# Patient Record
Sex: FEMALE | Race: BLACK OR AFRICAN AMERICAN | NOT HISPANIC OR LATINO | Employment: OTHER | URBAN - METROPOLITAN AREA
[De-identification: names, ages, dates, MRNs, and addresses within clinical notes are randomized per-mention and may not be internally consistent; named-entity substitution may affect disease eponyms.]

---

## 2017-06-08 DIAGNOSIS — Z12.31 VISIT FOR SCREENING MAMMOGRAM: Primary | ICD-10-CM

## 2017-08-10 ENCOUNTER — HOSPITAL ENCOUNTER (OUTPATIENT)
Dept: RADIOLOGY | Facility: HOSPITAL | Age: 82
Discharge: HOME | End: 2017-08-10
Attending: SPECIALIST
Payer: MEDICARE

## 2017-08-10 VITALS — WEIGHT: 190 LBS | HEIGHT: 65 IN | BODY MASS INDEX: 31.65 KG/M2

## 2017-08-10 DIAGNOSIS — Z12.31 VISIT FOR SCREENING MAMMOGRAM: ICD-10-CM

## 2017-08-10 PROCEDURE — 77067 SCR MAMMO BI INCL CAD: CPT | Mod: 26,,, | Performed by: RADIOLOGY

## 2017-08-10 PROCEDURE — 77063 BREAST TOMOSYNTHESIS BI: CPT | Mod: 26,,, | Performed by: RADIOLOGY

## 2017-08-10 PROCEDURE — 77067 SCR MAMMO BI INCL CAD: CPT | Mod: TC

## 2018-09-13 DIAGNOSIS — Z12.31 SCREENING MAMMOGRAM, ENCOUNTER FOR: Primary | ICD-10-CM

## 2018-09-20 ENCOUNTER — HOSPITAL ENCOUNTER (OUTPATIENT)
Dept: RADIOLOGY | Facility: HOSPITAL | Age: 83
Discharge: HOME | End: 2018-09-20
Attending: SPECIALIST
Payer: MEDICARE

## 2018-09-20 DIAGNOSIS — Z12.31 SCREENING MAMMOGRAM, ENCOUNTER FOR: ICD-10-CM

## 2018-09-20 PROCEDURE — 77067 SCR MAMMO BI INCL CAD: CPT | Mod: 26,,, | Performed by: RADIOLOGY

## 2018-09-20 PROCEDURE — 77063 BREAST TOMOSYNTHESIS BI: CPT | Mod: 26,,, | Performed by: RADIOLOGY

## 2018-09-20 PROCEDURE — 77067 SCR MAMMO BI INCL CAD: CPT | Mod: TC

## 2020-01-16 ENCOUNTER — HOSPITAL ENCOUNTER (OUTPATIENT)
Dept: RADIOLOGY | Facility: HOSPITAL | Age: 85
Discharge: HOME | End: 2020-01-16
Attending: SPECIALIST
Payer: MEDICARE

## 2020-01-16 DIAGNOSIS — Z12.31 SCREENING MAMMOGRAM, ENCOUNTER FOR: ICD-10-CM

## 2020-01-16 PROCEDURE — 77067 SCR MAMMO BI INCL CAD: CPT | Mod: 26,,, | Performed by: RADIOLOGY

## 2020-01-16 PROCEDURE — 77063 BREAST TOMOSYNTHESIS BI: CPT | Mod: 26,,, | Performed by: RADIOLOGY

## 2020-01-16 PROCEDURE — 77063 MAMMO DIGITAL SCREENING BILAT WITH TOMOSYNTHESIS_CAD: ICD-10-PCS | Mod: 26,,, | Performed by: RADIOLOGY

## 2020-01-16 PROCEDURE — 77067 MAMMO DIGITAL SCREENING BILAT WITH TOMOSYNTHESIS_CAD: ICD-10-PCS | Mod: 26,,, | Performed by: RADIOLOGY

## 2020-01-16 PROCEDURE — 77067 SCR MAMMO BI INCL CAD: CPT | Mod: TC

## 2020-06-17 ENCOUNTER — HOSPITAL ENCOUNTER (INPATIENT)
Facility: HOSPITAL | Age: 85
LOS: 5 days | Discharge: NURSING, REHAB OR LONG-TERM CARE | DRG: 068 | End: 2020-06-22
Attending: EMERGENCY MEDICINE | Admitting: INTERNAL MEDICINE
Payer: MEDICARE

## 2020-06-17 DIAGNOSIS — I67.9 INTRACRANIAL VASCULAR STENOSIS: ICD-10-CM

## 2020-06-17 DIAGNOSIS — E04.9 SUBSTERNAL THYROID GOITER: ICD-10-CM

## 2020-06-17 DIAGNOSIS — I63.312 THROMBOTIC STROKE INVOLVING LEFT MIDDLE CEREBRAL ARTERY: ICD-10-CM

## 2020-06-17 DIAGNOSIS — I63.9 STROKE: ICD-10-CM

## 2020-06-17 DIAGNOSIS — E78.2 MIXED HYPERLIPIDEMIA: ICD-10-CM

## 2020-06-17 DIAGNOSIS — I67.1 ANEURYSM OF MIDDLE CEREBRAL ARTERY: ICD-10-CM

## 2020-06-17 DIAGNOSIS — E04.2 MULTINODULAR GOITER (NONTOXIC): Chronic | ICD-10-CM

## 2020-06-17 DIAGNOSIS — G45.9 TIA (TRANSIENT ISCHEMIC ATTACK): Primary | ICD-10-CM

## 2020-06-17 DIAGNOSIS — N17.9 AKI (ACUTE KIDNEY INJURY): ICD-10-CM

## 2020-06-17 DIAGNOSIS — I10 UNCONTROLLED HYPERTENSION: ICD-10-CM

## 2020-06-17 PROBLEM — Z86.79 HX OF ANEURYSM: Chronic | Status: ACTIVE | Noted: 2020-06-17

## 2020-06-17 LAB
ALBUMIN SERPL BCP-MCNC: 3.8 G/DL (ref 3.5–5.2)
ALLENS TEST: ABNORMAL
ALLENS TEST: NORMAL
ALP SERPL-CCNC: 87 U/L (ref 55–135)
ALT SERPL W/O P-5'-P-CCNC: 10 U/L (ref 10–44)
ANION GAP SERPL CALC-SCNC: 5 MMOL/L (ref 8–16)
AORTIC ROOT ANNULUS: 3.36 CM
ASCENDING AORTA: 2.98 CM
AST SERPL-CCNC: 16 U/L (ref 10–40)
AV INDEX (PROSTH): 0.75
AV MEAN GRADIENT: 3 MMHG
AV PEAK GRADIENT: 6 MMHG
AV VALVE AREA: 2.73 CM2
AV VELOCITY RATIO: 0.66
BASOPHILS # BLD AUTO: 0.05 K/UL (ref 0–0.2)
BASOPHILS NFR BLD: 0.8 % (ref 0–1.9)
BILIRUB SERPL-MCNC: 0.4 MG/DL (ref 0.1–1)
BSA FOR ECHO PROCEDURE: 1.99 M2
BUN SERPL-MCNC: 12 MG/DL (ref 8–23)
CALCIUM SERPL-MCNC: 10.1 MG/DL (ref 8.7–10.5)
CHLORIDE SERPL-SCNC: 106 MMOL/L (ref 95–110)
CHOLEST SERPL-MCNC: 241 MG/DL (ref 120–199)
CHOLEST/HDLC SERPL: 4.1 {RATIO} (ref 2–5)
CK SERPL-CCNC: 99 U/L (ref 20–180)
CO2 SERPL-SCNC: 29 MMOL/L (ref 23–29)
CREAT SERPL-MCNC: 0.8 MG/DL (ref 0.5–1.4)
CREAT SERPL-MCNC: 0.9 MG/DL (ref 0.5–1.4)
CV ECHO LV RWT: 0.46 CM
DIFFERENTIAL METHOD: ABNORMAL
DOP CALC AO PEAK VEL: 1.27 M/S
DOP CALC AO VTI: 19.91 CM
DOP CALC LVOT AREA: 3.6 CM2
DOP CALC LVOT DIAMETER: 2.15 CM
DOP CALC LVOT PEAK VEL: 0.84 M/S
DOP CALC LVOT STROKE VOLUME: 54.39 CM3
DOP CALCLVOT PEAK VEL VTI: 14.99 CM
ECHO LV POSTERIOR WALL: 1.05 CM (ref 0.6–1.1)
EOSINOPHIL # BLD AUTO: 0 K/UL (ref 0–0.5)
EOSINOPHIL NFR BLD: 0.5 % (ref 0–8)
ERYTHROCYTE [DISTWIDTH] IN BLOOD BY AUTOMATED COUNT: 12.9 % (ref 11.5–14.5)
EST. GFR  (AFRICAN AMERICAN): >60 ML/MIN/1.73 M^2
EST. GFR  (NON AFRICAN AMERICAN): 59 ML/MIN/1.73 M^2
ESTIMATED AVG GLUCOSE: 120 MG/DL (ref 68–131)
FRACTIONAL SHORTENING: 25 % (ref 28–44)
GLUCOSE SERPL-MCNC: 122 MG/DL (ref 70–110)
HBA1C MFR BLD HPLC: 5.8 % (ref 4–5.6)
HCT VFR BLD AUTO: 44.4 % (ref 37–48.5)
HDLC SERPL-MCNC: 59 MG/DL (ref 40–75)
HDLC SERPL: 24.5 % (ref 20–50)
HGB BLD-MCNC: 14.1 G/DL (ref 12–16)
IMM GRANULOCYTES # BLD AUTO: 0.02 K/UL (ref 0–0.04)
IMM GRANULOCYTES NFR BLD AUTO: 0.3 % (ref 0–0.5)
INR PPP: 1 (ref 0.8–1.2)
INTERVENTRICULAR SEPTUM: 0.97 CM (ref 0.6–1.1)
IVRT: 111.32 MSEC
LA MAJOR: 4.76 CM
LA MINOR: 4.99 CM
LA WIDTH: 3.48 CM
LDLC SERPL CALC-MCNC: 152.8 MG/DL (ref 63–159)
LEFT ATRIUM SIZE: 3.68 CM
LEFT ATRIUM VOLUME INDEX: 27.4 ML/M2
LEFT ATRIUM VOLUME: 53.04 CM3
LEFT INTERNAL DIMENSION IN SYSTOLE: 3.39 CM (ref 2.1–4)
LEFT VENTRICLE DIASTOLIC VOLUME INDEX: 48.34 ML/M2
LEFT VENTRICLE DIASTOLIC VOLUME: 93.56 ML
LEFT VENTRICLE MASS INDEX: 81 G/M2
LEFT VENTRICLE SYSTOLIC VOLUME INDEX: 24.4 ML/M2
LEFT VENTRICLE SYSTOLIC VOLUME: 47.18 ML
LEFT VENTRICULAR INTERNAL DIMENSION IN DIASTOLE: 4.52 CM (ref 3.5–6)
LEFT VENTRICULAR MASS: 156.5 G
LYMPHOCYTES # BLD AUTO: 1.5 K/UL (ref 1–4.8)
LYMPHOCYTES NFR BLD: 25.6 % (ref 18–48)
MCH RBC QN AUTO: 30.1 PG (ref 27–31)
MCHC RBC AUTO-ENTMCNC: 31.8 G/DL (ref 32–36)
MCV RBC AUTO: 95 FL (ref 82–98)
MONOCYTES # BLD AUTO: 0.3 K/UL (ref 0.3–1)
MONOCYTES NFR BLD: 5 % (ref 4–15)
NEUTROPHILS # BLD AUTO: 4.1 K/UL (ref 1.8–7.7)
NEUTROPHILS NFR BLD: 67.8 % (ref 38–73)
NONHDLC SERPL-MCNC: 182 MG/DL
NRBC BLD-RTO: 0 /100 WBC
PISA TR MAX VEL: 1.88 M/S
PLATELET # BLD AUTO: 296 K/UL (ref 150–350)
PMV BLD AUTO: 9.6 FL (ref 9.2–12.9)
POC PTINR: 1.3 (ref 0.9–1.2)
POC PTWBT: 16 SEC (ref 9.7–14.3)
POCT GLUCOSE: 117 MG/DL (ref 70–110)
POCT GLUCOSE: 122 MG/DL (ref 70–110)
POTASSIUM SERPL-SCNC: 4.2 MMOL/L (ref 3.5–5.1)
PROT SERPL-MCNC: 7.6 G/DL (ref 6–8.4)
PROTHROMBIN TIME: 10.8 SEC (ref 9–12.5)
PULM VEIN S/D RATIO: 1.53
PV PEAK D VEL: 0.15 M/S
PV PEAK S VEL: 0.23 M/S
RA MAJOR: 4.28 CM
RA PRESSURE: 3 MMHG
RA WIDTH: 4.35 CM
RBC # BLD AUTO: 4.68 M/UL (ref 4–5.4)
RIGHT VENTRICULAR END-DIASTOLIC DIMENSION: 2.69 CM
SAMPLE: ABNORMAL
SAMPLE: NORMAL
SARS-COV-2 RDRP RESP QL NAA+PROBE: NEGATIVE
SITE: ABNORMAL
SITE: NORMAL
SODIUM SERPL-SCNC: 140 MMOL/L (ref 136–145)
STJ: 3.1 CM
TR MAX PG: 14 MMHG
TRICUSPID ANNULAR PLANE SYSTOLIC EXCURSION: 3.05 CM
TRIGL SERPL-MCNC: 146 MG/DL (ref 30–150)
TROPONIN I SERPL DL<=0.01 NG/ML-MCNC: 0.01 NG/ML (ref 0–0.03)
TROPONIN I SERPL DL<=0.01 NG/ML-MCNC: 0.02 NG/ML (ref 0–0.03)
TSH SERPL DL<=0.005 MIU/L-ACNC: 0.56 UIU/ML (ref 0.4–4)
TV REST PULMONARY ARTERY PRESSURE: 17 MMHG
WBC # BLD AUTO: 6.01 K/UL (ref 3.9–12.7)

## 2020-06-17 PROCEDURE — G0426 PR INPT TELEHEALTH CONSULT 50M: ICD-10-PCS | Mod: GT,,, | Performed by: PSYCHIATRY & NEUROLOGY

## 2020-06-17 PROCEDURE — 63600175 PHARM REV CODE 636 W HCPCS: Performed by: STUDENT IN AN ORGANIZED HEALTH CARE EDUCATION/TRAINING PROGRAM

## 2020-06-17 PROCEDURE — 36415 COLL VENOUS BLD VENIPUNCTURE: CPT

## 2020-06-17 PROCEDURE — 94761 N-INVAS EAR/PLS OXIMETRY MLT: CPT

## 2020-06-17 PROCEDURE — 92610 EVALUATE SWALLOWING FUNCTION: CPT

## 2020-06-17 PROCEDURE — 93010 EKG 12-LEAD: ICD-10-PCS | Mod: ,,, | Performed by: INTERNAL MEDICINE

## 2020-06-17 PROCEDURE — 99900035 HC TECH TIME PER 15 MIN (STAT)

## 2020-06-17 PROCEDURE — 84484 ASSAY OF TROPONIN QUANT: CPT

## 2020-06-17 PROCEDURE — 93005 ELECTROCARDIOGRAM TRACING: CPT

## 2020-06-17 PROCEDURE — 25000003 PHARM REV CODE 250: Performed by: STUDENT IN AN ORGANIZED HEALTH CARE EDUCATION/TRAINING PROGRAM

## 2020-06-17 PROCEDURE — 82607 VITAMIN B-12: CPT

## 2020-06-17 PROCEDURE — 25000003 PHARM REV CODE 250: Performed by: EMERGENCY MEDICINE

## 2020-06-17 PROCEDURE — 11000001 HC ACUTE MED/SURG PRIVATE ROOM

## 2020-06-17 PROCEDURE — 93010 ELECTROCARDIOGRAM REPORT: CPT | Mod: ,,, | Performed by: INTERNAL MEDICINE

## 2020-06-17 PROCEDURE — G0426 INPT/ED TELECONSULT50: HCPCS | Mod: GT,,, | Performed by: PSYCHIATRY & NEUROLOGY

## 2020-06-17 PROCEDURE — 80061 LIPID PANEL: CPT

## 2020-06-17 PROCEDURE — 92523 SPEECH SOUND LANG COMPREHEN: CPT

## 2020-06-17 PROCEDURE — 96374 THER/PROPH/DIAG INJ IV PUSH: CPT

## 2020-06-17 PROCEDURE — 82746 ASSAY OF FOLIC ACID SERUM: CPT

## 2020-06-17 PROCEDURE — 25500020 PHARM REV CODE 255: Performed by: INTERNAL MEDICINE

## 2020-06-17 PROCEDURE — 80053 COMPREHEN METABOLIC PANEL: CPT

## 2020-06-17 PROCEDURE — 84443 ASSAY THYROID STIM HORMONE: CPT

## 2020-06-17 PROCEDURE — 99291 CRITICAL CARE FIRST HOUR: CPT | Mod: 25

## 2020-06-17 PROCEDURE — G0378 HOSPITAL OBSERVATION PER HR: HCPCS

## 2020-06-17 PROCEDURE — 85025 COMPLETE CBC W/AUTO DIFF WBC: CPT

## 2020-06-17 PROCEDURE — 82550 ASSAY OF CK (CPK): CPT

## 2020-06-17 PROCEDURE — 85610 PROTHROMBIN TIME: CPT

## 2020-06-17 PROCEDURE — 83036 HEMOGLOBIN GLYCOSYLATED A1C: CPT

## 2020-06-17 PROCEDURE — 82565 ASSAY OF CREATININE: CPT

## 2020-06-17 PROCEDURE — U0002 COVID-19 LAB TEST NON-CDC: HCPCS

## 2020-06-17 PROCEDURE — 96372 THER/PROPH/DIAG INJ SC/IM: CPT | Mod: 59

## 2020-06-17 PROCEDURE — 93010 ELECTROCARDIOGRAM REPORT: CPT | Mod: 76,,, | Performed by: INTERNAL MEDICINE

## 2020-06-17 RX ORDER — LABETALOL HYDROCHLORIDE 5 MG/ML
10 INJECTION, SOLUTION INTRAVENOUS
Status: DISCONTINUED | OUTPATIENT
Start: 2020-06-17 | End: 2020-06-22 | Stop reason: HOSPADM

## 2020-06-17 RX ORDER — ENOXAPARIN SODIUM 100 MG/ML
40 INJECTION SUBCUTANEOUS EVERY 24 HOURS
Status: DISCONTINUED | OUTPATIENT
Start: 2020-06-17 | End: 2020-06-22 | Stop reason: HOSPADM

## 2020-06-17 RX ORDER — SODIUM CHLORIDE 0.9 % (FLUSH) 0.9 %
10 SYRINGE (ML) INJECTION
Status: DISCONTINUED | OUTPATIENT
Start: 2020-06-17 | End: 2020-06-22 | Stop reason: HOSPADM

## 2020-06-17 RX ORDER — NAPROXEN SODIUM 220 MG/1
81 TABLET, FILM COATED ORAL DAILY
Status: DISCONTINUED | OUTPATIENT
Start: 2020-06-18 | End: 2020-06-22 | Stop reason: HOSPADM

## 2020-06-17 RX ORDER — ASPIRIN 325 MG
325 TABLET ORAL
Status: COMPLETED | OUTPATIENT
Start: 2020-06-17 | End: 2020-06-17

## 2020-06-17 RX ORDER — CLOPIDOGREL BISULFATE 75 MG/1
75 TABLET ORAL DAILY
Status: DISCONTINUED | OUTPATIENT
Start: 2020-06-18 | End: 2020-06-22 | Stop reason: HOSPADM

## 2020-06-17 RX ORDER — ATORVASTATIN CALCIUM 40 MG/1
80 TABLET, FILM COATED ORAL DAILY
Status: DISCONTINUED | OUTPATIENT
Start: 2020-06-17 | End: 2020-06-22 | Stop reason: HOSPADM

## 2020-06-17 RX ORDER — CLOPIDOGREL BISULFATE 75 MG/1
300 TABLET ORAL
Status: COMPLETED | OUTPATIENT
Start: 2020-06-17 | End: 2020-06-17

## 2020-06-17 RX ADMIN — ENOXAPARIN SODIUM 40 MG: 100 INJECTION SUBCUTANEOUS at 02:06

## 2020-06-17 RX ADMIN — ATORVASTATIN CALCIUM 80 MG: 40 TABLET, FILM COATED ORAL at 02:06

## 2020-06-17 RX ADMIN — LABETALOL HYDROCHLORIDE 10 MG: 5 INJECTION INTRAVENOUS at 04:06

## 2020-06-17 RX ADMIN — IOHEXOL 100 ML: 350 INJECTION, SOLUTION INTRAVENOUS at 02:06

## 2020-06-17 RX ADMIN — ASPIRIN 325 MG ORAL TABLET 325 MG: 325 PILL ORAL at 11:06

## 2020-06-17 RX ADMIN — CLOPIDOGREL BISULFATE 300 MG: 75 TABLET ORAL at 11:06

## 2020-06-17 NOTE — ED PROVIDER NOTES
"History       Chief Complaint   Patient presents with    Extremity Weakness     woke up at about 0645 this morning with weakness to her right arm and leg and a right facial droop. Last felt normal at about 2000 last night       HPI:    Margarita Chávez 84 y.o.  With a  has a past medical history of Hypertension. who presents to the emergency department today with a complaint of right sided weakness. Pt went to bed last night around 8:45pm in her normal state and when she woke up and tried to get out of bed she fell. She felt weak in her right arm and leg. She could not bear weight. She denies slurred speech, sensation changes, headache. She has HTN, takes her BP meds in the evening.      ROS    Constitutional: No fever, no chills.  Eyes: No discharge. No pain.  HENT: No nasal drainage. No ear ache. No sore throat.  Cardiovascular: No chest pain, no palpitations.  Respiratory: No cough, no shortness of breath.  Gastrointestinal: No abdominal pain, no vomiting. No diarrhea.  Genitourinary: No hematuria, dysuria, urgency.  Musculoskeletal: No back pain.   Skin: No rashes, no lesions.  Neurological: See above.     Otherwise remaining ROS negative     The history is provided by the patient     ALLERGIES REVIEWED  MEDICATIONS REVIEWED  PMH/PSH/SOC/FH REVIEWED     Past Medical History:   Diagnosis Date    Hypertension        Past Surgical History:   Procedure Laterality Date    BRAIN SURGERY      HYSTERECTOMY         Family History   Problem Relation Age of Onset    Cancer Paternal Aunt        Social History     Tobacco Use    Smoking status: Never Smoker   Substance Use Topics    Alcohol use: Not on file    Drug use: Not on file       Nursing/Ancillary staff note reviewed.  VS reviewed         Physical Exam   BP (!) 166/86   Pulse 91   Temp 98.2 °F (36.8 °C) (Oral)   Resp 16   Ht 5' 5" (1.651 m)   Wt 86.2 kg (190 lb)   LMP  (LMP Unknown)   SpO2 97%   BMI 31.62 kg/m²     Physical Exam  General Appearance: " The patient is alert, has no immediate need for airway protection and no signs of toxicity.     HEENT: Head: NCAT.     Eyes: Pupils equal and round no pallor or injection. Extra ocular movements intact. No nystagmus. No drainage.       Mouth: Mucous membranes are moist. Oropharynx clear.   Neck:Neck is supple non-tender. No lymphadenopathy. No stridor.   Respiratory: There are no retractions, lungs are clear to auscultation. No wheezing, no crackles. Chest wall nontender to palpation.   Cardiovascular: Regular rate and rhythm. No murmurs, rubs or gallops.  Gastrointestinal:  Abdomen is soft and non-tender, no masses, bowel sounds normal. No guarding, no rebound.  No pulsatile mass.   Neurological: Alert and oriented x 4.   Speech: No slurred speech, no dysarthria.   CN: Slight facial droop on right. Minor.    Strength: RUE and RLE weak   Drift: +RUE drift, + RLE drift.  Gait: Unable to assess     Skin: Warm and dry, no rashes.  Musculoskeletal:Musculoskeletal: Extremities are non-tender, non-swollen. Back NTTP along the midline.       NIHSS : 3      VAN Neg      DIFFERENTIAL DIAGNOSIS: After history and physical exam a differential diagnosis was considered, but was not limited to, CVA, TIA, hypertensive encephalopathy, seizure, migraine, hyperglycemia, hypoglycemia, metabolic derangement     CHART REVIEW: Known aneurysm,        Initial management: This is a 84 y.o. female who presents with right sided weakness, weakness on exam. Stroke protocol activated immediately. Labs, imaging, neuro consult, closely monitor and reassess.                EK bpm, NSR, LAD, LVH, no STEMI, read by myself read by cardiology pending.       Labs Reviewed   CBC W/ AUTO DIFFERENTIAL - Abnormal; Notable for the following components:       Result Value    Mean Corpuscular Hemoglobin Conc 31.8 (*)     All other components within normal limits   COMPREHENSIVE METABOLIC PANEL - Abnormal; Notable for the following components:    Glucose  122 (*)     Anion Gap 5 (*)     eGFR if non  59 (*)     All other components within normal limits   LIPID PANEL - Abnormal; Notable for the following components:    Cholesterol 241 (*)     All other components within normal limits   POCT GLUCOSE - Abnormal; Notable for the following components:    POCT Glucose 122 (*)     All other components within normal limits   ISTAT PROCEDURE - Abnormal; Notable for the following components:    POC PTWBT 16.0 (*)     POC PTINR 1.3 (*)     All other components within normal limits   PROTIME-INR   TSH   CK   SARS-COV-2 RNA AMPLIFICATION, QUAL   URINALYSIS, REFLEX TO URINE CULTURE   TROPONIN I   HEMOGLOBIN A1C   VITAMIN B12   FOLATE   POCT GLUCOSE, HAND-HELD DEVICE   ISTAT CREATININE       X-Ray Chest AP Portable   Final Result      As above.         Electronically signed by: Lewis Webb   Date:    06/17/2020   Time:    12:07      CT Head Without Contrast   Final Result      No acute intracranial abnormality.      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         Electronically signed by: Quincy Randolph MD   Date:    06/17/2020   Time:    11:30      MRI Brain Without Contrast    (Results Pending)   CTA Head and Neck (xpd)    (Results Pending)           ED Course     ED Course    I spoke with neurology, Dr Christianson, re:the pts presentation. At this time pt is out of window for TPA.       Is not a candidate for TPA due to : >4.5 hr after wake up stroke.      I spoke with [facility], Dr Archuleta for admission. They accept.       ED Course as of Jun 17 1353   Wed Jun 17, 2020   1133 Dr Christianson has seen pt, not a TPA candidate. Recommends admission for further evaluation. Asprin and plavix load. Permissive HTN SBP<220.     [JA]   1220 I spoke with Dr Arhculeta who will accept pt for admission after Neg Covid swab.     [JA]   1255 Pt to be admitted   SARS-CoV-2 RNA, Amplification,  Qual: Negative [JA]      ED Course User Index  [JA] Jade Mesa MD            [facility]  MDM  Number of Diagnoses or Management Options  Stroke: new, needed workup     Amount and/or Complexity of Data Reviewed  Clinical lab tests: reviewed and ordered  Tests in the radiology section of CPT®: ordered and reviewed  Tests in the medicine section of CPT®: ordered and reviewed  Discussion of test results with the performing providers: yes  Decide to obtain previous medical records or to obtain history from someone other than the patient: yes  Discuss the patient with other providers: yes  Independent visualization of images, tracings, or specimens: yes        Margarita Chávez  presents to the ED today with right sided weakness, stroke symptoms.  The pt will be admitted for further management.              Impression        The encounter diagnosis was Stroke.              Critical care time spent with this patient (not including billable procedures) was 32 minutes due to consideration for thrombolytics and possible CNS compromise.    Critical Care    Date/Time: 6/17/2020 11:47 AM  Performed by: Jade Mesa MD  Authorized by: Jade Mesa MD   Total critical care time (exclusive of procedural time) : 32 minutes  Critical care time was exclusive of separately billable procedures and treating other patients.  Critical care was necessary to treat or prevent imminent or life-threatening deterioration of the following conditions: CNS failure or compromise.  Critical care was time spent personally by me on the following activities: development of treatment plan with patient or surrogate, discussions with consultants, discussions with primary provider, examination of patient, obtaining history from patient or surrogate, ordering and performing treatments and interventions, ordering and review of laboratory studies, ordering and review of radiographic studies, pulse oximetry, re-evaluation of patient's condition and  review of old charts.                   Jade Mesa MD  06/17/20 2704

## 2020-06-17 NOTE — ED NOTES
Admitting team spoke with son and confirmed pt does have clip from previous brain aneurysm surgery.

## 2020-06-17 NOTE — CONSULTS
[facility]  Vascular Neurology  Comprehensive Stroke Center  Tele-Consultation Note      Inpatient consult to Telemedicine-Stroke  Consult performed by: Derik Christianson MD  Consult ordered by: Omar Mesa MD          Consulting Provider: OMAR MESA  Current Providers  No providers found    Patient Location:  [facility] EMERGENCY DEPARTMENT Emergency Department  Spoke hospital nurse at bedside with patient assisting consultant.     Patient information was obtained from patient and EMS personnel.         Assessment/Plan:     STROKE DOCUMENTATION     Acute Stroke Times:   Acute Stroke Times   Last Known Normal Date: 06/16/20  Last Known Normal Time: 2030  Symptom Onset Date: 06/17/20  Symptom Onset Time: 0645  Stroke Team Called Time: 1114  Stroke Team Arrival Time: 1119  CT Interpretation Time: 1119    NIH Scale:  1a. Level of Consciousness: 0-->Alert, keenly responsive  1b. LOC Questions: 0-->Answers both questions correctly  1c. LOC Commands: 0-->Performs both tasks correctly  2. Best Gaze: 0-->Normal  3. Visual: 0-->No visual loss  4. Facial Palsy: 1-->Minor paralysis (flattened nasolabial fold, asymmetry on smiling)  5a. Motor Arm, Left: 0-->No drift, limb holds 90 (or 45) degrees for full 10 secs  5b. Motor Arm, Right: 1-->Drift, limb holds 90 (or 45) degrees, but drifts down before full 10 secs, does not hit bed or other support  6a. Motor Leg, Left: 0-->No drift, leg holds 30 degree position for full 5 secs  6b. Motor Leg, Right: 1-->Drift, leg falls by the end of the 5-sec period but does not hit bed  7. Limb Ataxia: 0-->Absent  8. Sensory: 0-->Normal, no sensory loss  9. Best Language: 0-->No aphasia, normal  10. Dysarthria: 0-->Normal  11. Extinction and Inattention (formerly Neglect): 0-->No abnormality  Total (NIH Stroke Scale): 3     Modified Mónica    Charbel Coma Scale:    ABCD2 Score:    [id number] Score:   HAS -BLED Score:   ICH Score:   Hunt & Freedman  Classification:       Diagnoses:   Thrombotic stroke involving left middle cerebral artery  Wake up stroke with mild right hemiparesis, NIHSS 3.     Antithrombotics for secondary stroke prevention:  Load aspirin 325 mg & clopidogrel 300 mg x 1 now, followed by daily aspirin 81 mg /  clopidogrel 75 mg x 30 days followed by monotherapy therafter    Statins for secondary stroke prevention and hyperlipidemia, if present:   Statins: Atorvastatin- 80 mg daily    Aggressive risk factor modification: HTN     Rehab efforts: The patient has been evaluated by a stroke team provider and the therapy needs have been fully considered based off the presenting complaints and exam findings. The following therapy evaluations are needed: PT evaluate and treat, OT evaluate and treat, SLP evaluate and treat    Diagnostics ordered/pending: CTA Head to assess vasculature , CTA Neck/Arch to assess vasculature, HgbA1C to assess blood glucose levels, Lipid Profile to assess cholesterol levels, MRI head without contrast to assess brain parenchyma, TTE to assess cardiac function/status     VTE prophylaxis: Enoxaparin 40 mg SQ every 24 hours    BP parameters: Infarct: No intervention, SBP <220            There were no vitals taken for this visit.  Alteplase Eligible?: No  Alteplase Recommendation: Alteplase not recommended due to Outside of treatment window  and Mild Non-Disabling Symptoms  Possible Interventional Revascularization Candidate? VAN-    Disposition Recommendation: admit to inpatient    Subjective:     History of Present Illness:  84F w/ wake up right hemiparesis. The aforementioned symptoms have never happened before. There are no identified triggers or modifying factors. There have been no recurrent events. There are no other associated symptoms.          Woke up with symptoms?: yes    Recent bleeding noted: no  Does the patient take any Blood Thinners? no  Medications: No relevant medications      Past Medical History:   Past  Medical History:   Diagnosis Date    Hypertension      Past Surgical History: no major surgeries within the last 2 weeks    Family History: no relevant history    Social History: no smoking, no drinking, no drugs    Allergies: Penicillins No relevant allergies    Review of Systems   Constitutional: Negative for appetite change.   HENT: Negative for congestion.    Eyes: Negative for discharge.   Respiratory: Negative for shortness of breath.    Cardiovascular: Negative for chest pain.   Gastrointestinal: Negative for abdominal pain.   Endocrine: Negative for cold intolerance.   Genitourinary: Negative for difficulty urinating.   Musculoskeletal: Negative for joint swelling.   Skin: Negative for color change.     Objective:   Vitals:  /95, , RR 12, o2 97    CT READ: Yes  No hemmorhage. No mass effect. No early infarct signs.     Physical Exam  Constitutional:       General: She is not in acute distress.  HENT:      Head: Normocephalic.      Right Ear: External ear normal.      Left Ear: External ear normal.   Eyes:      Conjunctiva/sclera: Conjunctivae normal.   Neck:      Musculoskeletal: Normal range of motion.   Pulmonary:      Effort: Pulmonary effort is normal.      Breath sounds: No stridor.   Abdominal:      Tenderness: There is no abdominal tenderness.   Skin:     General: Skin is dry.      Findings: No rash.               Recommended the emergency room physician to have a brief discussion with the patient and/or family if available regarding the risks and benefits of treatment, and to briefly document the occurrence of that discussion in his clinical encounter note.     The attending portion of this evaluation, treatment, and documentation was performed per Derik Christianson MD via audiovisual.    Billing code:  (non-intervention mild to moderate stroke, TIA, some mimics)    · This patient has a critical neurological condition/illness, with some potential for high morbidity and  mortality.  · There is a moderate probability for acute neurological change leading to clinical and possibly life-threatening deterioration requiring highest level of physician preparedness for urgent intervention.  · Care was coordinated with other physicians involved in the patient's care.  · Radiologic studies and laboratory data were reviewed and interpreted, and plan of care was re-assessed based on the results.  · Diagnosis, treatment options and prognosis may have been discussed with the patient and/or family members or caregiver.      In your opinion, this was a: Tier 2 Van Negative    Consult End Time: 11:26 AM     Derik Christianson MD  Comprehensive Stroke Center  Vascular Neurology   [facility]

## 2020-06-17 NOTE — PLAN OF CARE
VN cued into room to complete admit assessment, VIP model introduced, VN working alongside bedside treatment team.  Son Adam at bedside. Plan of care reviewed with patient and son. Patient verbalized understanding. Patient informed of fall risk and fall precautions, call light within reach, 2x bed rails. Patient notified to ask staff for assistance and pt verbalized complete understanding. Stroke education provided to pt and son. Time allowed for questions.   Son stated he will call nurses station with updated home medication list. Will continue to monitor and intervene as needed.

## 2020-06-17 NOTE — PT/OT/SLP EVAL
"Speech Language Pathology Evaluation  Bedside Swallow    Patient Name:  Margarita Chávez   MRN:  [id number]  Admitting Diagnosis: Stroke    Recommendations:                 General Recommendations:  Monitor with PO intake, ensure safety, dysarthria remediation  Diet recommendations:  Dental Soft, Thin   1. Pt to be fully awake/alert for all PO  2. HOB at 90* for intake  3. Remain upright at least 30 mins s/p intake  4. SMALL bites/sips  5. Alternate bites/sips  6. Check for pocketing/oral residue  7. Cue pt to swallow  8. Slow rate of feeding      General Precautions: Standard, fall, vision impaired(wears glasses)  Communication strategies:  Use speech strategies     History:     History of Present Illness:  Margarita Chávez is a 84 y.o. female with past medical history of HTN.. The patient presented to [facility] on 6/17/2020 with a primary complaint of right sided weakness with facial droop upon awakening.     The patient was in their usual state of health until waking up at this morning at 6:30. She stated she woke up and went to stand up out of bed and immediately slid onto the floor because she could not use her right leg. She noted it was weak and could not lift it off the ground. She stated she pulled her self up into a chair using her left arm at this time and hoped her leg would regain its strength but it did not. At this time she scooted on the ground to the bathroom and pulled her self up onto the toilet seat and shortly after called her son to come get her. She says she also noted the right side of her face was weak and she felt "forgetful." Prior to this, she was living alone and performing all ADL's. She does reports recent diagnosis of hypertension at her PCP within the past month. She denies any worsening weakness, vision changes, head ache, dysarthria, loss of sensation, urinary/bowel incontinence, fever, chills, nausea, vomiting, CP, or SOB.      Past Medical History:   Diagnosis " "Date    Hypertension        Past Surgical History:   Procedure Laterality Date    BRAIN SURGERY      HYSTERECTOMY         Social History: Patient lives alone at home, indep with ADLS, was driving, cooking and cleaning. 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    Patient goals: "Oh honey my feet are cold."    Pain/Comfort:  · Pain Rating 1: 0/10    Objective:   Pt seen in ED, she is awake and alert. Pt is verbal, follows commands.  Pt with mild dysarthric speech marked by oral motor weakness, imprecise articulation, slow rate of speech and dcr'd rate of diadochokinesis observed during speech tasks.    Oral Musculature Evaluation  · Oral Musculature: facial asymmetry present, right weakness  · Dentition: present and adequate  · Mucosal Quality: adequate, good  · Mandibular Strength and Mobility: (fair)  · Oral Labial Strength and Mobility: impaired seal  · Lingual Strength and Mobility: impaired strength  · Buccal Strength and Mobility: decreased tone  · Volitional Cough: elicited  · Volitional Swallow: timely swallow  · Voice Prior to PO Intake: clear voice    Bedside Swallow Eval:   Consistencies Assessed:  · Thin liquids water by cup/straw  · Puree pudding self fed  · Solids cracker and diced peaches     Oral Phase:   Slow oral transit time   dcr'd labial seal  Fair oral motor control  No oral spillage    Pharyngeal Phase:   · no overt clinical signs/symptoms of aspiration  · no overt clinical signs/symptoms of pharyngeal dysphagia  · multiple spontaneous swallows "   · No coughing, no voice changes and no choking present on tested consistencies.     Compensatory Strategies  · Pt can feed self  · Slow rate of eating is recommended  · Instruct on speech strategies     Treatment: monitor PO intake/ ensure safety with meal, monitor speech production.     Assessment:     Margarita Chávez is a 84 y.o. female admitted with CVA who presents with an SLP diagnosis of mild dysarthria and mild oral dysphagia (very minimal). No outward signs of airway threat or penetration noted.      Goals:   Multidisciplinary Problems     SLP Goals        Problem: SLP Goal    Goal Priority Disciplines Outcome   SLP Goal     SLP Ongoing, Progressing   Description: Short Term Goals:  1. Pt will participate in swallow eval to determine safest PO diet.  2 Pt will tolerate dental soft diet and thin liquids with no s/s of dysphagia.  3. Pt will participate in speech eval to determine deficits.   4. Pt will complete OMEx w/ min cues to increase buccal, lingual, labial ROM/strength  5. Pt will converse with staff and unknown listeners with 90% speech clarity.                    Plan:     · Patient to be seen:  3 x/week   · Plan of Care expires:  07/16/20  · Plan of Care reviewed with:  patient   · SLP Follow-Up:  Yes       Discharge recommendations:  (TBD pending PT/OT evals)   Barriers to Discharge:  none    Time Tracking:     SLP Treatment Date:   06/17/20  Speech Start Time:  1300  Speech Stop Time:  1324     Speech Total Time (min):  24 min    Billable Minutes: Eval 12  and Eval Swallow and Oral Function 12    ELBA Arreaga, CCC-SLP  06/17/2020

## 2020-06-17 NOTE — ASSESSMENT & PLAN NOTE
Wake up stroke with mild right hemiparesis, NIHSS 3.     Antithrombotics for secondary stroke prevention:  Load aspirin 325 mg & clopidogrel 300 mg x 1 now, followed by daily aspirin 81 mg /  clopidogrel 75 mg x 30 days followed by monotherapy therafter    Statins for secondary stroke prevention and hyperlipidemia, if present:   Statins: Atorvastatin- 80 mg daily    Aggressive risk factor modification: HTN     Rehab efforts: The patient has been evaluated by a stroke team provider and the therapy needs have been fully considered based off the presenting complaints and exam findings. The following therapy evaluations are needed: PT evaluate and treat, OT evaluate and treat, SLP evaluate and treat    Diagnostics ordered/pending: CTA Head to assess vasculature , CTA Neck/Arch to assess vasculature, HgbA1C to assess blood glucose levels, Lipid Profile to assess cholesterol levels, MRI head without contrast to assess brain parenchyma, TTE to assess cardiac function/status     VTE prophylaxis: Enoxaparin 40 mg SQ every 24 hours    BP parameters: Infarct: No intervention, SBP <220

## 2020-06-17 NOTE — HPI
85 y/o woman with PMH HTN, arthritis, R-MCA aneurysm s/p craniotomy and pipeline (details unclear @ [facility] 2010), who was admitted with presumed L-MCA stroke.  Patient woke up yesterday with R sided weakness.  Did not receive tPA as she was outside the window.  Admitted for workup.  CTA with relatively stable R-MCA aneurysm and diffuse intracranial atherosclerosis.  Unable to obtain MRI due to prior aneurysm hardware.

## 2020-06-17 NOTE — PLAN OF CARE
Problem: SLP Goal  Goal: SLP Goal  Description: Short Term Goals:  1. Pt will participate in swallow eval to determine safest PO diet.  2 Pt will tolerate dental soft diet and thin liquids with no s/s of dysphagia.  3. Pt will participate in speech eval to determine deficits.   4. Pt will complete OMEx w/ min cues to increase buccal, lingual, labial ROM/strength  5. Pt will converse with staff and unknown listeners with 90% speech clarity.   Outcome: Ongoing, Progressing   ST eval completed, pt may initiate dental soft diet and thin liquids for now.

## 2020-06-17 NOTE — H&P
"[facility] Medicine H&P Note     Admitting Team: [facility] Hospitalist Team A  Attending Physician: Lorraine Archuleta MD  Resident: Diana  Intern: Kathe    Date of Admit: 6/17/2020    Chief Complaint     Right sided weakness for 6+ hours    Subjective:      History of Present Illness:  Margarita Chávez is a 84 y.o. female with past medical history of HTN.. The patient presented to [facility] on 6/17/2020 with a primary complaint of right sided weakness with facial droop upon awakening.    The patient was in their usual state of health until waking up at this morning at 6:30. She stated she woke up and went to stand up out of bed and immediately slid onto the floor because she could not use her right leg. She noted it was weak and could not lift it off the ground. She stated she pulled her self up into a chair using her left arm at this time and hoped her leg would regain its strength but it did not. At this time she scooted on the ground to the bathroom and pulled her self up onto the toilet seat and shortly after called her son to come get her. She says she also noted the right side of her face was weak and she felt "forgetful." Prior to this, she was living alone and performing all ADL's. She does reports recent diagnosis of hypertension at her PCP within the past month. She denies any worsening weakness, vision changes, head ache, dysarthria, loss of sensation, urinary/bowel incontinence, fever, chills, nausea, vomiting, CP, or SOB.    Past Medical History:  Past Medical History:   Diagnosis Date    Hypertension      Past Surgical History:  Past Surgical History:   Procedure Laterality Date    BRAIN SURGERY      HYSTERECTOMY       Allergies:  Review of patient's allergies indicates:   Allergen Reactions    Penicillins Nausea And Vomiting     Home Medications:  Prior to Admission medications    Medication Sig Start Date End Date Taking? Authorizing Provider   hydrochlorothiazide " (HYDRODIURIL) 12.5 MG Tab Take 12.5 mg by mouth once daily.    Historical Provider, MD   naproxen (NAPROSYN) 500 MG tablet Take 1 tablet (500 mg total) by mouth 2 (two) times daily with meals. 14   Enmanuel Abarca Jr., MD   tramadol (ULTRAM) 50 mg tablet Take 50 mg by mouth every 6 (six) hours as needed for Pain.    Historical Provider, MD     Family History:  Family History   Problem Relation Age of Onset    Cancer Paternal Aunt      Social History:  Social History     Tobacco Use    Smoking status: Never Smoker   Substance Use Topics    Alcohol use: Not on file    Drug use: Not on file     Review of Systems:  Pertinent items are noted in HPI. All other systems are reviewed and are negative.    Health Maintaince :   Primary Care Physician: Syeda Grace    Immunizations:   TDap UTD    Flu unknown   Pna UTD    Cancer Screening:  MMG: unknown  Colonoscopy: UTD     Objective:   Last 24 Hour Vital Signs:  BP  Min: 195/95  Max: 195/95  Pulse  Av  Min: 98  Max: 98  Resp  Av  Min: 16  Max: 16  SpO2  Av %  Min: 97 %  Max: 97 %  There is no height or weight on file to calculate BMI.  No intake/output data recorded.    Physical Examination:  Examination  General: Patient sitting comfortably in NAD  HEENT: normocephalic, atraumatic, PERRL, EOMI  Neck: No thyromegaly or masses   Cardiac: RRR, no murmurs appreciated, no extra heart sounds  Pulmonary/Chest: CTAB, symmetric chest rise, no wheezing or crackles  GI: Soft, non tender, non distended, normoactive bowel sounds  Extremities: no edema, clubbing, or cyanosis  Skin: dry, warm, intact. No bruising or rashes.  Neuro: Alert and oriented. Moves all extremities and carries on conversation. CN- II: PERRL; III/IV/VI: EOMI without evidence of nystagmus; V: no deficits appreciated to light touch bilateral face; VII: right sided facial asymmetry, eyebrow raise symmetric, smile asymmetric; IX/X: palate midline and raises symmetrically; XI: shoulder shrug  5/5 bilaterally; XII: tongue is midline without asymmetry. 4/5 strength of right side upper and lower extremities compared to left side.     Laboratory:  CBC:   Lab Results   Component Value Date    WBC 6.01 06/17/2020    HGB 14.1 06/17/2020    HCT 44.4 06/17/2020     06/17/2020    MCV 95 06/17/2020    RDW 12.9 06/17/2020     BMP:   Lab Results   Component Value Date     06/17/2020    K 4.2 06/17/2020     06/17/2020    CO2 29 06/17/2020    BUN 12 06/17/2020    CREATININE 0.9 06/17/2020     (H) 06/17/2020    CALCIUM 10.1 06/17/2020     LFTs:   Lab Results   Component Value Date    PROT 7.6 06/17/2020    ALBUMIN 3.8 06/17/2020    BILITOT 0.4 06/17/2020    AST 16 06/17/2020    ALKPHOS 87 06/17/2020    ALT 10 06/17/2020     Coags:   Lab Results   Component Value Date    INR 1.0 06/17/2020     FLP:   Lab Results   Component Value Date    CHOL 241 (H) 06/17/2020    HDL 59 06/17/2020    LDLCALC 152.8 06/17/2020    TRIG 146 06/17/2020    CHOLHDL 24.5 06/17/2020     DM:   Lab Results   Component Value Date    LDLCALC 152.8 06/17/2020    CREATININE 0.9 06/17/2020     Thyroid:   Lab Results   Component Value Date    TSH 0.564 06/17/2020     Cardiac:   Lab Results   Component Value Date    TROPONINI <0.006 05/26/2014     Trended Lab Data:  Recent Labs   Lab 06/17/20  1120   WBC 6.01   HGB 14.1   HCT 44.4      MCV 95   RDW 12.9      K 4.2      CO2 29   BUN 12   CREATININE 0.9   *   PROT 7.6   ALBUMIN 3.8   BILITOT 0.4   AST 16   ALKPHOS 87   ALT 10     Microbiology Data:  COVID = negative    Other Results:  EKG (my interpretation): NSR at a rate of 95. No interval changes, no enlargements, and no STEMI noted.    Radiology:  CT Head (6/17/2020)  The brain is significant for periventricular hypoattenuation consistent with microvascular ischemic change.  There are remote bilateral lacunar type basal ganglial infarcts.  There is a remote left cerebellar hemisphere infarct.  There  is a 1.9 cm partially calcified aneurysm of the right MCA better characterized on CTA performed 04/06/2010.  This aneurysm measured 1.4 cm on previous CTA.  There is no intra or extra-axial mass or hemorrhage.  The visualized extracranial structures are significant for postoperative change within the right frontotemporal calvarium.    CXR (6/17/2020)  No large airspace opacity or lobar consolidation.  No pleural effusion or pneumothorax.  Cardiomediastinal silhouette appears similar allowing for variation in technique.  Slight elevation of the left hemidiaphragm with probable gas in the stomach.  Similar rightward deviation of the trachea.  Age related DJD.     Assessment:     Margarita Chávez is a 84 y.o. female with:  Patient Active Problem List    Diagnosis Date Noted    Thrombotic stroke involving left middle cerebral artery 06/17/2020        Plan:     Thrombotic CVA of Left MCA  - Patient woke morning of admission with right sided hemiparesis and facial droop  - NIHSS = 3 I tPA not given due to patient outside of treatment window  - Vascular Neurology consulted: , plavix 300 followed by 75 mg for 30 days, atorvastatin 80 mg  - Ordered CTA head/neck and MRI brain without contrast  - Consulted SLP/OT/PT    Right MCA Aneurysm  - Patient with known right MCA aneurysm  - CT head (6/17): Large partially calcified aneurysm extending off the right MCA which has increased in size compared to previous CT dated 04/06/2010.  - Follow up with neurosurgery as outpatient    Hypertension  - BP on admission = 195/95  - Home medications: HCTZ 12.5  - Holding medications for permissive hypertension give CVA    Health Care Maintenance  - TSH = 0.564 I ordered A1c  - Lipid panel: cholesterol = 241 I TG = 146 I HDL = 59 I LDL = 152  - Influenza, Tdap, and pneumococcal vaccines up to date    Code Status: Full code  DVT Prophylaxis: lovenox  Diet: NPO for speech evaluation  Disposition:  Pending stroke workup; possible  discharge tomorrow    Susie Archuleta MD  [facility] Internal Medicine Resident, -I    [facility] Medicine Hospitalist Pager numbers:   [facility] Hospitalist Medicine Team A (Fuad/Kathe): [phone]  [facility] Hospitalist Medicine Team B (Nayana/Alysa):  [phone]

## 2020-06-17 NOTE — ED NOTES
Pt reports previous brain surgery for aneurysm. She is unsure if any clips or metal were placed. Admitting team informed pt needs to be cleared by radiology prior to testing.

## 2020-06-18 PROBLEM — I67.9 INTRACRANIAL VASCULAR STENOSIS: Status: ACTIVE | Noted: 2020-06-18

## 2020-06-18 PROBLEM — E78.2 MIXED HYPERLIPIDEMIA: Status: ACTIVE | Noted: 2020-06-18

## 2020-06-18 PROBLEM — E04.9 SUBSTERNAL THYROID GOITER: Status: ACTIVE | Noted: 2020-06-18

## 2020-06-18 PROBLEM — G45.9 TIA (TRANSIENT ISCHEMIC ATTACK): Status: ACTIVE | Noted: 2020-06-17

## 2020-06-18 LAB
ANION GAP SERPL CALC-SCNC: 8 MMOL/L (ref 8–16)
BACTERIA #/AREA URNS HPF: ABNORMAL /HPF
BASOPHILS # BLD AUTO: 0.05 K/UL (ref 0–0.2)
BASOPHILS NFR BLD: 0.9 % (ref 0–1.9)
BILIRUB UR QL STRIP: NEGATIVE
BUN SERPL-MCNC: 8 MG/DL (ref 8–23)
CALCIUM SERPL-MCNC: 10.3 MG/DL (ref 8.7–10.5)
CHLORIDE SERPL-SCNC: 105 MMOL/L (ref 95–110)
CLARITY UR: ABNORMAL
CO2 SERPL-SCNC: 27 MMOL/L (ref 23–29)
COLOR UR: YELLOW
CREAT SERPL-MCNC: 0.8 MG/DL (ref 0.5–1.4)
DIFFERENTIAL METHOD: ABNORMAL
EOSINOPHIL # BLD AUTO: 0.1 K/UL (ref 0–0.5)
EOSINOPHIL NFR BLD: 1.9 % (ref 0–8)
ERYTHROCYTE [DISTWIDTH] IN BLOOD BY AUTOMATED COUNT: 13 % (ref 11.5–14.5)
EST. GFR  (AFRICAN AMERICAN): >60 ML/MIN/1.73 M^2
EST. GFR  (NON AFRICAN AMERICAN): >60 ML/MIN/1.73 M^2
FOLATE SERPL-MCNC: 15.1 NG/ML (ref 4–24)
GLUCOSE SERPL-MCNC: 93 MG/DL (ref 70–110)
GLUCOSE UR QL STRIP: NEGATIVE
HCT VFR BLD AUTO: 44.5 % (ref 37–48.5)
HGB BLD-MCNC: 13.8 G/DL (ref 12–16)
HGB UR QL STRIP: ABNORMAL
HYALINE CASTS #/AREA URNS LPF: 0 /LPF
IMM GRANULOCYTES # BLD AUTO: 0.01 K/UL (ref 0–0.04)
IMM GRANULOCYTES NFR BLD AUTO: 0.2 % (ref 0–0.5)
KETONES UR QL STRIP: NEGATIVE
LEUKOCYTE ESTERASE UR QL STRIP: ABNORMAL
LYMPHOCYTES # BLD AUTO: 2 K/UL (ref 1–4.8)
LYMPHOCYTES NFR BLD: 34.1 % (ref 18–48)
MAGNESIUM SERPL-MCNC: 2 MG/DL (ref 1.6–2.6)
MCH RBC QN AUTO: 29.2 PG (ref 27–31)
MCHC RBC AUTO-ENTMCNC: 31 G/DL (ref 32–36)
MCV RBC AUTO: 94 FL (ref 82–98)
MICROSCOPIC COMMENT: ABNORMAL
MONOCYTES # BLD AUTO: 0.5 K/UL (ref 0.3–1)
MONOCYTES NFR BLD: 8.6 % (ref 4–15)
NEUTROPHILS # BLD AUTO: 3.1 K/UL (ref 1.8–7.7)
NEUTROPHILS NFR BLD: 54.3 % (ref 38–73)
NITRITE UR QL STRIP: POSITIVE
NRBC BLD-RTO: 0 /100 WBC
PH UR STRIP: 7 [PH] (ref 5–8)
PHOSPHATE SERPL-MCNC: 3.4 MG/DL (ref 2.7–4.5)
PLATELET # BLD AUTO: 283 K/UL (ref 150–350)
PMV BLD AUTO: 9.6 FL (ref 9.2–12.9)
POCT GLUCOSE: 103 MG/DL (ref 70–110)
POTASSIUM SERPL-SCNC: 4.3 MMOL/L (ref 3.5–5.1)
PROT UR QL STRIP: NEGATIVE
RBC # BLD AUTO: 4.72 M/UL (ref 4–5.4)
RBC #/AREA URNS HPF: 10 /HPF (ref 0–4)
SODIUM SERPL-SCNC: 140 MMOL/L (ref 136–145)
SP GR UR STRIP: 1.01 (ref 1–1.03)
SQUAMOUS #/AREA URNS HPF: 4 /HPF
TROPONIN I SERPL DL<=0.01 NG/ML-MCNC: 0.01 NG/ML (ref 0–0.03)
URN SPEC COLLECT METH UR: ABNORMAL
UROBILINOGEN UR STRIP-ACNC: NEGATIVE EU/DL
VIT B12 SERPL-MCNC: 436 PG/ML (ref 210–950)
WBC # BLD AUTO: 5.72 K/UL (ref 3.9–12.7)
WBC #/AREA URNS HPF: 100 /HPF (ref 0–5)
WBC CLUMPS URNS QL MICRO: ABNORMAL

## 2020-06-18 PROCEDURE — 36415 COLL VENOUS BLD VENIPUNCTURE: CPT

## 2020-06-18 PROCEDURE — 87086 URINE CULTURE/COLONY COUNT: CPT

## 2020-06-18 PROCEDURE — 97530 THERAPEUTIC ACTIVITIES: CPT

## 2020-06-18 PROCEDURE — 97161 PT EVAL LOW COMPLEX 20 MIN: CPT

## 2020-06-18 PROCEDURE — 87186 SC STD MICRODIL/AGAR DIL: CPT

## 2020-06-18 PROCEDURE — G0425 PR INPT TELEHEALTH CONSULT 30M: ICD-10-PCS | Mod: GT,,, | Performed by: PSYCHIATRY & NEUROLOGY

## 2020-06-18 PROCEDURE — 92523 SPEECH SOUND LANG COMPREHEN: CPT

## 2020-06-18 PROCEDURE — 83735 ASSAY OF MAGNESIUM: CPT

## 2020-06-18 PROCEDURE — 93010 EKG 12-LEAD: ICD-10-PCS | Mod: ,,, | Performed by: INTERNAL MEDICINE

## 2020-06-18 PROCEDURE — 11000001 HC ACUTE MED/SURG PRIVATE ROOM

## 2020-06-18 PROCEDURE — 63600175 PHARM REV CODE 636 W HCPCS: Performed by: STUDENT IN AN ORGANIZED HEALTH CARE EDUCATION/TRAINING PROGRAM

## 2020-06-18 PROCEDURE — 80048 BASIC METABOLIC PNL TOTAL CA: CPT

## 2020-06-18 PROCEDURE — 94761 N-INVAS EAR/PLS OXIMETRY MLT: CPT

## 2020-06-18 PROCEDURE — 93010 ELECTROCARDIOGRAM REPORT: CPT | Mod: ,,, | Performed by: INTERNAL MEDICINE

## 2020-06-18 PROCEDURE — 84484 ASSAY OF TROPONIN QUANT: CPT

## 2020-06-18 PROCEDURE — 87077 CULTURE AEROBIC IDENTIFY: CPT

## 2020-06-18 PROCEDURE — 87088 URINE BACTERIA CULTURE: CPT

## 2020-06-18 PROCEDURE — 92526 ORAL FUNCTION THERAPY: CPT

## 2020-06-18 PROCEDURE — 97110 THERAPEUTIC EXERCISES: CPT

## 2020-06-18 PROCEDURE — 85025 COMPLETE CBC W/AUTO DIFF WBC: CPT

## 2020-06-18 PROCEDURE — 81000 URINALYSIS NONAUTO W/SCOPE: CPT

## 2020-06-18 PROCEDURE — 97165 OT EVAL LOW COMPLEX 30 MIN: CPT

## 2020-06-18 PROCEDURE — 93005 ELECTROCARDIOGRAM TRACING: CPT

## 2020-06-18 PROCEDURE — 97802 MEDICAL NUTRITION INDIV IN: CPT

## 2020-06-18 PROCEDURE — G0425 INPT/ED TELECONSULT30: HCPCS | Mod: GT,,, | Performed by: PSYCHIATRY & NEUROLOGY

## 2020-06-18 PROCEDURE — 84100 ASSAY OF PHOSPHORUS: CPT

## 2020-06-18 PROCEDURE — 25000003 PHARM REV CODE 250: Performed by: STUDENT IN AN ORGANIZED HEALTH CARE EDUCATION/TRAINING PROGRAM

## 2020-06-18 RX ORDER — HYDROCHLOROTHIAZIDE 25 MG/1
25 TABLET ORAL DAILY
Status: DISCONTINUED | OUTPATIENT
Start: 2020-06-18 | End: 2020-06-22 | Stop reason: HOSPADM

## 2020-06-18 RX ADMIN — HYDROCHLOROTHIAZIDE 25 MG: 25 TABLET ORAL at 10:06

## 2020-06-18 RX ADMIN — ENOXAPARIN SODIUM 40 MG: 100 INJECTION SUBCUTANEOUS at 05:06

## 2020-06-18 RX ADMIN — CLOPIDOGREL BISULFATE 75 MG: 75 TABLET ORAL at 09:06

## 2020-06-18 RX ADMIN — ATORVASTATIN CALCIUM 80 MG: 40 TABLET, FILM COATED ORAL at 09:06

## 2020-06-18 RX ADMIN — ASPIRIN 81 MG 81 MG: 81 TABLET ORAL at 09:06

## 2020-06-18 NOTE — PT/OT/SLP EVAL
"Speech Language Pathology Evaluation  Cognitive Communication    Patient Name:  Margarita Chávez   MRN:  [id number]   [id number]    Admitting Diagnosis: Thrombotic stroke involving left middle cerebral artery    Recommendations:     Recommendations:                General Recommendations:  Cognitive-linguistic therapy  Diet recommendations:  Regular, Thin   Aspiration Precautions: Standard aspiration precautions   General Precautions: Standard, fall, vision impaired(wears glasses)  Communication strategies:  none    History:     Past Medical History:   Diagnosis Date    Hypertension        Past Surgical History:   Procedure Laterality Date    BRAIN SURGERY      HYSTERECTOMY         Social History: Patient lives alone at home, indep with ADLS, was driving, cooking and cleaning.     Prior Intubation HX:  n/a     Modified Barium Swallow: n/a     Chest X-Rays:  No pleural effusion or pneumothorax     CT: Large partially calcified aneurysm extending off the right MCA which has increased in size compared to previous CT dated 04/06/2010     Prior diet: unrestricted diet       Subjective     Patient found awake and feeding self breakfast.   She states, "I think they said I have to see a few more doctors and thin I can go home."    Objective:       COGNITIVE STATUS:  Behavioral Observations: alert and appropriate-  Memory:  · Immediate: wfl for 5 word and number lists; impaired for phone numebrs  · Short Term: impaired; recalled 0/3 unrelated words after a 1 minute filled delay; impaired with funcitonal information    · Long Term: wfl  Orientation: wfl  Attention: mildly impaired sustained attn  Problem Solving: wfl for hypothetical situations  Insight Awareness: wfl  Sequencing:   · Functional Events: wfl  · Mental Manipulation: impaired with 4 word lists  Pragmatics: wfl  Simple Money/Time Management: impaired    LANGUAGE:   Receptive Language:  Simple y/n Questions: wfl  Complex y/n Questions: wfl  Identification: wfl  1 " Step Directions: wfl  2 Step Directions: wfl  Complex Directions: impaired; required multiple repetitions    Expressive Language:   Naming:   · Divergent: impaired; patient named 6 animals despite cues in 1 minute and 4 fruits in 40 seconds given min cues.   · Convergent: wfl  · Confrontational: wfl  Automatic Speech: wfl  Sentence Completion: wfl  Responsive Speech: wfl  Repetition: wfl    Motor Speech: Pt with mild dysarthric speech marked by oral motor weakness, imprecise articulation, slow rate of speech and dcr'd rate of diadochokinesis observed during speech tasks.     Voice: Wfl    Reading: tba     Writing: tba    Treatment: Patient seen for an ongoing swallowing assessment. She denies difficulties with meal trays. She completed entire breakfast tray without difficulties. Patient assessed with bites of ankur cracker and sips of water via straw. She presents with adequate oral clearance and no overt s/s of aspiration with all trials. SLP provided patient education on SLP recommendations for diet upgrade, SLP role, s/s and risks of aspiration, safe swallow precautions, and POC. Patient verbalized understanding of all discussed.     Assessment:   Margarita Chávez is a 84 y.o. female with an SLP diagnosis of mild Cognitive-Linguistic Impairment and mild Dysarthria. ST will continue to follow 3x a week.     Goals:   Multidisciplinary Problems     SLP Goals        Problem: SLP Goal    Goal Priority Disciplines Outcome   SLP Goal     SLP Ongoing, Progressing   Description: Short Term Goals:  1. Pt will participate in swallow eval to determine safest PO diet.  2 Pt will tolerate dental soft diet and thin liquids with no s/s of dysphagia. -met 6/18  3. Pt will participate in speech eval to determine deficits. -met 6/18  4. Pt will complete OMEx w/ min cues to increase buccal, lingual, labial ROM/strength  5. Pt will converse with staff and unknown listeners with 90% speech clarity. -met 6/18  6. Pt will name 8 items  in a concrete category in 60 seconds given min cues.  7. Pt will answer functional money/time management questions with 85% acc min cues.   8. Pt will recall functional information after a 2+ minute delay with 90% acc min cues.   9. Pt will follow multi-unit directions with 90% acc given 1 repetition.   10. Pt will participate in ongoing assessment of reading and writing to determine further ST needs.                    Plan:   · Patient to be seen:  3 x/week   · Plan of Care expires:  07/16/20  · Plan of Care reviewed with:  patient   · SLP Follow-Up:  Yes       Discharge recommendations:  Discharge Facility/Level of Care Needs: rehabilitation facility   Barriers to Discharge:  None    Time Tracking:   SLP Treatment Date:   06/18/20  Speech Start Time:  0811  Speech Stop Time:  0830     Speech Total Time (min):  19 min    Billable Minutes: Eval 10  and Treatment Swallowing Dysfunction 9    ELBA Harris, CCC-SLP  06/18/2020

## 2020-06-18 NOTE — PLAN OF CARE
Referral sent to [facility] as this is pt's first choice.  Per Savita in admissions, facility has beds available.  Will have physician review and request insurance auth once medically accepted.  Aware pt is ready for d/c tomorrow.    Brianne Lo RN    [phone]

## 2020-06-18 NOTE — ASSESSMENT & PLAN NOTE
Contributing Nutrition Diagnosis  Inadequate oral intake    Related to (etiology):   TIA    Signs and Symptoms (as evidenced by):   Decreased appetite, slowly increasing. Pt eating 25-50% of meals.      Interventions:  Collaboration with other providers  [employer]     Nutrition Diagnosis Status:   New

## 2020-06-18 NOTE — PLAN OF CARE
1900 reported no pain.   No change in neuro check however pt reported that her right hand doesn't feel normal we touched; it's not numb or tingly but has an abnormal sensation when touched.     No accu ck.     Tele: SR, HR  80,  No alarms.     Bed in lowest position, wheels locked, non skid socks, ID band worn, personal items and call bell with in reach, bed alarm set.

## 2020-06-18 NOTE — PLAN OF CARE
Plan of care reviewed with patient. Patient voiced understanding. NSR on monitor. No acute distress noted. Side rails x2, bed in lowest position, call bell within reach, pt advised to call for assistance. Maintain bed alarm for patient safety.

## 2020-06-18 NOTE — PLAN OF CARE
Problem: SLP Goal  Goal: SLP Goal  Description: Short Term Goals:  1. Pt will participate in swallow eval to determine safest PO diet.  2 Pt will tolerate dental soft diet and thin liquids with no s/s of dysphagia. -met 6/18  3. Pt will participate in speech eval to determine deficits. -met 6/18  4. Pt will complete OMEx w/ min cues to increase buccal, lingual, labial ROM/strength  5. Pt will converse with staff and unknown listeners with 90% speech clarity. -met 6/18  6. Pt will name 8 items in a concrete category in 60 seconds given min cues.  7. Pt will answer functional money/time management questions with 85% acc min cues.   8. Pt will recall functional information after a 2+ minute delay with 90% acc min cues.   9. Pt will follow multi-unit directions with 90% acc given 1 repetition.   10. Pt will participate in ongoing assessment of reading and writing to determine further ST needs.   Outcome: Ongoing, Progressing   Gwlvpd-Dcbvjylx-Newgwfxmu Evaluation completed. Patient presents with mild cognitive-linguistic impairments. Patient with increased tolerance of regular solids. Recommend upgrade to regular diet and thin liquids. ST will continue to follow.   RANDA Moralez., CCC-SLP  06/18/2020

## 2020-06-18 NOTE — PT/OT/SLP EVAL
Physical Therapy Evaluation and Treatment    Patient Name:  Margarita Chávez   MRN:  [id number]    Recommendations:     Discharge Recommendations:  rehabilitation facility   Discharge Equipment Recommendations: (defer to next level of care)   Barriers to discharge: Decreased caregiver support and requires significantly increased assist for all mobility    Assessment:     Margarita Chávez is a 84 y.o. female admitted with a medical diagnosis of TIA (transient ischemic attack).  She presents with the following impairments/functional limitations:  weakness, impaired endurance, impaired self care skills, impaired functional mobilty, gait instability, impaired balance, visual deficits, decreased coordination, decreased upper extremity function, decreased lower extremity function, decreased ROM, impaired coordination, impaired fine motor. Pt presenting with R UE/LE weakness with ataxia and requires significant assist for all functional mobility. Pt was living alone and functioning at mod I level with use of SPC at baseline. Pt is cognitively intact, highly motivated, and would greatly benefit from IPR upon d/c. Pt is able to tolerate 3 hours of therapy/day and is motivated return to her mod I functional level.    Rehab Prognosis: Good; patient would benefit from acute skilled PT services to address these deficits and reach maximum level of function.    Recent Surgery: * No surgery found *      Plan:     During this hospitalization, patient to be seen 6 x/week to address the identified rehab impairments via gait training, therapeutic activities, therapeutic exercises, neuromuscular re-education and progress toward the following goals:    · Plan of Care Expires:  07/18/20    Subjective     Chief Complaint: weakness in RUE  Patient/Family Comments/goals: pt is very pleasant and motivated to participate in therapy and is open to going to IPR for intensive therapy  Pain/Comfort:  · Pain Rating 1: 0/10  · Pain Rating  Post-Intervention 1: 0/10    Patients cultural, spiritual, Judaism conflicts given the current situation: no    Living Environment:  Pt lives alone in a SSH with no Edgar and walk in tub.  Prior to admission, patients level of function was mod I with use of her SPC for all mobility and ADLs, drives, and completes her own cooking/cleaning.  Equipment used at home: cane, straight.  DME owned (not currently used): none.  Upon discharge, patient will have assistance from unknown level of assist available upon d/c as pt lives alone.    Objective:     Communicated with nurse Tiffany prior to session.  Patient found supine with bed alarm  upon PT entry to room.    General Precautions: Standard, fall   Orthopedic Precautions:N/A   Braces: N/A     Exams:  · Pt is alert and oriented but requires repetition of commands to process therapist's request  · Cognitive Exam:  Patient is oriented to Person, Place, Time and Situation  · Fine Motor Coordination:    · -       Impaired  Right hand thumb/finger opposition skills impaired, Right hand, manipulation of objects imapired and RLE heel shin impaired  · Gross Motor Coordination:  R sided ataxia - RLE ataxia with ambulation  · Postural Exam:  Patient presented with the following abnormalities:    · -       Rounded shoulders  · -       Forward head  · Sensation:    · -       Intact  · Skin Integrity/Edema:      · -       Skin integrity: Visible skin intact  · RLE ROM: WFL except pt with ankle inversion during DF  · RLE Strength: Deficits: ankle DF ~4-/5 with inversion, knee ext WFLs, knee flex ~3-/5, hip flexion 4-/5  · LLE ROM: WFL  · LLE Strength: WFL except hip flexion 4/5    Functional Mobility:  · Bed Mobility:     · Scooting: moderate assist to scoot forward while sitting EOB and min A to scoot up in bed in supine (assist for placement of RLE into bridge position)  · Supine to Sit: moderate assistance  · Sit to Supine: maximal assistance  · Transfers:     · Sit to Stand:   moderate assist x 2 for 1st stand, minimal/moderate assist x 2 for 2nd stand with rolling walker and assist for placement and maintenance of RUE on RW  · Gait: 4-5 side steps left with mod A x 2 - assist with maintaining R hand on RW and assist with weight shifting and pt leaning heavily to the right in standing. Pt with R knee buckling during LLE steps. Pt unable to clear feet during steps, dragging feet.    Therapeutic Activities and Exercises:  Pt sat EOB with intermittent R lateral leaning requiring min A to correct then pt improves midline positioning following stands. Cues for forward gaze and to place RUE on bed 2/2 R hand sliding forward off EOB.  Completed 2 stands as reported above and took side steps towards HOB.  Returned to supine and completed rolling R/L to change brief and complete hygiene.  Educated extensively on importance of R sided mobility and educated in UE/LE exercises.    AM-PAC 6 CLICK MOBILITY  Total Score:11     Patient left HOB elevated with all lines intact, call button in reach, bed alarm on and nurse notified.    GOALS:   Multidisciplinary Problems     Physical Therapy Goals        Problem: Physical Therapy Goal    Goal Priority Disciplines Outcome Goal Variances Interventions   Physical Therapy Goal     PT, PT/OT Ongoing, Not Progressing     Description: Goals to be met by: 20     Patient will increase functional independence with mobility by performin. Supine <> sit with MInimal Assistance  2. Sit to stand transfer with Minimal Assistance  3. Bed to chair transfer with Minimal Assistance using appropriate AD  4. Gait  x 10 feet with Moderate Assistance using appropriate AD  5. Lower extremity exercise program x 10 reps per handout, with supervision                     History:     Past Medical History:   Diagnosis Date    Hypertension        Past Surgical History:   Procedure Laterality Date    BRAIN SURGERY      HYSTERECTOMY         Time Tracking:     PT Received On:  06/18/20  PT Start Time: 0835     PT Stop Time: 0917  PT Total Time (min): 42 min co-tx with OT    Billable Minutes: Evaluation 10 and Therapeutic Activity 16      Jen Lopez, PT  06/18/2020

## 2020-06-18 NOTE — PLAN OF CARE
Recommendation:   1. Continue current low Na diet with texture/consistency modifications per SLP.   2. Addition of Boost Plus BID if pt has consistent intake of 50% or less of meals.      Goals:   Pt intake >/= 75% EEN/EPN by RD follow up     Nutrition Goal Status: new  Communication of RD Recs: other (comment)(POC)      Problem: Adult Inpatient Plan of Care  Goal: Plan of Care Review  Outcome: Ongoing, Progressing

## 2020-06-18 NOTE — PLAN OF CARE
Problem: Physical Therapy Goal  Goal: Physical Therapy Goal  Description: Goals to be met by: 20     Patient will increase functional independence with mobility by performin. Supine <> sit with MInimal Assistance  2. Sit to stand transfer with Minimal Assistance  3. Bed to chair transfer with Minimal Assistance using appropriate AD  4. Gait  x 10 feet with Moderate Assistance using appropriate AD  5. Lower extremity exercise program x 10 reps per handout, with supervision    Outcome: Ongoing, Not Progressing     PT evaluation completed, note to follow. Pt presenting with R UE/LE weakness with ataxia and requires significant assist for all functional mobility. Pt was living alone and functioning at mod I level with use of SPC at baseline. Pt is cognitively intact, highly motivated, and would greatly benefit from IPR upon d/c. Pt is able to tolerate 3 hours of therapy/day and is motivated return to her mod I functional level.

## 2020-06-18 NOTE — CONSULTS
"  [facility]  Adult Nutrition  Consult Note    SUMMARY     Recommendations    Recommendation:   1. Continue current low Na diet with texture/consistency modifications per SLP.   2. Addition of Boost Plus BID if pt has consistent intake of 50% or less of meals.     Goals:   Pt intake >/= 75% EEN/EPN by RD follow up    Nutrition Goal Status: new  Communication of RD Recs: other (comment)(POC)    Reason for Assessment    Reason For Assessment: consult(assess dietary needs)  Diagnosis: cardiac disease(TIA)  Relevant Medical History: HTN  Interdisciplinary Rounds: did not attend  General Information Comments: Pt with family member in the room. On dysphagia soft low Na diet. Pt reports increasing appetite, eate 1/3 of breakfast and eating lunch at time of interview. Already had consumed 50% of plate. Pt denies N/V/D/C. Pt open to trying Boost Plus if consistently consuming 50% of meals or less.  Pt shows no indicators of malnutrition at this time NFPE not warranted.   Nutrition Discharge Planning: d/c on low na diet with texture/consistency modifications per SLP    Nutrition Risk Screen    Nutrition Risk Screen: no indicators present    Nutrition/Diet History    Food Preferences: no cultural or spiritual preferences identified  Spiritual, Cultural Beliefs, Advent Practices, Values that Affect Care: no  Food Allergies: NKFA  Factors Affecting Nutritional Intake: None identified at this time    Anthropometrics    Temp: 98.9 °F (37.2 °C)  Height Method: Stated  Height: 5' 5" (165.1 cm)  Height (inches): 65 in  Weight Method: Bed Scale  Weight: 77.5 kg (170 lb 13.7 oz)  Weight (lb): 170.86 lb  Ideal Body Weight (IBW), Female: 125 lb  % Ideal Body Weight, Female (lb): 136.69 %  BMI (Calculated): 28.4  BMI Grade: 25 - 29.9 - overweight       Lab/Procedures/Meds    Pertinent Labs Reviewed: reviewed  Pertinent Labs Comments: Chol 241, A1C 5.8  Pertinent Medications Reviewed: reviewed  Pertinent Medications " Comments: aspirin, statin, clopidogrel, enoxaparin    Physical Findings/Assessment    Abdirashid Score: 20    Estimated/Assessed Needs    Weight Used For Calorie Calculations: 77.5 kg (170 lb 13.7 oz)  Energy Calorie Requirements (kcal): 1744-4252  Energy Need Method: Kcal/kg(20-25 kcal/kg)  Protein Requirements: 78(1.0 gm/kg)  Weight Used For Protein Calculations: 77.5 kg (170 lb 13.7 oz)     Estimated Fluid Requirement Method: RDA Method(or PER MD)  RDA Method (mL): 1550         Nutrition Prescription Ordered    Current Diet Order: Dysphagia Soft Low Na Thin Liquids    Evaluation of Received Nutrient/Fluid Intake    I/O: -345  Energy Calories Required: not meeting needs  Protein Required: not meeting needs  Fluid Required: meeting needs  Comments: LBM 6/17  Tolerance: tolerating  % Intake of Estimated Energy Needs: 25 - 50 %  % Meal Intake: 25 - 50 %    Nutrition Risk    Level of Risk/Frequency of Follow-up: (2 x/week)     Assessment and Plan    * TIA (transient ischemic attack)  Contributing Nutrition Diagnosis  Inadequate oral intake    Related to (etiology):   TIA    Signs and Symptoms (as evidenced by):   Decreased appetite, slowly increasing. Pt eating 25-50% of meals.      Interventions:  Collaboration with other providers  [employer]     Nutrition Diagnosis Status:   New           Monitor and Evaluation    Food and Nutrient Intake: energy intake, food and beverage intake  Food and Nutrient Adminstration: diet order  Knowledge/Beliefs/Attitudes: food and nutrition knowledge/skill  Physical Activity and Function: nutrition-related ADLs and IADLs  Anthropometric Measurements: weight, body mass index, weight change  Biochemical Data, Medical Tests and Procedures: electrolyte and renal panel, gastrointestinal profile, glucose/endocrine profile, inflammatory profile, lipid profile  Nutrition-Focused Physical Findings: overall appearance     Malnutrition Assessment     NFPE not warranted at this time, pt with  no signs of malnutrition     Nutrition Follow-Up    RD Follow-up?: Yes

## 2020-06-18 NOTE — PLAN OF CARE
Visit with pt and Adam at bedside.  Pt lives at home alone, independent with ADL's, uses no DME.  Pt's son lives in [place].  We discussed stroke deficits, pt is aware of inpatient rehab recommendations by therapy to return to independent living and self care. Also discussed importance of timely transition of rehab to assist in restoring function.  Discussed contracted facilities nearby, pt and son have chosen [facility] as first choice and will review list in [facility] as well as [place] for second option.  Referral has been sent.    Discharge planning brochure provided. White board updated with CM name & contact info.  Pt encouraged to call with any questions or needs. CM will continue to follow patient throughout the transitions of care, and assist with any discharge needs.          06/18/20 1222   Discharge Assessment   Assessment Type Discharge Planning Assessment   Confirmed/corrected address and phone number on facesheet? Yes   Assessment information obtained from? Patient   Expected Length of Stay (days) 2   Communicated expected length of stay with patient/caregiver yes   Prior to hospitilization cognitive status: Alert/Oriented   Prior to hospitalization functional status: Independent   Current cognitive status: Alert/Oriented   Current Functional Status: Needs Assistance   Lives With alone   Able to Return to Prior Arrangements   (TBD)   Patient's perception of discharge disposition rehab facility   Readmission Within the Last 30 Days no previous admission in last 30 days   Patient currently being followed by outpatient case management? No   Patient currently receives any other outside agency services? No   Equipment Currently Used at Home none   Do you have any problems affording any of your prescribed medications? No   Is the patient taking medications as prescribed? yes   Does the patient have transportation home? Yes   Transportation Anticipated family or friend will provide    Does the patient receive services at the [facility]? No   Discharge Plan A Rehab   Patient/Family in Agreement with Plan yes       Brianne Lo RN    [phone]

## 2020-06-18 NOTE — CONSULTS
[facility]  Vascular Neurology  Comprehensive Stroke Center  Consult Note    Consults  Assessment/Plan:     Patient is a 84 y.o. 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  Suspect either artery-artery embolism in setting of extensive intracranial atherosclerosis, or lacunar stroke.  Aneurysm relatively stable from imaging 10 years ago, and given her age, treatment is not indicated.    - Repeat CTH today to see if infarct now apparent.  - Continue DAPT for 30d, then ASA 81 mg daily.  - Continue atorvastatin 40 for intracranial athero.  - Encouraged patient to stay well hydrated with H20 given multiple intracranial stenosis.  - PT/OT.  - Follow-up arranged in [facility] at [facility].      STROKE DOCUMENTATION     Acute Stroke Times   Last Known Normal Date: 06/16/20  Last Known Normal Time: 2030  Symptom Onset Date: 06/17/20  Symptom Onset Time: 0654  Stroke Team Called Time: 1114  Stroke Team Arrival Time: 1119  CT Interpretation Time: 1119    NIH Scale:  1a. Level of Consciousness: 0-->Alert, keenly responsive  1b. LOC Questions: 0-->Answers both questions correctly  1c. LOC Commands: 0-->Performs both tasks correctly  2. Best Gaze: 0-->Normal  3. Visual: 0-->No visual loss  4. Facial Palsy: 2-->Partial paralysis (total or near-total paralysis of lower face)  5a. Motor Arm, Left: 0-->No drift, limb holds 90 (or 45) degrees for full 10 secs  5b. Motor Arm, Right: 2-->Some effort against gravity, limb cannot get to or maintain (if cued) 90 (or 45) degrees, drifts down to bed, but has some effort against gravity  6a. Motor Leg, Left: 0-->No drift, leg holds 30 degree position for full 5 secs  6b. Motor Leg, Right: 1-->Drift, leg falls by the end of the 5-sec period but does not hit bed  7. Limb Ataxia: 0-->Absent  8. Sensory: 0-->Normal, no sensory loss  9. Best Language: 0-->No aphasia, normal  10. Dysarthria: 0-->Normal  11. Extinction and  Inattention (formerly Neglect): 0-->No abnormality  Total (NIH Stroke Scale): 5      Thrombolysis Candidate? No, Out of window     Interventional Revascularization Candidate?   Is the patient eligible for mechanical endovascular reperfusion (MODESTA)?  No; No large vessel occlusion      Hemorrhagic change of an Ischemic Stroke: Does this patient have an ischemic stroke with hemorrhagic changes? No     Subjective:     History of Present Illness:  85 y/o woman with PMH HTN, arthritis, R-MCA aneurysm s/p craniotomy and pipeline (details unclear @ [facility] 2010), who was admitted with presumed L-MCA stroke.  Patient woke up yesterday with R sided weakness.  Did not receive tPA as she was outside the window.  Admitted for workup.  CTA with relatively stable R-MCA aneurysm and diffuse intracranial atherosclerosis.  Unable to obtain MRI due to prior aneurysm hardware.      Woke up with symptoms?: yes    Recent bleeding noted: no  Does the patient take any Blood Thinners? no  Medications: No relevant medications      Past Medical History:   Past Medical History:   Diagnosis Date    Hypertension      Past Surgical History: no major surgeries within the last 2 weeks    Family History: no relevant history    Social History: no smoking, no drinking, no drugs    Allergies: Penicillins No relevant allergies    Review of Systems   Constitutional: Negative for appetite change.   HENT: Negative for congestion.    Eyes: Negative for discharge.   Respiratory: Negative for shortness of breath.    Cardiovascular: Negative for chest pain.   Gastrointestinal: Negative for abdominal pain.   Endocrine: Negative for cold intolerance.   Genitourinary: Negative for difficulty urinating.   Musculoskeletal: Negative for joint swelling.   Skin: Negative for color change.     Objective:     Vitals:    06/18/20 0735 06/18/20 0810 06/18/20 1145 06/18/20 1300   BP:  (!) 183/81     BP Location:  Left arm     Patient Position:  Sitting     Pulse:  91 84   "  Resp:  18     Temp:  98.9 °F (37.2 °C)     TempSrc:  Oral     SpO2: 96% 96%     Weight:    77.5 kg (170 lb 13.7 oz)   Height:    5' 5" (1.651 m)       Neuro Exam:  MS: A&XO3, speech fluent, follows commands, no neglect  CN: PERRL, EOMI, sensation intact, R facial droop, no dysarthria  Motor: R arm antigravity but quickly falls to bed  R leg drift  Sens: intact to LT  Coord: no ataxia on finger to nose      CTA Head/Neck   CT head: No hemorrhage or major vascular distribution infarction.  Further evaluation with MRI at the discretion of the clinical service.     CTA: No definite large vessel occlusion.  Severe stenosis of both branches of the right M2 as seen on series 5, image 412 and 416.  Multifocal intracranial arterial stenoses including multifocal posterior circulation moderate to severe stenoses and moderate right M1 MCA stenosis.     Enlarging right MCA aneurysm in comparison the prior exam of 2010.     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            Khushboo Allred MD  Comprehensive Stroke Center  Department of Vascular Neurology   [facility]   "

## 2020-06-18 NOTE — PT/OT/SLP EVAL
Occupational Therapy   Evaluation/Treatment     Name: Margarita Chávez  MRN: [id number]  Admitting Diagnosis:  TIA (transient ischemic attack)      Recommendations:     Discharge Recommendations: rehabilitation facility  Discharge Equipment Recommendations:  (per rehab)  Barriers to discharge:  Decreased caregiver support    Assessment:     Margarita Chávez is a 84 y.o. female with a medical diagnosis of TIA (transient ischemic attack).  She presents with R hemiplegia . Performance deficits affecting function: weakness, gait instability, decreased upper extremity function, impaired balance, impaired endurance, decreased lower extremity function, decreased ROM, impaired self care skills, impaired fine motor, abnormal tone, decreased coordination, impaired functional mobilty.      Despite patient's co-morbidities, including HTN, aneurysm, patient was living in community setting functioning at independent level for ADLs, and mobility prior to this event.  There is an expectation of returning to prior level of function to maintain independence thus avoiding readmission.  Patient's clinical condition meets full Inpatient Rehab (IPR) criteria; including the ability to actively participate in 3 hours of therapy.  A lower level of care(SNF) cannot provide the interdisciplinary treatment approach needed.     Pt significantly motivated to participate in therapy services in order to return to independent PLOF. Demonstrates R hemiplegia at this time. Requires increased assist for ADLs and functional mobility. Excellent rehab candidate.     Rehab Prognosis: Good; patient would benefit from acute skilled OT services to address these deficits and reach maximum level of function.       Plan:     Patient to be seen 5 x/week to address the above listed problems via self-care/home management, therapeutic activities, therapeutic exercises  · Plan of Care Expires: 07/18/20  · Plan of Care Reviewed with: patient    Subjective     Chief  "Complaint: R UE/LE weakness; pt states, " I could move it yesterday. It done gone got stiff on me."   Patient/Family Comments/goals: return to independent PLOF     Occupational Profile:  Living Environment: alone, SSH, no steps, WIS, and walk in tub with built in tub  Previous level of function: independent ADLs; using SPC for ambulation   Equipment Used at Home:  cane, straight  Assistance upon Discharge: limited     Pain/Comfort:  · Pain Rating 1: 0/10    Patients cultural, spiritual, Yarsanism conflicts given the current situation:      Objective:     Communicated with: sophia prior to session.     General Precautions: Standard, fall   Orthopedic Precautions:N/A   Braces: N/A     Occupational Performance:    Bed Mobility:    · Patient completed Rolling/Turning to Left with  minimum assistance and moderate assistance  · Patient completed Rolling/Turning to Right with contact guard assistance and minimum assistance  · Patient completed Scooting/Bridging with minimum assistance and moderate assistance  · Patient completed Supine to Sit with moderate assistance  · Patient completed Sit to Supine with max assistance    Functional Mobility/Transfers:  · Patient completed Sit <> Stand Transfer with minimum assistance, moderate assistance and of 2 persons  with  rolling walker   · Functional Mobility: Mod A of 2 lateral steps to HOB with RW    Activities of Daily Living:  · Toileting: maximal assistance and total assistance soiled brief    Cognitive/Visual Perceptual:  Cognitive/Psychosocial Skills:     -       Oriented to: Person, Place, Time and Situation   -       Follows Commands/attention:Follows multistep  commands  -       Communication: clear/fluent; R facial droop   -       Memory: No Deficits noted  -       Safety awareness/insight to disability: impaired   -       Mood/Affect/Coping skills/emotional control: Appropriate to situation    Physical Exam:  Balance:    -       SBA/CGA sitting balance with slight R " lateral lean, howvever, pt able to correct self; Max/mod A standing   Postural examination/scapula alignment:    -       Rounded shoulders  Skin integrity: Visible skin intact  Sensation:    -       Intact  Dominant hand:    -       right  Upper Extremity Range of Motion:  LUE limited at end shoulder range but WFL for pt's needs and WFL distally; AAROM WFL RUE   Upper Extremity Strength:  LUE 4/5 within range; RUE + shoulder shrug; 2+ to 3/5 at shoulder in gravity eliminated position- able to hold at 90 degrees; unable to hold at 90 degrees seated EOB and limited active flexion noted- distally 3+/5 at elbow   Strength:  WFL L hand; fair - R hand   Fine Motor Coordination:  WFL L hand; impaired R hand 2/2 weakness   Gross motor coordination:  Impaired LLE      AMPAC 6 Click ADL:  AMPAC Total Score: 14    Treatment & Education:  Pt performed bed mobility as above  Slight R lateral lean, however, pt able to self correct to midline with verbal cues  Focus made for attention to RUE 2/2 increased weakness   Stood x 2 trials from EOB with RW; mod A of 2 first trial and Min A of 2 second trial; required assist to maintain R  on RW handle throughout transitions   Lateral steps to HOB Mod A; poor placement and control of LLE but able to advance; increased R lateral leaning during stance and decreased WB through RLE for step with LLE due to weakness; focused on upright stance with tactile and verbal cues for trunk and head   Sat EOB for rest break and performed shoulder shrugs, bicep curls, wrist flex/ext, and fisting   Returned to supine max A   Rolling performed (to L mod A, to R min A) for changing of brief; total A - soiled in urine   Performed supine UE/LE therex- shoulder shrugs, AAROM shoulder flex/ext, bicep curls, and fisting   Education:    Patient left supine with all lines intact, call button in reach, bed alarm on and nsg notified    GOALS:   Multidisciplinary Problems     Occupational Therapy Goals         Problem: Occupational Therapy Goal    Goal Priority Disciplines Outcome Interventions   Occupational Therapy Goal     OT, PT/OT Ongoing, Progressing    Description: Goals to be met by: 7/18/20     Patient will increase functional independence with ADLs by performing:    LE Dressing with Moderate Assistance.  Grooming while seated with Supervision.  Toileting from bedside commode with Moderate Assistance for hygiene and clothing management.   Supine to sit with Contact Guard Assistance.  Step transfer with Minimal Assistance  Toilet transfer to bedside commode with Minimal Assistance.  Increased functional strength RUE to 3+/5 for self care skills and functional mobility.  Upper extremity exercise program x10 reps per handout, with independence.                     History:     Past Medical History:   Diagnosis Date    Hypertension        Past Surgical History:   Procedure Laterality Date    BRAIN SURGERY      HYSTERECTOMY         Time Tracking:     OT Date of Treatment: 06/18/20  OT Start Time: 0835  OT Stop Time: 0917  OT Total Time (min): 42 min    Billable Minutes:Evaluation 10  Therapeutic Exercise 16 co treatment with PT       Grecia Bright OT  6/18/2020

## 2020-06-18 NOTE — NURSING
Stroke Neurovascular Consult done with Dr. Allred     Via Telemedicine cart.  See physician consult note.  Patient educated on stroke s/s, FAST, Risk factors including HTN, HLD, Atherosclerosis, diet, exercise, and new medications to be dc'd on;  Plavix, Aspirin, & Lipitor. Stroke Education Packet Guide individualized for her care and given to patient to take home.

## 2020-06-18 NOTE — PROGRESS NOTES
"[facility] Medicine Progress Note    Primary Team: [facility] Hospitalist Team A  Attending Physician: Lorraine Archuleta MD  Resident: Diana  Intern: Kathe    Subjective:      Mrs. Chávez reports sleeping well overnight. She was eating her breakfast without issue upon entering the room. She states that her right arms continues to feel weaker than her left but she is still able to lift it. She denies any dysarthria, aphasia, trouble swallowing, lower extremity weakness, loss in sensation, nausea, vomiting, fever, chills, CP or SOB.     Objective:     Last 24 Hour Vital Signs:  BP  Min: 130/84  Max: 222/102  Temp  Av.6 °F (36.4 °C)  Min: 96.8 °F (36 °C)  Max: 98.2 °F (36.8 °C)  Pulse  Av.2  Min: 62  Max: 98  Resp  Av.1  Min: 16  Max: 23  SpO2  Av.4 %  Min: 93 %  Max: 99 %  Height  Av' 5" (165.1 cm)  Min: 5' 5" (165.1 cm)  Max: 5' 5" (165.1 cm)  Weight  Av.7 kg (177 lb 14.3 oz)  Min: 76.6 kg (168 lb 14 oz)  Max: 86.2 kg (190 lb)  No intake/output data recorded.    Physical Examination:  Examination  General: Patient sitting comfortably in NAD  HEENT: normocephalic, atraumatic  Cardiac: RRR, no murmurs appreciated, no extra heart sounds  Pulmonary/Chest: CTAB, symmetric chest rise, no wheezing or crackles  GI: Soft, non tender, non distended, normoactive bowel sounds  Extremities: no edema, clubbing, or cyanosis  Skin: dry, warm, intact. No bruising or rashes.  Neuro: Alert and oriented, moving all extremities; right sided facial droop with asymmetry on smile. Right upper extremity weakness 4/5 compared to left sided 5/5. Lower extremity 5/5 strength BL.    Laboratory:  Recent Labs   Lab 20  1120 20  1341 20  1907 20  0153   WBC 6.01  --   --  5.72   HGB 14.1  --   --  13.8   HCT 44.4  --   --  44.5     --   --  283   MCV 95  --   --  94     --   --   --    K 4.2  --   --   --      --   --   --    CO2 29  --   --   --    BUN 12  --   --   --  "   CREATININE 0.9  --   --   --    *  --   --   --    CALCIUM 10.1  --   --   --    PROT 7.6  --   --   --    ALBUMIN 3.8  --   --   --    AST 16  --   --   --    ALT 10  --   --   --    ALKPHOS 87  --   --   --    TROPONINI  --  0.018 0.014 0.006     Invalid input(s): POCTGLU  Recent Labs   Lab 06/17/20  1341   HGBA1C 5.8*     Microbiology Data:  COVID = negative    Radiology:  CT Head (6/17/2020)  The brain is significant for periventricular hypoattenuation consistent with microvascular ischemic change.  There are remote bilateral lacunar type basal ganglial infarcts.  There is a remote left cerebellar hemisphere infarct.  There is a 1.9 cm partially calcified aneurysm of the right MCA better characterized on CTA performed 04/06/2010.  This aneurysm measured 1.4 cm on previous CTA.  There is no intra or extra-axial mass or hemorrhage.  The visualized extracranial structures are significant for postoperative change within the right frontotemporal calvarium.     CXR (6/17/2020)  No large airspace opacity or lobar consolidation.  No pleural effusion or pneumothorax.  Cardiomediastinal silhouette appears similar allowing for variation in technique.  Slight elevation of the left hemidiaphragm with probable gas in the stomach.  Similar rightward deviation of the trachea.  Age related DJD.     CTA Head and Neck (6/18/2020)  CT head: No hemorrhage or major vascular distribution infarction.  Further evaluation with MRI at the discretion of the clinical service.     CTA: No definite large vessel occlusion.  Severe stenosis of both branches of the right M2 as seen on series 5, image 412 and 416.  Multifocal intracranial arterial stenoses including multifocal posterior circulation moderate to severe stenoses and moderate right M1 MCA stenosis.     Enlarging right MCA aneurysm in comparison the prior exam of 2010.     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    Current Medications:  Scheduled:    aspirin  81 mg Oral Daily    atorvastatin  80 mg Oral Daily    clopidogreL  75 mg Oral Daily    enoxaparin  40 mg Subcutaneous Q24H        PRN:  labetalol, sodium chloride 0.9%    Antibiotics and Day Number of Therapy:  None    Assessment:     Margarita Chávez is a 84 y.o.female with  Patient Active Problem List    Diagnosis Date Noted    Thrombotic stroke involving left middle cerebral artery 06/17/2020    Stroke 06/17/2020    Uncontrolled hypertension 06/17/2020    Aneurysm of middle cerebral artery 06/17/2020        Plan:     High Risk TIA  - Patient woke morning of admission with right sided hemiparesis and facial droop  - NIHSS = 3 I tPA not given due to patient outside of treatment window  - Vascular Neurology consulted: , plavix 300 followed by 75 mg for 30 days, atorvastatin 80 mg  - CTA head/neck (6/17/2020):  No definite large vessel occlusion.  Severe stenosis of both branches of the right M2 as seen on series 5, image 412 and 416.  Multifocal intracranial arterial stenoses including multifocal posterior circulation moderate to severe stenoses and moderate right M1 MCA stenosis.  - Unable to obtain MRI brain due to aneurysm clip placed  - Consulted SLP/OT/PT: dental soft diet  - Consulted vascular neurology     Right MCA Aneurysm  - Patient with known right MCA aneurysm  - CT head (6/17): Large partially calcified aneurysm extending off the right MCA which has increased in size compared to previous CT dated 04/06/2010.  - Follow up with neurosurgery as outpatient    Thyroid Goiter  - CTA head and Neck (6/18/2020): Massive thyroid goiter with substernal extension which deviates the airway to the right.  - Ordered ultrasound of thyroid    Hypertension  - BP on admission = 195/95  - Home medications: HCTZ 12.5  - Holding medications for permissive hypertension give CVA    HLD  - Lipid panel: cholesterol = 241 I TG = 146 I HDL = 59 I LDL = 152  - Starting on atorvastatin 80  - Will continue home  medication     Health Care Maintenance  - TSH = 0.564 I  A1c = 5.8  - Lipid panel: cholesterol = 241 I TG = 146 I HDL = 59 I LDL = 152  - Influenza, Tdap, and pneumococcal vaccines up to date     Code Status: Full code  DVT Prophylaxis: lovenox  Diet: Level 6 diet  Disposition:  Pending stroke workup and neurology consult    Susie Archuleta MD  [facility] Internal Medicine Resident, -I    [facility] Medicine Hospitalist Pager numbers:   [facility] Hospitalist Medicine Team A (Fuad/Kathe): [phone]  [facility] Hospitalist Medicine Team B (Nayana/Alysa):  [phone]

## 2020-06-18 NOTE — PLAN OF CARE
Problem: Occupational Therapy Goal  Goal: Occupational Therapy Goal  Description: Goals to be met by: 7/18/20     Patient will increase functional independence with ADLs by performing:    LE Dressing with Moderate Assistance.  Grooming while seated with Supervision.  Toileting from bedside commode with Moderate Assistance for hygiene and clothing management.   Supine to sit with Contact Guard Assistance.  Step transfer with Minimal Assistance  Toilet transfer to bedside commode with Minimal Assistance.  Increased functional strength RUE to 3+/5 for self care skills and functional mobility.  Upper extremity exercise program x10 reps per handout, with independence.    Outcome: Ongoing, Progressing       Despite patient's co-morbidities, including HTN, aneurysm, patient was living in community setting functioning at independent level for ADLs, and mobility prior to this event.  There is an expectation of returning to prior level of function to maintain independence thus avoiding readmission.  Patient's clinical condition meets full Inpatient Rehab (IPR) criteria; including the ability to actively participate in 3 hours of therapy.  A lower level of care(SNF) cannot provide the interdisciplinary treatment approach needed.     Pt significantly motivated to participate in therapy services in order to return to independent PLOF. Demonstrates R hemiplegia at this time. Requires increased assist for ADLs and functional mobility. Excellent rehab candidate.

## 2020-06-19 PROBLEM — E04.2 MULTINODULAR GOITER (NONTOXIC): Chronic | Status: ACTIVE | Noted: 2020-06-18

## 2020-06-19 LAB
BASOPHILS # BLD AUTO: 0.04 K/UL (ref 0–0.2)
BASOPHILS NFR BLD: 0.7 % (ref 0–1.9)
DIFFERENTIAL METHOD: ABNORMAL
EOSINOPHIL # BLD AUTO: 0.2 K/UL (ref 0–0.5)
EOSINOPHIL NFR BLD: 3.1 % (ref 0–8)
ERYTHROCYTE [DISTWIDTH] IN BLOOD BY AUTOMATED COUNT: 13.2 % (ref 11.5–14.5)
HCT VFR BLD AUTO: 43.2 % (ref 37–48.5)
HGB BLD-MCNC: 13.7 G/DL (ref 12–16)
IMM GRANULOCYTES # BLD AUTO: 0.01 K/UL (ref 0–0.04)
IMM GRANULOCYTES NFR BLD AUTO: 0.2 % (ref 0–0.5)
LYMPHOCYTES # BLD AUTO: 2 K/UL (ref 1–4.8)
LYMPHOCYTES NFR BLD: 35.1 % (ref 18–48)
MCH RBC QN AUTO: 29.5 PG (ref 27–31)
MCHC RBC AUTO-ENTMCNC: 31.7 G/DL (ref 32–36)
MCV RBC AUTO: 93 FL (ref 82–98)
MONOCYTES # BLD AUTO: 0.6 K/UL (ref 0.3–1)
MONOCYTES NFR BLD: 10.9 % (ref 4–15)
NEUTROPHILS # BLD AUTO: 2.9 K/UL (ref 1.8–7.7)
NEUTROPHILS NFR BLD: 50 % (ref 38–73)
NRBC BLD-RTO: 0 /100 WBC
PLATELET # BLD AUTO: 299 K/UL (ref 150–350)
PMV BLD AUTO: 9.8 FL (ref 9.2–12.9)
POCT GLUCOSE: 91 MG/DL (ref 70–110)
RBC # BLD AUTO: 4.65 M/UL (ref 4–5.4)
WBC # BLD AUTO: 5.79 K/UL (ref 3.9–12.7)

## 2020-06-19 PROCEDURE — 85025 COMPLETE CBC W/AUTO DIFF WBC: CPT

## 2020-06-19 PROCEDURE — 92526 ORAL FUNCTION THERAPY: CPT

## 2020-06-19 PROCEDURE — 94761 N-INVAS EAR/PLS OXIMETRY MLT: CPT

## 2020-06-19 PROCEDURE — 97530 THERAPEUTIC ACTIVITIES: CPT

## 2020-06-19 PROCEDURE — 97535 SELF CARE MNGMENT TRAINING: CPT | Mod: CO

## 2020-06-19 PROCEDURE — 11000001 HC ACUTE MED/SURG PRIVATE ROOM

## 2020-06-19 PROCEDURE — 63600175 PHARM REV CODE 636 W HCPCS: Performed by: STUDENT IN AN ORGANIZED HEALTH CARE EDUCATION/TRAINING PROGRAM

## 2020-06-19 PROCEDURE — 25000003 PHARM REV CODE 250: Performed by: STUDENT IN AN ORGANIZED HEALTH CARE EDUCATION/TRAINING PROGRAM

## 2020-06-19 PROCEDURE — 36415 COLL VENOUS BLD VENIPUNCTURE: CPT

## 2020-06-19 PROCEDURE — 92507 TX SP LANG VOICE COMM INDIV: CPT

## 2020-06-19 RX ORDER — CLOPIDOGREL BISULFATE 75 MG/1
75 TABLET ORAL DAILY
Qty: 30 TABLET | Refills: 11 | Status: SHIPPED | OUTPATIENT
Start: 2020-06-20 | End: 2020-06-22

## 2020-06-19 RX ORDER — HYDROCHLOROTHIAZIDE 12.5 MG/1
25 TABLET ORAL DAILY
Qty: 30 TABLET | Refills: 5 | Status: SHIPPED | OUTPATIENT
Start: 2020-06-19 | End: 2020-07-19

## 2020-06-19 RX ORDER — AMLODIPINE BESYLATE 10 MG/1
10 TABLET ORAL DAILY
Qty: 30 TABLET | Refills: 11 | Status: SHIPPED | OUTPATIENT
Start: 2020-06-20 | End: 2021-06-20

## 2020-06-19 RX ORDER — AMLODIPINE BESYLATE 5 MG/1
10 TABLET ORAL DAILY
Status: DISCONTINUED | OUTPATIENT
Start: 2020-06-19 | End: 2020-06-22 | Stop reason: HOSPADM

## 2020-06-19 RX ORDER — NAPROXEN SODIUM 220 MG/1
81 TABLET, FILM COATED ORAL DAILY
Qty: 30 TABLET | Refills: 5 | Status: SHIPPED | OUTPATIENT
Start: 2020-06-20 | End: 2022-12-15 | Stop reason: SDUPTHER

## 2020-06-19 RX ORDER — ACETAMINOPHEN 325 MG/1
650 TABLET ORAL EVERY 6 HOURS PRN
Status: DISCONTINUED | OUTPATIENT
Start: 2020-06-19 | End: 2020-06-22 | Stop reason: HOSPADM

## 2020-06-19 RX ORDER — ATORVASTATIN CALCIUM 80 MG/1
80 TABLET, FILM COATED ORAL DAILY
Qty: 90 TABLET | Refills: 5 | Status: SHIPPED | OUTPATIENT
Start: 2020-06-20 | End: 2021-06-20

## 2020-06-19 RX ADMIN — HYDROCHLOROTHIAZIDE 25 MG: 25 TABLET ORAL at 09:06

## 2020-06-19 RX ADMIN — ASPIRIN 81 MG 81 MG: 81 TABLET ORAL at 09:06

## 2020-06-19 RX ADMIN — CLOPIDOGREL BISULFATE 75 MG: 75 TABLET ORAL at 09:06

## 2020-06-19 RX ADMIN — ENOXAPARIN SODIUM 40 MG: 100 INJECTION SUBCUTANEOUS at 05:06

## 2020-06-19 RX ADMIN — ATORVASTATIN CALCIUM 80 MG: 40 TABLET, FILM COATED ORAL at 09:06

## 2020-06-19 RX ADMIN — AMLODIPINE BESYLATE 10 MG: 5 TABLET ORAL at 09:06

## 2020-06-19 NOTE — PLAN OF CARE
POC reviewed with the pt, verbalized understanding.  High BP noted, closely monitored.  AAOx3.  No complaint of pain or any other distress noted throughout night.  PIV remained intact.  On continuous telemetry monitor, SR.  No ectopy noted.  Neuro check done.  Purewick changed during shift.  Encouraged to turn frequently.  Safety maintained, free of falls throughout shift.  Instructed to call for any assistance.  Will continue to monitor.

## 2020-06-19 NOTE — PLAN OF CARE
Contacted Twan at [facility] who states rehab request is under review at [facility] (in process).  Plan for d/c today pending auth.    Brianne Lo RN    [phone]

## 2020-06-19 NOTE — PLAN OF CARE
Plan of care reviewed with patient and son. Pt AAO and able to tell staff what she needs. Pt with noted facial droop to left side of face. Evident right sided weakness. Continues to be able to lift right arm but does drift. Worked with PT /OT/ST on transfers and strength training. Took med whole with 0 aspiration problems noted. Pt set to DC to inpat rehab when bed available. Denies pain when asked. Pt with 0 respiratory distress at this time. Verbalizes to staff understanding. NSR on monitor with 0 red alarms noted.  No acute distress observed at this time. Side rails x2, bed in low position, call bell within reach. Bed alarm in place for patient safety. Will relay to oncoming nurse to monitor for changes in condition.

## 2020-06-19 NOTE — PLAN OF CARE
Problem: SLP Goal  Goal: SLP Goal  Description: Short Term Goals:  1. Pt will participate in swallow eval to determine safest PO diet.  2 Pt will tolerate dental soft diet and thin liquids with no s/s of dysphagia. -met 6/18  3. Pt will participate in speech eval to determine deficits. -met 6/18  4. Pt will complete OMEx w/ min cues to increase buccal, lingual, labial ROM/strength  5. Pt will converse with staff and unknown listeners with 90% speech clarity. -met 6/18  6. Pt will name 8 items in a concrete category in 60 seconds given min cues.  7. Pt will answer functional money/time management questions with 85% acc min cues.   8. Pt will recall functional information after a 2+ minute delay with 90% acc min cues.   9. Pt will follow multi-unit directions with 90% acc given 1 repetition.   10. Pt will participate in ongoing assessment of reading and writing to determine further ST needs.   Outcome: Ongoing, Progressing   Pt making good progress towards ST goals.

## 2020-06-19 NOTE — PLAN OF CARE
"Problem: Occupational Therapy Goal  Goal: Occupational Therapy Goal  Description: Goals to be met by: 7/18/20     Patient will increase functional independence with ADLs by performing:    LE Dressing with Moderate Assistance.  Grooming while seated with Supervision.  Toileting from bedside commode with Moderate Assistance for hygiene and clothing management.   Supine to sit with Contact Guard Assistance.  Step transfer with Minimal Assistance  Toilet transfer to bedside commode with Minimal Assistance.  Increased functional strength RUE to 3+/5 for self care skills and functional mobility.  Upper extremity exercise program x10 reps per handout, with independence.    Outcome: Ongoing, Progressing    Despite patient's co-morbidities, patient was living in community setting functioning at MODIFIED INDEPENDENT level for ADLs, and mobility prior to this event.  There is an expectation of returning to prior level of function to maintain independence thus avoiding readmission.  Patient's clinical condition meets full Inpatient Rehab (IPR) criteria; including the ability to actively participate in 3 hours of therapy.      Patient able to complete BSC SPT /c Mod-Max of 2 persons. Patient is highly motivated to participate in therapy. Patient /c increased c/o "heaviness" and "aching" in RUE -- limited ROM and ability to grasp/squeeze /c R hand.   "

## 2020-06-19 NOTE — PLAN OF CARE
CM contacted by Mary at [facility] requesting OT/PT notes from today for determination of inpt rehab.  Therapy notified with request.      Acceptance to [facility] today pending insurance auth once therapy notes reviewed.    Brianne Lo RN    [phone]

## 2020-06-19 NOTE — PT/OT/SLP PROGRESS
"Physical Therapy Treatment    Patient Name:  Margarita Chávez   MRN:  [id number]    Recommendations:     Discharge Recommendations:  rehabilitation facility   Discharge Equipment Recommendations: (defer to next level of care)   Barriers to discharge: Decreased caregiver support and requires significant assist for all functional mobility    Assessment:     Margarita Chávez is a 84 y.o. female admitted with a medical diagnosis of TIA (transient ischemic attack).  She presents with the following impairments/functional limitations:  weakness, impaired endurance, impaired self care skills, impaired functional mobilty, gait instability, impaired balance, decreased coordination, decreased upper extremity function, decreased lower extremity function, decreased safety awareness, abnormal tone, decreased ROM, impaired coordination, impaired fine motor. Pt is very motivated to participate in therapy and improve her independence. Pt required max A x 1 to stand from BSC and mod A x 2 to stand from bed, requiring mod/max A x 2 for SPT, and max A x 2 for side steps 2/2 R hemiparesis leading to heavy R leaning in standing requiring blocking at R knee and assist with weight shifting.  Patient's clinical condition meets full Inpatient Rehab (IPR) criteria; including the ability to actively participate in 3 hours of therapy.      Rehab Prognosis: Good; patient would benefit from acute skilled PT services to address these deficits and reach maximum level of function.    Recent Surgery: * No surgery found *      Plan:     During this hospitalization, patient to be seen 6 x/week to address the identified rehab impairments via gait training, therapeutic activities, therapeutic exercises, neuromuscular re-education and progress toward the following goals:    · Plan of Care Expires:  07/18/20    Subjective     Chief Complaint: weakness in her RUE  Patient/Family Comments/goals: "I can't move it (R UE) like I did " "yesterday"  Pain/Comfort:  · Pain Rating 1: 0/10  · Pain Rating Post-Intervention 1: 0/10      Objective:     Communicated with nurse Shin prior to session.  Patient found sitting on BSC with OT present with peripheral IV, telemetry upon PT entry to room.     General Precautions: Standard, fall   Orthopedic Precautions:N/A   Braces: N/A     Functional Mobility:  · Bed Mobility:     · Sit to Supine: moderate assistance to assist slightly at trunk and with raising RLE  · Transfers:     · Sit to Stand:  max A x 1 from BSC and mod A x 2 from bed with hand-held assist  · Toilet Transfer: moderate assistance, maximal assistance and of 2  ·  persons with  hand-held assist  using  Stand Pivot  · Gait: 2-3 side steps R/L and SPT from BSC>bed with max A x 2 2/2 pt with R leaning, R knee flexion, only able to slide feet, and requires significant assist for weight shifting.       AM-PAC 6 CLICK MOBILITY  Turning over in bed (including adjusting bedclothes, sheets and blankets)?: 3  Sitting down on and standing up from a chair with arms (e.g., wheelchair, bedside commode, etc.): 2  Moving from lying on back to sitting on the side of the bed?: 2  Moving to and from a bed to a chair (including a wheelchair)?: 2  Need to walk in hospital room?: 1  Climbing 3-5 steps with a railing?: 1  Basic Mobility Total Score: 11       Therapeutic Activities and Exercises:  Pt sitting on BSC with OT when PT entered.  Pt required max A x 1 to assume and maintain standing from PT - blocking R knee. OT completed hygiene following BM.  SPT to the right back to bed, requiring PT to move RLE during pivot.  Sat with SBA/CGA with mild R lateral lean which pt improves when given VCs.  Completed 2 stands and side stepping as reported above - significant R lateral lean and RLE flexion but able to improve for ~3-5 sec bouts with cues prior to returning to lateral flexion.  Sat on bed and completed oral hygiene.  Instructed in APs, LAQs, and hip flexion - " cues for full available ROM and pacing for control of movement. Pt with most difficulty noted with R knee flexion.  Returend to supine and pt able to scoot self laterally to position in midline.  RUE propped on pillow and re-educated pt on UE/LE exercises.    Patient left HOB elevated with all lines intact, call button in reach, bed alarm on and nurse present..    GOALS:   Multidisciplinary Problems     Physical Therapy Goals        Problem: Physical Therapy Goal    Goal Priority Disciplines Outcome Goal Variances Interventions   Physical Therapy Goal     PT, PT/OT Ongoing, Progressing     Description: Goals to be met by: 20     Patient will increase functional independence with mobility by performin. Supine <> sit with MInimal Assistance  2. Sit to stand transfer with Minimal Assistance  3. Bed to chair transfer with Minimal Assistance using appropriate AD  4. Gait  x 10 feet with Moderate Assistance using appropriate AD  5. Lower extremity exercise program x 10 reps per handout, with supervision                     Time Tracking:     PT Received On: 20  PT Start Time: 913     PT Stop Time: 941  PT Total Time (min): 28 min overlap with OT    Billable Minutes: Therapeutic Activity 10    Treatment Type: Treatment  PT/PTA: PT     PTA Visit Number: 0     Jen Lopez, PT  2020

## 2020-06-19 NOTE — PLAN OF CARE
No word from [facility] regarding auth per Savita at [facility].  [facility] is aware and will be contacted if pt is approved for admission in event pt approved for admit tomorrow.    Brianne Lo RN    [phone]

## 2020-06-19 NOTE — PT/OT/SLP PROGRESS
"Speech Language Pathology Treatment    Patient Name:  Margarita Chávez   MRN:  [id number]  Admitting Diagnosis: TIA (transient ischemic attack)    Recommendations:                Recommendations:            General Recommendations:  Cognitive-linguistic therapy  Diet recommendations:  Regular, Thin   Aspiration Precautions: Standard aspiration precautions   General Precautions: Standard, fall, vision impaired(wears glasses)  Communication strategies:  none    Subjective     Pt seen this date for cognitive-linguistic deficits and monitor with meal.   Patient goals: "Oh I just need to get this arm moving."    Pain/Comfort:  · Pain Rating 1: 0/10    Objective:     Has the patient been evaluated by SLP for swallowing?   Yes  Keep patient NPO? No   Current Respiratory Status: room air      Swallowing: Pt consumed 100% of PO with no difficulty. Pt took whole meds with water. Pt reports good appetite and no issues with consuming liquids.     Communication: Pt completed OMEx with min cues. Pt stated improved mouth movement when talking. Pt completed recall of autobiographical info with min cues. Pt recalled morning events with min cues.   Pt completed word deduction tasks with 75% acc.   Pt completed divergent naming tasks up to 5 w/ mod cues. Pt aware that she is "not thinking as quickly"  Pt did need assist with sequencing of tasks and organization.Pt stated one of her favorite activities is playing cards and cooking.   Dysarthric speech is improving. Dcr'd speech clarity noted in complex conversational and unknown contexts. She tends to speak fast.     Assessment:     Margarita Chávez is a 84 y.o. female with an SLP diagnosis of mild Cognitive-Linguistic Impairment and mild Dysarthria. ST will continue to follow 3x a week.        Goals:   Multidisciplinary Problems     SLP Goals        Problem: SLP Goal    Goal Priority Disciplines Outcome   SLP Goal     SLP Ongoing, Progressing   Description: Short Term Goals:  1. Pt will " participate in swallow eval to determine safest PO diet.  2 Pt will tolerate dental soft diet and thin liquids with no s/s of dysphagia. -met 6/18  3. Pt will participate in speech eval to determine deficits. -met 6/18  4. Pt will complete OMEx w/ min cues to increase buccal, lingual, labial ROM/strength  5. Pt will converse with staff and unknown listeners with 90% speech clarity. -met 6/18  6. Pt will name 8 items in a concrete category in 60 seconds given min cues.  7. Pt will answer functional money/time management questions with 85% acc min cues.   8. Pt will recall functional information after a 2+ minute delay with 90% acc min cues.   9. Pt will follow multi-unit directions with 90% acc given 1 repetition.   10. Pt will participate in ongoing assessment of reading and writing to determine further ST needs.                    Plan:     · Patient to be seen:  3 x/week   · Plan of Care expires:  07/16/20  · Plan of Care reviewed with:  patient   · SLP Follow-Up:  Yes       Discharge recommendations:  rehabilitation facility       Time Tracking:     SLP Treatment Date:   06/19/20  Speech Start Time:  0950  Speech Stop Time:  1010     Speech Total Time (min):  20 min      Billable Minutes: Speech Therapy Individual 10 and Treatment Swallowing Dysfunction 10        ELBA Arreaga, CCC-SLP  06/19/2020

## 2020-06-19 NOTE — PT/OT/SLP PROGRESS
"Occupational Therapy   Treatment    Name: Margarita Chávez  MRN: [id number]  Admitting Diagnosis:  TIA (transient ischemic attack)       Recommendations:     Discharge Recommendations: rehabilitation facility  Discharge Equipment Recommendations:  (Defer to IPR)  Barriers to discharge:  Decreased caregiver support    Assessment:   Despite patient's co-morbidities, patient was living in community setting functioning at MODIFIED INDEPENDENT level for ADLs, and mobility prior to this event.  There is an expectation of returning to prior level of function to maintain independence thus avoiding readmission.  Patient's clinical condition meets full Inpatient Rehab (IPR) criteria; including the ability to actively participate in 3 hours of therapy.      Patient able to complete BSC SPT /c Mod-Max of 2 persons. Patient is highly motivated to participate in therapy. Patient /c increased c/o "heaviness" and "aching" in RUE -- limited ROM and ability to grasp/squeeze /c R hand.     Margarita Chávez is a 84 y.o. female with a medical diagnosis of TIA (transient ischemic attack). Performance deficits affecting function are weakness, impaired endurance, impaired self care skills, impaired functional mobilty, gait instability, impaired balance, decreased coordination, decreased upper extremity function, decreased lower extremity function, abnormal tone, decreased ROM, impaired coordination, impaired fine motor, edema.     Rehab Prognosis:  Good; patient would benefit from acute skilled OT services to address these deficits and reach maximum level of function.       Plan:     Patient to be seen 5 x/week to address the above listed problems via self-care/home management, therapeutic activities, therapeutic exercises  · Plan of Care Expires: 07/18/20  · Plan of Care Reviewed with: patient    Subjective     Pain/Comfort:  · Pain Rating 1: 0/10(reported "heaviness and ache" in RUE)    Objective:     Communicated with: Aleida villarreal " to session.  Patient found on BSC with telemetry, peripheral IV upon OT entry to room.    General Precautions: Standard, fall   Orthopedic Precautions:N/A   Braces: N/A     Bed Mobility:    · Patient completed Scooting/Bridging with contact guard assistance and VCs  · Patient completed Sit to Supine with minimum assistance, 2 persons and (A) for RLE     Functional Mobility/Transfers:  · MaxA to stand from BSC  · Mod-MaxA of 2 persons SPT BSC > EOB  · ModA of 2 persons to stand from EOB    Activities of Daily Living:  · Grooming: ModA to complete hair grooming on R side; Carly for oral care    · Toileting: total assistance for hygiene after BM    AMPAC 6 Click ADL: 14    Treatment & Education:  Patient on BSC on arrival. MaxA to stand from BSC, but unable to maintain stand to allow therapist to perform toileting hygiene. PT/OT together able to stand patient /c MaxA of 1 and 2nd person performing hygiene after BM. SPT back to EOB as above. Patient /c improved sit > stand from EOB /c mod-maxHHA of 2 persons. Demonstrating increased R lateral lean in standing /c mod-maxA to WS to midline/neutral. MaxA of 2 persons to SS -- see PT note for details. Completed oral care from EOB /c forearms resting on tabletop. Educated on using R hand as gross (A) for functional tasks. Increased time and effort for all tasks. Patient returned to supine position and RUE elevated /c pillow. Educated on ROM (AAROM and SROM) to RUE.     Patient left HOB elevated with all lines intact, call button in reach, bed alarm on and nsg notifiedEducation:      GOALS:   Multidisciplinary Problems     Occupational Therapy Goals        Problem: Occupational Therapy Goal    Goal Priority Disciplines Outcome Interventions   Occupational Therapy Goal     OT, PT/OT Ongoing, Progressing    Description: Goals to be met by: 7/18/20     Patient will increase functional independence with ADLs by performing:    LE Dressing with Moderate Assistance.  Grooming while  seated with Supervision.  Toileting from bedside commode with Moderate Assistance for hygiene and clothing management.   Supine to sit with Contact Guard Assistance.  Step transfer with Minimal Assistance  Toilet transfer to bedside commode with Minimal Assistance.  Increased functional strength RUE to 3+/5 for self care skills and functional mobility.  Upper extremity exercise program x10 reps per handout, with independence.                     Time Tracking:     OT Date of Treatment: 06/19/20  OT Start Time: 0855  OT Stop Time: 0942  OT Total Time (min): 47 mins; partial co-tx /c PT    Billable Minutes:Self Care/Home Management 32    MARIALUISA Fritz  6/19/2020

## 2020-06-19 NOTE — PLAN OF CARE
Problem: Physical Therapy Goal  Goal: Physical Therapy Goal  Description: Goals to be met by: 20     Patient will increase functional independence with mobility by performin. Supine <> sit with MInimal Assistance  2. Sit to stand transfer with Minimal Assistance  3. Bed to chair transfer with Minimal Assistance using appropriate AD  4. Gait  x 10 feet with Moderate Assistance using appropriate AD  5. Lower extremity exercise program x 10 reps per handout, with supervision    Outcome: Ongoing, Progressing     Pt is very motivated to participate in therapy and improve her independence. Pt required max A x 1 to stand from BSC and mod A x 2 to stand from bed, requiring mod/max A x 2 for SPT, and max A x 2 for side steps 2/2 R hemiparesis leading to heavy R leaning in standing requiring blocking at R knee and assist with weight shifting.  Patient's clinical condition meets full Inpatient Rehab (IPR) criteria; including the ability to actively participate in 3 hours of therapy.

## 2020-06-19 NOTE — PROGRESS NOTES
"[facility] Hospital Medicine Progress Note    Primary Team: [facility] Hospitalist Team A  Attending Physician: Lorraine Archuleta MD  Resident: Diana  Intern: Kathe    Subjective:      Evaluated by PT/OT/SLP and Neurovascular on yesterday.   Advanced diet to regular, continuing recommended medications, BP control. Pending inpatient rehab placement.   This morning, reports continued decreased movement of RUE. Otherwise, she denies fever, chills, difficulty swallowing, n/v, CP and SOB.      Objective:     Last 24 Hour Vital Signs:  BP  Min: 176/81  Max: 213/93  Temp  Av.6 °F (36.4 °C)  Min: 96.7 °F (35.9 °C)  Max: 98.9 °F (37.2 °C)  Pulse  Av.1  Min: 64  Max: 91  Resp  Av.5  Min: 16  Max: 20  SpO2  Av.4 %  Min: 95 %  Max: 99 %  Height  Av' 5" (165.1 cm)  Min: 5' 5" (165.1 cm)  Max: 5' 5" (165.1 cm)  Weight  Av.5 kg (170 lb 13.7 oz)  Min: 77.5 kg (170 lb 13.7 oz)  Max: 77.5 kg (170 lb 13.7 oz)  I/O last 3 completed shifts:  In: 540 [P.O.:540]  Out: 745 [Urine:745]    Physical Examination:  Examination  General: Elderly AAF, NAD, sitting upright in bed, eating breakfast, requested assistance with opening packets  HEENT: normocephalic, atraumatic, mild asymmetric smile, neck is supple  Cardiac: RRR, No MGR, no pedal edema  Respt: Lungs CTAB, No WRR, nonlabored breathing  GI: Soft, non tender, non distended, normoactive bowel sounds  Neuro: Alert and oriented,4/5 strength in LUE and b/l LE, unable to raise RUE from bed without assistance, Asymmetry on smile, decreased sensation in RUE,  MSK: no cyanosis  Skin: Warm, intact. No bruising or rashes noted      Laboratory:  Recent Labs   Lab 20  1120 20  1341 20  1907 20  0153 20  0623 20  0413   WBC 6.01  --   --  5.72  --  5.79   HGB 14.1  --   --  13.8  --  13.7   HCT 44.4  --   --  44.5  --  43.2     --   --  283  --  299   MCV 95  --   --  94  --  93     --   --   --  140  --    K 4.2  --   --   --  4.3  " --      --   --   --  105  --    CO2 29  --   --   --  27  --    BUN 12  --   --   --  8  --    CREATININE 0.9  --   --   --  0.8  --    *  --   --   --  93  --    CALCIUM 10.1  --   --   --  10.3  --    PROT 7.6  --   --   --   --   --    ALBUMIN 3.8  --   --   --   --   --    PHOS  --   --   --   --  3.4  --    MG  --   --   --   --  2.0  --    AST 16  --   --   --   --   --    ALT 10  --   --   --   --   --    ALKPHOS 87  --   --   --   --   --    TROPONINI  --  0.018 0.014 0.006  --   --      Invalid input(s): POCTGLU  Recent Labs   Lab 06/17/20  1341   HGBA1C 5.8*     Microbiology Data:  COVID = negative    Radiology:  CT Head (6/17/2020)  The brain is significant for periventricular hypoattenuation consistent with microvascular ischemic change.  There are remote bilateral lacunar type basal ganglial infarcts.  There is a remote left cerebellar hemisphere infarct.  There is a 1.9 cm partially calcified aneurysm of the right MCA better characterized on CTA performed 04/06/2010.  This aneurysm measured 1.4 cm on previous CTA.  There is no intra or extra-axial mass or hemorrhage.  The visualized extracranial structures are significant for postoperative change within the right frontotemporal calvarium.     CXR (6/17/2020)  No large airspace opacity or lobar consolidation.  No pleural effusion or pneumothorax.  Cardiomediastinal silhouette appears similar allowing for variation in technique.  Slight elevation of the left hemidiaphragm with probable gas in the stomach.  Similar rightward deviation of the trachea.  Age related DJD.     CTA Head and Neck (6/18/2020)  FINDINGS:  Intracranial Compartment:     Ventricles and sulci are normal in size for age without evidence of hydrocephalus. No extra-axial blood or fluid collections.     Periventricular hypoattenuation consistent with chronic microvascular ischemic changes.  Remote bilateral lacunar infarcts in the basal ganglia.  Remote left cerebellar  infarct.  No major vascular distribution infarction or intracranial hemorrhage.  No parenchymal mass.     Skull/Extracranial Contents (limited evaluation): Surgical change of the right frontal and temporal bones stable.  Mastoid air cells and paranasal sinuses are essentially clear.     Non-Vascular Structures of the Neck/Thoracic Inlet (limited evaluation): Massive thyroid goiter with substernal extension which deviates the airway to the right.  Posterior dependent densities in the lungs.  No focal consolidation or mass.     Aorta: Tortuosity of the aorta.  There is minimal aortic atherosclerosis.  Origin of the common carotid arteries appears unremarkable.  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     Extracranial carotid circulation: No hemodynamically significant stenosis, aneurysmal dilatation, or dissection.  Slight tortuosity of the internal carotid arteries is noted.     Extracranial vertebral circulation: No hemodynamically significant stenosis, aneurysmal dilatation, or dissection.  Congenitally small sized left vertebral artery.     Intracranial Arteries:     Anterior circulation: Large, slightly lobulated and partially calcified aneurysm of the right MCA bifurcation is again seen measuring 1.7 x 1.3 x 1.6 cm (AP TV CC).  The more distal right MCA arises from the aneurysm with stenosis of the post-aneurysmal segment of the M1 MCA.  Additional severe stenosis of the M2 MCA affecting both M2 branches as seen on series 5, image 412 and image 416.  Multifocal mild stenoses of the left M1 MCA without occlusion.  Additional more distal stenosis with probable high-grade stenosis of about the M2/M3 bifurcation on the left.     Posterior circulation: Multifocal narrowing of the vertebrobasilar system with severe stenosis of the right P1 PCA prior to the posterior communicating artery which appears to provide significant supply.  Stenosis of the right posterior communicating artery at its junction with  the right PCA, mild-to-moderate.  Left PCA arises predominantly from the left posterior communicating artery.  There is moderate stenosis of the left posterior communicating artery and moderate stenosis of the more distal left PCA.  Irregularity of the basilar artery with areas of up to 50% narrowing related to atherosclerosis.     Additional aneurysms are identified in the bilateral supraclinoid ICA measuring 9 x 3 mm on the right and a more fusiform aneurysm on the left measuring approximately 5 x 4 mm.     Venous structures (limited evaluation): Normal.     Impression:     CT head: No hemorrhage or major vascular distribution infarction.  Further evaluation with MRI at the discretion of the clinical service.     CTA: No definite large vessel occlusion.  Severe stenosis of both branches of the right M2 as seen on series 5, image 412 and 416.  Multifocal intracranial arterial stenoses including multifocal posterior circulation moderate to severe stenoses and moderate right M1 MCA stenosis.     Enlarging right MCA aneurysm in comparison the prior exam of 2010.     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        US SOFT TISSUE HEAD NECK THYROID  (6/18/2020)     CLINICAL HISTORY:  Incidental finding of massive thyroid goiter on CTA;     TECHNIQUE:  Ultrasound of the thyroid.     COMPARISON:  CTA head neck dated 06/17/2020     FINDINGS:  Right lobe measures 5.7 x 1.8 x 2.0 cm.  Measured nodules as follows: Midpole measuring 1.9 x 1.4 x 1.8 cm and lower pole measuring 1.2 x 1.8 x 1.5 cm.  Both nodules demonstrate more spongiform appearance on dedicated cine clip.     Left lobe measures 8.9 x 3.2 x 5.0 cm and diffusely heterogeneous.  Dominant, predominantly solid appearing nodule measuring 2.9 x 2.2 x 3.7 cm with apparent scattered peripheral echogenic foci.     The isthmus measures 0.43 cm.     Note that degree of substernal thyroid extension is better evaluated on comparison CTA.     No  evidence for cervical adenopathy.     Impression:     Enlarged, multinodular thyroid, greater on the left.  Note that degree of substernal extension is better evaluated on comparison CTA.     Dominant left lobe nodule which meets sonographic criteria for FNA guided sampling if not already performed.     This report was flagged in Epic as abnormal.       Current Medications:  Scheduled:   amLODIPine  10 mg Oral Daily    aspirin  81 mg Oral Daily    atorvastatin  80 mg Oral Daily    clopidogreL  75 mg Oral Daily    enoxaparin  40 mg Subcutaneous Q24H    hydroCHLOROthiazide  25 mg Oral Daily        PRN:  labetalol, sodium chloride 0.9%    Antibiotics and Day Number of Therapy:  None    Assessment:     Margarita Chávez is a 84 y.o.female with  Patient Active Problem List    Diagnosis Date Noted    Mixed hyperlipidemia 06/18/2020    Substernal thyroid goiter 06/18/2020    Intracranial vascular stenosis 06/18/2020    TIA (transient ischemic attack) 06/17/2020    Stroke 06/17/2020    Uncontrolled hypertension 06/17/2020    Aneurysm of middle cerebral artery 06/17/2020        Plan:     High Risk TIA  - Patient woke morning of admission with right sided hemiparesis and facial droop  - NIHSS = 3 I tPA not given due to patient outside of treatment window  - Vascular Neurology consulted: , plavix 300 followed by 75 mg for 30 days, atorvastatin 80 mg  - CTA head/neck (6/17/2020):  No definite large vessel occlusion.  Severe stenosis of both branches of the right M2 as seen on series 5, image 412 and 416.  Multifocal intracranial arterial stenoses including multifocal posterior circulation moderate to severe stenoses and moderate right M1 MCA stenosis.  - Unable to obtain MRI brain due to intracranial aneurysm clip in place  - Has been evaluated by Vascular Neurology, SLP/OT/PT  - Continue DAPT, high dose statin, pending inpatient rehab       Chronic Right MCA Aneurysm s/p clip with multiple areas of  nonocclusive intracerebral stenoses and remote infarctions  - Patient with known right MCA aneurysm s/p clip >10yrs ago  - CT head (6/17): Large partially calcified aneurysm extending off the right MCA which has increased in size compared to previous CT dated 04/06/2010.  - DAPT, Will f/u with Vascular Neurology and Neurosurgery after discharge    Uncontrolled Hypertension  - BP on admission = 195/95  - Initially permissive HTN upon admission, required one dose of prn labetalol for BP >220/110  - Now resumed home HCTZ, increased from 12.5mg to to 25mg, added amlodipine this morning  - Will continue to monitor, Goal BP <130/80      Multinodular Thyroid Goiter, incidentaloma  - CTA head and Neck (6/17/2020): Demonstrated massive thyroid goiter with substernal extension which deviates the airway to the right, incidental finding  - Normal TSH  - Thyroid ultrasound of thyroid from 6/18 showed large multinodular thyroid, L>R  - Will refer to Endocrinology for further eval after discharge    HLD  - Lipid panel: cholesterol = 241 I TG = 146 I HDL = 59 I LDL = 152  - Starting on atorvastatin 80  - Will continue home medication     Health Care Maintenance  - TSH = 0.564 I  A1c = 5.8  - Lipid panel: cholesterol = 241 I TG = 146 I HDL = 59 I LDL = 152  - Influenza, Tdap, and pneumococcal vaccines up to date     Code Status: Full code  DVT Prophylaxis: lovenox  Diet: Regular/thin liquids    Disposition:  Pending inpatient rehab placement, f/u with PCP, Vascular Neurology, Neurosurgery, and Endocrinology as an outpatient      Kala Ortiz MD, PhD  [facility] Med-Peds Resident, [facility] Internal Medicine/[facility] Team A      [facility] Medicine Hospitalist Pager numbers:   [facility] Hospitalist Medicine Team A (Fuad/Kathe): [phone]  [facility] Hospitalist Medicine Team B (Nayana/Alysa):  [phone]

## 2020-06-19 NOTE — PLAN OF CARE
PHN aware of therapy notes in [facility], will review and notify CM and [facility] with decision.    Brianne Lo RN    [phone]

## 2020-06-19 NOTE — PLAN OF CARE
06/19/20 0922   Post-Acute Status   Post-Acute Authorization Placement   Post-Acute Placement Status Pending Payor Medical Review   Discharge Delays None known at this time   Discharge Plan   Discharge Plan A Rehab

## 2020-06-19 NOTE — PLAN OF CARE
PHN requesting contact number for primary team to discuss case before giving decision on inpt rehab.  Paged primary team, awaiting return call.  Secure chat also initiated to expedite.      1340  Return call from Dr Rdz, contact number provided to N for discussion.  Will await decision.  Pt and son at bedside have been informed.    Brianne Lo RN    [phone]

## 2020-06-20 LAB
BACTERIA UR CULT: ABNORMAL
POCT GLUCOSE: 102 MG/DL (ref 70–110)
POCT GLUCOSE: 108 MG/DL (ref 70–110)

## 2020-06-20 PROCEDURE — 94761 N-INVAS EAR/PLS OXIMETRY MLT: CPT

## 2020-06-20 PROCEDURE — 63600175 PHARM REV CODE 636 W HCPCS: Performed by: STUDENT IN AN ORGANIZED HEALTH CARE EDUCATION/TRAINING PROGRAM

## 2020-06-20 PROCEDURE — 25000003 PHARM REV CODE 250: Performed by: STUDENT IN AN ORGANIZED HEALTH CARE EDUCATION/TRAINING PROGRAM

## 2020-06-20 PROCEDURE — 11000001 HC ACUTE MED/SURG PRIVATE ROOM

## 2020-06-20 RX ORDER — LISINOPRIL 20 MG/1
20 TABLET ORAL DAILY
Status: DISCONTINUED | OUTPATIENT
Start: 2020-06-20 | End: 2020-06-20

## 2020-06-20 RX ORDER — LISINOPRIL 5 MG/1
10 TABLET ORAL DAILY
Status: DISCONTINUED | OUTPATIENT
Start: 2020-06-20 | End: 2020-06-20

## 2020-06-20 RX ADMIN — ENOXAPARIN SODIUM 40 MG: 100 INJECTION SUBCUTANEOUS at 08:06

## 2020-06-20 RX ADMIN — ATORVASTATIN CALCIUM 80 MG: 40 TABLET, FILM COATED ORAL at 09:06

## 2020-06-20 RX ADMIN — HYDROCHLOROTHIAZIDE 25 MG: 25 TABLET ORAL at 09:06

## 2020-06-20 RX ADMIN — CLOPIDOGREL BISULFATE 75 MG: 75 TABLET ORAL at 09:06

## 2020-06-20 RX ADMIN — ACETAMINOPHEN 650 MG: 325 TABLET ORAL at 11:06

## 2020-06-20 RX ADMIN — ASPIRIN 81 MG 81 MG: 81 TABLET ORAL at 09:06

## 2020-06-20 RX ADMIN — AMLODIPINE BESYLATE 10 MG: 5 TABLET ORAL at 09:06

## 2020-06-20 RX ADMIN — ACETAMINOPHEN 650 MG: 325 TABLET ORAL at 04:06

## 2020-06-20 NOTE — PROGRESS NOTES
[facility] Internal Medicine Resident HO-II Progress Note    Subjective:      Afebrile overnight, talking on the phone this morning without issues. Tolerating her diet without any issues. Able to get up and move around safely. Feels like her R hand strength is slowly improving, but feels more comfortable with  strength this morning. Encouraged her to continue to work with therapy and that we're still awaiting insurance for rehab vs SNF vs outpatient; she's aware and is very encouraged.    Denied any fevers, chills, nausea, vomiting, chest pain, SOB, abdominal pain, dysuria, constipation, diarrhea.     Objective:     Last 24 Hour Vital Signs:  BP  Min: 140/76  Max: 216/98  Temp  Av.6 °F (37 °C)  Min: 98 °F (36.7 °C)  Max: 99.2 °F (37.3 °C)  Pulse  Av.1  Min: 83  Max: 96  Resp  Av.7  Min: 17  Max: 20  SpO2  Av.4 %  Min: 94 %  Max: 98 %  I/O last 3 completed shifts:  In: 420 [P.O.:420]  Out: 1100 [Urine:1100]    Physical Examination:  Gen: AOx3, NAD, sitting upright talking to her son without issues  HEENT: NCAT, PERRL, EOMI, MMM, JVP ~5cm, no thyromegaly, lymphadenopathy appreciated; mild asymmetric smile, neck supple  CV: RRR, S1/S2 appreciated, no murmur gallop or rubs  Resp: CTA B/L, no increased WOB, no crackles, rhonchi appreciated  Abdomen: Soft, NT, ND, +BS  Ext: 2+ distal pulses, no edema appreciated  Skin: Warm, dry, no rashes appreciated  Psych: Good mood, Affect  Neuro: AAOx4, follows commands without issue. Asymmetric smile, decreased sensation to touch on RUE compared to LUE; Strength is slowly improving on the R arm, able to resist gravity for a few seconds (improved from day prior), Strength 3/5 in RUE, 5/5 in LUE     Laboratory:  Laboratory Data Reviewed: Yes  Pertinent Findings:  Trended Lab Data:  Recent Labs   Lab 20  1120 20  0153 20  0623 20  0413   WBC 6.01 5.72  --  5.79   HGB 14.1 13.8  --  13.7   HCT 44.4 44.5  --  43.2    283  --  299   MCV 95  94  --  93   RDW 12.9 13.0  --  13.2     --  140  --    K 4.2  --  4.3  --      --  105  --    CO2 29  --  27  --    BUN 12  --  8  --    *  --  93  --    CALCIUM 10.1  --  10.3  --    PROT 7.6  --   --   --    ALBUMIN 3.8  --   --   --    BILITOT 0.4  --   --   --    AST 16  --   --   --    ALKPHOS 87  --   --   --    ALT 10  --   --   --        Microbiology Data Reviewed: Yes  Pertinent Findings:  Microbiology Results (last 7 days)     Procedure Component Value Units Date/Time    Urine culture [[id number]]  (Abnormal) Collected: 06/18/20 1206    Order Status: Completed Specimen: Urine Updated: 06/19/20 1800     Urine Culture, Routine GRAM NEGATIVE JOHAN  >100,000 cfu/ml  Identification and susceptibility pending  No other significant isolate      Narrative:      Preferred Collection Type->Urine, Clean Catch  Specimen Source->Urine          Other Results:  Radiology:  CT Head (6/17/2020)  The brain is significant for periventricular hypoattenuation consistent with microvascular ischemic change.  There are remote bilateral lacunar type basal ganglial infarcts.  There is a remote left cerebellar hemisphere infarct.  There is a 1.9 cm partially calcified aneurysm of the right MCA better characterized on CTA performed 04/06/2010.  This aneurysm measured 1.4 cm on previous CTA.  There is no intra or extra-axial mass or hemorrhage.  The visualized extracranial structures are significant for postoperative change within the right frontotemporal calvarium.     CXR (6/17/2020)  No large airspace opacity or lobar consolidation.  No pleural effusion or pneumothorax.  Cardiomediastinal silhouette appears similar allowing for variation in technique.  Slight elevation of the left hemidiaphragm with probable gas in the stomach.  Similar rightward deviation of the trachea.  Age related DJD.      CTA Head and Neck (6/18/2020)  FINDINGS:  Intracranial Compartment:     Ventricles and sulci are normal in size for age  without evidence of hydrocephalus. No extra-axial blood or fluid collections.     Periventricular hypoattenuation consistent with chronic microvascular ischemic changes.  Remote bilateral lacunar infarcts in the basal ganglia.  Remote left cerebellar infarct.  No major vascular distribution infarction or intracranial hemorrhage.  No parenchymal mass.     Skull/Extracranial Contents (limited evaluation): Surgical change of the right frontal and temporal bones stable.  Mastoid air cells and paranasal sinuses are essentially clear.     Non-Vascular Structures of the Neck/Thoracic Inlet (limited evaluation): Massive thyroid goiter with substernal extension which deviates the airway to the right.  Posterior dependent densities in the lungs.  No focal consolidation or mass.     Aorta: Tortuosity of the aorta.  There is minimal aortic atherosclerosis.  Origin of the common carotid arteries appears unremarkable.  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     Extracranial carotid circulation: No hemodynamically significant stenosis, aneurysmal dilatation, or dissection.  Slight tortuosity of the internal carotid arteries is noted.     Extracranial vertebral circulation: No hemodynamically significant stenosis, aneurysmal dilatation, or dissection.  Congenitally small sized left vertebral artery.     Intracranial Arteries:     Anterior circulation: Large, slightly lobulated and partially calcified aneurysm of the right MCA bifurcation is again seen measuring 1.7 x 1.3 x 1.6 cm (AP TV CC).  The more distal right MCA arises from the aneurysm with stenosis of the post-aneurysmal segment of the M1 MCA.  Additional severe stenosis of the M2 MCA affecting both M2 branches as seen on series 5, image 412 and image 416.  Multifocal mild stenoses of the left M1 MCA without occlusion.  Additional more distal stenosis with probable high-grade stenosis of about the M2/M3 bifurcation on the left.     Posterior circulation:  Multifocal narrowing of the vertebrobasilar system with severe stenosis of the right P1 PCA prior to the posterior communicating artery which appears to provide significant supply.  Stenosis of the right posterior communicating artery at its junction with the right PCA, mild-to-moderate.  Left PCA arises predominantly from the left posterior communicating artery.  There is moderate stenosis of the left posterior communicating artery and moderate stenosis of the more distal left PCA.  Irregularity of the basilar artery with areas of up to 50% narrowing related to atherosclerosis.     Additional aneurysms are identified in the bilateral supraclinoid ICA measuring 9 x 3 mm on the right and a more fusiform aneurysm on the left measuring approximately 5 x 4 mm.     Venous structures (limited evaluation): Normal.     Impression:     CT head: No hemorrhage or major vascular distribution infarction.  Further evaluation with MRI at the discretion of the clinical service.     CTA: No definite large vessel occlusion.  Severe stenosis of both branches of the right M2 as seen on series 5, image 412 and 416.  Multifocal intracranial arterial stenoses including multifocal posterior circulation moderate to severe stenoses and moderate right M1 MCA stenosis.     Enlarging right MCA aneurysm in comparison the prior exam of 2010.     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           US SOFT TISSUE HEAD NECK THYROID  (6/18/2020)     CLINICAL HISTORY:  Incidental finding of massive thyroid goiter on CTA;     TECHNIQUE:  Ultrasound of the thyroid.     COMPARISON:  CTA head neck dated 06/17/2020     FINDINGS:  Right lobe measures 5.7 x 1.8 x 2.0 cm.  Measured nodules as follows: Midpole measuring 1.9 x 1.4 x 1.8 cm and lower pole measuring 1.2 x 1.8 x 1.5 cm.  Both nodules demonstrate more spongiform appearance on dedicated cine clip.     Left lobe measures 8.9 x 3.2 x 5.0 cm and diffusely heterogeneous.   Dominant, predominantly solid appearing nodule measuring 2.9 x 2.2 x 3.7 cm with apparent scattered peripheral echogenic foci.     The isthmus measures 0.43 cm.     Note that degree of substernal thyroid extension is better evaluated on comparison CTA.     No evidence for cervical adenopathy.     Impression:     Enlarged, multinodular thyroid, greater on the left.  Note that degree of substernal extension is better evaluated on comparison CTA.     Dominant left lobe nodule which meets sonographic criteria for FNA guided sampling if not already performed.     This report was flagged in Epic as abnormal.    Current Medications:     Infusions:       Scheduled:   amLODIPine  10 mg Oral Daily    aspirin  81 mg Oral Daily    atorvastatin  80 mg Oral Daily    clopidogreL  75 mg Oral Daily    enoxaparin  40 mg Subcutaneous Q24H    hydroCHLOROthiazide  25 mg Oral Daily    lisinopriL  10 mg Oral Daily        PRN:  acetaminophen, labetalol, sodium chloride 0.9%      Assessment:     Margarita Chávez is a 84 y.o.female with  Patient Active Problem List    Diagnosis Date Noted    Substernal thyroid goiter     Mixed hyperlipidemia 06/18/2020    Multinodular goiter (nontoxic) 06/18/2020    Intracranial vascular stenosis 06/18/2020    TIA (transient ischemic attack) 06/17/2020    Stroke 06/17/2020    Uncontrolled hypertension 06/17/2020    Aneurysm of middle cerebral artery 06/17/2020        Plan:     High Risk TIA  - Patient woke morning of admission with right sided hemiparesis and facial droop  - NIHSS = 3 I tPA not given due to patient outside of treatment window  - Vascular Neurology consulted: , plavix 300 followed by 75 mg for 30 days, atorvastatin 80 mg  - CTA head/neck (6/17/2020):  No definite large vessel occlusion.  Severe stenosis of both branches of the right M2 as seen on series 5, image 412 and 416.  Multifocal intracranial arterial stenoses including multifocal posterior circulation moderate  to severe stenoses and moderate right M1 MCA stenosis.  - Unable to obtain MRI brain due to intracranial aneurysm clip in place  - Has been evaluated by Vascular Neurology, SLP/OT/PT  - Continue DAPT, high dose statin, pending placement     Chronic Right MCA Aneurysm s/p clip with multiple areas of nonocclusive intracerebral stenoses and remote infarctions  - Patient with known right MCA aneurysm s/p clip >10yrs ago  - CT head (6/17): Large partially calcified aneurysm extending off the right MCA which has increased in size compared to previous CT dated 04/06/2010.  - DAPT, Will f/u with Vascular Neurology and Neurosurgery after discharge     Uncontrolled Hypertension  - BP still elevated  - Continue Amlodipine 10mg daily, HCTZ 25mg daily, will add lisinopril 20mg daily and titrate as able  - Goal BP <130/80     Multinodular Thyroid Goiter, incidentaloma  - CTA head and Neck (6/17/2020): Demonstrated massive thyroid goiter with substernal extension which deviates the airway to the right, incidental finding  - Normal TSH  - Thyroid ultrasound of thyroid from 6/18 showed large multinodular thyroid, L>R  - Will refer to Endocrinology for further eval after discharge     HLD  - Lipid panel: cholesterol = 241 I TG = 146 I HDL = 59 I LDL = 152  - Continue atorvastatin 80mg daily     Health Care Maintenance  - TSH = 0.564 I  A1c = 5.8  - Lipid panel: cholesterol = 241 I TG = 146 I HDL = 59 I LDL = 152  - Influenza, Tdap, and pneumococcal vaccines up to date     Code Status: Full code  DVT Prophylaxis: lovenox  Diet: Regular/thin liquids  Disposition: Medically stable for discharge, pending placement    Derick Gutierrez MD  [facility] -II  [facility] Internal Medicine Service Team A

## 2020-06-20 NOTE — PLAN OF CARE
Permission attained to enter room via virtual system.  Virtual rounds completed as documented.  Today's lab values, notes, and vital signs up to now have been reviewed.  Son at bedside   pt has handout stroke patient education guide [facility] stroke center.   Pt up in chair   reviewed booklet with both pt and son   pt and son very receptive  they both have been reviewing booklet by statement prior to this virtual visit .  No questions at this time after review

## 2020-06-20 NOTE — PLAN OF CARE
POC reviewed, patient verbalize understanding. Patient denies pain/discomfort.  Neuro checks every 4 hours. NSR on tele monitor.Fall precaution maintained: bed on lowest position, call light within reach, bed alarm set, side rail up x 2, non skid sock on. Will continue to monitor.

## 2020-06-21 PROCEDURE — 63600175 PHARM REV CODE 636 W HCPCS: Performed by: STUDENT IN AN ORGANIZED HEALTH CARE EDUCATION/TRAINING PROGRAM

## 2020-06-21 PROCEDURE — 11000001 HC ACUTE MED/SURG PRIVATE ROOM

## 2020-06-21 PROCEDURE — 25000003 PHARM REV CODE 250: Performed by: STUDENT IN AN ORGANIZED HEALTH CARE EDUCATION/TRAINING PROGRAM

## 2020-06-21 PROCEDURE — 97112 NEUROMUSCULAR REEDUCATION: CPT

## 2020-06-21 PROCEDURE — 94761 N-INVAS EAR/PLS OXIMETRY MLT: CPT

## 2020-06-21 PROCEDURE — 97530 THERAPEUTIC ACTIVITIES: CPT

## 2020-06-21 RX ORDER — TALC
9 POWDER (GRAM) TOPICAL NIGHTLY PRN
Status: DISCONTINUED | OUTPATIENT
Start: 2020-06-21 | End: 2020-06-22 | Stop reason: HOSPADM

## 2020-06-21 RX ORDER — LISINOPRIL 20 MG/1
20 TABLET ORAL DAILY
Status: DISCONTINUED | OUTPATIENT
Start: 2020-06-21 | End: 2020-06-22 | Stop reason: HOSPADM

## 2020-06-21 RX ADMIN — HYDROCHLOROTHIAZIDE 25 MG: 25 TABLET ORAL at 08:06

## 2020-06-21 RX ADMIN — CLOPIDOGREL BISULFATE 75 MG: 75 TABLET ORAL at 08:06

## 2020-06-21 RX ADMIN — ENOXAPARIN SODIUM 40 MG: 100 INJECTION SUBCUTANEOUS at 06:06

## 2020-06-21 RX ADMIN — ACETAMINOPHEN 650 MG: 325 TABLET ORAL at 09:06

## 2020-06-21 RX ADMIN — AMLODIPINE BESYLATE 10 MG: 5 TABLET ORAL at 08:06

## 2020-06-21 RX ADMIN — ATORVASTATIN CALCIUM 80 MG: 40 TABLET, FILM COATED ORAL at 08:06

## 2020-06-21 RX ADMIN — Medication 9 MG: at 09:06

## 2020-06-21 RX ADMIN — LISINOPRIL 20 MG: 20 TABLET ORAL at 10:06

## 2020-06-21 RX ADMIN — ASPIRIN 81 MG 81 MG: 81 TABLET ORAL at 08:06

## 2020-06-21 NOTE — PLAN OF CARE
VIRTUAL NURSE:  Cued into patient's room.  Permission received per patient to turn camera to view patient.  Introduced as VN for night shift that will be working with floor nurse and nursing assistant.  Educated patient on VN's role in patient care. Plan of care reviewed with patient. Education per flowsheet.  Opportunity given for questions and questions answered.  States feeling stiff and tired; no other complaints.  Instructed to call for assistance.  Will cont to monitor.    Labs, notes, and orders reviewed.

## 2020-06-21 NOTE — PLAN OF CARE
Problem: Physical Therapy Goal  Goal: Physical Therapy Goal  Description: Goals to be met by: 20     Patient will increase functional independence with mobility by performin. Supine <> sit with MInimal Assistance  2. Sit to stand transfer with Minimal Assistance  3. Bed to chair transfer with Minimal Assistance using appropriate AD  4. Gait  x 10 feet with Moderate Assistance using appropriate AD  5. Lower extremity exercise program x 10 reps per handout, with supervision    Outcome: Ongoing, Progressing   Pt  required MODAx  1 with supine to sit, scooting anteriorly  at EOB with MAX/MODAx  1, sit<>stand transfer from bed requiring MAX/MODAx 2 ( using a rw 1st trial and no AD supported by therapist on either side of pt 2nd trial) and bed to chair stand pivot with MODAx 1 and MIN assist of another.Pt leans excessively to  R in standing needing assist for supporting R UE and blocking  R knee to promote extension. Pt continues to meet IPR criteria  that is needed upon discharge in order to return to prior functional mobility level.

## 2020-06-21 NOTE — PROGRESS NOTES
[facility] Medicine Progress Note    Primary Team: [facility] Hospitalist Team A  Attending Physician: Lorraine Archuleta MD  Resident: Diana  Intern: Kathe    Subjective:      Mrs. Chávez reports that she is frustrated with the decreased mobility of her right arm given than prior to this hospitalization she was living alone performing all ADL's. She states that she is ready for IPR and is motivated to work hard to get strength and functionality back in her arm. She denies any nausea, vomiting, fever, chills, abdominal pain, dysuria, or increased urination.      Objective:     Last 24 Hour Vital Signs:  BP  Min: 138/95  Max: 169/80  Temp  Av.6 °F (37 °C)  Min: 96.6 °F (35.9 °C)  Max: 100.2 °F (37.9 °C)  Pulse  Av.3  Min: 66  Max: 99  Resp  Av  Min: 17  Max: 20  SpO2  Av.3 %  Min: 94 %  Max: 97 %  Weight  Av.6 kg (166 lb 10.7 oz)  Min: 75.6 kg (166 lb 10.7 oz)  Max: 75.6 kg (166 lb 10.7 oz)  I/O last 3 completed shifts:  In: 320 [P.O.:320]  Out: 400 [Urine:400]    Physical Examination:  Examination  General: Patient sitting comfortably in NAD  HEENT: normocephalic, atraumatic  Cardiac: RRR, no murmurs appreciated, no extra heart sounds  Pulmonary/Chest: CTAB, symmetric chest rise, no wheezing or crackles  GI: Soft, non tender, non distended, normoactive bowel sounds  Extremities: no edema, clubbing, or cyanosis  Skin: dry, warm, intact. No bruising or rashes.  Neuro: Alert and oriented, moving all extremities; right sided facial droop with asymmetry on smile. Right upper extremity weakness 4/5 compared to left sided 5/5. Lower extremity 5/5 strength BL.    Laboratory:  Recent Labs   Lab 20  1120 20  1341 20  1907 20  0153 20  0623 20  0413   WBC 6.01  --   --  5.72  --  5.79   HGB 14.1  --   --  13.8  --  13.7   HCT 44.4  --   --  44.5  --  43.2     --   --  283  --  299   MCV 95  --   --  94  --  93     --   --   --  140  --    K 4.2  --   --    --  4.3  --      --   --   --  105  --    CO2 29  --   --   --  27  --    BUN 12  --   --   --  8  --    CREATININE 0.9  --   --   --  0.8  --    *  --   --   --  93  --    CALCIUM 10.1  --   --   --  10.3  --    PROT 7.6  --   --   --   --   --    ALBUMIN 3.8  --   --   --   --   --    PHOS  --   --   --   --  3.4  --    MG  --   --   --   --  2.0  --    AST 16  --   --   --   --   --    ALT 10  --   --   --   --   --    ALKPHOS 87  --   --   --   --   --    TROPONINI  --  0.018 0.014 0.006  --   --      Invalid input(s): POCTGLU  Recent Labs   Lab 06/17/20  1341   HGBA1C 5.8*     Microbiology Data:  COVID = negative    Radiology:  CT Head (6/17/2020)  The brain is significant for periventricular hypoattenuation consistent with microvascular ischemic change.  There are remote bilateral lacunar type basal ganglial infarcts.  There is a remote left cerebellar hemisphere infarct.  There is a 1.9 cm partially calcified aneurysm of the right MCA better characterized on CTA performed 04/06/2010.  This aneurysm measured 1.4 cm on previous CTA.  There is no intra or extra-axial mass or hemorrhage.  The visualized extracranial structures are significant for postoperative change within the right frontotemporal calvarium.     CXR (6/17/2020)  No large airspace opacity or lobar consolidation.  No pleural effusion or pneumothorax.  Cardiomediastinal silhouette appears similar allowing for variation in technique.  Slight elevation of the left hemidiaphragm with probable gas in the stomach.  Similar rightward deviation of the trachea.  Age related DJD.     CTA Head and Neck (6/18/2020)  CT head: No hemorrhage or major vascular distribution infarction.  Further evaluation with MRI at the discretion of the clinical service.     CTA: No definite large vessel occlusion.  Severe stenosis of both branches of the right M2 as seen on series 5, image 412 and 416.  Multifocal intracranial arterial stenoses including  multifocal posterior circulation moderate to severe stenoses and moderate right M1 MCA stenosis.     Enlarging right MCA aneurysm in comparison the prior exam of 2010.     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    Current Medications:  Scheduled:   amLODIPine  10 mg Oral Daily    aspirin  81 mg Oral Daily    atorvastatin  80 mg Oral Daily    clopidogreL  75 mg Oral Daily    enoxaparin  40 mg Subcutaneous Q24H    hydroCHLOROthiazide  25 mg Oral Daily        PRN:  acetaminophen, labetalol, sodium chloride 0.9%    Antibiotics and Day Number of Therapy:  None    Assessment:     Margarita Chávez is a 84 y.o.female with  Patient Active Problem List    Diagnosis Date Noted    Substernal thyroid goiter     Mixed hyperlipidemia 06/18/2020    Multinodular goiter (nontoxic) 06/18/2020    Intracranial vascular stenosis 06/18/2020    TIA (transient ischemic attack) 06/17/2020    Stroke 06/17/2020    Uncontrolled hypertension 06/17/2020    Aneurysm of middle cerebral artery 06/17/2020        Plan:     High Risk TIA  - Patient woke morning of admission with right sided hemiparesis and facial droop  - NIHSS = 3 I tPA not given due to patient outside of treatment window  - Vascular Neurology consulted: , plavix 300 followed by 75 mg for 30 days, atorvastatin 80 mg  - CTA head/neck (6/17/2020):  No definite large vessel occlusion.  Severe stenosis of both branches of the right M2 as seen on series 5, image 412 and 416.  Multifocal intracranial arterial stenoses including multifocal posterior circulation moderate to severe stenoses and moderate right M1 MCA stenosis.  - Unable to obtain MRI brain due to aneurysm clip placed  - Consulted SLP/OT/PT: dental soft diet  - Pending inpatient rehabilitation; continue DAPT and high dose statin     Chronic Right MCA Aneurysm s/p clip with areas of stenoses and infarctions  - Patient with known right MCA aneurysm  - CT head (6/17): Large partially  calcified aneurysm extending off the right MCA which has increased in size compared to previous CT dated 04/06/2010.  - Follow up with neurosurgery as outpatient    Multinodular Thyroid Goiter  - TSH = 0.564  - CTA head and Neck (6/18/2020): Massive thyroid goiter with substernal extension which deviates the airway to the right.  - Thyroid ultrasound of thyroid from 6/18 showed large multinodular thyroid, L>R  - Will refer to Endocrinology for further eval after discharge    Uncontrolled Hypertension  - BP on admission = 195/95  - Home medications: HCTZ 12.5  - Continue amlodipine 10 daily, HCTZ 25 mg daily  - Plan for addition of lisinopril 20 mg if BP remains above goal (BP < 130/80)    HLD  - Lipid panel: cholesterol = 241 I TG = 146 I HDL = 59 I LDL = 152  - Continue on atorvastatin 80    Asymptomatic E. Coli Urinary Tract Infection  - Urine culture = pan sensitive E. Coli  - Patient denies any dysuria, frequency, or urgency  - Afebrile without elevation in WBC    Health Care Maintenance  - TSH = 0.564 I  A1c = 5.8  - Lipid panel: cholesterol = 241 I TG = 146 I HDL = 59 I LDL = 152  - Influenza, Tdap, and pneumococcal vaccines up to date     Code Status: Full code  DVT Prophylaxis: lovenox  Diet: Regular diet with thin liquids  Disposition:  Medically stable for discharge; pending IPR    Susie Archuleta MD  [facility] Internal Medicine Resident, [facility] Medicine Hospitalist Pager numbers:   [facility] Hospitalist Medicine Team A (Fuad/Kathe): [phone]  [facility] Hospitalist Medicine Team B (Nayana/Alysa):  [phone]

## 2020-06-21 NOTE — PLAN OF CARE
Plan of care reviewed with patient and son. Pt AAO x4 . Will call staff for assistance. Continues to have evident right sided weakness, but is able to move upper and lower extremities. Pt up in recliner most of the day with 0 problems. Purewick in place at this time. Tolerates med with 0 problems. 0 swallowing issues noted with meals. Denies pain when asked. Verbalizes to staff understanding. NSR on monitor with 0 red alarms noted.  No acute distress observed at this time. Side rails x2, bed in low position, call bell within reach. Bed alarm in place for patient safety. Will relay to oncoming nurse to monitor for changes in condition.

## 2020-06-21 NOTE — PLAN OF CARE
Plan of care reviewed with patient and son. Pt AAO x4. Pt continues to put on call light for assistance. Pt up in her recliner most of the day. Evident right sided weakness mostly in her hand. Pt able to move hand in the morning, but becomes weaker as the day goes. Continues to be able to turn and pivot to get on the BSC. 2 large bowel movements today.  No problem with eating and swallowing meds, Tylenol given for right arm pain. PRN med was effective. Verbalizes to staff understanding. NSR on monitor with 0 red alarms noted.  No acute distress observed at this time. Side rails x2, bed in low position, call bell within reach. Bed alarm in place for patient safety. Will relay to oncoming nurse to monitor for changes in condition.

## 2020-06-21 NOTE — PLAN OF CARE
Care plan reviewed with patient, AAOx4, no respiratory distress noted, on room air. NSR per cardiac monitor, no red alarm noted. Denies any pain of discomfort, education provided on medication effect and side effect, voices understanding, bed in low position, bed alarm on, call light within her reach, educated on the importance of calling as needed, voices understanding, stable at this time.

## 2020-06-21 NOTE — PT/OT/SLP PROGRESS
Physical Therapy Treatment    Patient Name:  Margarita Chávez   MRN:  [id number]    Recommendations:     Discharge Recommendations:  rehabilitation facility   Discharge Equipment Recommendations: (TBD)   Barriers to discharge: Decreased caregiver support and dcreased functional mobility level requiring extensive assist    Assessment:     Margarita Chávez is a 84 y.o. female admitted with a medical diagnosis of TIA (transient ischemic attack).  She presents with the following impairments/functional limitations:  weakness, impaired endurance, impaired sensation, impaired self care skills, impaired functional mobilty, gait instability, impaired balance, decreased coordination, decreased upper extremity function, decreased lower extremity function, pain, decreased ROM, impaired coordination, impaired fine motor, decreased safety awareness, abnormal tone Pt  required MODAx  1 with supine to sit, scooting anteriorly  at EOB with MAX/MODAx  1, sit<>stand transfer from bed requiring MAX/MODAx 2 ( using a rw 1st trial and no AD supported by therapist on either side of pt 2nd trial) and bed to chair stand pivot with MODAx 1 and MIN assist of another.Pt leans excessively to  R in standing needing assist for supporting R UE and blocking  R knee to promote extension. Pt continues to meet IPR criteria  that is needed upon discharge in order to return to prior functional mobility level..    Rehab Prognosis: Good; patient would benefit from acute skilled PT services to address these deficits and reach maximum level of function.    Recent Surgery: * No surgery found *      Plan:     During this hospitalization, patient to be seen 6 x/week to address the identified rehab impairments via gait training, therapeutic activities, therapeutic exercises, neuromuscular re-education and progress toward the following goals:    · Plan of Care Expires:  07/18/20    Subjective     Chief Complaint: R elbow and L shoulder pain which decreased at end  of TX  Patient/Family Comments/goals: to be independent  Pain/Comfort:  · Pain Rating 1: 5/10  · Location - Side 1: Right  · Location - Orientation 1: generalized  · Location 1: elbow  · Pain Addressed 1: Reposition, Distraction, Cessation of Activity, Nurse notified  · Pain Rating Post-Intervention 1: 2/10  · Pain Rating 2: 3/10  · Location - Side 2: Left  · Location - Orientation 2: generalized  · Location 2: shoulder  · Pain Addressed 2: Reposition, Distraction, Nurse notified  · Pain Rating Post-Intervention 2: 2/10      Objective:     Communicated with Aleida ( RN) prior to session.  Patient found HOB elevated with PureWick, telemetry, bed alarm upon PT entry to room.     General Precautions: Standard, fall(Standard)   Orthopedic Precautions:N/A   Braces:       Functional Mobility:  · Bed Mobility:     · Rolling Right: minimum assistance using bed rail  · Supine to sit: MODAx  1 for LE and trunk assist and HOB elevated  · Transfers:     · Sit to Stand:  moderate assistance, maximal assistance and of 2 persons with rolling walker and also without AD supported by a therapist on either side of pt  · Bed to Chair: minimum assistance, moderate assistance and of 2 persons with  hand-held assist  using  Stand Pivot  · Balance: Static sit: initially MOD/MINAx  1 but progressed to CG/SBA      AM-PAC 6 CLICK MOBILITY  Turning over in bed (including adjusting bedclothes, sheets and blankets)?: 3  Sitting down on and standing up from a chair with arms (e.g., wheelchair, bedside commode, etc.): 2  Moving from lying on back to sitting on the side of the bed?: 2  Moving to and from a bed to a chair (including a wheelchair)?: 2  Need to walk in hospital room?: 1  Climbing 3-5 steps with a railing?: 1  Basic Mobility Total Score: 11       Therapeutic Activities and Exercises:   Pt performed rolling to R and supine to sit as stated above requiring  MAX/MODAx 1 for scooting anteriorly at EOB. Pt initially required MOD/MINAx  1  for static sit balance with excessive leaning to R and posteriorly. Worked on R forearm wt bearing  X 6-8 reps with cues and facilitation for pt to re-position from midline  to leaning on R forearm needing MOD/MINAx 1. Also performed ant wt shift reaches with L UE while R UE/ R LE wt bearing, trunk flex/ ext  x 8-10 reps to activate abdominals holding ext position posterior  to COG for 10 sec holds and facilitation at  R lat trunk for R trunk elongation and upright position. Pt tolerated 30 minutes of EOB sitting time with improved mid line and upright position at end of session requirining CG/SBA for static sit balance. Pt transferred sit<>stand to a rw with MODAx 2 but unable to  RW/ WB through RW using R hand/UE. Pt sat at EOB and again transferred sit<>stand with therapist on either side of pt requiring assist for R UE support, blocking of R knee and facilitation of upright posture  due to leaning to R. Pt rested and performed bed to chair stand pivot requiring MODAx  1 and MIN assist of another.    Patient left up in chair with call button in reach, chair alarm on and RN notifiedGOALS:   Multidisciplinary Problems     Physical Therapy Goals        Problem: Physical Therapy Goal    Goal Priority Disciplines Outcome Goal Variances Interventions   Physical Therapy Goal     PT, PT/OT Ongoing, Progressing     Description: Goals to be met by: 20     Patient will increase functional independence with mobility by performin. Supine <> sit with MInimal Assistance  2. Sit to stand transfer with Minimal Assistance  3. Bed to chair transfer with Minimal Assistance using appropriate AD  4. Gait  x 10 feet with Moderate Assistance using appropriate AD  5. Lower extremity exercise program x 10 reps per handout, with supervision                     Time Tracking:     PT Received On: 20  PT Start Time: 855(Co-tx with OT)     PT Stop Time: 938  PT Total Time (min): 43 min     Billable Minutes: Therapeutic  Activity 20    Treatment Type: Treatment  PT/PTA: PT     PTA Visit Number: 0     Jyoti Zaragoza, PT  06/21/2020

## 2020-06-21 NOTE — PT/OT/SLP PROGRESS
Occupational Therapy   Treatment    Name: Margarita Chávez  MRN: [id number]  Admitting Diagnosis:  TIA (transient ischemic attack)       Recommendations:     Discharge Recommendations: rehabilitation facility  Discharge Equipment Recommendations:  (TBD at next level of care)  Barriers to discharge:  Decreased caregiver support, Inaccessible home environment    Assessment:     Margarita Chávez is a 84 y.o. female with a medical diagnosis of TIA (transient ischemic attack).  She presents with the following performance deficits affecting function: weakness, impaired endurance, impaired self care skills, impaired functional mobilty, gait instability, impaired balance, impaired cognition, decreased coordination, decreased upper extremity function, decreased lower extremity function, decreased safety awareness, pain, abnormal tone, decreased ROM, impaired coordination, impaired fine motor.     Rehab Prognosis:  Good; patient would benefit from acute skilled OT services to address these deficits and reach maximum level of function.       Plan:     Patient to be seen 5 x/week to address the above listed problems via self-care/home management, therapeutic activities, therapeutic exercises, neuromuscular re-education  · Plan of Care Expires: 07/21/20  · Plan of Care Reviewed with: patient    Subjective     Pain/Comfort:  · Pain Rating 1: 5/10  · Location - Side 1: Right  · Location 1: elbow  · Pain Addressed 1: Reposition, Distraction  · Pain Rating Post-Intervention 1: 3/10  · Pain Rating 2: 3/10  · Location - Side 2: Left  · Location 2: shoulder  · Pain Addressed 2: Reposition, Distraction  · Pain Rating Post-Intervention 2: 2/10    Objective:     Communicated with: nurse prior to session.  Patient found HOB elevated with bed alarm, telemetry upon OT entry to room.    General Precautions: Standard, fall   Orthopedic Precautions:N/A   Braces: N/A     Occupational Performance:     Bed Mobility:    · Patient completed  Scooting/Bridging with maximal assistance  · Patient completed Supine to Sit with moderate assistance     Functional Mobility/Transfers:  · Patient completed Sit <> Stand Transfer with maximal assistance and of 2 persons  with  rolling walker and (max of 1 person and min of 2nd) - pt needed cues and A for sequencing and holding onto walker with R hand   · Patient completed Bed <> Chair Transfer using Step Transfer technique with moderate assistance and of 2 persons with no assistive device  · Functional Mobility: Pt easily distracted and with decreased attention to R - required cues to attend to task and with sequencing     Activities of Daily Living:  · Grooming: contact guard assistance set up for task and CGA for balance while seated EOB      [facility] 6 Click ADL: 13    Treatment & Education:  · Neuromuscular Re-education:  · Pt worked on increasing active/purposeful movement of R UE:  · Began with WB on R forearm while seated EOB (x10) - pt needed A to position self on R elbow and to push back to sitting - min triceps activation to push to sit when assisted with hand placement on bed during process of lifting trunk to upright  · AROM performed for set of 10 reps:  Shoulder shrugs (began within moderate ROM and decreased to min ROM with repetition due to fatigue); shoulder flex/ext (extension began WFL with decrease to mod ROM; flexion began within min range - trace range); elbow flex/ext (Ext - WFL ROM with muscle tapping on triceps;  throughout; Flex (only min-trace ROM - did not tolerate muscle tapping on biceps due to pain)  · SROM performed for elbow flex/ext - educated to grasp R wrist instead of fingers; pt only able to perform SROM minimally on R shoulder flexion due limited shoulder flexion on L (up to ~90 degrees only)  · Pt worked on sitting balance:   · Pt noted to sit EOB for 30' with SBA - min A  · Pt worked on R sided awareness with cues to assist with sitting upright - R hand placed on bedrail and pt  able to hold onto it for only very brief intervals at a time  · Manual cues along shoulders and trunks, along with verbal cues, provided to assist with midline sitting and upright posture  · Pt completed 10 reps of ant/post weight shifts while seated EOB - reached forward to touch toes and leaned back far enough to activate abdominal muscles to hold position (for a count of 10) - pt varied CGA to SBA during posterior leans, but able to transition with touching toes to sitting upright without physical assistance.     Patient left up in chair with call button in reach and chair alarm onEducation:      GOALS:   Multidisciplinary Problems     Occupational Therapy Goals        Problem: Occupational Therapy Goal    Goal Priority Disciplines Outcome Interventions   Occupational Therapy Goal     OT, PT/OT Ongoing, Progressing    Description: Goals to be met by: 7/18/20     Patient will increase functional independence with ADLs by performing:    LE Dressing with Moderate Assistance.  Grooming while seated with Supervision.  Toileting from bedside commode with Moderate Assistance for hygiene and clothing management.   Supine to sit with Contact Guard Assistance.  Step transfer with Minimal Assistance  Toilet transfer to bedside commode with Minimal Assistance.  Increased functional strength RUE to 3+/5 for self care skills and functional mobility.  Upper extremity exercise program x10 reps per handout, with independence.                     Time Tracking:     OT Date of Treatment: 06/21/20  OT Start Time: 0855  OT Stop Time: 0938  OT Total Time (min): 43 min    Billable Minutes:Neuromuscular Re-education 23 (cotx with PT)    Marina Ramires, OT  6/21/2020

## 2020-06-22 VITALS
BODY MASS INDEX: 27.77 KG/M2 | SYSTOLIC BLOOD PRESSURE: 128 MMHG | TEMPERATURE: 98 F | HEIGHT: 65 IN | WEIGHT: 166.69 LBS | HEART RATE: 85 BPM | DIASTOLIC BLOOD PRESSURE: 61 MMHG | OXYGEN SATURATION: 93 % | RESPIRATION RATE: 20 BRPM

## 2020-06-22 LAB
ALBUMIN SERPL BCP-MCNC: 3.5 G/DL (ref 3.5–5.2)
ALP SERPL-CCNC: 73 U/L (ref 55–135)
ALT SERPL W/O P-5'-P-CCNC: 16 U/L (ref 10–44)
ANION GAP SERPL CALC-SCNC: 10 MMOL/L (ref 8–16)
AST SERPL-CCNC: 19 U/L (ref 10–40)
BASOPHILS # BLD AUTO: 0.03 K/UL (ref 0–0.2)
BASOPHILS NFR BLD: 0.4 % (ref 0–1.9)
BILIRUB SERPL-MCNC: 0.7 MG/DL (ref 0.1–1)
BUN SERPL-MCNC: 34 MG/DL (ref 8–23)
CALCIUM SERPL-MCNC: 10.2 MG/DL (ref 8.7–10.5)
CHLORIDE SERPL-SCNC: 99 MMOL/L (ref 95–110)
CO2 SERPL-SCNC: 28 MMOL/L (ref 23–29)
CREAT SERPL-MCNC: 1.4 MG/DL (ref 0.5–1.4)
DIFFERENTIAL METHOD: NORMAL
EOSINOPHIL # BLD AUTO: 0.2 K/UL (ref 0–0.5)
EOSINOPHIL NFR BLD: 3.2 % (ref 0–8)
ERYTHROCYTE [DISTWIDTH] IN BLOOD BY AUTOMATED COUNT: 12.9 % (ref 11.5–14.5)
EST. GFR  (AFRICAN AMERICAN): 40 ML/MIN/1.73 M^2
EST. GFR  (NON AFRICAN AMERICAN): 35 ML/MIN/1.73 M^2
GLUCOSE SERPL-MCNC: 92 MG/DL (ref 70–110)
HCT VFR BLD AUTO: 41.3 % (ref 37–48.5)
HGB BLD-MCNC: 13.3 G/DL (ref 12–16)
IMM GRANULOCYTES # BLD AUTO: 0.02 K/UL (ref 0–0.04)
IMM GRANULOCYTES NFR BLD AUTO: 0.3 % (ref 0–0.5)
LYMPHOCYTES # BLD AUTO: 2.1 K/UL (ref 1–4.8)
LYMPHOCYTES NFR BLD: 30 % (ref 18–48)
MAGNESIUM SERPL-MCNC: 2.1 MG/DL (ref 1.6–2.6)
MCH RBC QN AUTO: 30 PG (ref 27–31)
MCHC RBC AUTO-ENTMCNC: 32.2 G/DL (ref 32–36)
MCV RBC AUTO: 93 FL (ref 82–98)
MONOCYTES # BLD AUTO: 0.7 K/UL (ref 0.3–1)
MONOCYTES NFR BLD: 9.8 % (ref 4–15)
NEUTROPHILS # BLD AUTO: 4 K/UL (ref 1.8–7.7)
NEUTROPHILS NFR BLD: 56.3 % (ref 38–73)
NRBC BLD-RTO: 0 /100 WBC
PHOSPHATE SERPL-MCNC: 4.8 MG/DL (ref 2.7–4.5)
PLATELET # BLD AUTO: 288 K/UL (ref 150–350)
PMV BLD AUTO: 10.2 FL (ref 9.2–12.9)
POTASSIUM SERPL-SCNC: 4 MMOL/L (ref 3.5–5.1)
PROT SERPL-MCNC: 7.2 G/DL (ref 6–8.4)
RBC # BLD AUTO: 4.44 M/UL (ref 4–5.4)
SODIUM SERPL-SCNC: 137 MMOL/L (ref 136–145)
WBC # BLD AUTO: 7.13 K/UL (ref 3.9–12.7)

## 2020-06-22 PROCEDURE — 36415 COLL VENOUS BLD VENIPUNCTURE: CPT

## 2020-06-22 PROCEDURE — 83735 ASSAY OF MAGNESIUM: CPT

## 2020-06-22 PROCEDURE — 80053 COMPREHEN METABOLIC PANEL: CPT

## 2020-06-22 PROCEDURE — 25000003 PHARM REV CODE 250: Performed by: STUDENT IN AN ORGANIZED HEALTH CARE EDUCATION/TRAINING PROGRAM

## 2020-06-22 PROCEDURE — 84100 ASSAY OF PHOSPHORUS: CPT

## 2020-06-22 PROCEDURE — 63600175 PHARM REV CODE 636 W HCPCS: Performed by: STUDENT IN AN ORGANIZED HEALTH CARE EDUCATION/TRAINING PROGRAM

## 2020-06-22 PROCEDURE — 94761 N-INVAS EAR/PLS OXIMETRY MLT: CPT

## 2020-06-22 PROCEDURE — 85025 COMPLETE CBC W/AUTO DIFF WBC: CPT

## 2020-06-22 RX ORDER — CLOPIDOGREL BISULFATE 75 MG/1
75 TABLET ORAL DAILY
Qty: 30 TABLET | Refills: 0
Start: 2020-06-22 | End: 2020-08-19

## 2020-06-22 RX ORDER — LISINOPRIL 20 MG/1
20 TABLET ORAL DAILY
Qty: 90 TABLET | Refills: 3 | Status: ON HOLD
Start: 2020-06-23 | End: 2022-12-16 | Stop reason: HOSPADM

## 2020-06-22 RX ADMIN — HYDROCHLOROTHIAZIDE 25 MG: 25 TABLET ORAL at 09:06

## 2020-06-22 RX ADMIN — ATORVASTATIN CALCIUM 80 MG: 40 TABLET, FILM COATED ORAL at 09:06

## 2020-06-22 RX ADMIN — LISINOPRIL 20 MG: 20 TABLET ORAL at 09:06

## 2020-06-22 RX ADMIN — ASPIRIN 81 MG 81 MG: 81 TABLET ORAL at 09:06

## 2020-06-22 RX ADMIN — AMLODIPINE BESYLATE 10 MG: 5 TABLET ORAL at 09:06

## 2020-06-22 RX ADMIN — SODIUM CHLORIDE, SODIUM LACTATE, POTASSIUM CHLORIDE, AND CALCIUM CHLORIDE 1000 ML: .6; .31; .03; .02 INJECTION, SOLUTION INTRAVENOUS at 11:06

## 2020-06-22 RX ADMIN — CLOPIDOGREL BISULFATE 75 MG: 75 TABLET ORAL at 09:06

## 2020-06-22 NOTE — PLAN OF CARE
Pt approved for transfer to [facility] today, nurse informed to call report to [phone].  Transport arranged for pickup at 1PM today per rehab request.  Pt and son notified.       06/22/20 1151   Final Note   Assessment Type Final Discharge Note   Anticipated Discharge Disposition Rehab  ([facility])   Hospital Follow Up  Appt(s) scheduled? No   Discharge plans and expectations educations in teach back method with documentation complete? Yes   Right Care Referral Info   Post Acute Recommendation IRF   Post-Acute Status   Post-Acute Authorization Placement   Post-Acute Placement Status Set-up Complete   Discharge Delays None known at this time       Brianne Lo, BK    [phone]

## 2020-06-22 NOTE — PLAN OF CARE
[facility]    FACILITY TRANSFER ORDERS      Patient Name: Margarita Chávez  YOB: 1935    PCP: Syeda Grace MD   PCP Address: [address] [place] [address] [address] / [place]  PCP Phone Number: [phone]  PCP Fax: [phone]    Encounter Date: 06/22/2020    Admit to: [facility]    Vital Signs:  Routine    Diagnoses:   Active Hospital Problems    Diagnosis  POA    *TIA (transient ischemic attack) [G45.9]  Yes    Substernal thyroid goiter [E04.9]  Yes    Mixed hyperlipidemia [E78.2]  Yes    Multinodular goiter (nontoxic) [E04.2]  Yes     Chronic    Intracranial vascular stenosis [I67.9]  Yes    Stroke [I63.9]  Yes    Uncontrolled hypertension [I10]  Yes    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     Allergies:  Review of patient's allergies indicates:   Allergen Reactions    Penicillins Nausea And Vomiting     Diet: regular diet    Activities: Activity as tolerated    Nursing: As per facility     Labs: CMP weekly to monitor creatinine with routing to PCP    CONSULTS:    Physical Therapy to evaluate and treat.  and Occupational Therapy to evaluate and treat.    MISCELLANEOUS CARE:  None    WOUND CARE ORDERS  None    Medications: Review discharge medications with patient and family and provide education.      Current Discharge Medication List      START taking these medications    Details   amLODIPine (NORVASC) 10 MG tablet Take 1 tablet (10 mg total) by mouth once daily.  Qty: 30 tablet, Refills: 11    Comments: .      aspirin 81 MG Chew Chew and swallow 1 tablet (81 mg total) by mouth once daily.  Qty: 30 tablet, Refills: 5      atorvastatin (LIPITOR) 80 MG tablet Take 1 tablet (80 mg total) by mouth once daily.  Qty: 90 tablet, Refills: 5      clopidogreL (PLAVIX) 75 mg tablet Take 1 tablet (75 mg total) by mouth once daily.  Qty: 30 tablet, Refills: 11      lisinopriL  (PRINIVIL,ZESTRIL) 20 MG tablet Take 1 tablet (20 mg total) by mouth once daily.  Qty: 90 tablet, Refills: 3    Comments: .         CONTINUE these medications which have CHANGED    Details   hydroCHLOROthiazide (HYDRODIURIL) 12.5 MG Tab Take 2 tablets (25 mg total) by mouth once daily.  Qty: 30 tablet, Refills: 5    Comments: .         STOP taking these medications       naproxen (NAPROSYN) 500 MG tablet Comments:   Reason for Stopping:         tramadol (ULTRAM) 50 mg tablet Comments:   Reason for Stopping:              _________________________________  Susie Archuleta MD  06/22/2020

## 2020-06-22 NOTE — PROGRESS NOTES
[facility] Medicine Progress Note    Primary Team: [facility] Hospitalist Team A  Attending Physician: Lorraine Archuleta MD  Resident: Diana  Intern: Kathe    Subjective:      Mrs. Chávez reports no issues overnight. Assisted her with preparing her breakfast as she said it is difficult to get her food ready due to her right arm being weak. Otherwise, she reports no further weakness, loss in sensation, nausea, vomiting, fever, chills, SOB, or CP.     Objective:     Last 24 Hour Vital Signs:  BP  Min: 117/79  Max: 162/81  Temp  Av.7 °F (36.5 °C)  Min: 96.9 °F (36.1 °C)  Max: 98.6 °F (37 °C)  Pulse  Av.7  Min: 57  Max: 87  Resp  Av  Min: 16  Max: 18  SpO2  Av.9 %  Min: 93 %  Max: 100 %  I/O last 3 completed shifts:  In: 460 [P.O.:460]  Out: 300 [Urine:300]    Physical Examination:  Examination  General: Patient sitting comfortably in NAD eating breakfast  HEENT: normocephalic, atraumatic  Cardiac: RRR, no murmurs appreciated, no extra heart sounds  Pulmonary/Chest: CTAB, symmetric chest rise, no wheezing or crackles  GI: Soft, non tender, non distended, normoactive bowel sounds  Extremities: no edema, clubbing, or cyanosis  Skin: dry, warm, intact. No bruising or rashes.  Neuro: Alert and oriented, moving all extremities; right sided facial droop with asymmetry on smile. Right upper extremity weakness 4/5 compared to left sided 5/5. Lower extremity 5/5 strength BL.    Laboratory:  Recent Labs   Lab 20  1120 20  1341 20  1907 20  0153 20  0623 20  0413 20  0410   WBC 6.01  --   --  5.72  --  5.79 7.13   HGB 14.1  --   --  13.8  --  13.7 13.3   HCT 44.4  --   --  44.5  --  43.2 41.3     --   --  283  --  299 288   MCV 95  --   --  94  --  93 93     --   --   --  140  --  137   K 4.2  --   --   --  4.3  --  4.0     --   --   --  105  --  99   CO2 29  --   --   --  27  --  28   BUN 12  --   --   --  8  --  34*   CREATININE 0.9  --   --   --   0.8  --  1.4   *  --   --   --  93  --  92   CALCIUM 10.1  --   --   --  10.3  --  10.2   PROT 7.6  --   --   --   --   --  7.2   ALBUMIN 3.8  --   --   --   --   --  3.5   PHOS  --   --   --   --  3.4  --  4.8*   MG  --   --   --   --  2.0  --  2.1   AST 16  --   --   --   --   --  19   ALT 10  --   --   --   --   --  16   ALKPHOS 87  --   --   --   --   --  73   TROPONINI  --  0.018 0.014 0.006  --   --   --      Invalid input(s): POCTGLU  Recent Labs   Lab 06/17/20  1341   HGBA1C 5.8*     Microbiology Data:  COVID = negative    Radiology:  CT Head (6/17/2020)  The brain is significant for periventricular hypoattenuation consistent with microvascular ischemic change.  There are remote bilateral lacunar type basal ganglial infarcts.  There is a remote left cerebellar hemisphere infarct.  There is a 1.9 cm partially calcified aneurysm of the right MCA better characterized on CTA performed 04/06/2010.  This aneurysm measured 1.4 cm on previous CTA.  There is no intra or extra-axial mass or hemorrhage.  The visualized extracranial structures are significant for postoperative change within the right frontotemporal calvarium.     CXR (6/17/2020)  No large airspace opacity or lobar consolidation.  No pleural effusion or pneumothorax.  Cardiomediastinal silhouette appears similar allowing for variation in technique.  Slight elevation of the left hemidiaphragm with probable gas in the stomach.  Similar rightward deviation of the trachea.  Age related DJD.     CTA Head and Neck (6/18/2020)  CT head: No hemorrhage or major vascular distribution infarction.  Further evaluation with MRI at the discretion of the clinical service.     CTA: No definite large vessel occlusion.  Severe stenosis of both branches of the right M2 as seen on series 5, image 412 and 416.  Multifocal intracranial arterial stenoses including multifocal posterior circulation moderate to severe stenoses and moderate right M1 MCA  stenosis.     Enlarging right MCA aneurysm in comparison the prior exam of 2010.     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    Current Medications:  Scheduled:   amLODIPine  10 mg Oral Daily    aspirin  81 mg Oral Daily    atorvastatin  80 mg Oral Daily    clopidogreL  75 mg Oral Daily    enoxaparin  40 mg Subcutaneous Q24H    hydroCHLOROthiazide  25 mg Oral Daily    lisinopriL  20 mg Oral Daily        PRN:  acetaminophen, labetalol, melatonin, sodium chloride 0.9%    Antibiotics and Day Number of Therapy:  None    Assessment:     Margarita Chávez is a 84 y.o.female with  Patient Active Problem List    Diagnosis Date Noted    Substernal thyroid goiter     Mixed hyperlipidemia 06/18/2020    Multinodular goiter (nontoxic) 06/18/2020    Intracranial vascular stenosis 06/18/2020    TIA (transient ischemic attack) 06/17/2020    Stroke 06/17/2020    Uncontrolled hypertension 06/17/2020    Aneurysm of middle cerebral artery 06/17/2020        Plan:     High Risk TIA  - Patient woke morning of admission with right sided hemiparesis and facial droop  - NIHSS = 3 I tPA not given due to patient outside of treatment window  - Vascular Neurology consulted: , plavix 300 followed by 75 mg for 30 days, atorvastatin 80 mg  - CTA head/neck (6/17/2020):  No definite large vessel occlusion.  Severe stenosis of both branches of the right M2 as seen on series 5, image 412 and 416.  Multifocal intracranial arterial stenoses including multifocal posterior circulation moderate to severe stenoses and moderate right M1 MCA stenosis.  - Unable to obtain MRI brain due to aneurysm clip placed  - Consulted SLP/OT/PT: dental soft diet  - Pending inpatient rehabilitation; continue DAPT and high dose statin    SANTOSH  - Cr = 1.4 (baseline ~ 0.8)   - Encourage PO intake  - Obtaining urine studies then will start IVF    Chronic Right MCA Aneurysm s/p clip with areas of stenoses and infarctions  - Patient  with known right MCA aneurysm  - CT head (6/17): Large partially calcified aneurysm extending off the right MCA which has increased in size compared to previous CT dated 04/06/2010.  - Follow up with neurosurgery as outpatient    Multinodular Thyroid Goiter  - TSH = 0.564  - CTA head and Neck (6/18/2020): Massive thyroid goiter with substernal extension which deviates the airway to the right.  - Thyroid ultrasound of thyroid from 6/18 showed large multinodular thyroid, L>R  - Will refer to Endocrinology for further eval after discharge    Uncontrolled Hypertension  - BP on admission = 195/95  - Home medications: HCTZ 12.5  - Continue amlodipine 10 daily, HCTZ 25 mg daily, lisinopril 20 mg daily  - Goal BP of < 130/80    HLD  - Lipid panel: cholesterol = 241 I TG = 146 I HDL = 59 I LDL = 152  - Continue on atorvastatin 80 mg daily    Asymptomatic E. Coli Urinary Tract Infection  - Urine culture = pan sensitive E. Coli  - Patient denies any dysuria, frequency, or urgency  - Afebrile without elevation in WBC    Health Care Maintenance  - TSH = 0.564 I  A1c = 5.8  - Lipid panel: cholesterol = 241 I TG = 146 I HDL = 59 I LDL = 152  - Influenza, Tdap, and pneumococcal vaccines up to date     Code Status: Full code  DVT Prophylaxis: lovenox  Diet: Regular diet with thin liquids  Disposition:  Medically stable for discharge; pending IPR    Susie Archuleta MD  U Internal Medicine Resident, [facility] Medicine Hospitalist Pager numbers:   [facility] Hospitalist Medicine Team A (Fuad/Kathe): [phone]  [facility] Hospitalist Medicine Team B (Nayana/Alysa):  [phone]

## 2020-06-22 NOTE — PLAN OF CARE
POC reviewed, patient verbalize understanding. Patient complained of shoulder pain administered tylenol prn as charted. Administered melatonin prn for insomnia as charted. NSR on tele monitor. Neuro check every 4 hours. Fall precaution maintained: bed on lowest position, call light within reach, bed alarm set, side rail up x 2, non skid sock on. Will continue to monitor.

## 2020-06-22 NOTE — DISCHARGE SUMMARY
[facility] Hospital Medicine Discharge Summary    Primary Team: [facility] Hospitalist Team A  Attending Physician: Kathe  Resident: Diana  Intern: Kathe    Date of Admit: 6/17/2020  Date of Discharge: 6/22/2020    Discharge to: [facility]  Condition: Stable    Discharge Diagnoses     Patient Active Problem List   Diagnosis    TIA (transient ischemic attack)    Stroke    Uncontrolled hypertension    Aneurysm of middle cerebral artery    Mixed hyperlipidemia    Multinodular goiter (nontoxic)    Intracranial vascular stenosis    Substernal thyroid goiter       Consultants and Procedures     Consultants:  Vascular Neurology  Nutrition    Procedures:   None    Imaging:  CT Head (6/17/2020)  The brain is significant for periventricular hypoattenuation consistent with microvascular ischemic change.  There are remote bilateral lacunar type basal ganglial infarcts.  There is a remote left cerebellar hemisphere infarct.  There is a 1.9 cm partially calcified aneurysm of the right MCA better characterized on CTA performed 04/06/2010.  This aneurysm measured 1.4 cm on previous CTA.  There is no intra or extra-axial mass or hemorrhage.  The visualized extracranial structures are significant for postoperative change within the right frontotemporal calvarium.     CXR (6/17/2020)  No large airspace opacity or lobar consolidation.  No pleural effusion or pneumothorax.  Cardiomediastinal silhouette appears similar allowing for variation in technique.  Slight elevation of the left hemidiaphragm with probable gas in the stomach.  Similar rightward deviation of the trachea.  Age related DJD.      CTA Head and Neck (6/18/2020)  CT head: No hemorrhage or major vascular distribution infarction.  Further evaluation with MRI at the discretion of the clinical service.     CTA: No definite large vessel occlusion.  Severe stenosis of both branches of the right M2 as seen on series 5, image 412 and 416.  Multifocal intracranial arterial  "stenoses including multifocal posterior circulation moderate to severe stenoses and moderate right M1 MCA stenosis.     Enlarging right MCA aneurysm in comparison the prior exam of 2010.     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    Brief History of Present Illness      Margarita Chávez is a 84 y.o. female with past medical history of HTN.. The patient presented to [facility] on 6/17/2020 with a primary complaint of right sided weakness with facial droop upon awakening.     The patient was in their usual state of health until waking up at this morning at 6:30. She stated she woke up and went to stand up out of bed and immediately slid onto the floor because she could not use her right leg. She noted it was weak and could not lift it off the ground. She stated she pulled her self up into a chair using her left arm at this time and hoped her leg would regain its strength but it did not. At this time she scooted on the ground to the bathroom and pulled her self up onto the toilet seat and shortly after called her son to come get her. She says she also noted the right side of her face was weak and she felt "forgetful." Prior to this, she was living alone and performing all ADL's. She does reports recent diagnosis of hypertension at her PCP within the past month. She denies any worsening weakness, vision changes, head ache, dysarthria, loss of sensation, urinary/bowel incontinence, fever, chills, nausea, vomiting, CP, or SOB.    For the full HPI please refer to the History & Physical from this admission.    Hospital Course By Problem with Pertinent Findings     High Risk TIA  - Patient woke morning of admission with right sided hemiparesis and facial droop  - NIHSS = 3 I tPA not given due to patient outside of treatment window  - Vascular Neurology consulted: , plavix 300 followed by 75 mg for 30 days, atorvastatin 80 mg  - CTA head/neck (6/17/2020):  No definite large " vessel occlusion.  Severe stenosis of both branches of the right M2 as seen on series 5, image 412 and 416.  Multifocal intracranial arterial stenoses including multifocal posterior circulation moderate to severe stenoses and moderate right M1 MCA stenosis.  - Unable to obtain MRI brain due to aneurysm clip placed  - Consulted SLP/OT/PT: dental soft diet  - Discharge with plavix for 30 days, aspirin, and high dose statin     SANTOSH  - Cr = 1.4 (baseline ~ 0.8)   - Encourage PO intake  - Likely secondary to recent start of ACEi with poor PO intake  - Received 1L IVF and recommend continued monitoring at [facility]     Chronic Right MCA Aneurysm s/p clip with areas of stenoses and infarctions  - Patient with known right MCA aneurysm  - CT head (6/17): Large partially calcified aneurysm extending off the right MCA which has increased in size compared to previous CT dated 04/06/2010.  - Neurosurgery referral sent at discharge     Multinodular Thyroid Goiter  - TSH = 0.564  - CTA head and Neck (6/18/2020): Massive thyroid goiter with substernal extension which deviates the airway to the right.  - Thyroid ultrasound of thyroid from 6/18 showed large multinodular thyroid, L>R  - Endocrinology referral sent at discharge     Hypertension  - BP on admission = 195/95  - Home medications: HCTZ 12.5  - Discharge with amlodipine 10 daily, HCTZ 25 mg daily, lisinopril 20 mg daily  - Goal BP of < 130/80     HLD  - Lipid panel: cholesterol = 241 I TG = 146 I HDL = 59 I LDL = 152  - Discharge on atorvastatin 80 mg daily     Asymptomatic E. Coli Urinary Tract Infection  - Urine culture = pan sensitive E. Coli  - Patient denies any dysuria, frequency, or urgency  - Afebrile without elevation in WBC     Health Care Maintenance  - TSH = 0.564 I  A1c = 5.8  - Lipid panel: cholesterol = 241 I TG = 146 I HDL = 59 I LDL = 152  - Influenza, Tdap, and pneumococcal vaccines up to date     Discharge Medications      Margarita Chávez   Home Medication  Instructions JOANIE:[id number]    Printed on:06/22/20 4385   Medication Information                      amLODIPine (NORVASC) 10 MG tablet  Take 1 tablet (10 mg total) by mouth once daily.             aspirin 81 MG Chew  Chew and swallow 1 tablet (81 mg total) by mouth once daily.             atorvastatin (LIPITOR) 80 MG tablet  Take 1 tablet (80 mg total) by mouth once daily.             clopidogreL (PLAVIX) 75 mg tablet  Take 1 tablet (75 mg total) by mouth once daily.             hydroCHLOROthiazide (HYDRODIURIL) 12.5 MG Tab  Take 2 tablets (25 mg total) by mouth once daily.               Discharge Information:     Diet:  Regular Diet    Physical Activity:  As tolerated             Instructions:  1. Take all medications as prescribed  2. Keep all follow-up appointments  3. Return to the hospital or call your primary care physicians if any worsening symptoms such as fever, chest pain, shortness of breath, return of symptoms, or any other concerns.    Follow-Up Appointments:  PCP  Neurology  Neurosurgery  Endocrine    Susie Archuleta MD  [facility] Internal Medicine Resident, [facility]

## 2020-06-22 NOTE — PLAN OF CARE
CM notified by Savita at [facility] has been approved by N to begin inpatient rehab today.   Savita will contact CM with transport and report time, will notify nursing once established.    Primary team to place Facility Transfer Orders in Epic, bed will be assigned once orders received and reviewed. Team paged for orders.    Family made aware of process for transfer to facility, anticipate transfer early afternoon pending orders and review.      Brianne Lo RN    [phone]

## 2020-06-22 NOTE — PT/OT/SLP PROGRESS
Occupational Therapy  Visit Attempt    Patient Name:  Margarita Chávez   MRN:  [id number]    Patient not seen today secondary to c/o pain in RUE (antecubital area). On assessment, patient /c increased erythema, edema, heat and tender to touch just superior to previous PIV site. Nursing notified and present to assess. Will notify MD team of same. Will follow-up.    MARIALUISA Fritz  6/22/2020

## 2020-06-22 NOTE — PLAN OF CARE
VIRTUAL NURSE:  Cued into patient's room.  Patient not responding to introduction and permission inquiry.  Patient resting comfortably in bed with eyes closed; respirations even and unlabored.  No distress noted.    Labs, notes, and orders reviewed.

## 2020-06-22 NOTE — NURSING
PT reported noting redness to pt's right arm with C/O pain with movement. Assessed and noted redness with minimal swelling to right arm.  Contacted Dr. Archuleta's team to notify. Order to apply warm compress to site.

## 2020-06-22 NOTE — PLAN OF CARE
06/22/20 1006   Post-Acute Status   Post-Acute Authorization Placement  ([facility])   Post-Acute Placement Status   (Pending Orders in Epic)

## 2020-06-22 NOTE — NURSING
Report given to BK Lassiter at [facility] [phone].  Communicated right arm redness with edema with order for warm compress and to collect U/A per MD order. Unable to collect this morning.

## 2020-06-23 NOTE — PHYSICIAN QUERY
"PT Name: Margarita Chávez  MR #: [id number]  Physician Query Form -  Neurological Condition Clarification      CDS: Ara Mcclure RN, CCDS  Contact information: [email or web address]    This form is a permanent document in the medical record.     Query Date: June 23, 2020    By submitting this query, we are merely seeking further clarification of documentation. Please utilize your independent clinical judgment when addressing the question(s) below.    The Medical record contains the following:   Indicators  Supporting Clinical Findings Location in Medical Record   x "Occlusion", "Stenosis" documented CTA Head and Neck (6/18/2020)   CT head: No hemorrhage or major vascular distribution infarction. Further evaluation with MRI at the discretion of the clinical service.     CTA: No definite large vessel &occlusion. Severe stenosis of both branches of the right M2 as seen on series 5, image 412 and 416. Multifocal intracranial arterial stenoses including multifocal posterior circulation moderate to severe stenoses and moderate right M1 MCA stenosis.    DC summary 6/22   x "TIA" or  "Stroke" documented Patient is a 84 y.o. 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  Suspect either artery-artery embolism in setting of extensive intracranial atherosclerosis, or lacunar stroke.    CTA: No definite large vessel occlusion      High Risk TIA  - Patient woke morning of admission with right sided hemiparesis and facial droop  - NIHSS = 3 I tPA not given due to patient outside of treatment window    Discharge Diagnoses:  Patient Active Problem list:  TIA (tranient ischemic attack)  Stroke  Intracranial vascular stenosis   Vasc Neuro consult 6/18            Dc summary 6/22   x Radiology findings: Intracranial Arteries:     Anterior circulation: Large, slightly lobulated and partially calcified aneurysm of the right MCA bifurcation is again seen measuring 1.7 x 1.3 x 1.6 cm (AP TV " CC).  The more distal right MCA arises from the aneurysm with stenosis of the post-aneurysmal segment of the M1 MCA.  Additional severe stenosis of the M2 MCA affecting both M2 branches as seen on series 5, image 412 and image 416.  Multifocal mild stenoses of the left M1 MCA without occlusion.  Additional more distal stenosis with probable high-grade stenosis of about the M2/M3 bifurcation on the left.     Posterior circulation: Multifocal narrowing of the vertebrobasilar system with severe stenosis of the right P1 PCA prior to the posterior communicating artery which appears to provide significant supply.  Stenosis of the right posterior communicating artery at its junction with the right PCA, mild-to-moderate.  Left PCA arises predominantly from the left posterior communicating artery.  There is moderate stenosis of the left posterior communicating artery and moderate stenosis of the more distal left PCA.  Irregularity of the basilar artery with areas of up to 50% narrowing related to atherosclerosis.      Impression:  Large right MCA aneurysm.  No acute intracranial abnormality. CTA head and neck 6/17                                          CT head 6/18    Medication:     x Treatment: - Vascular Neurology consulted: , plavix 300 followed by 75 mg for 30 days, atorvastatin 80 mg DC summary 6/22    Other:          Provider, please clarify the suspected or known diagnosis associated with above clinical findings.    Provider Use Only        [  ] Suspected embolic L MCA CVA      [  ] Suspected Lacunar CVA      [ X ] Suspected TIA due to cerebral artery stenosis, please specify artery, if possible        [  ] Left PCA        [  ] Left Basilar        [ X ] Right MCA        [  ] Right PCA        [  ] Other artery: _______________        [  ] Unspecified     [  ] Other diagnosis (please explain): _______________________________________                                                                                                              [  ] Clinically Undetermined       Please document in your progress notes daily for the duration of treatment, until resolved, and include in your discharge summary.

## 2020-06-29 ENCOUNTER — HOSPITAL ENCOUNTER (OUTPATIENT)
Dept: RADIOLOGY | Facility: HOSPITAL | Age: 85
Discharge: HOME | End: 2020-06-29
Attending: PHYSICAL MEDICINE & REHABILITATION
Payer: MEDICARE

## 2020-06-29 PROCEDURE — 73560 XR KNEE 1 OR 2 VIEW BILATERAL: ICD-10-PCS | Mod: 26,RT,, | Performed by: RADIOLOGY

## 2020-06-29 PROCEDURE — 73560 X-RAY EXAM OF KNEE 1 OR 2: CPT | Mod: 26,RT,, | Performed by: RADIOLOGY

## 2020-06-29 PROCEDURE — 73560 X-RAY EXAM OF KNEE 1 OR 2: CPT | Mod: 26,59,LT, | Performed by: RADIOLOGY

## 2020-07-01 ENCOUNTER — HOSPITAL ENCOUNTER (INPATIENT)
Facility: HOSPITAL | Age: 85
LOS: 16 days | Discharge: NURSING, REHAB OR LONG-TERM CARE | DRG: 067 | End: 2020-07-17
Attending: EMERGENCY MEDICINE | Admitting: INTERNAL MEDICINE
Payer: MEDICARE

## 2020-07-01 DIAGNOSIS — Z74.09 IMPAIRED MOBILITY AND ADLS: ICD-10-CM

## 2020-07-01 DIAGNOSIS — R94.31 ST ELEVATION ON ECG: ICD-10-CM

## 2020-07-01 DIAGNOSIS — I67.1 ANEURYSM OF MIDDLE CEREBRAL ARTERY: ICD-10-CM

## 2020-07-01 DIAGNOSIS — G45.9 TIA (TRANSIENT ISCHEMIC ATTACK): ICD-10-CM

## 2020-07-01 DIAGNOSIS — I21.3 ST ELEVATION MYOCARDIAL INFARCTION (STEMI), UNSPECIFIED ARTERY: Primary | ICD-10-CM

## 2020-07-01 DIAGNOSIS — Z78.9 IMPAIRED MOBILITY AND ADLS: ICD-10-CM

## 2020-07-01 DIAGNOSIS — I63.9 STROKE: ICD-10-CM

## 2020-07-01 DIAGNOSIS — R53.1 ACUTE LEFT-SIDED WEAKNESS: ICD-10-CM

## 2020-07-01 DIAGNOSIS — I67.9 INTRACRANIAL VASCULAR STENOSIS: ICD-10-CM

## 2020-07-01 DIAGNOSIS — I21.4 NSTEMI (NON-ST ELEVATED MYOCARDIAL INFARCTION): ICD-10-CM

## 2020-07-01 DIAGNOSIS — R53.1 WEAKNESS: ICD-10-CM

## 2020-07-01 DIAGNOSIS — I21.02 ST ELEVATION MYOCARDIAL INFARCTION INVOLVING LEFT ANTERIOR DESCENDING (LAD) CORONARY ARTERY: ICD-10-CM

## 2020-07-01 PROBLEM — I24.9 ACS (ACUTE CORONARY SYNDROME): Status: ACTIVE | Noted: 2020-07-01

## 2020-07-01 LAB
ALBUMIN SERPL BCP-MCNC: 3.6 G/DL (ref 3.5–5.2)
ALP SERPL-CCNC: 67 U/L (ref 55–135)
ALT SERPL W/O P-5'-P-CCNC: 28 U/L (ref 10–44)
ANION GAP SERPL CALC-SCNC: 11 MMOL/L (ref 8–16)
APTT BLDCRRT: 28 SEC (ref 21–32)
APTT BLDCRRT: 28.4 SEC (ref 21–32)
ASCENDING AORTA: 2.99 CM
AST SERPL-CCNC: 27 U/L (ref 10–40)
AV INDEX (PROSTH): 0.68
AV MEAN GRADIENT: 5 MMHG
AV PEAK GRADIENT: 10 MMHG
AV VALVE AREA: 2.46 CM2
AV VELOCITY RATIO: 0.63
BACTERIA #/AREA URNS AUTO: NORMAL /HPF
BASOPHILS # BLD AUTO: 0.02 K/UL (ref 0–0.2)
BASOPHILS NFR BLD: 0.3 % (ref 0–1.9)
BILIRUB SERPL-MCNC: 0.5 MG/DL (ref 0.1–1)
BILIRUB UR QL STRIP: NEGATIVE
BSA FOR ECHO PROCEDURE: 1.86 M2
BUN SERPL-MCNC: 49 MG/DL (ref 6–30)
BUN SERPL-MCNC: 50 MG/DL (ref 8–23)
CALCIUM SERPL-MCNC: 10.2 MG/DL (ref 8.7–10.5)
CHLORIDE SERPL-SCNC: 99 MMOL/L (ref 95–110)
CHLORIDE SERPL-SCNC: 99 MMOL/L (ref 95–110)
CLARITY UR REFRACT.AUTO: CLEAR
CO2 SERPL-SCNC: 23 MMOL/L (ref 23–29)
COLOR UR AUTO: ABNORMAL
CREAT SERPL-MCNC: 1.9 MG/DL (ref 0.5–1.4)
CREAT SERPL-MCNC: 2 MG/DL (ref 0.5–1.4)
CREAT UR-MCNC: 39 MG/DL (ref 15–325)
CV ECHO LV RWT: 0.51 CM
DIFFERENTIAL METHOD: ABNORMAL
DOP CALC AO PEAK VEL: 1.57 M/S
DOP CALC AO VTI: 24.68 CM
DOP CALC LVOT AREA: 3.6 CM2
DOP CALC LVOT DIAMETER: 2.14 CM
DOP CALC LVOT PEAK VEL: 0.99 M/S
DOP CALC LVOT STROKE VOLUME: 60.68 CM3
DOP CALCLVOT PEAK VEL VTI: 16.88 CM
E WAVE DECELERATION TIME: 162.73 MSEC
E/A RATIO: 0.62
E/E' RATIO: 5.16 M/S
ECHO LV POSTERIOR WALL: 1.09 CM (ref 0.6–1.1)
EOSINOPHIL # BLD AUTO: 0.6 K/UL (ref 0–0.5)
EOSINOPHIL NFR BLD: 10.5 % (ref 0–8)
ERYTHROCYTE [DISTWIDTH] IN BLOOD BY AUTOMATED COUNT: 12.6 % (ref 11.5–14.5)
EST. GFR  (AFRICAN AMERICAN): 26 ML/MIN/1.73 M^2
EST. GFR  (NON AFRICAN AMERICAN): 22.6 ML/MIN/1.73 M^2
FRACTIONAL SHORTENING: 35 % (ref 28–44)
GLUCOSE SERPL-MCNC: 102 MG/DL (ref 70–110)
GLUCOSE SERPL-MCNC: 106 MG/DL (ref 70–110)
GLUCOSE UR QL STRIP: NEGATIVE
HCT VFR BLD AUTO: 42.6 % (ref 37–48.5)
HCT VFR BLD CALC: 42 %PCV (ref 36–54)
HGB BLD-MCNC: 13.4 G/DL (ref 12–16)
HGB UR QL STRIP: ABNORMAL
IMM GRANULOCYTES # BLD AUTO: 0.02 K/UL (ref 0–0.04)
IMM GRANULOCYTES NFR BLD AUTO: 0.3 % (ref 0–0.5)
INR PPP: 1 (ref 0.8–1.2)
INTERVENTRICULAR SEPTUM: 1.06 CM (ref 0.6–1.1)
KETONES UR QL STRIP: NEGATIVE
LA MAJOR: 4.3 CM
LA MINOR: 4.12 CM
LA WIDTH: 3.28 CM
LACTATE SERPL-SCNC: 1.1 MMOL/L (ref 0.5–2.2)
LEFT ATRIUM SIZE: 3.46 CM
LEFT ATRIUM VOLUME INDEX: 22.2 ML/M2
LEFT ATRIUM VOLUME: 40.59 CM3
LEFT INTERNAL DIMENSION IN SYSTOLE: 2.78 CM (ref 2.1–4)
LEFT VENTRICLE DIASTOLIC VOLUME INDEX: 44.73 ML/M2
LEFT VENTRICLE DIASTOLIC VOLUME: 81.96 ML
LEFT VENTRICLE MASS INDEX: 85 G/M2
LEFT VENTRICLE SYSTOLIC VOLUME INDEX: 15.9 ML/M2
LEFT VENTRICLE SYSTOLIC VOLUME: 29.12 ML
LEFT VENTRICULAR INTERNAL DIMENSION IN DIASTOLE: 4.28 CM (ref 3.5–6)
LEFT VENTRICULAR MASS: 156.55 G
LEUKOCYTE ESTERASE UR QL STRIP: ABNORMAL
LV LATERAL E/E' RATIO: 4.45 M/S
LV SEPTAL E/E' RATIO: 6.13 M/S
LYMPHOCYTES # BLD AUTO: 1.2 K/UL (ref 1–4.8)
LYMPHOCYTES NFR BLD: 20.7 % (ref 18–48)
MAGNESIUM SERPL-MCNC: 2.2 MG/DL (ref 1.6–2.6)
MCH RBC QN AUTO: 30.6 PG (ref 27–31)
MCHC RBC AUTO-ENTMCNC: 31.5 G/DL (ref 32–36)
MCV RBC AUTO: 97 FL (ref 82–98)
MICROSCOPIC COMMENT: NORMAL
MONOCYTES # BLD AUTO: 0.5 K/UL (ref 0.3–1)
MONOCYTES NFR BLD: 8 % (ref 4–15)
MV PEAK A VEL: 0.79 M/S
MV PEAK E VEL: 0.49 M/S
MV STENOSIS PRESSURE HALF TIME: 47.19 MS
MV VALVE AREA P 1/2 METHOD: 4.66 CM2
NEUTROPHILS # BLD AUTO: 3.6 K/UL (ref 1.8–7.7)
NEUTROPHILS NFR BLD: 60.2 % (ref 38–73)
NITRITE UR QL STRIP: NEGATIVE
NRBC BLD-RTO: 0 /100 WBC
PH UR STRIP: 5 [PH] (ref 5–8)
PISA TR MAX VEL: 2.66 M/S
PLATELET # BLD AUTO: 310 K/UL (ref 150–350)
PMV BLD AUTO: 10 FL (ref 9.2–12.9)
POC IONIZED CALCIUM: 1.23 MMOL/L (ref 1.06–1.42)
POC TCO2 (MEASURED): 26 MMOL/L (ref 23–29)
POCT GLUCOSE: 102 MG/DL (ref 70–110)
POTASSIUM BLD-SCNC: 4.8 MMOL/L (ref 3.5–5.1)
POTASSIUM SERPL-SCNC: 5 MMOL/L (ref 3.5–5.1)
PROT SERPL-MCNC: 7.5 G/DL (ref 6–8.4)
PROT UR QL STRIP: NEGATIVE
PROTHROMBIN TIME: 10.5 SEC (ref 9–12.5)
PULM VEIN S/D RATIO: 1.76
PV PEAK D VEL: 0.34 M/S
PV PEAK S VEL: 0.6 M/S
RA MAJOR: 4.41 CM
RA PRESSURE: 3 MMHG
RA WIDTH: 2.08 CM
RBC # BLD AUTO: 4.38 M/UL (ref 4–5.4)
RBC #/AREA URNS AUTO: 1 /HPF (ref 0–4)
RIGHT VENTRICULAR END-DIASTOLIC DIMENSION: 1.91 CM
SAMPLE: ABNORMAL
SARS-COV-2 RDRP RESP QL NAA+PROBE: NEGATIVE
SINUS: 3.17 CM
SODIUM BLD-SCNC: 132 MMOL/L (ref 136–145)
SODIUM SERPL-SCNC: 133 MMOL/L (ref 136–145)
SODIUM UR-SCNC: 47 MMOL/L (ref 20–250)
SP GR UR STRIP: 1.01 (ref 1–1.03)
SQUAMOUS #/AREA URNS AUTO: 1 /HPF
STJ: 2.15 CM
T4 FREE SERPL-MCNC: 1.15 NG/DL (ref 0.71–1.51)
TDI LATERAL: 0.11 M/S
TDI SEPTAL: 0.08 M/S
TDI: 0.1 M/S
TR MAX PG: 28 MMHG
TRICUSPID ANNULAR PLANE SYSTOLIC EXCURSION: 1.37 CM
TROPONIN I SERPL DL<=0.01 NG/ML-MCNC: 0.01 NG/ML (ref 0–0.03)
TROPONIN I SERPL DL<=0.01 NG/ML-MCNC: <0.006 NG/ML (ref 0–0.03)
TSH SERPL DL<=0.005 MIU/L-ACNC: 0.34 UIU/ML (ref 0.4–4)
TV REST PULMONARY ARTERY PRESSURE: 31 MMHG
URN SPEC COLLECT METH UR: ABNORMAL
WBC # BLD AUTO: 5.98 K/UL (ref 3.9–12.7)
WBC #/AREA URNS AUTO: 3 /HPF (ref 0–5)

## 2020-07-01 PROCEDURE — 84443 ASSAY THYROID STIM HORMONE: CPT

## 2020-07-01 PROCEDURE — 93005 ELECTROCARDIOGRAM TRACING: CPT

## 2020-07-01 PROCEDURE — 82570 ASSAY OF URINE CREATININE: CPT

## 2020-07-01 PROCEDURE — 85730 THROMBOPLASTIN TIME PARTIAL: CPT | Mod: 91

## 2020-07-01 PROCEDURE — 84439 ASSAY OF FREE THYROXINE: CPT

## 2020-07-01 PROCEDURE — 20000000 HC ICU ROOM

## 2020-07-01 PROCEDURE — 25000003 PHARM REV CODE 250: Performed by: STUDENT IN AN ORGANIZED HEALTH CARE EDUCATION/TRAINING PROGRAM

## 2020-07-01 PROCEDURE — 82962 GLUCOSE BLOOD TEST: CPT

## 2020-07-01 PROCEDURE — 80053 COMPREHEN METABOLIC PANEL: CPT

## 2020-07-01 PROCEDURE — 99223 1ST HOSP IP/OBS HIGH 75: CPT | Mod: GC,,, | Performed by: PSYCHIATRY & NEUROLOGY

## 2020-07-01 PROCEDURE — 99222 1ST HOSP IP/OBS MODERATE 55: CPT | Mod: AI,GC,, | Performed by: INTERNAL MEDICINE

## 2020-07-01 PROCEDURE — 93010 EKG 12-LEAD: ICD-10-PCS | Mod: 77,,, | Performed by: INTERNAL MEDICINE

## 2020-07-01 PROCEDURE — U0002 COVID-19 LAB TEST NON-CDC: HCPCS

## 2020-07-01 PROCEDURE — 84484 ASSAY OF TROPONIN QUANT: CPT | Mod: 91

## 2020-07-01 PROCEDURE — 99239 HOSP IP/OBS DSCHRG MGMT >30: CPT | Mod: ,,, | Performed by: PHYSICAL MEDICINE & REHABILITATION

## 2020-07-01 PROCEDURE — 99292 PR CRITICAL CARE, ADDL 30 MIN: ICD-10-PCS | Mod: ,,, | Performed by: EMERGENCY MEDICINE

## 2020-07-01 PROCEDURE — 85730 THROMBOPLASTIN TIME PARTIAL: CPT

## 2020-07-01 PROCEDURE — 99292 CRITICAL CARE ADDL 30 MIN: CPT | Mod: ,,, | Performed by: EMERGENCY MEDICINE

## 2020-07-01 PROCEDURE — 93010 ELECTROCARDIOGRAM REPORT: CPT | Mod: 77,,, | Performed by: INTERNAL MEDICINE

## 2020-07-01 PROCEDURE — 99223 PR INITIAL HOSPITAL CARE,LEVL III: ICD-10-PCS | Mod: GC,,, | Performed by: PSYCHIATRY & NEUROLOGY

## 2020-07-01 PROCEDURE — 99291 PR CRITICAL CARE, E/M 30-74 MINUTES: ICD-10-PCS | Mod: ,,, | Performed by: EMERGENCY MEDICINE

## 2020-07-01 PROCEDURE — 99222 PR INITIAL HOSPITAL CARE,LEVL II: ICD-10-PCS | Mod: AI,GC,, | Performed by: INTERNAL MEDICINE

## 2020-07-01 PROCEDURE — 94761 N-INVAS EAR/PLS OXIMETRY MLT: CPT

## 2020-07-01 PROCEDURE — 25000003 PHARM REV CODE 250: Performed by: INTERNAL MEDICINE

## 2020-07-01 PROCEDURE — 99239 PR HOSPITAL DISCHARGE DAY,>30 MIN: ICD-10-PCS | Mod: ,,, | Performed by: PHYSICAL MEDICINE & REHABILITATION

## 2020-07-01 PROCEDURE — 81001 URINALYSIS AUTO W/SCOPE: CPT

## 2020-07-01 PROCEDURE — 85610 PROTHROMBIN TIME: CPT

## 2020-07-01 PROCEDURE — 99291 CRITICAL CARE FIRST HOUR: CPT | Mod: ,,, | Performed by: EMERGENCY MEDICINE

## 2020-07-01 PROCEDURE — 84484 ASSAY OF TROPONIN QUANT: CPT

## 2020-07-01 PROCEDURE — 83605 ASSAY OF LACTIC ACID: CPT

## 2020-07-01 PROCEDURE — 84300 ASSAY OF URINE SODIUM: CPT

## 2020-07-01 PROCEDURE — 83735 ASSAY OF MAGNESIUM: CPT

## 2020-07-01 PROCEDURE — 99291 CRITICAL CARE FIRST HOUR: CPT | Mod: 25

## 2020-07-01 PROCEDURE — 80047 BASIC METABLC PNL IONIZED CA: CPT

## 2020-07-01 PROCEDURE — 85025 COMPLETE CBC W/AUTO DIFF WBC: CPT

## 2020-07-01 RX ORDER — ASPIRIN 81 MG/1
81 TABLET ORAL DAILY
Status: DISCONTINUED | OUTPATIENT
Start: 2020-07-02 | End: 2020-07-17 | Stop reason: HOSPADM

## 2020-07-01 RX ORDER — SODIUM CHLORIDE 9 MG/ML
3 INJECTION, SOLUTION INTRAVENOUS CONTINUOUS
Status: CANCELLED | OUTPATIENT
Start: 2020-07-01 | End: 2020-07-01

## 2020-07-01 RX ORDER — CLOPIDOGREL BISULFATE 75 MG/1
75 TABLET ORAL DAILY
Status: DISCONTINUED | OUTPATIENT
Start: 2020-07-02 | End: 2020-07-17 | Stop reason: HOSPADM

## 2020-07-01 RX ORDER — HEPARIN SODIUM,PORCINE/D5W 25000/250
12 INTRAVENOUS SOLUTION INTRAVENOUS CONTINUOUS
Status: DISCONTINUED | OUTPATIENT
Start: 2020-07-01 | End: 2020-07-01

## 2020-07-01 RX ORDER — ONDANSETRON 8 MG/1
8 TABLET, ORALLY DISINTEGRATING ORAL EVERY 8 HOURS PRN
Status: DISCONTINUED | OUTPATIENT
Start: 2020-07-01 | End: 2020-07-17 | Stop reason: HOSPADM

## 2020-07-01 RX ORDER — ASPIRIN 325 MG
TABLET ORAL
Status: DISPENSED
Start: 2020-07-01 | End: 2020-07-02

## 2020-07-01 RX ORDER — ACETAMINOPHEN 325 MG/1
650 TABLET ORAL EVERY 4 HOURS PRN
Status: DISCONTINUED | OUTPATIENT
Start: 2020-07-01 | End: 2020-07-17 | Stop reason: HOSPADM

## 2020-07-01 RX ORDER — ONDANSETRON 2 MG/ML
4 INJECTION INTRAMUSCULAR; INTRAVENOUS EVERY 8 HOURS PRN
Status: DISCONTINUED | OUTPATIENT
Start: 2020-07-01 | End: 2020-07-17 | Stop reason: HOSPADM

## 2020-07-01 RX ORDER — TALC
6 POWDER (GRAM) TOPICAL NIGHTLY PRN
Status: DISCONTINUED | OUTPATIENT
Start: 2020-07-01 | End: 2020-07-17 | Stop reason: HOSPADM

## 2020-07-01 RX ORDER — HEPARIN SODIUM 5000 [USP'U]/ML
5000 INJECTION, SOLUTION INTRAVENOUS; SUBCUTANEOUS EVERY 8 HOURS
Status: DISCONTINUED | OUTPATIENT
Start: 2020-07-01 | End: 2020-07-17 | Stop reason: HOSPADM

## 2020-07-01 RX ORDER — DIPHENHYDRAMINE HCL 50 MG
50 CAPSULE ORAL ONCE
Status: CANCELLED | OUTPATIENT
Start: 2020-07-01 | End: 2020-07-01

## 2020-07-01 RX ORDER — SODIUM CHLORIDE 0.9 % (FLUSH) 0.9 %
10 SYRINGE (ML) INJECTION
Status: DISCONTINUED | OUTPATIENT
Start: 2020-07-01 | End: 2020-07-17 | Stop reason: HOSPADM

## 2020-07-01 RX ORDER — ATORVASTATIN CALCIUM 20 MG/1
80 TABLET, FILM COATED ORAL NIGHTLY
Status: DISCONTINUED | OUTPATIENT
Start: 2020-07-01 | End: 2020-07-17 | Stop reason: HOSPADM

## 2020-07-01 RX ORDER — ASPIRIN 325 MG
325 TABLET ORAL DAILY
Status: DISCONTINUED | OUTPATIENT
Start: 2020-07-01 | End: 2020-07-01

## 2020-07-01 RX ORDER — CLOPIDOGREL 300 MG/1
600 TABLET, FILM COATED ORAL ONCE
Status: COMPLETED | OUTPATIENT
Start: 2020-07-01 | End: 2020-07-01

## 2020-07-01 RX ADMIN — Medication 325 MG: at 01:07

## 2020-07-01 RX ADMIN — CLOPIDOGREL BISULFATE 600 MG: 300 TABLET, FILM COATED ORAL at 01:07

## 2020-07-01 RX ADMIN — ATORVASTATIN CALCIUM 80 MG: 20 TABLET, FILM COATED ORAL at 08:07

## 2020-07-01 NOTE — SUBJECTIVE & OBJECTIVE
Past Medical History:   Diagnosis Date    Hypertension        Past Surgical History:   Procedure Laterality Date    BRAIN SURGERY      HYSTERECTOMY         Review of patient's allergies indicates:   Allergen Reactions    Penicillins Nausea And Vomiting       Current Facility-Administered Medications on File Prior to Encounter   Medication    [DISCONTINUED] ibuprofen tablet     Current Outpatient Medications on File Prior to Encounter   Medication Sig    amLODIPine (NORVASC) 10 MG tablet Take 1 tablet (10 mg total) by mouth once daily.    aspirin 81 MG Chew Chew and swallow 1 tablet (81 mg total) by mouth once daily.    atorvastatin (LIPITOR) 80 MG tablet Take 1 tablet (80 mg total) by mouth once daily.    clopidogreL (PLAVIX) 75 mg tablet Take 1 tablet (75 mg total) by mouth once daily.    hydroCHLOROthiazide (HYDRODIURIL) 12.5 MG Tab Take 2 tablets (25 mg total) by mouth once daily.    lisinopriL (PRINIVIL,ZESTRIL) 20 MG tablet Take 1 tablet (20 mg total) by mouth once daily.     Family History     Problem Relation (Age of Onset)    Cancer Paternal Aunt        Tobacco Use    Smoking status: Never Smoker   Substance and Sexual Activity    Alcohol use: Never     Frequency: Never    Drug use: Never    Sexual activity: Not on file     Review of Systems   Constitution: Negative for chills, diaphoresis, fever, weight gain and weight loss.   HENT: Negative for congestion and sore throat.    Eyes: Negative for visual disturbance.   Cardiovascular: Negative for chest pain, dyspnea on exertion, leg swelling, orthopnea, palpitations, paroxysmal nocturnal dyspnea and syncope.        L arm pain   Respiratory: Negative for cough, shortness of breath and wheezing.    Skin: Negative for rash.   Musculoskeletal: Negative for joint pain and myalgias.   Gastrointestinal: Negative for abdominal pain, constipation, diarrhea, melena, nausea and vomiting.   Genitourinary: Negative for dysuria, frequency and hematuria.    Neurological: Negative for dizziness, headaches, light-headedness and numbness.   Psychiatric/Behavioral: Positive for altered mental status.     Objective:     Vital Signs (Most Recent):  Temp: 98 °F (36.7 °C) (07/01/20 1217)  Pulse: 66 (07/01/20 1518)  Resp: 15 (07/01/20 1518)  BP: (!) 126/59 (07/01/20 1510)  SpO2: 100 % (07/01/20 1518) Vital Signs (24h Range):  Temp:  [98 °F (36.7 °C)] 98 °F (36.7 °C)  Pulse:  [54-76] 66  Resp:  [13-20] 15  SpO2:  [94 %-100 %] 100 %  BP: (110-155)/(54-70) 126/59     Weight: 75.8 kg (167 lb)  Body mass index is 27.79 kg/m².    SpO2: 100 %  O2 Device (Oxygen Therapy): room air    No intake or output data in the 24 hours ending 07/01/20 1615    Lines/Drains/Airways     Peripheral Intravenous Line                 Peripheral IV - Single Lumen 07/01/20 1334 18 G Right Antecubital less than 1 day                Physical Exam   Constitutional: She is oriented to person, place, and time. She appears well-developed and well-nourished. No distress.   HENT:   Head: Normocephalic and atraumatic.   Mouth/Throat: Oropharynx is clear and moist. No oropharyngeal exudate.   Eyes: Pupils are equal, round, and reactive to light. Conjunctivae are normal. No scleral icterus.   Neck: Neck supple. No JVD present. No tracheal deviation present.   Cardiovascular: Normal rate, regular rhythm, normal heart sounds and intact distal pulses.   No murmur heard.  Pulmonary/Chest: Effort normal and breath sounds normal. No respiratory distress. She has no wheezes. She has no rales.   Abdominal: Soft. Bowel sounds are normal. She exhibits no distension. There is no abdominal tenderness. There is no rebound.   Musculoskeletal:         General: No edema.   Neurological: She is alert and oriented to person, place, and time. She exhibits normal muscle tone.   Skin: Skin is warm and dry. No rash noted. She is not diaphoretic.   Psychiatric: She has a normal mood and affect. Her behavior is normal.       Significant  Labs:   CMP   Recent Labs   Lab 07/01/20  1334   *   K 5.0   CL 99   CO2 23      BUN 50*   CREATININE 2.0*   CALCIUM 10.2   PROT 7.5   ALBUMIN 3.6   BILITOT 0.5   ALKPHOS 67   AST 27   ALT 28   ANIONGAP 11   ESTGFRAFRICA 26.0*   EGFRNONAA 22.6*   , CBC   Recent Labs   Lab 07/01/20  1334 07/01/20  1338   WBC 5.98  --    HGB 13.4  --    HCT 42.6 42     --    , INR   Recent Labs   Lab 07/01/20  1334   INR 1.0    and Troponin   Recent Labs   Lab 07/01/20  1334   TROPONINI <0.006       Significant Imaging: Reviewed.

## 2020-07-01 NOTE — PLAN OF CARE
CMICU DAILY GOALS       A: Awake    RASS: Goal - RASS Goal: 0-->alert and calm  Actual - RASS (Ibarra Agitation-Sedation Scale): 0-->alert and calm   Restraint necessity:    B: Breath   SBT: Not intubated   C: Coordinate A & B, analgesics/sedatives   Pain: managed    SAT: Not intubated  D: Delirium   CAM-ICU: Overall CAM-ICU: Negative  E: Early Mobility   MOVE Screen: Pass   Activity: Activity Management: activity adjusted per tolerance, activity clustered for rest period  FAS: Feeding/Nutrition   Diet order: Diet/Nutrition Received: NPO,   Fluid restriction:    T: Thrombus   DVT prophylaxis:    H: HOB Elevation   Head of Bed (HOB): HOB at 30-45 degrees  U: Ulcer Prophylaxis   GI: yes  G: Glucose control   managed    S: Skin   Bundle compliance: yes   Bathing/Skin Care: bath, partial, incontinence care, linen changed Date: 7/1/2020  B: Bowel Function   no issues   I: Indwelling Catheters   Pol[place] necessity:  no   CVC necessity: no   IPAD offered: Not appropriate  D: De-escalation Antibx   No  Plan for the day   EKG Q6. Treating with aspirin and plavix.   Family/Goals of care/Code Status   Code Status: DNR     No acute events throughout day, VS and assessment per flow sheet, patient progressing towards goals as tolerated, plan of care reviewed with Margarita Chávez and family, all concerns addressed, will continue to monitor.

## 2020-07-01 NOTE — SUBJECTIVE & OBJECTIVE
Past Medical History:   Diagnosis Date    Hypertension        Past Surgical History:   Procedure Laterality Date    BRAIN SURGERY      HYSTERECTOMY         Review of patient's allergies indicates:   Allergen Reactions    Penicillins Nausea And Vomiting       Current Facility-Administered Medications on File Prior to Encounter   Medication    [DISCONTINUED] ibuprofen tablet     Current Outpatient Medications on File Prior to Encounter   Medication Sig    amLODIPine (NORVASC) 10 MG tablet Take 1 tablet (10 mg total) by mouth once daily.    aspirin 81 MG Chew Chew and swallow 1 tablet (81 mg total) by mouth once daily.    atorvastatin (LIPITOR) 80 MG tablet Take 1 tablet (80 mg total) by mouth once daily.    clopidogreL (PLAVIX) 75 mg tablet Take 1 tablet (75 mg total) by mouth once daily.    hydroCHLOROthiazide (HYDRODIURIL) 12.5 MG Tab Take 2 tablets (25 mg total) by mouth once daily.    lisinopriL (PRINIVIL,ZESTRIL) 20 MG tablet Take 1 tablet (20 mg total) by mouth once daily.     Family History     Problem Relation (Age of Onset)    Cancer Paternal Aunt        Tobacco Use    Smoking status: Never Smoker   Substance and Sexual Activity    Alcohol use: Never     Frequency: Never    Drug use: Never    Sexual activity: Not on file     Review of Systems   Constitution: Negative for chills, decreased appetite and diaphoresis.   HENT: Negative for congestion and ear discharge.    Eyes: Negative for blurred vision and discharge.   Cardiovascular: Negative for chest pain, dyspnea on exertion, irregular heartbeat, leg swelling and paroxysmal nocturnal dyspnea.   Respiratory: Negative for cough, hemoptysis and shortness of breath.    Gastrointestinal: Negative for abdominal pain.     Objective:     Vital Signs (Most Recent):  Temp: 98 °F (36.7 °C) (07/01/20 1217)  Pulse: 72 (07/01/20 1350)  Resp: 14 (07/01/20 1350)  BP: (!) 155/70 (07/01/20 1350)  SpO2: (!) 94 % (07/01/20 1350) Vital Signs (24h Range):  Temp:   [98 °F (36.7 °C)] 98 °F (36.7 °C)  Pulse:  [58-76] 72  Resp:  [13-18] 14  SpO2:  [94 %-98 %] 94 %  BP: (110-155)/(54-70) 155/70        There is no height or weight on file to calculate BMI.    SpO2: (!) 94 %  O2 Device (Oxygen Therapy): room air    No intake or output data in the 24 hours ending 07/01/20 1359    Lines/Drains/Airways     Peripheral Intravenous Line                 Peripheral IV - Single Lumen 07/01/20 1334 18 G Right Antecubital less than 1 day                Physical Exam   Constitutional: She is oriented to person, place, and time. She appears well-developed and well-nourished. No distress.   Eyes: Pupils are equal, round, and reactive to light. Conjunctivae are normal.   Neck: No tracheal deviation present. No thyromegaly present.   Cardiovascular: Normal rate, regular rhythm, normal heart sounds and intact distal pulses. Exam reveals no gallop and no friction rub.   No murmur heard.  Pulses:       Radial pulses are 2+ on the right side and 2+ on the left side.        Femoral pulses are 2+ on the right side and 2+ on the left side.  Pulmonary/Chest: Effort normal and breath sounds normal. No respiratory distress. She has no wheezes. She has no rales.   Abdominal: Soft. Bowel sounds are normal. She exhibits no distension. There is no abdominal tenderness.   Musculoskeletal:         General: No deformity or edema.   Neurological: She is alert and oriented to person, place, and time. No cranial nerve deficit. Coordination normal.   Skin: Skin is warm and dry. She is not diaphoretic.   Psychiatric: She has a normal mood and affect. Her behavior is normal.       Significant Labs: BMP: No results for input(s): GLU, NA, K, CL, CO2, BUN, CREATININE, CALCIUM, MG in the last 48 hours.    Significant Imaging: Echocardiogram:   Transthoracic echo (TTE) complete (Cupid Only):   Results for orders placed or performed during the hospital encounter of 06/17/20   Echo Color Flow Doppler? Yes; Bubble Contrast? Yes    Result Value Ref Range    Ascending aorta 2.98 cm    STJ 3.10 cm    AV mean gradient 3 mmHg    Ao peak jacob 1.27 m/s    Ao VTI 19.91 cm    IVRT 111.32 msec    IVS 0.97 0.6 - 1.1 cm    LA size 3.68 cm    Left Atrium Major Axis 4.76 cm    Left Atrium Minor Axis 4.99 cm    LVIDD 4.52 3.5 - 6.0 cm    LVIDS 3.39 2.1 - 4.0 cm    LVOT diameter 2.15 cm    LVOT peak VTI 14.99 cm    PW 1.05 0.6 - 1.1 cm    PV Peak D Jacob 0.15 m/s    PV Peak S Jacob 0.23 m/s    RA Major Axis 4.28 cm    RA Width 4.35 cm    RVDD 2.69 cm    TAPSE 3.05 cm    TR Max Jacob 1.88 m/s    LA WIDTH 3.48 cm    Ao root annulus 3.36 cm    LV Diastolic Volume 93.56 mL    LV Systolic Volume 47.18 mL    LVOT peak jacob 0.84 m/s    FS 25 %    LA volume 53.04 cm3    LV mass 156.50 g    Left Ventricle Relative Wall Thickness 0.46 cm    AV valve area 2.73 cm2    AV Velocity Ratio 0.66     AV index (prosthetic) 0.75     Pulm vein S/D ratio 1.53     LVOT area 3.6 cm2    LVOT stroke volume 54.39 cm3    AV peak gradient 6 mmHg    LV Systolic Volume Index 24.4 mL/m2    LV Diastolic Volume Index 48.34 mL/m2    LA Volume Index 27.4 mL/m2    LV Mass Index 81 g/m2    Triscuspid Valve Regurgitation Peak Gradient 14 mmHg    BSA 1.99 m2    Right Atrial Pressure (from IVC) 3 mmHg    TV rest pulmonary artery pressure 17 mmHg    Narrative    · Concentric left ventricular hypertrophy.  · Left ventricular systolic function. The estimated ejection fraction is   55%.  · Normal LV diastolic function.  · Normal right ventricular systolic function.  · Mild aortic regurgitation.  · Normal central venous pressure (3 mmHg).  · The estimated PA systolic pressure is 17 mmHg.  · There is no evidence of intracardiac shunting.

## 2020-07-01 NOTE — ED NOTES
Patient reports left arm pain and a left sided facial droop was noted when trying to attempt an IV.  Dr. Peters notified

## 2020-07-01 NOTE — CARE UPDATE
Met with patient and son at bedside to discuss code status. Patient is oriented x 3. Per son, patient seemed alert and oriented to make decisions about code status. When asked patient what she would like if her heart were to stop, she expressed clearly that she would not like to be intubated or resuscitated. Son agreed that this was in her best interest. DNR order placed.     Sal Rosales MD  Medical Resident  [facility]  Pager: [phone]

## 2020-07-01 NOTE — ED TRIAGE NOTES
"Patient from [facility]. Patient's son states that he and the patient were having a conversation when all of a sudden the patient began having slurred speech and she told her son "she felt funny" but could not explain what that meant. Patient nodding off on ems stretcher. No stroke code per Dr. Olivas. History of TIA 2 weeks ago.   "

## 2020-07-01 NOTE — CONSULTS
[facility]  Cardiology  Consult Note    Patient Name: Margarita Chávez  MRN: [id number]  Admission Date: 7/1/2020  Hospital Length of Stay: 0 days  Code Status: Prior   Attending Provider: Patsy Peters MD   Consulting Provider: Lai Churchill MD  Primary Care Physician: Syeda Grace MD  Principal Problem:<principal problem not specified>    Patient information was obtained from patient and ER records.     Inpatient consult to Cardiology  Consult performed by: Lai Churchill MD  Consult ordered by: Patsy Peters MD        Subjective:     Chief Complaint:  stemi     HPI:   83 yo F with PMH of TIA, cerebral aneurysm s/p clip (slight increase), coming in with some confusion, slurred speech and now complaining of arm pain or R arm. Bedside TTE EF 50%, no effusion, Inf wall hypokinesis,    Past Medical History:   Diagnosis Date    Hypertension        Past Surgical History:   Procedure Laterality Date    BRAIN SURGERY      HYSTERECTOMY         Review of patient's allergies indicates:   Allergen Reactions    Penicillins Nausea And Vomiting       Current Facility-Administered Medications on File Prior to Encounter   Medication    [DISCONTINUED] ibuprofen tablet     Current Outpatient Medications on File Prior to Encounter   Medication Sig    amLODIPine (NORVASC) 10 MG tablet Take 1 tablet (10 mg total) by mouth once daily.    aspirin 81 MG Chew Chew and swallow 1 tablet (81 mg total) by mouth once daily.    atorvastatin (LIPITOR) 80 MG tablet Take 1 tablet (80 mg total) by mouth once daily.    clopidogreL (PLAVIX) 75 mg tablet Take 1 tablet (75 mg total) by mouth once daily.    hydroCHLOROthiazide (HYDRODIURIL) 12.5 MG Tab Take 2 tablets (25 mg total) by mouth once daily.    lisinopriL (PRINIVIL,ZESTRIL) 20 MG tablet Take 1 tablet (20 mg total) by mouth once daily.     Family History     Problem Relation (Age of Onset)    Cancer Paternal Aunt        Tobacco Use     Smoking status: Never Smoker   Substance and Sexual Activity    Alcohol use: Never     Frequency: Never    Drug use: Never    Sexual activity: Not on file     Review of Systems   Constitution: Negative for chills, decreased appetite and diaphoresis.   HENT: Negative for congestion and ear discharge.    Eyes: Negative for blurred vision and discharge.   Cardiovascular: Negative for chest pain, dyspnea on exertion, irregular heartbeat, leg swelling and paroxysmal nocturnal dyspnea.   Respiratory: Negative for cough, hemoptysis and shortness of breath.    Gastrointestinal: Negative for abdominal pain.     Objective:     Vital Signs (Most Recent):  Temp: 98 °F (36.7 °C) (07/01/20 1217)  Pulse: 72 (07/01/20 1350)  Resp: 14 (07/01/20 1350)  BP: (!) 155/70 (07/01/20 1350)  SpO2: (!) 94 % (07/01/20 1350) Vital Signs (24h Range):  Temp:  [98 °F (36.7 °C)] 98 °F (36.7 °C)  Pulse:  [58-76] 72  Resp:  [13-18] 14  SpO2:  [94 %-98 %] 94 %  BP: (110-155)/(54-70) 155/70        There is no height or weight on file to calculate BMI.    SpO2: (!) 94 %  O2 Device (Oxygen Therapy): room air    No intake or output data in the 24 hours ending 07/01/20 1359    Lines/Drains/Airways     Peripheral Intravenous Line                 Peripheral IV - Single Lumen 07/01/20 1334 18 G Right Antecubital less than 1 day                Physical Exam   Constitutional: She is oriented to person, place, and time. She appears well-developed and well-nourished. No distress.   Eyes: Pupils are equal, round, and reactive to light. Conjunctivae are normal.   Neck: No tracheal deviation present. No thyromegaly present.   Cardiovascular: Normal rate, regular rhythm, normal heart sounds and intact distal pulses. Exam reveals no gallop and no friction rub.   No murmur heard.  Pulses:       Radial pulses are 2+ on the right side and 2+ on the left side.        Femoral pulses are 2+ on the right side and 2+ on the left side.  Pulmonary/Chest: Effort normal and  breath sounds normal. No respiratory distress. She has no wheezes. She has no rales.   Abdominal: Soft. Bowel sounds are normal. She exhibits no distension. There is no abdominal tenderness.   Musculoskeletal:         General: No deformity or edema.   Neurological: She is alert and oriented to person, place, and time. No cranial nerve deficit. Coordination normal.   Skin: Skin is warm and dry. She is not diaphoretic.   Psychiatric: She has a normal mood and affect. Her behavior is normal.       Significant Labs: BMP: No results for input(s): GLU, NA, K, CL, CO2, BUN, CREATININE, CALCIUM, MG in the last 48 hours.    Significant Imaging: Echocardiogram:   Transthoracic echo (TTE) complete (Cupid Only):   Results for orders placed or performed during the hospital encounter of 06/17/20   Echo Color Flow Doppler? Yes; Bubble Contrast? Yes   Result Value Ref Range    Ascending aorta 2.98 cm    STJ 3.10 cm    AV mean gradient 3 mmHg    Ao peak jacob 1.27 m/s    Ao VTI 19.91 cm    IVRT 111.32 msec    IVS 0.97 0.6 - 1.1 cm    LA size 3.68 cm    Left Atrium Major Axis 4.76 cm    Left Atrium Minor Axis 4.99 cm    LVIDD 4.52 3.5 - 6.0 cm    LVIDS 3.39 2.1 - 4.0 cm    LVOT diameter 2.15 cm    LVOT peak VTI 14.99 cm    PW 1.05 0.6 - 1.1 cm    PV Peak D Jacob 0.15 m/s    PV Peak S Jacob 0.23 m/s    RA Major Axis 4.28 cm    RA Width 4.35 cm    RVDD 2.69 cm    TAPSE 3.05 cm    TR Max Jacob 1.88 m/s    LA WIDTH 3.48 cm    Ao root annulus 3.36 cm    LV Diastolic Volume 93.56 mL    LV Systolic Volume 47.18 mL    LVOT peak jacob 0.84 m/s    FS 25 %    LA volume 53.04 cm3    LV mass 156.50 g    Left Ventricle Relative Wall Thickness 0.46 cm    AV valve area 2.73 cm2    AV Velocity Ratio 0.66     AV index (prosthetic) 0.75     Pulm vein S/D ratio 1.53     LVOT area 3.6 cm2    LVOT stroke volume 54.39 cm3    AV peak gradient 6 mmHg    LV Systolic Volume Index 24.4 mL/m2    LV Diastolic Volume Index 48.34 mL/m2    LA Volume Index 27.4 mL/m2    LV  Mass Index 81 g/m2    Triscuspid Valve Regurgitation Peak Gradient 14 mmHg    BSA 1.99 m2    Right Atrial Pressure (from IVC) 3 mmHg    TV rest pulmonary artery pressure 17 mmHg    Narrative    · Concentric left ventricular hypertrophy.  · Left ventricular systolic function. The estimated ejection fraction is   55%.  · Normal LV diastolic function.  · Normal right ventricular systolic function.  · Mild aortic regurgitation.  · Normal central venous pressure (3 mmHg).  · The estimated PA systolic pressure is 17 mmHg.  · There is no evidence of intracardiac shunting.        Assessment and Plan:     STEMI (ST elevation myocardial infarction)  - ST elevation I and aVL left arm pain  - R RADIAL LHC +/- PCI  - Update -  - Repeat EKG with resolution of ST elevation in 1 and aVL   - Continue DAPT after Load, Consult Neurology to comment on aneurysm and TIA/CVA  - Trend EKG, Troponin, follow up CT head  - Admit to CCU        VTE Risk Mitigation (From admission, onward)    None          Thank you for your consult. I will follow-up with patient. Please contact us if you have any additional questions.    Lai Churchill MD  Cardiology   [facility]

## 2020-07-01 NOTE — HPI
Ms. Chávez is a 84 yr old female with medical history of HTN; HLD, intracranial aneurysms (right MCA bifurcation - measuring 1.7 x 1.3 x 1.6 cm; reported of mesh repair 10 yr ago & bilateral supraclinoid ICA measuring 9 x 3 mm on the right and a more fusiform aneurysm on the left measuring approximately 5 x 4 mm); recent CVA - right sided weakness (TIA On DAPT, statins - for intracranial atheor etiology - multifocal including stenosis of the post-aneurysmal segment of the right M1 MCA. Additional severe stenosis of the M2 MCA affecting both M2 branches; Multifocal mild stenoses of the left M1 MCA without occlusion and more distal stenosis with probable high-grade stenosis of about the M2/M3 bifurcation on the left + Multifocal narrowing of the vertebrobasilar system) presenting to the ER with acute onset fatigue, dysarthria; reduced level of alertness or wakefulness. Presentation within the spectrum of ACS. Given intracranial aneurysm - consult reasons for clearance for ACS intervention from stroke standpoint. Also rule out stroke DDx for presentation.     CT head: Negative for acute stroke. Extensive microvascular ishcemic changes. Remote infarcts in left BG, Left cerebellum.     Patient back to baseline - with residual subtle RUE drift. Recuded concentration, however no aphasia, no facial droop. Negative concerns for any LVO. Less concerns for acute stroke etiology. However, intracranial aneurysm needs IR evaluation in near future. Shall have F/u set-up on op basis. And optimal medical management for initiation of anticoagulation from cardiac, ACS standpoint. No specific interventions, investigations from stroke standpoint. Continue atorvastatin. Continue close neuro-monitoring.

## 2020-07-01 NOTE — ASSESSMENT & PLAN NOTE
Recently suffered TIA with R-sided residual weakness  CTH negative for acute abnormalities, extensive chronic microvascular changes + small remote infarcts, stable calcified R MCA bifurcation aneurysm    Plan:  - Continue ASA, plavix, statin  - Vascular Neuro consulted, recs appreciated

## 2020-07-01 NOTE — HPI
83 yo F with recent TIA (R sided residual weakness), cerebral aneurysm s/p clip w/ recent subacute increase in size, HTN, and HLD that presents with confusion, slurred speech, and facial droop.    Patient recently suffered TIA stroke. She was discharged to [facility] and progressing well. On morning of 7/1, son visited patient. During visit, patient showed some intermittent confusion that was different from day prior. He also noticed some slurred speech and facial droop. Patient was transferred to [facility] for evaluation of possible recurrent TIA.    While in the ED, patient developed L arm pain. Denies SOB or CP. EKG showed ST elevation in I and aVL. Code STEMI called. Bedside TTE EF 50%, no effusion, inferior wall hypokinesis. Interventional Cards evaluated patient, recommended repeat EKG, which showed resolution of ST elevations. Recommended CCU admission for closer monitoring.

## 2020-07-01 NOTE — SUBJECTIVE & OBJECTIVE
Past Medical History:   Diagnosis Date    Hypertension      Past Surgical History:   Procedure Laterality Date    BRAIN SURGERY      HYSTERECTOMY       Family History   Problem Relation Age of Onset    Cancer Paternal Aunt      Social History     Tobacco Use    Smoking status: Never Smoker   Substance Use Topics    Alcohol use: Never     Frequency: Never    Drug use: Never     Review of patient's allergies indicates:   Allergen Reactions    Penicillins Nausea And Vomiting       Medications: I have reviewed the current medication administration record.    Medications Prior to Admission   Medication Sig Dispense Refill Last Dose    amLODIPine (NORVASC) 10 MG tablet Take 1 tablet (10 mg total) by mouth once daily. 30 tablet 11 7/1/2020    aspirin 81 MG Chew Chew and swallow 1 tablet (81 mg total) by mouth once daily. 30 tablet 5 7/1/2020    atorvastatin (LIPITOR) 80 MG tablet Take 1 tablet (80 mg total) by mouth once daily. 90 tablet 5 7/1/2020    clopidogreL (PLAVIX) 75 mg tablet Take 1 tablet (75 mg total) by mouth once daily. 30 tablet 0 7/1/2020    hydroCHLOROthiazide (HYDRODIURIL) 12.5 MG Tab Take 2 tablets (25 mg total) by mouth once daily. 30 tablet 5 7/1/2020    lisinopriL (PRINIVIL,ZESTRIL) 20 MG tablet Take 1 tablet (20 mg total) by mouth once daily. 90 tablet 3 7/1/2020       Review of Systems   Unable to perform ROS: Acuity of condition   Neurological: Negative for facial asymmetry and headaches.   Psychiatric/Behavioral: Positive for decreased concentration. Negative for agitation and behavioral problems.     Objective:     Vital Signs (Most Recent):  Temp: 97.9 °F (36.6 °C) (07/01/20 1600)  Pulse: 66 (07/01/20 1600)  Resp: 16 (07/01/20 1600)  BP: (!) 123/57 (07/01/20 1600)  SpO2: 98 % (07/01/20 1600)    Vital Signs Range (Last 24H):  Temp:  [97.9 °F (36.6 °C)-98 °F (36.7 °C)]   Pulse:  [54-76]   Resp:  [13-20]   BP: (110-155)/(54-70)   SpO2:  [94 %-100 %]     Physical  Exam    Neurological Exam:   LOC: alerty  Attention Span: poor   Language: No aphasia  Articulation: dysarthria  Orientation: Person, Place   Visual Fields: Full  EOM (CN III, IV, VI): Full/intact  Pupils (CN II, III): PERRL  Facial Sensation (CN V): Normal  Facial Movement (CN VII): Symmetric facial expression    Motor: Arm left  Normal 4+/5  Leg left  Normal 4+/5  Arm right  Paresis: 4/5  Leg right Paresis: 4+/5  Cebellar: No evidence of appendicular or axial ataxia  Sensation: Intact to light touch  Tone: Normal tone throughout    Laboratory:  CMP:   Recent Labs   Lab 20  1334   CALCIUM 10.2   ALBUMIN 3.6   PROT 7.5   *   K 5.0   CO2 23   CL 99   BUN 50*   CREATININE 2.0*   ALKPHOS 67   ALT 28   AST 27   BILITOT 0.5     CBC:   Recent Labs   Lab 20  1334 20  1338   WBC 5.98  --    RBC 4.38  --    HGB 13.4  --    HCT 42.6 42     --    MCV 97  --    MCH 30.6  --    MCHC 31.5*  --      Lipid Panel: No results for input(s): CHOL, LDLCALC, HDL, TRIG in the last 168 hours.  Coagulation:   Recent Labs   Lab 20  1334 20  1630   INR 1.0  --    APTT 28.0 28.4     Hgb A1C: No results for input(s): HGBA1C in the last 168 hours.  TSH:   Recent Labs   Lab 20  1334   TSH 0.341*       Diagnostic Results:    No acute intracranial abnormality.  No significant detrimental changes from recent exams.     Extensive chronic microvascular ischemic changes and small remote infarcts.     Large peripherally calcified right MCA bifurcation aneurysm stable from prior.    Brain imagin/17        Cardiac Evaluation:   N/A

## 2020-07-01 NOTE — ASSESSMENT & PLAN NOTE
84 yr old female with medical history of HTN; HLD, intracranial aneurysms (right MCA bifurcation - measuring 1.7 x 1.3 x 1.6 cm; reported of mesh repair 10 yr ago & bilateral supraclinoid ICA measuring 9 x 3 mm on the right and a more fusiform aneurysm on the left measuring approximately 5 x 4 mm); recent CVA - right sided weakness (On DAPT, statins - for intracranial atheor etiology - multifocal including stenosis of the post-aneurysmal segment of the right M1 MCA. Additional severe stenosis of the M2 MCA affecting both M2 branches; Multifocal mild stenoses of the left M1 MCA without occlusion and more distal stenosis with probable high-grade stenosis of about the M2/M3 bifurcation on the left + Multifocal narrowing of the vertebrobasilar system) presenting to the ER with acute onset fatigue, dysarthria; reduced level of alertness or wakefulness. Presentation within the spectrum of ACS. Given intracranial aneurysm - consult reasons for clearance for ACS intervention from stroke standpoint. Also rule out stroke DDx for presentation.     CT head: Negative for acute stroke. Extensive microvascular ishcemic changes. Remote infarcts in left BG, Left cerebellum. MRI contraindicated for unclear details on compatibility of intracranial mesh.     Patient back to baseline - with residual subtle RUE drift. Recuded concentration, however no aphasia, no facial droop. Negative concerns for any LVO signs.      Plans:  - Less concerns for acute stroke etiology. However, intracranial aneurysm needs IR evaluation in near future. Shall have F/u set-up on op basis.   - Currently on optimal medical management for initiation of anticoagulation from cardiac, ACS standpoint in addition to DAPT and high potency statins for secondary stroke prevention. Reviewed the benefits > risks for anticoagulation with family.     - No specific interventions, investigations from stroke standpoint.    - Continue close neuro-monitoring. If any neuro  change - raising concerns for acute ishcemic event - shall consider repeat CT head and vessel imaging as needed

## 2020-07-01 NOTE — H&P
[facility]  Cardiology  History and Physical     Patient Name: Margarita Chávez  MRN: [id number]  Admission Date: 7/1/2020  Code Status: DNR   Attending Provider: Derik Cunningham III, MD   Primary Care Physician: Syeda Grace MD  Principal Problem:NSTEMI (non-ST elevated myocardial infarction)    Patient information was obtained from patient, relative(s) and ER records.     Subjective:     Chief Complaint:  Stroke-like symptoms     HPI:  83 yo F with recent TIA (R sided residual weakness), cerebral aneurysm s/p clip w/ recent subacute increase in size, HTN, and HLD that presents with confusion, slurred speech, and facial droop.    Patient recently suffered TIA stroke. She was discharged to [facility] and progressing well. On morning of 7/1, son visited patient. During visit, patient showed some intermittent confusion that was different from day prior. He also noticed some slurred speech and facial droop. Patient was transferred to [facility] for evaluation of possible recurrent TIA.    While in the ED, patient developed L arm pain. Denies SOB or CP. EKG showed ST elevation in I and aVL. Code STEMI called. Bedside TTE EF 50%, no effusion, inferior wall hypokinesis. Interventional Cards evaluated patient, recommended repeat EKG, which showed resolution of ST elevations. Recommended CCU admission for closer monitoring.     Past Medical History:   Diagnosis Date    Hypertension        Past Surgical History:   Procedure Laterality Date    BRAIN SURGERY      HYSTERECTOMY         Review of patient's allergies indicates:   Allergen Reactions    Penicillins Nausea And Vomiting       Current Facility-Administered Medications on File Prior to Encounter   Medication    [DISCONTINUED] ibuprofen tablet     Current Outpatient Medications on File Prior to Encounter   Medication Sig    amLODIPine (NORVASC) 10 MG tablet Take 1 tablet (10 mg total) by mouth once daily.    aspirin 81 MG Chew Chew and swallow 1 tablet  (81 mg total) by mouth once daily.    atorvastatin (LIPITOR) 80 MG tablet Take 1 tablet (80 mg total) by mouth once daily.    clopidogreL (PLAVIX) 75 mg tablet Take 1 tablet (75 mg total) by mouth once daily.    hydroCHLOROthiazide (HYDRODIURIL) 12.5 MG Tab Take 2 tablets (25 mg total) by mouth once daily.    lisinopriL (PRINIVIL,ZESTRIL) 20 MG tablet Take 1 tablet (20 mg total) by mouth once daily.     Family History     Problem Relation (Age of Onset)    Cancer Paternal Aunt        Tobacco Use    Smoking status: Never Smoker   Substance and Sexual Activity    Alcohol use: Never     Frequency: Never    Drug use: Never    Sexual activity: Not on file     Review of Systems   Constitution: Negative for chills, diaphoresis, fever, weight gain and weight loss.   HENT: Negative for congestion and sore throat.    Eyes: Negative for visual disturbance.   Cardiovascular: Negative for chest pain, dyspnea on exertion, leg swelling, orthopnea, palpitations, paroxysmal nocturnal dyspnea and syncope.        L arm pain   Respiratory: Negative for cough, shortness of breath and wheezing.    Skin: Negative for rash.   Musculoskeletal: Negative for joint pain and myalgias.   Gastrointestinal: Negative for abdominal pain, constipation, diarrhea, melena, nausea and vomiting.   Genitourinary: Negative for dysuria, frequency and hematuria.   Neurological: Negative for dizziness, headaches, light-headedness and numbness.   Psychiatric/Behavioral: Positive for altered mental status.     Objective:     Vital Signs (Most Recent):  Temp: 98 °F (36.7 °C) (07/01/20 1217)  Pulse: 66 (07/01/20 1518)  Resp: 15 (07/01/20 1518)  BP: (!) 126/59 (07/01/20 1510)  SpO2: 100 % (07/01/20 1518) Vital Signs (24h Range):  Temp:  [98 °F (36.7 °C)] 98 °F (36.7 °C)  Pulse:  [54-76] 66  Resp:  [13-20] 15  SpO2:  [94 %-100 %] 100 %  BP: (110-155)/(54-70) 126/59     Weight: 75.8 kg (167 lb)  Body mass index is 27.79 kg/m².    SpO2: 100 %  O2 Device  (Oxygen Therapy): room air    No intake or output data in the 24 hours ending 07/01/20 1615    Lines/Drains/Airways     Peripheral Intravenous Line                 Peripheral IV - Single Lumen 07/01/20 1334 18 G Right Antecubital less than 1 day                Physical Exam   Constitutional: She is oriented to person, place, and time. She appears well-developed and well-nourished. No distress.   HENT:   Head: Normocephalic and atraumatic.   Mouth/Throat: Oropharynx is clear and moist. No oropharyngeal exudate.   Eyes: Pupils are equal, round, and reactive to light. Conjunctivae are normal. No scleral icterus.   Neck: Neck supple. No JVD present. No tracheal deviation present.   Cardiovascular: Normal rate, regular rhythm, normal heart sounds and intact distal pulses.   No murmur heard.  Pulmonary/Chest: Effort normal and breath sounds normal. No respiratory distress. She has no wheezes. She has no rales.   Abdominal: Soft. Bowel sounds are normal. She exhibits no distension. There is no abdominal tenderness. There is no rebound.   Musculoskeletal:         General: No edema.   Neurological: She is alert and oriented to person, place, and time. She exhibits normal muscle tone.   Skin: Skin is warm and dry. No rash noted. She is not diaphoretic.   Psychiatric: She has a normal mood and affect. Her behavior is normal.       Significant Labs:   CMP   Recent Labs   Lab 07/01/20  1334   *   K 5.0   CL 99   CO2 23      BUN 50*   CREATININE 2.0*   CALCIUM 10.2   PROT 7.5   ALBUMIN 3.6   BILITOT 0.5   ALKPHOS 67   AST 27   ALT 28   ANIONGAP 11   ESTGFRAFRICA 26.0*   EGFRNONAA 22.6*   , CBC   Recent Labs   Lab 07/01/20  1334 07/01/20  1338   WBC 5.98  --    HGB 13.4  --    HCT 42.6 42     --    , INR   Recent Labs   Lab 07/01/20  1334   INR 1.0    and Troponin   Recent Labs   Lab 07/01/20  1334   TROPONINI <0.006       Significant Imaging: Reviewed.    Assessment and Plan:     * NSTEMI (non-ST elevated  myocardial infarction)  83 yo F with no significant past cardiac history that presents with slurred speech, confusion, and facial droop. Patient had L arm pain, denies chest pain/SOB. EKG showed ST elevations in I and aVL that resolved on repeat EKG. Trop wnl. Patient with recent TIA c/b R-sided weakness. Per Vascular Neurology, patient's intracranial abnormalities are not contraindications for anticoagulation/ACS protocol. Given negative troponin and resolution of ST abnormalities on repeat EKG, suspect type II NSTEMI 2/2 demand ischemia. Symptoms more consistent with TIA/stroke.    Plan:  - Admit to CCU for closer monitoring  - Neuro checks q4hr  - Serial EKGs and trend troponin q6hr  - Continue ASA, plavix  - Continue statin  - Hold on heparin gtt given negative troponin  - Consult Vascular Neuro, recs appreciated  - Telemetry  - NPO for potential procedure tomorrow    SANTOSH (acute kidney injury)  Baseline sCr 0.8, sCr 2 on admission  No indications for HD at this time    Plan:  - Trend Cr  - Strict I&Os  - Avoid nephrotoxic agents  - Renally adjust medications    Aneurysm of middle cerebral artery  TIA (transient ischemic attack)  Recently suffered TIA with R-sided residual weakness  CTH negative for acute abnormalities, extensive chronic microvascular changes + small remote infarcts, stable calcified R MCA bifurcation aneurysm    Plan:  - Continue ASA, plavix, statin  - Vascular Neuro consulted, recs appreciated    Mixed hyperlipidemia  Continue home statin    Uncontrolled hypertension  Hold home BP meds while normotensive      VTE Risk Mitigation (From admission, onward)         Ordered     heparin (porcine) injection 5,000 Units  Every 8 hours      07/01/20 1702     IP VTE HIGH RISK PATIENT  Once      07/01/20 1454                Sal Rosales MD  Cardiology   [facility]

## 2020-07-01 NOTE — ED PROVIDER NOTES
"Encounter Date: 7/1/2020       History     Chief Complaint   Patient presents with    Fatigue     skilled staff reports patient having slurred speech since 10am      84F with PMH remote brain aneurysm s/p mesh, recent CVA, HTN, presenting with weakness and aphasia 2 hr PTA. Patient had embolic stroke 2 weeks ago, with right-sided deficits.  At 10:00 a.m. while son was talking to patient he reports that she described sudden weakness with aphagia, and he noted left-sided facial droop.  He reports that lasted several minutes then resolved.  At this time patient also reported "strange feeling "in left arm.  Patient has not had any prior left-sided deficits.          Review of patient's allergies indicates:   Allergen Reactions    Penicillins Nausea And Vomiting     Past Medical History:   Diagnosis Date    Aneurysm of middle cerebral artery     Hypertension     TIA (transient ischemic attack)      Past Surgical History:   Procedure Laterality Date    BRAIN SURGERY      HYSTERECTOMY       Family History   Problem Relation Age of Onset    Cancer Paternal Aunt      Social History     Tobacco Use    Smoking status: Never Smoker   Substance Use Topics    Alcohol use: Never     Frequency: Never    Drug use: Never     Review of Systems   Unable to perform ROS: Acuity of condition   Constitutional: Negative for chills and fever.   Eyes: Negative for visual disturbance.   Respiratory: Negative for shortness of breath.    Cardiovascular: Negative for chest pain.   Neurological: Positive for weakness. Negative for headaches.        "strange feeling" in left arm       Physical Exam     Initial Vitals [07/01/20 1217]   BP Pulse Resp Temp SpO2   110/60 60 18 98 °F (36.7 °C) 98 %      MAP       --         Physical Exam    Nursing note and vitals reviewed.  Constitutional: She appears well-developed and well-nourished. She is not diaphoretic. No distress.   Appears globally fatigued   HENT:   Head: Normocephalic and " atraumatic.   Nose: Nose normal.   Eyes: EOM are normal. Pupils are equal, round, and reactive to light. No scleral icterus.   Neck: Normal range of motion. Neck supple.   Cardiovascular: Normal rate, regular rhythm and intact distal pulses.   Pulmonary/Chest: Breath sounds normal. No stridor. No respiratory distress. She has no wheezes. She has no rhonchi. She has no rales. She exhibits no tenderness.   Abdominal: Soft. Bowel sounds are normal. She exhibits no distension. There is no abdominal tenderness. There is no rebound.   Musculoskeletal: Normal range of motion. Edema (1+ bilateral lower extremity) present. No tenderness.   Neurological: She is alert and oriented to person, place, and time. She has normal strength. No cranial nerve deficit or sensory deficit.   No facial droop, no aphasia   Skin: Skin is warm and dry. Capillary refill takes less than 2 seconds. No rash noted. No pallor.   Psychiatric: She has a normal mood and affect. Her behavior is normal. Judgment and thought content normal.         ED Course   Critical Care    Date/Time: 7/1/2020 2:00 PM  Performed by: Patsy Peters MD  Authorized by: Patsy Peters MD   Direct patient critical care time: 20 minutes  Additional history critical care time: 15 minutes  Ordering / reviewing critical care time: 20 minutes  Documentation critical care time: 10 minutes  Consulting other physicians critical care time: 15 minutes  Consult with family critical care time: 10 minutes  Total critical care time (exclusive of procedural time) : 90 minutes  Critical care time was exclusive of separately billable procedures and treating other patients and teaching time.  Critical care was necessary to treat or prevent imminent or life-threatening deterioration of the following conditions: CNS failure or compromise and cardiac failure.  Critical care was time spent personally by me on the following activities: development of treatment plan with patient or  surrogate, discussions with consultants, interpretation of cardiac output measurements, evaluation of patient's response to treatment, obtaining history from patient or surrogate, ordering and review of laboratory studies, examination of patient, ordering and performing treatments and interventions, ordering and review of radiographic studies, re-evaluation of patient's condition, pulse oximetry and review of old charts.        Labs Reviewed   CBC W/ AUTO DIFFERENTIAL - Abnormal; Notable for the following components:       Result Value    Mean Corpuscular Hemoglobin Conc 31.5 (*)     Eos # 0.6 (*)     Eosinophil% 10.5 (*)     All other components within normal limits   TSH - Abnormal; Notable for the following components:    TSH 0.341 (*)     All other components within normal limits    Narrative:     ADD-ON APTT #[id number] PER GEENA RANDOLPH MD 15:12  07/01/2020    ISTAT PROCEDURE - Abnormal; Notable for the following components:    POC BUN 49 (*)     POC Creatinine 1.9 (*)     POC Sodium 132 (*)     All other components within normal limits   PROTIME-INR   TROPONIN I    Narrative:     ADD-ON APTT #[id number] PER GEENA RANDOLPH MD 15:12  07/01/2020    SARS-COV-2 RNA AMPLIFICATION, QUAL   LACTIC ACID, PLASMA   TROPONIN I   T4, FREE    Narrative:     ADD-ON APTT #[id number] PER GEENA RANDOLPH MD 15:12  07/01/2020    POCT GLUCOSE, HAND-HELD DEVICE   POCT GLUCOSE   ISTAT CHEM8     EKG Readings: (Independently Interpreted)   13:25: ST elevations in 1 and aVL, with T-wave inversions leads 3, AVF  13:29:  Same EKG pattern of ischemia/STEMI  13:44:  ST elevations have resolved, slight T-wave inversions still present in lead 3.        ECG Results          EKG 12-lead (In process)  Result time 07/01/20 17:18:25    In process by Interface, Lab In [facility] (07/01/20 17:18:25)                 Narrative:    Test Reason :     Vent. Rate : 068 BPM     Atrial Rate : 068 BPM     P-R Int : 188 ms          QRS Dur : 100 ms       QT Int : 378 ms       P-R-T Axes : 056 -24 026 degrees     QTc Int : 401 ms    Age and gender specific analysis  Sinus rhythm with sinus arrhythmia  Minimal voltage criteria for LVH, may be normal variant  Anterior infarct ,age undetermined  Abnormal ECG  When compared with ECG of 01-JUL-2020 14:13,  No significant change was found    Referred By: ZARIA   SELF           Confirmed By:                              EKG 12-lead (Final result)  Result time 07/01/20 18:25:19    Final result by Interface, Lab In [facility] (07/01/20 18:25:19)                 Narrative:    Test Reason : I21.4,    Vent. Rate : 076 BPM     Atrial Rate : 076 BPM     P-R Int : 188 ms          QRS Dur : 100 ms      QT Int : 356 ms       P-R-T Axes : 040 -34 015 degrees     QTc Int : 400 ms    Normal sinus rhythm  Left axis deviation  Moderate voltage criteria for LVH, may be normal variant  Abnormal R wave progression in the precordial leads  Abnormal ECG  When compared with ECG of 01-JUL-2020 13:29,  T wave inversion less evident in Inferior leads  ST elevation in lateral limb limbs has resolved    Confirmed by Derik Cunningham MD ([id number]) on 7/1/2020 6:25:10 PM    Referred By: ZARIA   SELF           Confirmed By:Derik Cunningham MD                             EKG 12-lead (Final result)  Result time 07/01/20 18:24:35    Final result by Interface, Lab In [facility] (07/01/20 18:24:35)                 Narrative:    Test Reason :     Vent. Rate : 064 BPM     Atrial Rate : 064 BPM     P-R Int : 184 ms          QRS Dur : 094 ms      QT Int : 394 ms       P-R-T Axes : 037 257 -17 degrees     QTc Int : 406 ms    Normal sinus rhythm  Lateral injury pattern       ACUTE MI       Abnormal ECG  When compared with ECG of 01-JUL-2020 13:25,  No significant change was found  Confirmed by Derik Cunningham MD ([id number]) on 7/1/2020 6:24:23 PM    Referred By: ZARIA   SELF           Confirmed By:Derik Cunningham MD                             ECG 12 lead (Final result)   Result time 07/01/20 18:24:03    Final result by Interface, Lab In [facility] (07/01/20 18:24:03)                 Narrative:    Test Reason : I63.9,    Vent. Rate : 068 BPM     Atrial Rate : 068 BPM     P-R Int : 188 ms          QRS Dur : 092 ms      QT Int : 384 ms       P-R-T Axes : 053 -71 -17 degrees     QTc Int : 408 ms    Sinus rhythm with sinus arrhythmia  Left axis deviation  Lateral injury pattern       ACUTE MI       Abnormal ECG  When compared with ECG of 18-JUN-2020 04:11,  ST now depressed in Inferior leads  Inverted T waves have replaced nonspecific T wave abnormality in Inferior  leads    Confirmed by Derik Cunningham MD ([id number]) on 7/1/2020 6:23:57 PM    Referred By: [email or web address]   SELF           Confirmed By:Derik Cunningham MD                            Imaging Results          CT Head Without Contrast (Final result)  Result time 07/01/20 14:32:55    Final result by José Manuel Vaz MD (07/01/20 14:32:55)                 Impression:      No acute intracranial abnormality.  No significant detrimental changes from recent exams.    Extensive chronic microvascular ischemic changes and small remote infarcts.    Large peripherally calcified right MCA bifurcation aneurysm stable from prior.      Electronically signed by: José Manuel Vaz MD  Date:    07/01/2020  Time:    14:32             Narrative:    EXAMINATION:  CT HEAD WITHOUT CONTRAST    CLINICAL HISTORY:  Altered mental status;    TECHNIQUE:  Low dose axial images were obtained through the head.  Coronal and sagittal reformations were also performed. Contrast was not administered.    COMPARISON:  06/18/2020    FINDINGS:  There is no midline shift, hydrocephalus or mass effect.  No evidence of acute intracranial hemorrhage or acute major vascular territory infarct.  There are extensive chronic microvascular ischemic changes with remote infarcts in the left basal ganglia and left cerebellum.  There is extensive atherosclerosis.  There is a large right MCA  bifurcation aneurysm with peripheral calcification.  This is stable when compared to most recent CTs of 06/18/2020 and 06/17/2020.  No abnormal extra-axial fluid collections.  Bones show no acute abnormalities.  There are postoperative changes of the right frontal bone.  There is a lobular opacity in the sphenoid sinus possibly a mucosal retention cyst.  Mastoid air cells are clear.                               X-Ray Chest AP Portable (Final result)  Result time 07/01/20 13:58:57    Final result by Alfred Marie MD (07/01/20 13:58:57)                 Impression:      Superior mediastinal mass lesion with tracheal deviation to the right is again observed, and evidently relates to significant enlargement of both lobes of the thyroid, as documented on a recent ultrasound exam.  Appearance of the chest is otherwise unremarkable, with no significant detrimental interval change since 06/17/2020 appreciated.      Electronically signed by: Alfred Marie MD  Date:    07/01/2020  Time:    13:58             Narrative:    EXAMINATION:  XR CHEST AP PORTABLE    COMPARISON:  Comparison is made to 06/17/2020 and 05/26/2014.    FINDINGS:  Allowing for magnification of the cardiomediastinal silhouette related to projection, the heart is not significantly enlarged, and the pulmonary vascularity is normal.  Abnormal widening of the superior mediastinum with deviation of the trachea to the right is observed, but can be seen on chest radiographs dating back to 05/26/2014, and relates to abnormal thyroid enlargement which has been documented on prior ultrasound examinations, including a recent study of 06/18/2020.  Lung zones remain clear, and they are free of significant airspace consolidation or volume loss.  No pleural fluid.  No pneumothorax.                                 Medical Decision Making:   History:   I obtained history from: someone other than patient.       <> Summary of History: Son at bedside (see HPI)  Old Medical Records:  "I decided to obtain old medical records.  Old Records Summarized: records from clinic visits and records from previous admission(s).       <> Summary of Records: Prior admission two weeks ago for TIA with R facial droop and hemiparesis, resolved:    CTA: No definite large vessel occlusion.  Severe stenosis of both branches of the right M2 as seen on series 5, image 412 and 416.  Multifocal intracranial arterial stenoses including multifocal posterior circulation moderate to severe stenoses and moderate right M1 MCA stenosis.   Enlarging right MCA aneurysm in comparison the prior exam of 2010.    "High Risk TIA  - Patient woke morning of admission with right sided hemiparesis and facial droop  - NIHSS = 3 I tPA not given due to patient outside of treatment window  - Vascular Neurology consulted: , plavix 300 followed by 75 mg for 30 days, atorvastatin 80 mg  - CTA head/neck (6/17/2020):  No definite large vessel occlusion.  Severe stenosis of both branches of the right M2 as seen on series 5, image 412 and 416.  Multifocal intracranial arterial stenoses including multifocal posterior circulation moderate to severe stenoses and moderate right M1 MCA stenosis.  - Unable to obtain MRI brain due to aneurysm clip placed  - Consulted SLP/OT/PT: dental soft diet  - Discharge with plavix for 30 days, aspirin, and high dose statin    Chronic Right MCA Aneurysm s/p clip with areas of stenoses and infarctions  - Patient with known right MCA aneurysm  - CT head (6/17): Large partially calcified aneurysm extending off the right MCA which has increased in size compared to previous CT dated 04/06/2010.  - Neurosurgery referral sent at discharge"    Initial Assessment:   Patient has no neurologic deficits appreciated on exam, however EKG showing ST elevations concerning for STEMI  Differential Diagnosis:   CVA, electrolyte or metabolic abnormality, ACS, I have considered but do not suspect AoD  Independently Interpreted " "Test(s):   I have ordered and independently interpreted X-rays - see summary below.       <> Summary of X-Ray Reading(s): CT head negative for bleed.    Chest x-ray unchanged from prior.  I have ordered and independently interpreted EKG Reading(s) - see prior notes  Clinical Tests:   Lab Tests: Ordered and Reviewed  Radiological Study: Ordered and Reviewed  Medical Tests: Ordered and Reviewed  ED Management:  1330:  EKG with elevations and reciprocal depressions concerning for STEMI, however patient denying chest pain or shortness of breath.  Left arm "strange feeling" may be ACS equivalent.  Called Cardiology and plan code STEMI.    1345:  Cardiologist at bedside, he plans to discuss with his attending and will d/w Neurology regarding aneurysm mesh which may contraindicate cath intervention, however I am concerned by EKG changes and Code STEMI activated. Labs pending.      Vascular Neurology evaluated patient and reviewed imaging, they feel that she needs neurologic evaluation in the future however potential ACS current priority.  Repeat EKG showing resolution of ST elevations, cardiology reviewed and will defer catheterization at this time, planned admission to CCU.  I have discussed findings and plan with patient's son and answered questions.    Other:   I have discussed this case with another health care provider.       <> Summary of the Discussion: See d/w Cards above. Also discussed with [facility] Neurology who notes no contraindication to ACS interventions, and will follow along.                                  Clinical Impression:       ICD-10-CM ICD-9-CM   1. ST elevation myocardial infarction (STEMI), unspecified artery  I21.3 410.90   2. Stroke  I63.9 434.91   3. Weakness  R53.1 780.79   4. ST elevation myocardial infarction involving left anterior descending (LAD) coronary artery  I21.02 410.10   5. Acute left-sided weakness  R53.1 728.87         ED Disposition Condition    Admit             Condition: " Serious                 Patsy Peters MD  07/03/20 1140

## 2020-07-01 NOTE — NURSING
Pt arrived to [facility] room 6088 from ED on cardiac monitor. Son at bedside with patient. No gtts infusing. Pt transferred to bed and cardiac leads applied. Pt AAOx4, no defeficts noted. EKG performed. Cards team called and at bedside. WCTM    Patient has glasses on, wedding ring, and wedding band. The rest of patient's personal belongings placed in patient closet.

## 2020-07-01 NOTE — ASSESSMENT & PLAN NOTE
85 yo F with no significant past cardiac history that presents with slurred speech, confusion, and facial droop. Patient had L arm pain, denies chest pain/SOB. EKG showed ST elevations in I and aVL that resolved on repeat EKG. Trop wnl. Patient with recent TIA c/b R-sided weakness. Per Vascular Neurology, patient's intracranial abnormalities are not contraindications for anticoagulation/ACS protocol. Given negative troponin and resolution of ST abnormalities on repeat EKG, suspect type II NSTEMI 2/2 demand ischemia. Symptoms more consistent with TIA/stroke.    Plan:  - Admit to CCU for closer monitoring  - Neuro checks q4hr  - Continue ASA, plavix  - Continue statin  - Hold on heparin gtt given negative troponin  - Consult Vascular Neuro, recs appreciated  - Telemetry  - NPO for potential procedure tomorrow

## 2020-07-01 NOTE — ED NOTES
[facility]  Patient's son took belongings

## 2020-07-01 NOTE — ASSESSMENT & PLAN NOTE
Baseline sCr 0.8, sCr 2 on admission  No indications for HD at this time    Plan:  - Trend Cr  - Strict I&Os  - Avoid nephrotoxic agents  - Renally adjust medications

## 2020-07-01 NOTE — CONSULTS
[facility]  Vascular Neurology  Comprehensive Stroke Center  Consult Note    Inpatient consult to Vascular (Stroke) Neurology  Consult performed by: Olivia Hodge MD  Consult ordered by: Sal Rosales MD        Assessment/Plan:     Patient is a 84 y.o. year old female with:    * NSTEMI (non-ST elevated myocardial infarction)  - as per cardiology     SANTOSH (acute kidney injury)  - as per primary    Mixed hyperlipidemia  - continue high potency statins   - atorva 80 mg     Aneurysm of middle cerebral artery  84 yr old female with medical history of HTN; HLD, intracranial aneurysms (right MCA bifurcation - measuring 1.7 x 1.3 x 1.6 cm; reported of mesh repair 10 yr ago & bilateral supraclinoid ICA measuring 9 x 3 mm on the right and a more fusiform aneurysm on the left measuring approximately 5 x 4 mm); recent CVA - right sided weakness (On DAPT, statins - for intracranial atheor etiology - multifocal including stenosis of the post-aneurysmal segment of the right M1 MCA. Additional severe stenosis of the M2 MCA affecting both M2 branches; Multifocal mild stenoses of the left M1 MCA without occlusion and more distal stenosis with probable high-grade stenosis of about the M2/M3 bifurcation on the left + Multifocal narrowing of the vertebrobasilar system) presenting to the ER with acute onset fatigue, dysarthria; reduced level of alertness or wakefulness. Presentation within the spectrum of ACS. Given intracranial aneurysm - consult reasons for clearance for ACS intervention from stroke standpoint. Also rule out stroke DDx for presentation.     CT head: Negative for acute stroke. Extensive microvascular ishcemic changes. Remote infarcts in left BG, Left cerebellum. MRI contraindicated for unclear details on compatibility of intracranial mesh.     Patient back to baseline - with residual subtle RUE drift. Recuded concentration, however no aphasia, no facial droop. Negative concerns for any LVO  signs.      Plans:  - Less concerns for acute stroke etiology. However, intracranial aneurysm needs IR evaluation in near future. Shall have F/u set-up on op basis.   - Currently on optimal medical management for initiation of anticoagulation from cardiac, ACS standpoint in addition to DAPT and high potency statins for secondary stroke prevention. Reviewed the benefits > risks for anticoagulation with family.     - No specific interventions, investigations from stroke standpoint.    - Continue close neuro-monitoring. If any neuro change - raising concerns for acute ishcemic event - shall consider repeat CT head and vessel imaging as needed       Uncontrolled hypertension  - stroke risk factor  - intracranial athero risk factor    - long term goals < 130/80 mmHg        STROKE DOCUMENTATION     Acute Stroke Times   Last Known Normal Date: 07/01/20  Last Known Normal Time: 0700  Symptom Onset Date: 07/01/20  Symptom Onset Time: 0700  Stroke Team Called Date: 07/01/20  Stroke Team Called Time: 1500  Stroke Team Arrival Date: 07/01/20  Stroke Team Arrival Time: 1515  Decision to Treat Time for Alteplase: (Alternative diagnosis )  Decision to Treat Time for IR: (Alternative diagnosis / F/u IR clinic)    NIH Scale:  Interval: initial 15 minute assessment from arrival  1a. Level of Consciousness: 0-->Alert, keenly responsive  1b. LOC Questions: 0-->Answers both questions correctly  1c. LOC Commands: 0-->Performs both tasks correctly  2. Best Gaze: 0-->Normal  3. Visual: 0-->No visual loss  4. Facial Palsy: 0-->Normal symmetrical movements  5a. Motor Arm, Left: 0-->No drift, limb holds 90 (or 45) degrees for full 10 secs  5b. Motor Arm, Right: 1-->Drift, limb holds 90 (or 45) degrees, but drifts down before full 10 secs, does not hit bed or other support  6a. Motor Leg, Left: 0-->No drift, leg holds 30 degree position for full 5 secs  6b. Motor Leg, Right: 0-->No drift, leg holds 30 degree position for full 5 secs  7. Limb  Ataxia: 0-->Absent  8. Sensory: 0-->Normal, no sensory loss  9. Best Language: 0-->No aphasia, normal  10. Dysarthria: 1-->Mild-to-moderate dysarthria, patient slurs at least some words and, at worst, can be understood with some difficulty  11. Extinction and Inattention (formerly Neglect): 0-->No abnormality  Total (NIH Stroke Scale): 2    Modified Cowlitz Score: 2  [place] Coma Scale:    ABCD2 Score:    [id number] Score:   HAS -BLED Score:   ICH Score:   Hunt & Freedman Classification:       Thrombolysis Candidate? No, Strong suspicion for stroke mimic or alternative diagnosis     Delays to Thrombolysis?  No    Interventional Revascularization Candidate?   Is the patient eligible for mechanical endovascular reperfusion (MODESTA)?  Alternative etiology       Hemorrhagic change of an Ischemic Stroke: Does this patient have an ischemic stroke with hemorrhagic changes? No     Subjective:     History of Present Illness:  Ms. Chávez is a 84 yr old female with medical history of HTN; HLD, intracranial aneurysms (right MCA bifurcation - measuring 1.7 x 1.3 x 1.6 cm; reported of mesh repair 10 yr ago & bilateral supraclinoid ICA measuring 9 x 3 mm on the right and a more fusiform aneurysm on the left measuring approximately 5 x 4 mm); recent CVA - right sided weakness (TIA On DAPT, statins - for intracranial atheor etiology - multifocal including stenosis of the post-aneurysmal segment of the right M1 MCA. Additional severe stenosis of the M2 MCA affecting both M2 branches; Multifocal mild stenoses of the left M1 MCA without occlusion and more distal stenosis with probable high-grade stenosis of about the M2/M3 bifurcation on the left + Multifocal narrowing of the vertebrobasilar system) presenting to the ER with acute onset fatigue, dysarthria; reduced level of alertness or wakefulness. Presentation within the spectrum of ACS. Given intracranial aneurysm - consult reasons for clearance for ACS intervention from stroke  standpoint. Also rule out stroke DDx for presentation.     CT head: Negative for acute stroke. Extensive microvascular ishcemic changes. Remote infarcts in left BG, Left cerebellum.     Patient back to baseline - with residual subtle RUE drift. Recuded concentration, however no aphasia, no facial droop. Negative concerns for any LVO. Less concerns for acute stroke etiology. However, intracranial aneurysm needs IR evaluation in near future. Shall have F/u set-up on op basis. And optimal medical management for initiation of anticoagulation from cardiac, ACS standpoint. No specific interventions, investigations from stroke standpoint. Continue atorvastatin. Continue close neuro-monitoring.         Past Medical History:   Diagnosis Date    Hypertension      Past Surgical History:   Procedure Laterality Date    BRAIN SURGERY      HYSTERECTOMY       Family History   Problem Relation Age of Onset    Cancer Paternal Aunt      Social History     Tobacco Use    Smoking status: Never Smoker   Substance Use Topics    Alcohol use: Never     Frequency: Never    Drug use: Never     Review of patient's allergies indicates:   Allergen Reactions    Penicillins Nausea And Vomiting       Medications: I have reviewed the current medication administration record.    Medications Prior to Admission   Medication Sig Dispense Refill Last Dose    amLODIPine (NORVASC) 10 MG tablet Take 1 tablet (10 mg total) by mouth once daily. 30 tablet 11 7/1/2020    aspirin 81 MG Chew Chew and swallow 1 tablet (81 mg total) by mouth once daily. 30 tablet 5 7/1/2020    atorvastatin (LIPITOR) 80 MG tablet Take 1 tablet (80 mg total) by mouth once daily. 90 tablet 5 7/1/2020    clopidogreL (PLAVIX) 75 mg tablet Take 1 tablet (75 mg total) by mouth once daily. 30 tablet 0 7/1/2020    hydroCHLOROthiazide (HYDRODIURIL) 12.5 MG Tab Take 2 tablets (25 mg total) by mouth once daily. 30 tablet 5 7/1/2020    lisinopriL (PRINIVIL,ZESTRIL) 20 MG tablet  Take 1 tablet (20 mg total) by mouth once daily. 90 tablet 3 7/1/2020       Review of Systems   Unable to perform ROS: Acuity of condition   Neurological: Negative for facial asymmetry and headaches.   Psychiatric/Behavioral: Positive for decreased concentration. Negative for agitation and behavioral problems.     Objective:     Vital Signs (Most Recent):  Temp: 97.9 °F (36.6 °C) (07/01/20 1600)  Pulse: 66 (07/01/20 1600)  Resp: 16 (07/01/20 1600)  BP: (!) 123/57 (07/01/20 1600)  SpO2: 98 % (07/01/20 1600)    Vital Signs Range (Last 24H):  Temp:  [97.9 °F (36.6 °C)-98 °F (36.7 °C)]   Pulse:  [54-76]   Resp:  [13-20]   BP: (110-155)/(54-70)   SpO2:  [94 %-100 %]     Physical Exam    Neurological Exam:   LOC: alerty  Attention Span: poor   Language: No aphasia  Articulation: dysarthria  Orientation: Person, Place   Visual Fields: Full  EOM (CN III, IV, VI): Full/intact  Pupils (CN II, III): PERRL  Facial Sensation (CN V): Normal  Facial Movement (CN VII): Symmetric facial expression    Motor: Arm left  Normal 4+/5  Leg left  Normal 4+/5  Arm right  Paresis: 4/5  Leg right Paresis: 4+/5  Cebellar: No evidence of appendicular or axial ataxia  Sensation: Intact to light touch  Tone: Normal tone throughout    Laboratory:  CMP:   Recent Labs   Lab 07/01/20  1334   CALCIUM 10.2   ALBUMIN 3.6   PROT 7.5   *   K 5.0   CO2 23   CL 99   BUN 50*   CREATININE 2.0*   ALKPHOS 67   ALT 28   AST 27   BILITOT 0.5     CBC:   Recent Labs   Lab 07/01/20  1334 07/01/20  1338   WBC 5.98  --    RBC 4.38  --    HGB 13.4  --    HCT 42.6 42     --    MCV 97  --    MCH 30.6  --    MCHC 31.5*  --      Lipid Panel: No results for input(s): CHOL, LDLCALC, HDL, TRIG in the last 168 hours.  Coagulation:   Recent Labs   Lab 07/01/20  1334 07/01/20  1630   INR 1.0  --    APTT 28.0 28.4     Hgb A1C: No results for input(s): HGBA1C in the last 168 hours.  TSH:   Recent Labs   Lab 07/01/20  1334   TSH 0.341*       Diagnostic Results:    No  acute intracranial abnormality.  No significant detrimental changes from recent exams.     Extensive chronic microvascular ischemic changes and small remote infarcts.     Large peripherally calcified right MCA bifurcation aneurysm stable from prior.    Brain imagin/17        Cardiac Evaluation:   Echo pending       Olivia Hodge MD  Comprehensive Stroke Center  Department of Vascular Neurology   [facility]

## 2020-07-02 PROBLEM — R94.31 ST ELEVATION ON ECG: Status: ACTIVE | Noted: 2020-07-01

## 2020-07-02 LAB
ALBUMIN SERPL BCP-MCNC: 3.4 G/DL (ref 3.5–5.2)
ALP SERPL-CCNC: 59 U/L (ref 55–135)
ALT SERPL W/O P-5'-P-CCNC: 24 U/L (ref 10–44)
ANION GAP SERPL CALC-SCNC: 11 MMOL/L (ref 8–16)
AST SERPL-CCNC: 21 U/L (ref 10–40)
BASOPHILS # BLD AUTO: 0.03 K/UL (ref 0–0.2)
BASOPHILS NFR BLD: 0.5 % (ref 0–1.9)
BILIRUB SERPL-MCNC: 0.5 MG/DL (ref 0.1–1)
BUN SERPL-MCNC: 41 MG/DL (ref 8–23)
CALCIUM SERPL-MCNC: 10.3 MG/DL (ref 8.7–10.5)
CHLORIDE SERPL-SCNC: 103 MMOL/L (ref 95–110)
CO2 SERPL-SCNC: 23 MMOL/L (ref 23–29)
CREAT SERPL-MCNC: 1.3 MG/DL (ref 0.5–1.4)
DIFFERENTIAL METHOD: ABNORMAL
EOSINOPHIL # BLD AUTO: 0.7 K/UL (ref 0–0.5)
EOSINOPHIL NFR BLD: 10.3 % (ref 0–8)
ERYTHROCYTE [DISTWIDTH] IN BLOOD BY AUTOMATED COUNT: 12.7 % (ref 11.5–14.5)
EST. GFR  (AFRICAN AMERICAN): 43.8 ML/MIN/1.73 M^2
EST. GFR  (NON AFRICAN AMERICAN): 38 ML/MIN/1.73 M^2
GLUCOSE SERPL-MCNC: 94 MG/DL (ref 70–110)
HCT VFR BLD AUTO: 42.6 % (ref 37–48.5)
HGB BLD-MCNC: 13.5 G/DL (ref 12–16)
IMM GRANULOCYTES # BLD AUTO: 0.03 K/UL (ref 0–0.04)
IMM GRANULOCYTES NFR BLD AUTO: 0.5 % (ref 0–0.5)
LYMPHOCYTES # BLD AUTO: 1.1 K/UL (ref 1–4.8)
LYMPHOCYTES NFR BLD: 17.7 % (ref 18–48)
MAGNESIUM SERPL-MCNC: 2.1 MG/DL (ref 1.6–2.6)
MCH RBC QN AUTO: 29.9 PG (ref 27–31)
MCHC RBC AUTO-ENTMCNC: 31.7 G/DL (ref 32–36)
MCV RBC AUTO: 94 FL (ref 82–98)
MONOCYTES # BLD AUTO: 0.4 K/UL (ref 0.3–1)
MONOCYTES NFR BLD: 6.8 % (ref 4–15)
NEUTROPHILS # BLD AUTO: 4.1 K/UL (ref 1.8–7.7)
NEUTROPHILS NFR BLD: 64.2 % (ref 38–73)
NRBC BLD-RTO: 0 /100 WBC
PHOSPHATE SERPL-MCNC: 3.9 MG/DL (ref 2.7–4.5)
PLATELET # BLD AUTO: 290 K/UL (ref 150–350)
PMV BLD AUTO: 10.4 FL (ref 9.2–12.9)
POTASSIUM SERPL-SCNC: 4.4 MMOL/L (ref 3.5–5.1)
PROT SERPL-MCNC: 7.1 G/DL (ref 6–8.4)
RBC # BLD AUTO: 4.52 M/UL (ref 4–5.4)
SODIUM SERPL-SCNC: 137 MMOL/L (ref 136–145)
TROPONIN I SERPL DL<=0.01 NG/ML-MCNC: 0.01 NG/ML (ref 0–0.03)
WBC # BLD AUTO: 6.32 K/UL (ref 3.9–12.7)

## 2020-07-02 PROCEDURE — 92610 EVALUATE SWALLOWING FUNCTION: CPT

## 2020-07-02 PROCEDURE — 84100 ASSAY OF PHOSPHORUS: CPT

## 2020-07-02 PROCEDURE — 93010 ELECTROCARDIOGRAM REPORT: CPT | Mod: ,,, | Performed by: INTERNAL MEDICINE

## 2020-07-02 PROCEDURE — 25000003 PHARM REV CODE 250: Performed by: INTERNAL MEDICINE

## 2020-07-02 PROCEDURE — 99233 PR SUBSEQUENT HOSPITAL CARE,LEVL III: ICD-10-PCS | Mod: ,,, | Performed by: PSYCHIATRY & NEUROLOGY

## 2020-07-02 PROCEDURE — 92523 SPEECH SOUND LANG COMPREHEN: CPT

## 2020-07-02 PROCEDURE — 99233 SBSQ HOSP IP/OBS HIGH 50: CPT | Mod: ,,, | Performed by: PSYCHIATRY & NEUROLOGY

## 2020-07-02 PROCEDURE — 99232 SBSQ HOSP IP/OBS MODERATE 35: CPT | Mod: GC,,, | Performed by: INTERNAL MEDICINE

## 2020-07-02 PROCEDURE — 25000003 PHARM REV CODE 250: Performed by: STUDENT IN AN ORGANIZED HEALTH CARE EDUCATION/TRAINING PROGRAM

## 2020-07-02 PROCEDURE — 80053 COMPREHEN METABOLIC PANEL: CPT

## 2020-07-02 PROCEDURE — 93005 ELECTROCARDIOGRAM TRACING: CPT

## 2020-07-02 PROCEDURE — 83735 ASSAY OF MAGNESIUM: CPT

## 2020-07-02 PROCEDURE — 93010 EKG 12-LEAD: ICD-10-PCS | Mod: ,,, | Performed by: INTERNAL MEDICINE

## 2020-07-02 PROCEDURE — 84484 ASSAY OF TROPONIN QUANT: CPT

## 2020-07-02 PROCEDURE — 85025 COMPLETE CBC W/AUTO DIFF WBC: CPT

## 2020-07-02 PROCEDURE — 63600175 PHARM REV CODE 636 W HCPCS: Performed by: STUDENT IN AN ORGANIZED HEALTH CARE EDUCATION/TRAINING PROGRAM

## 2020-07-02 PROCEDURE — 99232 PR SUBSEQUENT HOSPITAL CARE,LEVL II: ICD-10-PCS | Mod: GC,,, | Performed by: INTERNAL MEDICINE

## 2020-07-02 PROCEDURE — 20600001 HC STEP DOWN PRIVATE ROOM

## 2020-07-02 RX ADMIN — ACETAMINOPHEN 650 MG: 325 TABLET ORAL at 05:07

## 2020-07-02 RX ADMIN — HEPARIN SODIUM 5000 UNITS: 5000 INJECTION INTRAVENOUS; SUBCUTANEOUS at 08:07

## 2020-07-02 RX ADMIN — CLOPIDOGREL BISULFATE 75 MG: 75 TABLET ORAL at 08:07

## 2020-07-02 RX ADMIN — ASPIRIN 81 MG: 81 TABLET, COATED ORAL at 08:07

## 2020-07-02 RX ADMIN — ATORVASTATIN CALCIUM 80 MG: 20 TABLET, FILM COATED ORAL at 08:07

## 2020-07-02 RX ADMIN — HEPARIN SODIUM 5000 UNITS: 5000 INJECTION INTRAVENOUS; SUBCUTANEOUS at 03:07

## 2020-07-02 NOTE — PLAN OF CARE
Problem: SLP Goal  Goal: SLP Goal  Description: Speech Language Pathology Goals  Goals expected to be met by 7/9:  1. Patient will tolerate a mechanical soft diet and thin liquids with no overt signs of airway compromise.   2. Patient will complete mild to moderate level problem solving tasks with 70% acc given mod A.   3. Patient will provide 6-10 items within concrete categories indep.   4. Patient will attend to structured therapy tasks for 3 minutes without redirect cues.   5. Patient will participate in ongoing assessment of R,W,and VS.   Outcome: Ongoing, Progressing     Goals remain appropriate.   Emily P. Abadie M.S., CCC-SLP  Speech Language Pathologist  [phone]  07/02/2020

## 2020-07-02 NOTE — ASSESSMENT & PLAN NOTE
Recently suffered TIA with R-sided residual weakness  CTH negative for acute abnormalities, extensive chronic microvascular changes + small remote infarcts, stable calcified R MCA bifurcation aneurysm    Plan:  - Continue ASA, plavix, statin  - Vascular Neuro consulted, recs appreciated  - See above

## 2020-07-02 NOTE — PROGRESS NOTES
[facility]  Vascular Neurology  Comprehensive Stroke Center  Progress Note    Assessment/Plan:     * ST elevation on ECG  - as per cardiology     SANTOSH (acute kidney injury)  - as per primary    Mixed hyperlipidemia  - continue high potency statins   - atorva 80 mg     Aneurysm of middle cerebral artery  84 yr old female with medical history of HTN; HLD, intracranial aneurysms (right MCA bifurcation - measuring 1.7 x 1.3 x 1.6 cm; reported of mesh repair 10 yr ago & bilateral supraclinoid ICA measuring 9 x 3 mm on the right and a more fusiform aneurysm on the left measuring approximately 5 x 4 mm); recent CVA - right sided weakness (On DAPT, statins - for intracranial atheor etiology - multifocal including stenosis of the post-aneurysmal segment of the right M1 MCA. Additional severe stenosis of the M2 MCA affecting both M2 branches; Multifocal mild stenoses of the left M1 MCA without occlusion and more distal stenosis with probable high-grade stenosis of about the M2/M3 bifurcation on the left + Multifocal narrowing of the vertebrobasilar system) presenting to the ER with acute onset fatigue, dysarthria; reduced level of alertness or wakefulness. Presentation within the spectrum of ACS. Given intracranial aneurysm - consult reasons for clearance for ACS intervention from stroke standpoint. Also rule out stroke DDx for presentation.     CT head: Negative for acute stroke. Extensive microvascular ishcemic changes. Remote infarcts in left BG, Left cerebellum. MRI contraindicated for unclear details on compatibility of intracranial mesh.     Patient back to baseline - with residual subtle RUE drift. Recuded concentration, however no aphasia, no facial droop. Negative concerns for any LVO signs.      Plans:  - Less concerns for acute stroke etiology. However, intracranial aneurysm needs IR evaluation in near future. Shall have F/u set-up on op basis.   - Currently on optimal medical management for  initiation of anticoagulation from cardiac, ACS standpoint in addition to DAPT and high potency statins for secondary stroke prevention. Reviewed the benefits > risks for anticoagulation with family.     - No specific interventions, investigations from stroke standpoint.    - Continue close neuro-monitoring. If any neuro change - raising concerns for acute ishcemic event.    - Recommend a repeat CT head to rule out any acute infarcts that may have not developed on imaging at time of admission.       Uncontrolled hypertension  - stroke risk factor  - intracranial athero risk factor    - long term goals < 130/80 mmHg         Patient is doing better today and has improvement clinically.  She has no signs of facial droop, slurred speech, dysarthria or reduced alertness.  Oriented and Alert x3.   Denies any chest pain.   Patient does have some weakness in the RUE but a family member was bedside and stated this is ongoing from her previous stroke around 2 weeks ago.  Patient stated she believes she is taking both the ASA and Plavix as recommended during last discharge.  Has followup appointment with Dr. Allred in 2 weeks in the clinic.     STROKE DOCUMENTATION   Acute Stroke Times   Last Known Normal Date: 07/01/20  Last Known Normal Time: 0700  Symptom Onset Date: 07/01/20  Symptom Onset Time: 0700  Stroke Team Called Date: 07/01/20  Stroke Team Called Time: 1500  Stroke Team Arrival Date: 07/01/20  Stroke Team Arrival Time: 1515  Decision to Treat Time for Alteplase: (Alternative diagnosis )  Decision to Treat Time for IR: (Alternative diagnosis / F/u IR clinic)    NIH Scale:  1a. Level of Consciousness: 0-->Alert, keenly responsive  1b. LOC Questions: 0-->Answers both questions correctly  1c. LOC Commands: 0-->Performs both tasks correctly  2. Best Gaze: 0-->Normal  3. Visual: 0-->No visual loss  4. Facial Palsy: 0-->Normal symmetrical movements  5a. Motor Arm, Left: 2-->Some effort against gravity, limb cannot get to or  maintain (if cued) 90 (or 45) degrees, drifts down to bed, but has some effort against gravity  5b. Motor Arm, Right: 2-->Some effort against gravity, limb cannot get to or maintain (if cued) 90 (or 45) degrees, drifts down to bed, but has some effort against gravity  6a. Motor Leg, Left: 0-->No drift, leg holds 30 degree position for full 5 secs  6b. Motor Leg, Right: 0-->No drift, leg holds 30 degree position for full 5 secs  7. Limb Ataxia: 0-->Absent  8. Sensory: 0-->Normal, no sensory loss  9. Best Language: 0-->No aphasia, normal  10. Dysarthria: 0-->Normal  11. Extinction and Inattention (formerly Neglect): 0-->No abnormality  Total (NIH Stroke Scale): 4       Modified Mónica Score: 2  [place] Coma Scale:15   ABCD2 Score:    [id number] Score:   HAS -BLED Score:   ICH Score:   Hunt & Freedman Classification:      Hemorrhagic change of an Ischemic Stroke: Does this patient have an ischemic stroke with hemorrhagic changes? No     Neurologic Chief Complaint: Chronic Aneurysm of MCA with significant surgical history     Subjective:     Interval History: Patient is seen for follow-up neurological assessment and treatment recommendations: Patient is doing better today and has improvement clinically. She has no signs of facial droop, slurred speech, dysarthria or reduced alertness.  Oriented and Alert x3.  Denies any chest pain.   Patient does have some weakness in the RUE but a family member was bedside and stated this is ongoing from her previous stroke around 2 weeks ago.  Patient stated she believes she is taking both the ASA and Plavix as recommended during last discharge.  Has followup appointment with Dr. Allred in 2 weeks in the clinic.     HPI, Past Medical, Family, and Social History remains the same as documented in the initial encounter.     Review of Systems   Constitutional: Negative for chills, diaphoresis, fatigue and fever.   HENT: Negative for congestion and drooling.    Respiratory: Negative for cough,  chest tightness and shortness of breath.    Cardiovascular: Negative for chest pain and leg swelling.   Gastrointestinal: Negative for abdominal pain, constipation, diarrhea and nausea.   Neurological: Positive for weakness. Negative for dizziness, facial asymmetry, speech difficulty, numbness and headaches.   Psychiatric/Behavioral: Negative for confusion.     Scheduled Meds:   aspirin  81 mg Oral Daily    atorvastatin  80 mg Oral QHS    clopidogreL  75 mg Oral Daily    heparin (porcine)  5,000 Units Subcutaneous Q8H     Continuous Infusions:  PRN Meds:acetaminophen, melatonin, ondansetron, ondansetron, sodium chloride 0.9%    Objective:     Vital Signs (Most Recent):  Temp: 98.3 °F (36.8 °C) (07/02/20 1729)  Pulse: 74 (07/02/20 1729)  Resp: 18 (07/02/20 1729)  BP: (!) 157/74 (07/02/20 1729)  SpO2: 99 % (07/02/20 1729)  BP Location: Left arm    Vital Signs Range (Last 24H):  Temp:  [98 °F (36.7 °C)-98.3 °F (36.8 °C)]   Pulse:  [56-96]   Resp:  [9-29]   BP: (112-157)/()   SpO2:  [98 %-100 %]   BP Location: Left arm    Physical Exam  Constitutional:       Appearance: Normal appearance.   HENT:      Head: Normocephalic and atraumatic.   Eyes:      Extraocular Movements: Extraocular movements intact.      Conjunctiva/sclera: Conjunctivae normal.   Cardiovascular:      Rate and Rhythm: Normal rate.   Pulmonary:      Effort: Pulmonary effort is normal. No respiratory distress.   Neurological:      General: No focal deficit present.      Mental Status: She is alert and oriented to person, place, and time.   Psychiatric:         Mood and Affect: Mood normal.         Behavior: Behavior normal.         Neurological Exam:   LOC: alert  Attention Span: Good   Language: No aphasia  Articulation: No dysarthria  Orientation: Person, Place, Time   Visual Fields: Full  EOM (CN III, IV, VI): Full/intact  Pupils (CN II, III): PERRL  Facial Sensation (CN V): Normal  Facial Movement (CN VII): Symmetric facial expression     Gag Reflex: present  Reflexes: did not examine  Motor: Arm left  Paresis: 3/5  Leg left  Normal 5/5  Arm right  Paresis: 3/5  Leg right Normal 5/5  Cebellar: did not examine  Sensation: Intact to light touch, temperature and vibration  Tone: Normal tone throughout    Laboratory:  CMP:   Recent Labs   Lab 07/02/20  0323   CALCIUM 10.3   ALBUMIN 3.4*   PROT 7.1      K 4.4   CO2 23      BUN 41*   CREATININE 1.3   ALKPHOS 59   ALT 24   AST 21   BILITOT 0.5     BMP:   Recent Labs   Lab 07/02/20  0323      K 4.4      CO2 23   BUN 41*   CREATININE 1.3   CALCIUM 10.3     CBC:   Recent Labs   Lab 07/02/20  0323   WBC 6.32   RBC 4.52   HGB 13.5   HCT 42.6      MCV 94   MCH 29.9   MCHC 31.7*     Lipid Panel: No results for input(s): CHOL, LDLCALC, HDL, TRIG in the last 168 hours.    Diagnostic Results     Brain Imaging   7/1/2020 CT Head without contrast  Impression:     No acute intracranial abnormality.  No significant detrimental changes from recent exams.     Extensive chronic microvascular ischemic changes and small remote infarcts.     Large peripherally calcified right MCA bifurcation aneurysm stable from prior.    Vessel Imaging   7/1/2020 CTA Head and Neck  pending    Cardiac Imaging   7/1/2020 EDY  · Normal left ventricular systolic function. The estimated ejection fraction is 65%.  · No wall motion abnormalities.  · Normal LV diastolic function.  · Concentric left ventricular remodeling.  · Normal right ventricular systolic function.  · Normal central venous pressure (3 mmHg).  · The estimated PA systolic pressure is 31 mmHg.      Lidia Singleton MD  Comprehensive Stroke Center  Department of Vascular Neurology   [facility]

## 2020-07-02 NOTE — ASSESSMENT & PLAN NOTE
Baseline sCr 0.8, sCr 2 on admission  No indications for HD at this time  Cr improving (2 > 1.3)    Plan:  - Trend Cr  - Strict I&Os  - Avoid nephrotoxic agents  - Renally adjust medications

## 2020-07-02 NOTE — PROGRESS NOTES
[facility]  Cardiology  Progress Note    Patient Name: Margarita Chávez  MRN: [id number]  Admission Date: 7/1/2020  Hospital Length of Stay: 1 days  Code Status: DNR   Attending Physician: Robert Sharma MD   Primary Care Physician: Syeda Grace MD  Expected Discharge Date:   Principal Problem:ST elevation on ECG    Subjective:     Hospital Course:   Admitted to CCU for monitoring after suspected STEMI in ED. Repeat EKG showed resolution of ST elevations. Troponin wnl x 3. Denied chest pain, HD stable throughout admission. Formal TTE showed EF 65%, normal diastolic fxn, no local wall motion abnormalities. Didn't receive ACS protocol 2/2 low suspicion of cardiac etiology and subacute expanding MCA aneurysm.    Interval History: NAEON. Denies chest pain. Afebrile, VSS and WNL. Formal TTE showed EF 65%, normal diastolic fxn, no WMA.     Review of Systems   Constitution: Negative for chills, diaphoresis, fever, weight gain and weight loss.   HENT: Negative for congestion and sore throat.    Eyes: Negative for visual disturbance.   Cardiovascular: Negative for chest pain, dyspnea on exertion, leg swelling, orthopnea, palpitations, paroxysmal nocturnal dyspnea and syncope.        L arm pain   Respiratory: Negative for cough, shortness of breath and wheezing.    Skin: Negative for rash.   Musculoskeletal: Negative for joint pain and myalgias.   Gastrointestinal: Negative for abdominal pain, constipation, diarrhea, melena, nausea and vomiting.   Genitourinary: Negative for dysuria, frequency and hematuria.   Neurological: Negative for dizziness, headaches, light-headedness and numbness.   Psychiatric/Behavioral: Negative for altered mental status.     Objective:     Vital Signs (Most Recent):  Temp: 98.1 °F (36.7 °C) (07/02/20 0505)  Pulse: 72 (07/02/20 0605)  Resp: 14 (07/02/20 0605)  BP: 112/70 (07/02/20 0605)  SpO2: 100 % (07/02/20 0605) Vital Signs (24h Range):  Temp:  [97.9 °F (36.6 °C)-98.2 °F  (36.8 °C)] 98.1 °F (36.7 °C)  Pulse:  [54-95] 72  Resp:  [9-29] 14  SpO2:  [94 %-100 %] 100 %  BP: (110-157)/() 112/70     Weight: 75.8 kg (167 lb)  Body mass index is 27.79 kg/m².     SpO2: 100 %  O2 Device (Oxygen Therapy): room air      Intake/Output Summary (Last 24 hours) at 7/2/2020 0700  Last data filed at 7/2/2020 0505  Gross per 24 hour   Intake 180 ml   Output 600 ml   Net -420 ml       Lines/Drains/Airways     Drain            Female External Urinary Catheter 07/01/20 1700 less than 1 day          Peripheral Intravenous Line                 Peripheral IV - Single Lumen 07/01/20 1334 18 G Right Antecubital less than 1 day         Peripheral IV - Single Lumen 07/01/20 1640 22 G Distal;Posterior;Right Forearm less than 1 day                Physical Exam   Constitutional: She is oriented to person, place, and time. She appears well-developed and well-nourished. No distress.   HENT:   Head: Normocephalic and atraumatic.   Mouth/Throat: Oropharynx is clear and moist. No oropharyngeal exudate.   Eyes: Pupils are equal, round, and reactive to light. Conjunctivae are normal. No scleral icterus.   Neck: Neck supple. No JVD present. No tracheal deviation present.   Cardiovascular: Normal rate, regular rhythm, normal heart sounds and intact distal pulses.   No murmur heard.  Pulmonary/Chest: Effort normal and breath sounds normal. No respiratory distress. She has no wheezes. She has no rales.   Abdominal: Soft. Bowel sounds are normal. She exhibits no distension. There is no abdominal tenderness. There is no rebound.   Musculoskeletal:         General: No edema.   Neurological: She is alert and oriented to person, place, and time. She exhibits normal muscle tone.   Skin: Skin is warm and dry. No rash noted. She is not diaphoretic.   Psychiatric: She has a normal mood and affect. Her behavior is normal.       Significant Labs: All pertinent lab results from the last 24 hours have been reviewed.    Significant  Imaging: Reviewed.    Assessment and Plan:     * ST elevation on ECG  83 yo F with no significant past cardiac history that presents with slurred speech, confusion, and facial droop. Patient had L arm pain, denies chest pain/SOB. EKG showed ST elevations in I and aVL that resolved on repeat EKG. Trop wnl. Patient with recent TIA c/b R-sided weakness. Per Vascular Neurology, patient's intracranial abnormalities are not contraindications for anticoagulation/ACS protocol. Given negative troponin and resolution of ST abnormalities on repeat EKG, suspect type II NSTEMI 2/2 demand ischemia. Symptoms more consistent with TIA/stroke.    Plan:  - Step down to Hosp Med  - Neuro checks q4hr  - Continue ASA, plavix  - Continue statin  - Hold on heparin gtt given negative troponin  - Consult Vascular Neuro, recs appreciated  - Obtain info on aneurysm hardware (ie MRI-compatible) for possible MRI brain  - Telemetry    SANTOSH (acute kidney injury)  Baseline sCr 0.8, sCr 2 on admission  No indications for HD at this time  Cr improving (2 > 1.3)    Plan:  - Trend Cr  - Strict I&Os  - Avoid nephrotoxic agents  - Renally adjust medications    Aneurysm of middle cerebral artery  TIA (transient ischemic attack)  Recently suffered TIA with R-sided residual weakness  CTH negative for acute abnormalities, extensive chronic microvascular changes + small remote infarcts, stable calcified R MCA bifurcation aneurysm    Plan:  - Continue ASA, plavix, statin  - Vascular Neuro consulted, recs appreciated  - See above    STEMI (ST elevation myocardial infarction)  - ST elevation I and aVL left arm pain  - R rADIAL LHC +/- PCI    Mixed hyperlipidemia  Continue home statin    Uncontrolled hypertension  Hold home BP meds while normotensive        VTE Risk Mitigation (From admission, onward)         Ordered     heparin (porcine) injection 5,000 Units  Every 8 hours      07/01/20 1702     IP VTE HIGH RISK PATIENT  Once      07/01/20 6854                 Sal Rosales MD  Cardiology  [facility]

## 2020-07-02 NOTE — HOSPITAL COURSE
7/2/2020  Patient is doing better today and has improvement clinically.  She has no signs of facial droop, slurred speech, dysarthria or reduced alertness.  Oriented and Alert x3.   Denies any chest pain.   Patient does have some weakness in the RUE but a family member was bedside and stated this is ongoing from her previous stroke around 2 weeks ago.  Patient stated she believes she is taking both the ASA and Plavix as recommended during last discharge.  Has followup appointment with Dr. Allred in 2 weeks in the clinic.     7/3/2020   Back to baseline likely based on her son's account of her baseline prior to this adverse event.  Was resistant to participate in her physical exam today as she was wanting to eat her lunch and seemed frustrated. Overall, no new acute complaints or changes per the patient.

## 2020-07-02 NOTE — HOSPITAL COURSE
Admitted to CCU for monitoring after suspected STEMI in ED. Repeat EKG showed resolution of ST elevations. Troponin wnl x 3. Denied chest pain, HD stable throughout admission. Formal TTE showed EF 65%, normal diastolic fxn, no local wall motion abnormalities. Didn't receive ACS protocol 2/2 low suspicion of cardiac etiology and subacute expanding MCA aneurysm.

## 2020-07-02 NOTE — ASSESSMENT & PLAN NOTE
83 yo F with no significant past cardiac history that presents with slurred speech, confusion, and facial droop. Patient had L arm pain, denies chest pain/SOB. EKG showed ST elevations in I and aVL that resolved on repeat EKG. Trop wnl. Patient with recent TIA c/b R-sided weakness. Per Vascular Neurology, patient's intracranial abnormalities are not contraindications for anticoagulation/ACS protocol. Given negative troponin and resolution of ST abnormalities on repeat EKG, suspect type II NSTEMI 2/2 demand ischemia. Symptoms more consistent with TIA/stroke.    Plan:  - Step down to Hosp Med  - Neuro checks q4hr  - Continue ASA, plavix  - Continue statin  - Hold on heparin gtt given negative troponin  - Consult Vascular Neuro, recs appreciated  - Obtain info on aneurysm hardware (ie MRI-compatible) for possible MRI brain  - Telemetry

## 2020-07-02 NOTE — NURSING TRANSFER
Nursing Transfer Note      7/2/2020     Transfer To: [id number]    Transfer via bed    Transfer with cardiac monitoring    Transported by Rn and PCT    Medicines sent: no    Chart send with patient: Yes    Notified: son    Upon arrival to floor: cardiac monitor applied, patient oriented to room, call bell in reach and bed in lowest position

## 2020-07-02 NOTE — PT/OT/SLP EVAL
"Speech Language Pathology Evaluation  Cognitive/Bedside Swallow    Patient Name:  Margarita Chávez   MRN:  [id number]  Admitting Diagnosis: ST elevation on ECG    Recommendations:                  General Recommendations:  Dysphagia therapy and Cognitive-linguistic therapy  Diet recommendations:  Mechanical soft, Thin   Aspiration Precautions: Standard aspiration precautions   General Precautions: Standard, aspiration  Communication strategies:  go to room if call light pushed    History:     Past Medical History:   Diagnosis Date    Hypertension        Past Surgical History:   Procedure Laterality Date    BRAIN SURGERY      HYSTERECTOMY         Subjective     Patient awake;alert. Son present for session   Objective:     Cognitive Status:    Arousal/Alertness Appropriate response to stimuli  Attention Sustained attention deficit appreciated  Perseveration Not present  Orientation Oriented x4  Memory Immediate Recall WFL, Delayedimparied, patient recalled 1/3 unassociated words after 1 min filled delay.  and recall general information WFL  Problem Solving Categories patient provided 4 items in concrete category indep (norm 15-20), Solutions to hypothetical situations completed with75% acc and Compare/contrast completed with 25% acc, accuracy and task completion also lilely impaired by poor attention to directions/task  Safety awareness poor, patient initially did not recall number for "911"      Receptive Language:   Comprehension:   Questions Simple yes/no WFL  Complex yes/no WFL  Commands  two step basic commands WFL  multistep basic commands 50% acc    Expressive Language:  Verbal:    Naming Confrontation WFL and Single word responsive naming WFL  Conversational speech WFL        Motor Speech:  WFL    Voice:   WFL  Intensity diminished    Visual-Spatial:  WFL    Reading:   TBA     Written Expression:   TBA    Oral Musculature Evaluation  Dentition: present and adequate  Mucosal Quality: good  Mandibular Strength " and Mobility: WFL  Oral Labial Strength and Mobility: WFL  Lingual Strength and Mobility: WFL  Velar Elevation: WFL  Voice Prior to PO Intake: WFL though intensity decreased    Bedside Swallow Eval:   Consistencies Assessed:  · Thin liquids via tsp cup and straw  · Puree via 4oz  · Solids via 1 cracker     Oral Phase:   · Prolonged mastication with regular solids.     Pharyngeal Phase:   · no overt clinical signs/symptoms of aspiration  · no overt clinical signs/symptoms of pharyngeal dysphagia    Compensatory Strategies  · None    Treatment: Patient appears appropriate for diet initiation of mechanical soft consistencies and thin liquids at this time. Skilled education was provided to patient and family members re: diet recs, standard aspiration precautions of which to follow, and ongoing ST plan of care.    Assessment:     Margarita Chávez is a 84 y.o. female with an SLP diagnosis of Dysphagia and Cognitive-Linguistic Impairment.    Goals:   Multidisciplinary Problems     SLP Goals        Problem: SLP Goal    Goal Priority Disciplines Outcome   SLP Goal     SLP Ongoing, Progressing   Description: Speech Language Pathology Goals  Goals expected to be met by 7/9:  1. Patient will tolerate a mechanical soft diet and thin liquids with no overt signs of airway compromise.   2. Patient will complete mild to moderate level problem solving tasks with 70% acc given mod A.   3. Patient will provide 6-10 items within concrete categories indep.   4. Patient will attend to structured therapy tasks for 3 minutes without redirect cues.   5. Patient will participate in ongoing assessment of R,W,and VS.                          Plan:     · Patient to be seen:  4 x/week   · Plan of Care expires:  08/02/20  · Plan of Care reviewed with:  patient   · SLP Follow-Up:  Yes       Discharge recommendations:  Discharge Facility/Level of Care Needs: other (see comments)   Barriers to Discharge:  None    Time Tracking:     SLP Treatment  Date:   07/02/20  Speech Start Time:  1030  Speech Stop Time:  (P) 1046     Speech Total Time (min):  (P) 16 min    Billable Minutes: Eval 8  and Eval Swallow and Oral Function 8    Emily Abadie, CCC-SLP  07/02/2020

## 2020-07-02 NOTE — SUBJECTIVE & OBJECTIVE
Neurologic Chief Complaint: Chronic Aneurysm of MCA with significant surgical history     Subjective:     Interval History: Patient is seen for follow-up neurological assessment and treatment recommendations: Patient is doing better today and has improvement clinically. She has no signs of facial droop, slurred speech, dysarthria or reduced alertness.  Oriented and Alert x3.  Denies any chest pain.   Patient does have some weakness in the RUE but a family member was bedside and stated this is ongoing from her previous stroke around 2 weeks ago.  Patient stated she believes she is taking both the ASA and Plavix as recommended during last discharge.  Has followup appointment with Dr. Allred in 2 weeks in the clinic.     HPI, Past Medical, Family, and Social History remains the same as documented in the initial encounter.     Review of Systems   Constitutional: Negative for chills, diaphoresis, fatigue and fever.   HENT: Negative for congestion and drooling.    Respiratory: Negative for cough, chest tightness and shortness of breath.    Cardiovascular: Negative for chest pain and leg swelling.   Gastrointestinal: Negative for abdominal pain, constipation, diarrhea and nausea.   Neurological: Positive for weakness. Negative for dizziness, facial asymmetry, speech difficulty, numbness and headaches.   Psychiatric/Behavioral: Negative for confusion.     Scheduled Meds:   aspirin  81 mg Oral Daily    atorvastatin  80 mg Oral QHS    clopidogreL  75 mg Oral Daily    heparin (porcine)  5,000 Units Subcutaneous Q8H     Continuous Infusions:  PRN Meds:acetaminophen, melatonin, ondansetron, ondansetron, sodium chloride 0.9%    Objective:     Vital Signs (Most Recent):  Temp: 98.3 °F (36.8 °C) (07/02/20 1729)  Pulse: 74 (07/02/20 1729)  Resp: 18 (07/02/20 1729)  BP: (!) 157/74 (07/02/20 1729)  SpO2: 99 % (07/02/20 1729)  BP Location: Left arm    Vital Signs Range (Last 24H):  Temp:  [98 °F (36.7 °C)-98.3 °F (36.8 °C)]   Pulse:   [56-96]   Resp:  [9-29]   BP: (112-157)/()   SpO2:  [98 %-100 %]   BP Location: Left arm    Physical Exam  Constitutional:       Appearance: Normal appearance.   HENT:      Head: Normocephalic and atraumatic.   Eyes:      Extraocular Movements: Extraocular movements intact.      Conjunctiva/sclera: Conjunctivae normal.   Cardiovascular:      Rate and Rhythm: Normal rate.   Pulmonary:      Effort: Pulmonary effort is normal. No respiratory distress.   Neurological:      General: No focal deficit present.      Mental Status: She is alert and oriented to person, place, and time.   Psychiatric:         Mood and Affect: Mood normal.         Behavior: Behavior normal.         Neurological Exam:   LOC: alert  Attention Span: Good   Language: No aphasia  Articulation: No dysarthria  Orientation: Person, Place, Time   Visual Fields: Full  EOM (CN III, IV, VI): Full/intact  Pupils (CN II, III): PERRL  Facial Sensation (CN V): Normal  Facial Movement (CN VII): Symmetric facial expression    Gag Reflex: present  Reflexes: did not examine  Motor: Arm left  Paresis: 3/5  Leg left  Normal 5/5  Arm right  Paresis: 3/5  Leg right Normal 5/5  Cebellar: did not examine  Sensation: Intact to light touch, temperature and vibration  Tone: Normal tone throughout    Laboratory:  CMP:   Recent Labs   Lab 07/02/20  0323   CALCIUM 10.3   ALBUMIN 3.4*   PROT 7.1      K 4.4   CO2 23      BUN 41*   CREATININE 1.3   ALKPHOS 59   ALT 24   AST 21   BILITOT 0.5     BMP:   Recent Labs   Lab 07/02/20  0323      K 4.4      CO2 23   BUN 41*   CREATININE 1.3   CALCIUM 10.3     CBC:   Recent Labs   Lab 07/02/20  0323   WBC 6.32   RBC 4.52   HGB 13.5   HCT 42.6      MCV 94   MCH 29.9   MCHC 31.7*     Lipid Panel: No results for input(s): CHOL, LDLCALC, HDL, TRIG in the last 168 hours.    Diagnostic Results     Brain Imaging   7/1/2020 CT Head without contrast  Impression:     No acute intracranial abnormality.  No  significant detrimental changes from recent exams.     Extensive chronic microvascular ischemic changes and small remote infarcts.     Large peripherally calcified right MCA bifurcation aneurysm stable from prior.    Vessel Imaging   7/1/2020 CTA Head and Neck  pending    Cardiac Imaging   7/1/2020 EDY  · Normal left ventricular systolic function. The estimated ejection fraction is 65%.  · No wall motion abnormalities.  · Normal LV diastolic function.  · Concentric left ventricular remodeling.  · Normal right ventricular systolic function.  · Normal central venous pressure (3 mmHg).  · The estimated PA systolic pressure is 31 mmHg.

## 2020-07-02 NOTE — SUBJECTIVE & OBJECTIVE
Interval History: NAEON. Denies chest pain. Afebrile, VSS and WNL. Formal TTE showed EF 65%, normal diastolic fxn, no WMA.     Review of Systems   Constitution: Negative for chills, diaphoresis, fever, weight gain and weight loss.   HENT: Negative for congestion and sore throat.    Eyes: Negative for visual disturbance.   Cardiovascular: Negative for chest pain, dyspnea on exertion, leg swelling, orthopnea, palpitations, paroxysmal nocturnal dyspnea and syncope.        L arm pain   Respiratory: Negative for cough, shortness of breath and wheezing.    Skin: Negative for rash.   Musculoskeletal: Negative for joint pain and myalgias.   Gastrointestinal: Negative for abdominal pain, constipation, diarrhea, melena, nausea and vomiting.   Genitourinary: Negative for dysuria, frequency and hematuria.   Neurological: Negative for dizziness, headaches, light-headedness and numbness.   Psychiatric/Behavioral: Negative for altered mental status.     Objective:     Vital Signs (Most Recent):  Temp: 98.1 °F (36.7 °C) (07/02/20 0505)  Pulse: 72 (07/02/20 0605)  Resp: 14 (07/02/20 0605)  BP: 112/70 (07/02/20 0605)  SpO2: 100 % (07/02/20 0605) Vital Signs (24h Range):  Temp:  [97.9 °F (36.6 °C)-98.2 °F (36.8 °C)] 98.1 °F (36.7 °C)  Pulse:  [54-95] 72  Resp:  [9-29] 14  SpO2:  [94 %-100 %] 100 %  BP: (110-157)/() 112/70     Weight: 75.8 kg (167 lb)  Body mass index is 27.79 kg/m².     SpO2: 100 %  O2 Device (Oxygen Therapy): room air      Intake/Output Summary (Last 24 hours) at 7/2/2020 0700  Last data filed at 7/2/2020 0505  Gross per 24 hour   Intake 180 ml   Output 600 ml   Net -420 ml       Lines/Drains/Airways     Drain            Female External Urinary Catheter 07/01/20 1700 less than 1 day          Peripheral Intravenous Line                 Peripheral IV - Single Lumen 07/01/20 1334 18 G Right Antecubital less than 1 day         Peripheral IV - Single Lumen 07/01/20 1640 22 G Distal;Posterior;Right Forearm less than  1 day                Physical Exam   Constitutional: She is oriented to person, place, and time. She appears well-developed and well-nourished. No distress.   HENT:   Head: Normocephalic and atraumatic.   Mouth/Throat: Oropharynx is clear and moist. No oropharyngeal exudate.   Eyes: Pupils are equal, round, and reactive to light. Conjunctivae are normal. No scleral icterus.   Neck: Neck supple. No JVD present. No tracheal deviation present.   Cardiovascular: Normal rate, regular rhythm, normal heart sounds and intact distal pulses.   No murmur heard.  Pulmonary/Chest: Effort normal and breath sounds normal. No respiratory distress. She has no wheezes. She has no rales.   Abdominal: Soft. Bowel sounds are normal. She exhibits no distension. There is no abdominal tenderness. There is no rebound.   Musculoskeletal:         General: No edema.   Neurological: She is alert and oriented to person, place, and time. She exhibits normal muscle tone.   Skin: Skin is warm and dry. No rash noted. She is not diaphoretic.   Psychiatric: She has a normal mood and affect. Her behavior is normal.       Significant Labs: All pertinent lab results from the last 24 hours have been reviewed.    Significant Imaging: Reviewed.

## 2020-07-02 NOTE — PLAN OF CARE
CM[facility] DAILY GOALS       A: Awake    RASS: Goal - RASS Goal: 0-->alert and calm  Actual - RASS (Ibarra Agitation-Sedation Scale): 0-->alert and calm   Restraint necessity:    B: Breath   SBT: Not intubated   C: Coordinate A & B, analgesics/sedatives   Pain: managed    SAT: Not intubated  D: Delirium   CAM-ICU: Overall CAM-ICU: Negative  E: Early Mobility   MOVE Screen: Pass   Activity: Activity Management: activity adjusted per tolerance, activity clustered for rest period  FAS: Feeding/Nutrition   Diet order: Diet/Nutrition Received: NPO,   Fluid restriction:    T: Thrombus   DVT prophylaxis: VTE Required Core Measure: Pharmacological prophylaxis initiated/maintained  H: HOB Elevation   Head of Bed (HOB): HOB at 30-45 degrees  U: Ulcer Prophylaxis   GI: yes  G: Glucose control   managed    S: Skin   Bundle compliance: yes   Bathing/Skin Care: incontinence care, dressed/undressed, bath, partial, bath, chlorhexidine, back care, linen changed Date: 7/2/2020  B: Bowel Function   no issues   I: Indwelling Catheters   [place] necessity:     CVC necessity: No   IPAD offered: No  D: De-escalation Antibx   No  Plan for the day   Stepdown  Family/Goals of care/Code Status   Code Status: DNR     No acute events throughout day, VS and assessment per flow sheet, patient progressing towards goals as tolerated, plan of care reviewed with Margarita Chávez and family, all concerns addressed, will continue to monitor.

## 2020-07-02 NOTE — PLAN OF CARE
Syeda Grace MD  [address] / [place] [address]      [facility] - [place] - [address]  [address]  [place] [address]  Phone: [phone] Fax: [phone]    Extended Emergency Contact Information  Primary Emergency Contact: Clarke Chávez  Address: [address] [address] [facility]  Home Phone: [phone]  Relation: Spouse  Secondary Emergency Contact: Adam Chávez  Mobile Phone: [phone]  Relation: Son   needed? No    Future Appointments   Date Time Provider Department Center   7/16/2020 10:40 AM Khushboo Allred MD [facility] STROKE [facility]       Payor: [employer] MEDICARE / Plan: "[employer] (RIS)" 65 / Product Type: Medicare Advantage /       Patient states that she lives home alone and that's where her son found her on the floor.  Of note, patient was admitted from rehab as seen below.  Spoke with Philip at  rehab to verify.       07/02/20 1042   Discharge Assessment   Assessment Type Discharge Planning Assessment   Confirmed/corrected address and phone number on facesheet? Yes   Assessment information obtained from? Medical Record   Communicated expected length of stay with patient/caregiver no   Prior to hospitilization cognitive status: Alert/Oriented   Prior to hospitalization functional status: Assistive Equipment;Needs Assistance   Current cognitive status: Alert/Oriented   Current Functional Status: Needs Assistance   Facility Arrived From:  Rehab ([facility])   Lives With alone   Able to Return to Prior Arrangements other (see comments)  (TBD)   Is patient able to care for self after discharge? Unable to determine at this time (comments)   Who are your caregiver(s) and their phone number(s)? Adam Chávez (son) [phone]   Readmission Within the Last 30 Days previous discharge plan unsuccessful   Patient currently being followed by outpatient case management? No   Patient currently receives  any other outside agency services? No   Equipment Currently Used at Home cane, straight   Do you have any problems affording any of your prescribed medications? No   Is the patient taking medications as prescribed? yes   Does the patient have transportation home? Yes   Transportation Anticipated family or friend will provide;agency   Discharge Plan A Rehab   Discharge Plan B Home Health;Home with family   DME Needed Upon Discharge  other (see comments)  (TBD)   Readmission Questionnaire   At the time of your discharge, did someone talk to you about what your health problems were? Yes   At the time of discharge, did someone talk to you about what to watch out for regarding worsening of your health problem? Yes   At the time of discharge, did someone talk to you about what to do if you experienced worsening of your health problem? Yes   At the time of discharge, did someone talk to you about which medication to take when you left the hospital and which ones to stop taking? Yes   At the time of discharge, did someone talk to you about when and where to follow up with a doctor after you left the hospital? Yes   Do you have any problems affording any of  your prescribed medications? No   Do you have problems obtaining/receiving your medications? No   Living Arrangements house   Does the patient have family/friends to help with healtcare needs after discharge? yes       Chad Spencer RN MSN  Critical Care-   Ext. [phone]

## 2020-07-02 NOTE — ASSESSMENT & PLAN NOTE
84 yr old female with medical history of HTN; HLD, intracranial aneurysms (right MCA bifurcation - measuring 1.7 x 1.3 x 1.6 cm; reported of mesh repair 10 yr ago & bilateral supraclinoid ICA measuring 9 x 3 mm on the right and a more fusiform aneurysm on the left measuring approximately 5 x 4 mm); recent CVA - right sided weakness (On DAPT, statins - for intracranial atheor etiology - multifocal including stenosis of the post-aneurysmal segment of the right M1 MCA. Additional severe stenosis of the M2 MCA affecting both M2 branches; Multifocal mild stenoses of the left M1 MCA without occlusion and more distal stenosis with probable high-grade stenosis of about the M2/M3 bifurcation on the left + Multifocal narrowing of the vertebrobasilar system) presenting to the ER with acute onset fatigue, dysarthria; reduced level of alertness or wakefulness. Presentation within the spectrum of ACS. Given intracranial aneurysm - consult reasons for clearance for ACS intervention from stroke standpoint. Also rule out stroke DDx for presentation.     CT head: Negative for acute stroke. Extensive microvascular ishcemic changes. Remote infarcts in left BG, Left cerebellum. MRI contraindicated for unclear details on compatibility of intracranial mesh.     Patient back to baseline - with residual subtle RUE drift. Recuded concentration, however no aphasia, no facial droop. Negative concerns for any LVO signs.      Plans:  - Less concerns for acute stroke etiology. However, intracranial aneurysm needs IR evaluation in near future. Shall have F/u set-up on op basis.   - Currently on optimal medical management for initiation of anticoagulation from cardiac, ACS standpoint in addition to DAPT and high potency statins for secondary stroke prevention. Reviewed the benefits > risks for anticoagulation with family.     - No specific interventions, investigations from stroke standpoint.    - Continue close neuro-monitoring. If any neuro  change - raising concerns for acute ishcemic event.    - Recommend a repeat CT head to rule out any acute infarcts that may have not developed on imaging at time of admission.

## 2020-07-03 LAB
ALBUMIN SERPL BCP-MCNC: 3.6 G/DL (ref 3.5–5.2)
ALP SERPL-CCNC: 61 U/L (ref 55–135)
ALT SERPL W/O P-5'-P-CCNC: 21 U/L (ref 10–44)
ANION GAP SERPL CALC-SCNC: 11 MMOL/L (ref 8–16)
AST SERPL-CCNC: 18 U/L (ref 10–40)
BASOPHILS # BLD AUTO: 0.03 K/UL (ref 0–0.2)
BASOPHILS NFR BLD: 0.6 % (ref 0–1.9)
BILIRUB SERPL-MCNC: 0.6 MG/DL (ref 0.1–1)
BUN SERPL-MCNC: 39 MG/DL (ref 8–23)
CALCIUM SERPL-MCNC: 10.2 MG/DL (ref 8.7–10.5)
CHLORIDE SERPL-SCNC: 103 MMOL/L (ref 95–110)
CO2 SERPL-SCNC: 24 MMOL/L (ref 23–29)
CREAT SERPL-MCNC: 1.3 MG/DL (ref 0.5–1.4)
DIFFERENTIAL METHOD: ABNORMAL
EOSINOPHIL # BLD AUTO: 0.6 K/UL (ref 0–0.5)
EOSINOPHIL NFR BLD: 12.5 % (ref 0–8)
ERYTHROCYTE [DISTWIDTH] IN BLOOD BY AUTOMATED COUNT: 12.7 % (ref 11.5–14.5)
EST. GFR  (AFRICAN AMERICAN): 43.8 ML/MIN/1.73 M^2
EST. GFR  (NON AFRICAN AMERICAN): 38 ML/MIN/1.73 M^2
GLUCOSE SERPL-MCNC: 84 MG/DL (ref 70–110)
HCT VFR BLD AUTO: 44.1 % (ref 37–48.5)
HGB BLD-MCNC: 13.7 G/DL (ref 12–16)
IMM GRANULOCYTES # BLD AUTO: 0.01 K/UL (ref 0–0.04)
IMM GRANULOCYTES NFR BLD AUTO: 0.2 % (ref 0–0.5)
LYMPHOCYTES # BLD AUTO: 1.8 K/UL (ref 1–4.8)
LYMPHOCYTES NFR BLD: 35.3 % (ref 18–48)
MAGNESIUM SERPL-MCNC: 2.1 MG/DL (ref 1.6–2.6)
MCH RBC QN AUTO: 29.8 PG (ref 27–31)
MCHC RBC AUTO-ENTMCNC: 31.1 G/DL (ref 32–36)
MCV RBC AUTO: 96 FL (ref 82–98)
MONOCYTES # BLD AUTO: 0.5 K/UL (ref 0.3–1)
MONOCYTES NFR BLD: 9 % (ref 4–15)
NEUTROPHILS # BLD AUTO: 2.2 K/UL (ref 1.8–7.7)
NEUTROPHILS NFR BLD: 42.4 % (ref 38–73)
NRBC BLD-RTO: 0 /100 WBC
PHOSPHATE SERPL-MCNC: 3.2 MG/DL (ref 2.7–4.5)
PLATELET # BLD AUTO: 309 K/UL (ref 150–350)
PMV BLD AUTO: 10.6 FL (ref 9.2–12.9)
POTASSIUM SERPL-SCNC: 4.3 MMOL/L (ref 3.5–5.1)
PROT SERPL-MCNC: 7.4 G/DL (ref 6–8.4)
RBC # BLD AUTO: 4.59 M/UL (ref 4–5.4)
SODIUM SERPL-SCNC: 138 MMOL/L (ref 136–145)
WBC # BLD AUTO: 5.13 K/UL (ref 3.9–12.7)

## 2020-07-03 PROCEDURE — 36415 COLL VENOUS BLD VENIPUNCTURE: CPT

## 2020-07-03 PROCEDURE — 97535 SELF CARE MNGMENT TRAINING: CPT

## 2020-07-03 PROCEDURE — 97530 THERAPEUTIC ACTIVITIES: CPT

## 2020-07-03 PROCEDURE — 85025 COMPLETE CBC W/AUTO DIFF WBC: CPT

## 2020-07-03 PROCEDURE — 80053 COMPREHEN METABOLIC PANEL: CPT

## 2020-07-03 PROCEDURE — 97162 PT EVAL MOD COMPLEX 30 MIN: CPT

## 2020-07-03 PROCEDURE — 99233 PR SUBSEQUENT HOSPITAL CARE,LEVL III: ICD-10-PCS | Mod: GC,,, | Performed by: PSYCHIATRY & NEUROLOGY

## 2020-07-03 PROCEDURE — 84100 ASSAY OF PHOSPHORUS: CPT

## 2020-07-03 PROCEDURE — 97165 OT EVAL LOW COMPLEX 30 MIN: CPT

## 2020-07-03 PROCEDURE — 99232 PR SUBSEQUENT HOSPITAL CARE,LEVL II: ICD-10-PCS | Mod: ,,, | Performed by: INTERNAL MEDICINE

## 2020-07-03 PROCEDURE — 63600175 PHARM REV CODE 636 W HCPCS: Performed by: STUDENT IN AN ORGANIZED HEALTH CARE EDUCATION/TRAINING PROGRAM

## 2020-07-03 PROCEDURE — 94761 N-INVAS EAR/PLS OXIMETRY MLT: CPT

## 2020-07-03 PROCEDURE — 83735 ASSAY OF MAGNESIUM: CPT

## 2020-07-03 PROCEDURE — 20600001 HC STEP DOWN PRIVATE ROOM

## 2020-07-03 PROCEDURE — 25000003 PHARM REV CODE 250: Performed by: INTERNAL MEDICINE

## 2020-07-03 PROCEDURE — 99233 SBSQ HOSP IP/OBS HIGH 50: CPT | Mod: GC,,, | Performed by: PSYCHIATRY & NEUROLOGY

## 2020-07-03 PROCEDURE — 99232 SBSQ HOSP IP/OBS MODERATE 35: CPT | Mod: ,,, | Performed by: INTERNAL MEDICINE

## 2020-07-03 RX ADMIN — HEPARIN SODIUM 5000 UNITS: 5000 INJECTION INTRAVENOUS; SUBCUTANEOUS at 10:07

## 2020-07-03 RX ADMIN — ATORVASTATIN CALCIUM 80 MG: 20 TABLET, FILM COATED ORAL at 08:07

## 2020-07-03 RX ADMIN — HEPARIN SODIUM 5000 UNITS: 5000 INJECTION INTRAVENOUS; SUBCUTANEOUS at 05:07

## 2020-07-03 RX ADMIN — CLOPIDOGREL BISULFATE 75 MG: 75 TABLET ORAL at 09:07

## 2020-07-03 RX ADMIN — ASPIRIN 81 MG: 81 TABLET, COATED ORAL at 09:07

## 2020-07-03 RX ADMIN — HEPARIN SODIUM 5000 UNITS: 5000 INJECTION INTRAVENOUS; SUBCUTANEOUS at 02:07

## 2020-07-03 NOTE — PLAN OF CARE
Problem: Occupational Therapy Goal  Goal: Occupational Therapy Goal  Description: Goals to be met by: 7 days 7/10/2020     Patient will increase functional independence with ADLs by performing:    Pt to complete self feeding with set-up  Pt to complete g/h skills with set-up  Pt to complete UE dressing with MIN A   Pt to complete LE dressing with MIN A  Pt to complete t/f to BSC with MIN A   Pt to tolerate OOB to chair x 3 hours to increase endurance for functional activity.     Outcome: Ongoing, Progressing

## 2020-07-03 NOTE — PT/OT/SLP EVAL
Physical Therapy Evaluation and treatment    Patient Name:  Margarita Chávez   MRN:  [id number]    Recommendations:     Discharge Recommendations:  nursing facility, skilled   Discharge Equipment Recommendations: (will determine DME needs closer to discharge)   Barriers to discharge: Decreased caregiver support pt lives alone and family may not be able to assist at current functional level.     Assessment:     Margarita Chávez is a 84 y.o. female admitted with a medical diagnosis of ST elevation on ECG.  She presents with the following impairments/functional limitations:  weakness, gait instability, impaired balance, impaired endurance, decreased lower extremity function, impaired functional mobilty, decreased coordination, decreased safety awareness pt pardeep treatment well and will benefit from skilled PT 4x/wk to progress physically. Pt will need SNF placement to maximize rehab potential. Pt was transferred from SNF. Pt had recent CVA with R sided weakness.    Rehab Prognosis: Good; patient would benefit from acute skilled PT services to address these deficits and reach maximum level of function.    Recent Surgery: Procedure(s) (LRB):  Left heart cath (Left)      Plan:     During this hospitalization, patient to be seen 4 x/week to address the identified rehab impairments via gait training, therapeutic activities, therapeutic exercises, neuromuscular re-education and progress toward the following goals:    · Plan of Care Expires:  08/01/20    Subjective     Chief Complaint: pt had no complaints during treatment.   Patient/Family Comments/goals:  To get better and go home.   Pain/Comfort:  · Pain Rating 1: 0/10  · Pain Rating Post-Intervention 1: 0/10    Patients cultural, spiritual, Anglican conflicts given the current situation: no    Living Environment:  Pt lives alone in 1 story slab. Pt uses SC.   Prior to admission, patients level of function was modified Independent using SC.  Equipment used at home: cane,  straight.  DME owned (not currently used): none.  Upon discharge, patient will have assistance from sister? Pt states that her sister may be able to assist.     Objective:     Communicated with nurse prior to session.  Patient found supine with telemetry(hep lock IV)  upon PT entry to room.    General Precautions: Standard, fall   Orthopedic Precautions:    Braces:       Exams:  · Cognitive Exam:  Patient is oriented to Person, Place and Time  · RLE ROM: WFL  · RLE Strength: 3/5 for major muscle groups  · LLE ROM: WFL  · LLE Strength: WFL    Functional Mobility:  · Bed Mobility:  Pt needed verbal cues for hand placement and sequencing for functional mobility.   · Rolling Left:  maximal assistance  · Supine to Sit: maximal assistance  ·   · Transfers:     · Sit to Stand:  moderate assistance with hand-held assist  · Bed to Chair: maximal assistance with  hand-held assist  using  Squat Pivot  ·   · Balance: pt sat on EOB with SBA for sitting balance. pt was able to sit on EOB and perform ADLS with OT.     Therapeutic Activities and Exercises:   pt received verbal instructions in role of PT and POC. Pt verbally expressed understanding of such.     AM-PAC 6 CLICK MOBILITY  Total Score:10     Patient left up in chair with all lines intact and call button in reach.    GOALS:   Multidisciplinary Problems     Physical Therapy Goals        Problem: Physical Therapy Goal    Goal Priority Disciplines Outcome Goal Variances Interventions   Physical Therapy Goal     PT, PT/OT Ongoing, Progressing     Description: Goals to be met by: 20    Patient will increase functional independence with mobility by performin. Supine to sit with MInimal Assistance -not met  2. Sit to stand transfer with Minimal Assistance -not met  3. Bed to chair transfer with Moderate Assistance -not met  4. Gait  x 10 feet with Moderate Assistance using AD- not met                     History:     Past Medical History:   Diagnosis Date     Aneurysm of middle cerebral artery     Hypertension     TIA (transient ischemic attack)        Past Surgical History:   Procedure Laterality Date    BRAIN SURGERY      HYSTERECTOMY         Time Tracking:     PT Received On: 07/03/20  PT Start Time: 0757     PT Stop Time: 0819  PT Total Time (min): 22 min     Billable Minutes: Evaluation 8 min and Therapeutic Activity 14 min      Shania Addison, PT  07/03/2020

## 2020-07-03 NOTE — SUBJECTIVE & OBJECTIVE
Neurologic Chief Complaint: Chronic Aneurysm of MCA with significant surgical history     Subjective:     Interval History: Patient is seen for follow-up neurological assessment and treatment recommendations: Back to baseline likely based on her son's account of her baseline prior to this adverse event.  Was resistant to participate in her physical exam today as she was wanting to eat her lunch and seemed frustrated. Overall, no new acute complaints or changes per the patient.    Has followup appointment with Dr. Allred in 2 weeks in the clinic.     HPI, Past Medical, Family, and Social History remains the same as documented in the initial encounter.     Review of Systems   Constitutional: Negative for chills, diaphoresis, fatigue and fever.   HENT: Negative for congestion and drooling.    Respiratory: Negative for cough, chest tightness and shortness of breath.    Cardiovascular: Negative for chest pain and leg swelling.   Gastrointestinal: Negative for abdominal pain, constipation, diarrhea and nausea.   Neurological: Positive for weakness. Negative for dizziness, facial asymmetry, speech difficulty, numbness and headaches.        RUE, her baseline prior to this adverse event   Psychiatric/Behavioral: Negative for confusion.     Scheduled Meds:   aspirin  81 mg Oral Daily    atorvastatin  80 mg Oral QHS    clopidogreL  75 mg Oral Daily    heparin (porcine)  5,000 Units Subcutaneous Q8H     Continuous Infusions:  PRN Meds:acetaminophen, melatonin, ondansetron, ondansetron, sodium chloride 0.9%    Objective:     Vital Signs (Most Recent):  Temp: 98 °F (36.7 °C) (07/03/20 1544)  Pulse: 77 (07/03/20 1544)  Resp: 18 (07/03/20 1544)  BP: 111/66 (07/03/20 1544)  SpO2: (!) 94 % (07/03/20 1544)  BP Location: Left arm    Vital Signs Range (Last 24H):  Temp:  [97.7 °F (36.5 °C)-98.1 °F (36.7 °C)]   Pulse:  [51-88]   Resp:  [16-18]   BP: (111-137)/(59-71)   SpO2:  [85 %-99 %]   BP Location: Left arm    Physical  Exam  Constitutional:       Appearance: Normal appearance.   HENT:      Head: Normocephalic and atraumatic.   Eyes:      Extraocular Movements: Extraocular movements intact.      Conjunctiva/sclera: Conjunctivae normal.   Cardiovascular:      Rate and Rhythm: Normal rate.   Pulmonary:      Effort: Pulmonary effort is normal. No respiratory distress.   Neurological:      General: No focal deficit present.      Mental Status: She is alert and oriented to person, place, and time.   Psychiatric:         Mood and Affect: Mood normal.         Behavior: Behavior normal.         Neurological Exam:   LOC: alert  Attention Span: Good   Language: No aphasia  Articulation: No dysarthria  Orientation: Person, Place, Time   Visual Fields: Full  EOM (CN III, IV, VI): Full/intact  Pupils (CN II, III): PERRL  Facial Sensation (CN V): Normal  Facial Movement (CN VII): Symmetric facial expression    Gag Reflex: present  Reflexes: did not examine  Motor: Arm left  Paresis: 4/5  Leg left  Normal 5/5  Arm right  Paresis: 3/5  Leg right Normal 5/5  Cebellar: did not examine  Sensation: Intact to light touch, temperature and vibration  Tone: Normal tone throughout    Laboratory:  CMP:   Recent Labs   Lab 07/03/20  0515   CALCIUM 10.2   ALBUMIN 3.6   PROT 7.4      K 4.3   CO2 24      BUN 39*   CREATININE 1.3   ALKPHOS 61   ALT 21   AST 18   BILITOT 0.6     BMP:   Recent Labs   Lab 07/03/20  0515      K 4.3      CO2 24   BUN 39*   CREATININE 1.3   CALCIUM 10.2     CBC:   Recent Labs   Lab 07/03/20  0515   WBC 5.13   RBC 4.59   HGB 13.7   HCT 44.1      MCV 96   MCH 29.8   MCHC 31.1*     Lipid Panel: No results for input(s): CHOL, LDLCALC, HDL, TRIG in the last 168 hours.    Diagnostic Results     Brain Imaging   7/1/2020 CT Head without contrast  Impression:     No acute intracranial abnormality.  No significant detrimental changes from recent exams.     Extensive chronic microvascular ischemic changes and small  remote infarcts.     Large peripherally calcified right MCA bifurcation aneurysm stable from prior.    Vessel Imaging   7/1/2020 CTA Head and Neck  pending    Cardiac Imaging   7/1/2020 [facility]  · Normal left ventricular systolic function. The estimated ejection fraction is 65%.  · No wall motion abnormalities.  · Normal LV diastolic function.  · Concentric left ventricular remodeling.  · Normal right ventricular systolic function.  · Normal central venous pressure (3 mmHg).  · The estimated PA systolic pressure is 31 mmHg.

## 2020-07-03 NOTE — PT/OT/SLP EVAL
Occupational Therapy   Evaluation    Name: Margarita Chávez  MRN: [id number]  Admitting Diagnosis:  ST elevation on ECG     Pt was on SNF following recent stroke. Pt admitted through ED with suspected STEMI.      Recommendations:     Discharge Recommendations: nursing facility, skilled    Assessment:     Margarita Chávez is a 84 y.o. female with a medical diagnosis of ST elevation on ECG.  Pt tolerated session fairly well with good effort/participation. Pt with impaired functional strength/endurance, impaired mobility/ADL skills. Pt is not safe to return home alone at this time and to benefit from further post acute therapy prior to return home.     Rehab Prognosis: Good; patient would benefit from acute skilled OT services to address these deficits and reach maximum level of function.       Plan:     Patient to be seen 4 x/week to address the above listed problems via therapeutic activities, sensory integration, self-care/home management, cognitive retraining, therapeutic exercises, neuromuscular re-education  · Plan of Care Expires:    · Plan of Care Reviewed with: patient    Subjective     Pt agreeable to therapy   Pt with vague c/o of left side pain with mobility  Occupational Profile:  Prior to SNF admission, pt was living alone in one story home with no QUIN. Pt was using SPC for mobility. Pt had the assist of sister or son as needed.    Pain/Comfort:  · Pain Rating 1: (pt reports pain on left side at time with mobility but unable to fully localize.)    Patients cultural, spiritual, Muslim conflicts given the current situation: no    Objective:     Communicated with: nsjordy prior to session. Pt found supine in bed sleeping. Pt was easily wakened.   General Precautions: Standard,   fall      Occupational Performance:    Bed Mobility:    Supine>sit with MAX A     Functional Mobility/Transfers:  · Squat pivot bed>chair with MAX A     Activities of Daily Living:  · G/H: hand over hand assist needed for oral care with  right UE. Pt able to complete task with set upon with non-dominant left hand.  · LE dressing: TOTAL A   · UE dressing with MAX A     Cognitive/Visual Perceptual:  Pt awake, alert and following simple commands.     Physical Exam:  Pt is right hand dominant but right UE functionally weaker than left. Pt reports arthritis in B shoulders and demo 2/5 B shoulder strength.  Pt demo 3/5 right elbow and poor .  Pt demo 4/5 left elbow and good .  Pt with intact light touch sensation B UE's.     AMPAC 6 Click ADL:  AMPAC Total Score: 12    Treatment & Education:  Pt demo Poor+ progressing with Fair sitting balance. Pt completed seated g/h skills and t/f to chair.  Pt noted to have bloody dressing site pn abdomen near left breast with nsg notified to address.  Education provided to pt re: OT POC and safety with functional mobility/aDl skills.    Education:    Patient left up in chair with all lines intact, call button in reach, nsg notified and PT present    GOALS:   Multidisciplinary Problems     Occupational Therapy Goals        Problem: Occupational Therapy Goal    Goal Priority Disciplines Outcome Interventions   Occupational Therapy Goal     OT, PT/OT Ongoing, Progressing    Description: Goals to be met by: 7 days 7/10/2020     Patient will increase functional independence with ADLs by performing:    Pt to complete self feeding with set-up  Pt to complete g/h skills with set-up  Pt to complete UE dressing with MIN A   Pt to complete LE dressing with MIN A  Pt to complete t/f to BSC with MIN A   Pt to tolerate OOB to chair x 3 hours to increase endurance for functional activity.                      History:     Past Medical History:   Diagnosis Date    Aneurysm of middle cerebral artery     Hypertension     TIA (transient ischemic attack)        Past Surgical History:   Procedure Laterality Date    BRAIN SURGERY      HYSTERECTOMY         Time Tracking:     OT Date of Treatment: 07/03/20  OT Start Time:  0757  OT Stop Time: 0815  OT Total Time (min): 18 min    Billable Minutes:Evaluation 9  Self Care/Home Management 9    BRAD Jarrett  7/3/2020

## 2020-07-03 NOTE — PLAN OF CARE
Problem: Physical Therapy Goal  Goal: Physical Therapy Goal  Description: Goals to be met by: 20    Patient will increase functional independence with mobility by performin. Supine to sit with MInimal Assistance -not met  2. Sit to stand transfer with Minimal Assistance -not met  3. Bed to chair transfer with Moderate Assistance -not met  4. Gait  x 10 feet with Moderate Assistance using AD- not met    Outcome: Ongoing, Progressing   Evaluation completed and goals appropriate. Shania Addison, PT  7/3/2020

## 2020-07-03 NOTE — PLAN OF CARE
Patient free from falls and injuries throughout shift.  AAO and VSS.  Patient denies chest pain and SOB.  Blood glucose managed through meals and insulin; CBG ___.  Patient continues on dobutamine ___mcg and Lasix IVP ___ mg BID.   K+ __ and Mg __.   BUN/Cre ___.  No acute events overnight. Patient resting well at this time.  Plan of care discussed with patient.  Patient verbalizes understanding.  Will continue to monitor.

## 2020-07-03 NOTE — PROGRESS NOTES
Hospital Medicine  Progress note    Team: [facility]OSP MED A Robert Sharma MD  Admit Date: 7/1/2020  KYLEIGH 7/4/2020  Length of Stay:  LOS: 2 days   Code status: Full Code    Principal Problem:  ST elevation on ECG    HPI / Hospital Course     Interval hx:  7/03 Feeling well. Will ambulate today    ROS     Respiratory: neg for cough neg for shortness of breath  Cardiovascular: neg for chest pain neg for palpitations  Gastrointestinal: neg for nausea neg for vomiting, neg for abdominal pain neg for diarrhea neg for constipation   Behavioral/Psych: neg for depression neg for anxiety    PEx  Temp:  [97.7 °F (36.5 °C)-98.3 °F (36.8 °C)]   Pulse:  [51-88]   Resp:  [16-22]   BP: (114-157)/(59-74)   SpO2:  [97 %-100 %]     Intake/Output Summary (Last 24 hours) at 7/3/2020 1012  Last data filed at 7/3/2020 0500  Gross per 24 hour   Intake 240 ml   Output --   Net 240 ml       General Appearance: no acute distress   Heart: regular rate and rhythm  Respiratory: Normal respiratory effort, no crackles   Abdomen: Soft, non-tender; bowel sounds active  Skin: intact.Skin intact  Neurologic:  No focal numbness or weakness  Mental status: Alert, oriented x 4, affect appropriate     Recent Labs   Lab 07/01/20  1334 07/01/20  1338 07/02/20  0323 07/03/20  0515   WBC 5.98  --  6.32 5.13   HGB 13.4  --  13.5 13.7   HCT 42.6 42 42.6 44.1     --  290 309     Recent Labs   Lab 07/01/20  1334 07/02/20  0323 07/03/20  0515   * 137 138   K 5.0 4.4 4.3   CL 99 103 103   CO2 23 23 24   BUN 50* 41* 39*   CREATININE 2.0* 1.3 1.3    94 84   CALCIUM 10.2 10.3 10.2   MG 2.2 2.1 2.1   PHOS  --  3.9 3.2     Recent Labs   Lab 07/01/20  1334 07/02/20  0323 07/03/20  0515   ALKPHOS 67 59 61   ALT 28 24 21   AST 27 21 18   ALBUMIN 3.6 3.4* 3.6   PROT 7.5 7.1 7.4   BILITOT 0.5 0.5 0.6   INR 1.0  --   --         Recent Labs   Lab 07/01/20  1256   POCTGLUCOSE 102       Scheduled Meds:   aspirin  81 mg Oral Daily    atorvastatin  80 mg Oral  QHS    clopidogreL  75 mg Oral Daily    heparin (porcine)  5,000 Units Subcutaneous Q8H     Continuous Infusions:  As Needed:  acetaminophen, melatonin, ondansetron, ondansetron, sodium chloride 0.9%        Active Hospital Problems    Diagnosis  POA    *ST elevation on ECG [R94.31]  Yes    SANTOSH (acute kidney injury) [N17.9]  Yes    Mixed hyperlipidemia [E78.2]  Yes    TIA (transient ischemic attack) [G45.9]  Yes    Aneurysm of middle cerebral artery [I67.1]  Yes    Uncontrolled hypertension [I10]  Yes      Resolved Hospital Problems   No resolved problems to display.       Assessment and Plan  / Problems managed today  ST elevation on ECG  83 yo F with no significant past cardiac history that presents with slurred speech, confusion, and facial droop. Patient had L arm pain, denies chest pain/SOB. EKG showed ST elevations in I and aVL that resolved on repeat EKG. Trop wnl. Patient with recent TIA c/b R-sided weakness. Per Vascular Neurology, patient's intracranial abnormalities are not contraindications for anticoagulation/ACS protocol. Given negative troponin and resolution of ST abnormalities on repeat EKG, suspect type II NSTEMI 2/2 demand ischemia. Symptoms more consistent with TIA/stroke.     Plan:  - Step down to Hosp Med  - Neuro checks q4hr  - Continue ASA, plavix  - Continue statin  - Hold on heparin gtt given negative troponin  - Consult Vascular Neuro, recs appreciated  - Obtain info on aneurysm hardware (ie MRI-compatible) for possible MRI brain  - Telemetry     SANTOSH (acute kidney injury)  Baseline sCr 0.8, sCr 2 on admission  No indications for HD at this time  Cr improving (2 > 1.3)     Plan:  - Trend Cr  - Strict I&Os  - Avoid nephrotoxic agents  - Renally adjust medications     Aneurysm of middle cerebral artery  TIA (transient ischemic attack)  Recently suffered TIA with R-sided residual weakness  CTH negative for acute abnormalities, extensive chronic microvascular changes + small remote  infarcts, stable calcified R MCA bifurcation aneurysm     Plan:  - Continue ASA, plavix, statin  - Vascular Neuro consulted, recs appreciated  - See above     STEMI (ST elevation myocardial infarction)  - ST elevation I and aVL left arm pain  - R rADIAL LHC +/- PCI     Mixed hyperlipidemia  Continue home statin     Uncontrolled hypertension  Hold home BP meds while normotensive               VTE Risk Mitigation (From admission, onward)                  Ordered        heparin (porcine) injection 5,000 Units  Every 8 hours      07/01/20 1702        IP VTE HIGH RISK PATIENT  Once      07/01/20 1454                 Goals of Care:  Return to prior functional status     Discharge plan:    Time (minutes) spent in care of the patient (Greater than 1/2 spent in direct face-to-face contact)  35 minutes    Robert hSarma MD

## 2020-07-03 NOTE — PLAN OF CARE
Patient free from falls and injuries throughout shift.  AAO and VSS.  Patient denies chest pain and SOB.  Patient continues on lipitor 80 mg PO nightly.   K+ 4.4 and Mg 2.1.   BUN/Cre 41/1.3.  Troponin (-).  No acute events overnight. Patient resting well at this time.  Plan of care discussed with patient.  Patient verbalizes understanding.  Will continue to monitor.

## 2020-07-03 NOTE — PLAN OF CARE
Pt is A, A, Ox4. Calm, cooperative. Free of falls, trauma, and injuries. Skin intact. Pt educated on treatment plan. Pt demonstrates and verbalizes understanding. VSS. R side weakness to upper extremity. Room Air. SR on tele. Still waiting on MD to sign DNR paperwork. Mechanical soft diet. Plan of care reviewed with pt.

## 2020-07-03 NOTE — PROGRESS NOTES
[facility]  Vascular Neurology  Comprehensive Stroke Center  Progress Note    Assessment/Plan:     * Aneurysm of middle cerebral artery  84 yr old female with medical history of HTN; HLD, intracranial aneurysms (right MCA bifurcation - measuring 1.7 x 1.3 x 1.6 cm; reported of mesh repair 10 yr ago & bilateral supraclinoid ICA measuring 9 x 3 mm on the right and a more fusiform aneurysm on the left measuring approximately 5 x 4 mm); recent CVA - right sided weakness (On DAPT, statins - for intracranial atheor etiology - multifocal including stenosis of the post-aneurysmal segment of the right M1 MCA. Additional severe stenosis of the M2 MCA affecting both M2 branches; Multifocal mild stenoses of the left M1 MCA without occlusion and more distal stenosis with probable high-grade stenosis of about the M2/M3 bifurcation on the left + Multifocal narrowing of the vertebrobasilar system) presenting to the ER with acute onset fatigue, dysarthria; reduced level of alertness or wakefulness. Presentation within the spectrum of ACS. Given intracranial aneurysm - consult reasons for clearance for ACS intervention from stroke standpoint. Also rule out stroke DDx for presentation.     CT head: Negative for acute stroke. Extensive microvascular ishcemic changes. Remote infarcts in left BG, Left cerebellum. MRI contraindicated for unclear details on compatibility of intracranial mesh.     Patient back to baseline - with residual subtle RUE drift. Reduced concentration, however no aphasia, no facial droop. Negative concerns for any LVO signs.      Plans:  - Less concerns for acute stroke etiology.   - Currently on optimal medical management for initiation of anticoagulation from cardiac/ACS standpoint in addition to DAPT and high potency statins for secondary stroke prevention. Reviewed the benefits > risks for anticoagulation with family.     - No specific interventions, investigations from stroke standpoint.     - Continue close neuro-monitoring. If any neuro change - raising concerns for acute ishcemic event.    - Recommend a repeat CT head to rule out any acute infarcts that may have not developed on imaging at time of admission.       At this time, Vascular Neurology will sign off as the patient has returned to her baseline prior to this adverse event. If she has any additional changes, please feel free to contact our team again.    Thank you.       Mixed hyperlipidemia  - continue high potency statins   - atorva 80 mg     Uncontrolled hypertension  - stroke risk factor  - intracranial athero risk factor    - long term goals < 130/80 mmHg    ST elevation on ECG  - as per cardiology     SANTOSH (acute kidney injury)  - as per primary       7/2/2020  Patient is doing better today and has improvement clinically.  She has no signs of facial droop, slurred speech, dysarthria or reduced alertness.  Oriented and Alert x3.   Denies any chest pain.   Patient does have some weakness in the RUE but a family member was bedside and stated this is ongoing from her previous stroke around 2 weeks ago.  Patient stated she believes she is taking both the ASA and Plavix as recommended during last discharge.  Has followup appointment with Dr. Allred in 2 weeks in the clinic.     7/3/2020   Back to baseline likely based on her son's account of her baseline prior to this adverse event.  Was resistant to participate in her physical exam today as she was wanting to eat her lunch and seemed frustrated. Overall, no new acute complaints or changes per the patient.    STROKE DOCUMENTATION   Acute Stroke Times   Last Known Normal Date: 07/01/20  Last Known Normal Time: 0700  Symptom Onset Date: 07/01/20  Symptom Onset Time: 0700  Stroke Team Called Date: 07/01/20  Stroke Team Called Time: 1500  Stroke Team Arrival Date: 07/01/20  Stroke Team Arrival Time: 1515  Decision to Treat Time for Alteplase: (Alternative diagnosis )  Decision to Treat Time for  IR: (Alternative diagnosis / F/u IR clinic)    NIH Scale:  1a. Level of Consciousness: 0-->Alert, keenly responsive  1b. LOC Questions: 0-->Answers both questions correctly  1c. LOC Commands: 0-->Performs both tasks correctly  2. Best Gaze: 0-->Normal  3. Visual: 0-->No visual loss  4. Facial Palsy: 0-->Normal symmetrical movements  5a. Motor Arm, Left: 0-->No drift, limb holds 90 (or 45) degrees for full 10 secs  5b. Motor Arm, Right: 2-->Some effort against gravity, limb cannot get to or maintain (if cued) 90 (or 45) degrees, drifts down to bed, but has some effort against gravity  6a. Motor Leg, Left: 0-->No drift, leg holds 30 degree position for full 5 secs  6b. Motor Leg, Right: 0-->No drift, leg holds 30 degree position for full 5 secs  7. Limb Ataxia: 0-->Absent  8. Sensory: 0-->Normal, no sensory loss  9. Best Language: 0-->No aphasia, normal  10. Dysarthria: 0-->Normal  11. Extinction and Inattention (formerly Neglect): 0-->No abnormality  Total (NIH Stroke Scale): 2       Modified Mónica Score: 2  Charbel Coma Scale:15   ABCD2 Score:    [id number] Score:   HAS -BLED Score:   ICH Score:   Hunt & Freedmna Classification:      Hemorrhagic change of an Ischemic Stroke: Does this patient have an ischemic stroke with hemorrhagic changes? No     Neurologic Chief Complaint: Chronic Aneurysm of MCA with significant surgical history     Subjective:     Interval History: Patient is seen for follow-up neurological assessment and treatment recommendations: Back to baseline likely based on her son's account of her baseline prior to this adverse event.  Was resistant to participate in her physical exam today as she was wanting to eat her lunch and seemed frustrated. Overall, no new acute complaints or changes per the patient.    Has followup appointment with Dr. Allred in 2 weeks in the clinic.     HPI, Past Medical, Family, and Social History remains the same as documented in the initial encounter.     Review of Systems    Constitutional: Negative for chills, diaphoresis, fatigue and fever.   HENT: Negative for congestion and drooling.    Respiratory: Negative for cough, chest tightness and shortness of breath.    Cardiovascular: Negative for chest pain and leg swelling.   Gastrointestinal: Negative for abdominal pain, constipation, diarrhea and nausea.   Neurological: Positive for weakness. Negative for dizziness, facial asymmetry, speech difficulty, numbness and headaches.        RUE, her baseline prior to this adverse event   Psychiatric/Behavioral: Negative for confusion.     Scheduled Meds:   aspirin  81 mg Oral Daily    atorvastatin  80 mg Oral QHS    clopidogreL  75 mg Oral Daily    heparin (porcine)  5,000 Units Subcutaneous Q8H     Continuous Infusions:  PRN Meds:acetaminophen, melatonin, ondansetron, ondansetron, sodium chloride 0.9%    Objective:     Vital Signs (Most Recent):  Temp: 98 °F (36.7 °C) (07/03/20 1544)  Pulse: 77 (07/03/20 1544)  Resp: 18 (07/03/20 1544)  BP: 111/66 (07/03/20 1544)  SpO2: (!) 94 % (07/03/20 1544)  BP Location: Left arm    Vital Signs Range (Last 24H):  Temp:  [97.7 °F (36.5 °C)-98.1 °F (36.7 °C)]   Pulse:  [51-88]   Resp:  [16-18]   BP: (111-137)/(59-71)   SpO2:  [85 %-99 %]   BP Location: Left arm    Physical Exam  Constitutional:       Appearance: Normal appearance.   HENT:      Head: Normocephalic and atraumatic.   Eyes:      Extraocular Movements: Extraocular movements intact.      Conjunctiva/sclera: Conjunctivae normal.   Cardiovascular:      Rate and Rhythm: Normal rate.   Pulmonary:      Effort: Pulmonary effort is normal. No respiratory distress.   Neurological:      General: No focal deficit present.      Mental Status: She is alert and oriented to person, place, and time.   Psychiatric:         Mood and Affect: Mood normal.         Behavior: Behavior normal.         Neurological Exam:   LOC: alert  Attention Span: Good   Language: No aphasia  Articulation: No  dysarthria  Orientation: Person, Place, Time   Visual Fields: Full  EOM (CN III, IV, VI): Full/intact  Pupils (CN II, III): PERRL  Facial Sensation (CN V): Normal  Facial Movement (CN VII): Symmetric facial expression    Gag Reflex: present  Reflexes: did not examine  Motor: Arm left  Paresis: 4/5  Leg left  Normal 5/5  Arm right  Paresis: 3/5  Leg right Normal 5/5  Cebellar: did not examine  Sensation: Intact to light touch, temperature and vibration  Tone: Normal tone throughout    Laboratory:  CMP:   Recent Labs   Lab 07/03/20  0515   CALCIUM 10.2   ALBUMIN 3.6   PROT 7.4      K 4.3   CO2 24      BUN 39*   CREATININE 1.3   ALKPHOS 61   ALT 21   AST 18   BILITOT 0.6     BMP:   Recent Labs   Lab 07/03/20  0515      K 4.3      CO2 24   BUN 39*   CREATININE 1.3   CALCIUM 10.2     CBC:   Recent Labs   Lab 07/03/20 0515   WBC 5.13   RBC 4.59   HGB 13.7   HCT 44.1      MCV 96   MCH 29.8   MCHC 31.1*     Lipid Panel: No results for input(s): CHOL, LDLCALC, HDL, TRIG in the last 168 hours.    Diagnostic Results     Brain Imaging   7/1/2020 CT Head without contrast  Impression:     No acute intracranial abnormality.  No significant detrimental changes from recent exams.     Extensive chronic microvascular ischemic changes and small remote infarcts.     Large peripherally calcified right MCA bifurcation aneurysm stable from prior.    Vessel Imaging   7/1/2020 CTA Head and Neck  pending    Cardiac Imaging   7/1/2020 EDY  · Normal left ventricular systolic function. The estimated ejection fraction is 65%.  · No wall motion abnormalities.  · Normal LV diastolic function.  · Concentric left ventricular remodeling.  · Normal right ventricular systolic function.  · Normal central venous pressure (3 mmHg).  · The estimated PA systolic pressure is 31 mmHg.      Lidia Singleton MD  Comprehensive Stroke Center  Department of Vascular Neurology   [facility]

## 2020-07-03 NOTE — ASSESSMENT & PLAN NOTE
84 yr old female with medical history of HTN; HLD, intracranial aneurysms (right MCA bifurcation - measuring 1.7 x 1.3 x 1.6 cm; reported of mesh repair 10 yr ago & bilateral supraclinoid ICA measuring 9 x 3 mm on the right and a more fusiform aneurysm on the left measuring approximately 5 x 4 mm); recent CVA - right sided weakness (On DAPT, statins - for intracranial atheor etiology - multifocal including stenosis of the post-aneurysmal segment of the right M1 MCA. Additional severe stenosis of the M2 MCA affecting both M2 branches; Multifocal mild stenoses of the left M1 MCA without occlusion and more distal stenosis with probable high-grade stenosis of about the M2/M3 bifurcation on the left + Multifocal narrowing of the vertebrobasilar system) presenting to the ER with acute onset fatigue, dysarthria; reduced level of alertness or wakefulness. Presentation within the spectrum of ACS. Given intracranial aneurysm - consult reasons for clearance for ACS intervention from stroke standpoint. Also rule out stroke DDx for presentation.     CT head: Negative for acute stroke. Extensive microvascular ishcemic changes. Remote infarcts in left BG, Left cerebellum. MRI contraindicated for unclear details on compatibility of intracranial mesh.     Patient back to baseline - with residual subtle RUE drift. Reduced concentration, however no aphasia, no facial droop. Negative concerns for any LVO signs.      Plans:  - Less concerns for acute stroke etiology.   - Currently on optimal medical management for initiation of anticoagulation from cardiac/ACS standpoint in addition to DAPT and high potency statins for secondary stroke prevention. Reviewed the benefits > risks for anticoagulation with family.     - No specific interventions, investigations from stroke standpoint.    - Continue close neuro-monitoring. If any neuro change - raising concerns for acute ishcemic event.    - Recommend a repeat CT head to rule out any acute  infarcts that may have not developed on imaging at time of admission.       At this time, Vascular Neurology will sign off as the patient has returned to her baseline prior to this adverse event. If she has any additional changes, please feel free to contact our team again.    Thank you.

## 2020-07-04 LAB
ALBUMIN SERPL BCP-MCNC: 3.6 G/DL (ref 3.5–5.2)
ALP SERPL-CCNC: 58 U/L (ref 55–135)
ALT SERPL W/O P-5'-P-CCNC: 21 U/L (ref 10–44)
ANION GAP SERPL CALC-SCNC: 10 MMOL/L (ref 8–16)
AST SERPL-CCNC: 19 U/L (ref 10–40)
BASOPHILS # BLD AUTO: 0.04 K/UL (ref 0–0.2)
BASOPHILS NFR BLD: 0.7 % (ref 0–1.9)
BILIRUB SERPL-MCNC: 0.5 MG/DL (ref 0.1–1)
BUN SERPL-MCNC: 38 MG/DL (ref 8–23)
CALCIUM SERPL-MCNC: 10.3 MG/DL (ref 8.7–10.5)
CHLORIDE SERPL-SCNC: 104 MMOL/L (ref 95–110)
CO2 SERPL-SCNC: 26 MMOL/L (ref 23–29)
CREAT SERPL-MCNC: 1.1 MG/DL (ref 0.5–1.4)
DIFFERENTIAL METHOD: ABNORMAL
EOSINOPHIL # BLD AUTO: 0.6 K/UL (ref 0–0.5)
EOSINOPHIL NFR BLD: 11.2 % (ref 0–8)
ERYTHROCYTE [DISTWIDTH] IN BLOOD BY AUTOMATED COUNT: 12.5 % (ref 11.5–14.5)
EST. GFR  (AFRICAN AMERICAN): 53.7 ML/MIN/1.73 M^2
EST. GFR  (NON AFRICAN AMERICAN): 46.5 ML/MIN/1.73 M^2
GLUCOSE SERPL-MCNC: 87 MG/DL (ref 70–110)
HCT VFR BLD AUTO: 42.3 % (ref 37–48.5)
HGB BLD-MCNC: 13.3 G/DL (ref 12–16)
IMM GRANULOCYTES # BLD AUTO: 0.02 K/UL (ref 0–0.04)
IMM GRANULOCYTES NFR BLD AUTO: 0.4 % (ref 0–0.5)
LYMPHOCYTES # BLD AUTO: 1.9 K/UL (ref 1–4.8)
LYMPHOCYTES NFR BLD: 34.2 % (ref 18–48)
MAGNESIUM SERPL-MCNC: 2.2 MG/DL (ref 1.6–2.6)
MCH RBC QN AUTO: 30.1 PG (ref 27–31)
MCHC RBC AUTO-ENTMCNC: 31.4 G/DL (ref 32–36)
MCV RBC AUTO: 96 FL (ref 82–98)
MONOCYTES # BLD AUTO: 0.5 K/UL (ref 0.3–1)
MONOCYTES NFR BLD: 8 % (ref 4–15)
NEUTROPHILS # BLD AUTO: 2.6 K/UL (ref 1.8–7.7)
NEUTROPHILS NFR BLD: 45.5 % (ref 38–73)
NRBC BLD-RTO: 0 /100 WBC
PHOSPHATE SERPL-MCNC: 3.4 MG/DL (ref 2.7–4.5)
PLATELET # BLD AUTO: 292 K/UL (ref 150–350)
PMV BLD AUTO: 10.3 FL (ref 9.2–12.9)
POTASSIUM SERPL-SCNC: 4.1 MMOL/L (ref 3.5–5.1)
PROT SERPL-MCNC: 7.2 G/DL (ref 6–8.4)
RBC # BLD AUTO: 4.42 M/UL (ref 4–5.4)
SODIUM SERPL-SCNC: 140 MMOL/L (ref 136–145)
WBC # BLD AUTO: 5.65 K/UL (ref 3.9–12.7)

## 2020-07-04 PROCEDURE — 84100 ASSAY OF PHOSPHORUS: CPT

## 2020-07-04 PROCEDURE — 99232 SBSQ HOSP IP/OBS MODERATE 35: CPT | Mod: ,,, | Performed by: INTERNAL MEDICINE

## 2020-07-04 PROCEDURE — 85025 COMPLETE CBC W/AUTO DIFF WBC: CPT

## 2020-07-04 PROCEDURE — 63600175 PHARM REV CODE 636 W HCPCS: Performed by: STUDENT IN AN ORGANIZED HEALTH CARE EDUCATION/TRAINING PROGRAM

## 2020-07-04 PROCEDURE — 25000003 PHARM REV CODE 250: Performed by: INTERNAL MEDICINE

## 2020-07-04 PROCEDURE — 80053 COMPREHEN METABOLIC PANEL: CPT

## 2020-07-04 PROCEDURE — 36415 COLL VENOUS BLD VENIPUNCTURE: CPT

## 2020-07-04 PROCEDURE — 83735 ASSAY OF MAGNESIUM: CPT

## 2020-07-04 PROCEDURE — 99232 PR SUBSEQUENT HOSPITAL CARE,LEVL II: ICD-10-PCS | Mod: ,,, | Performed by: INTERNAL MEDICINE

## 2020-07-04 PROCEDURE — 20600001 HC STEP DOWN PRIVATE ROOM

## 2020-07-04 RX ADMIN — CLOPIDOGREL BISULFATE 75 MG: 75 TABLET ORAL at 09:07

## 2020-07-04 RX ADMIN — ATORVASTATIN CALCIUM 80 MG: 20 TABLET, FILM COATED ORAL at 08:07

## 2020-07-04 RX ADMIN — ASPIRIN 81 MG: 81 TABLET, COATED ORAL at 09:07

## 2020-07-04 RX ADMIN — HEPARIN SODIUM 5000 UNITS: 5000 INJECTION INTRAVENOUS; SUBCUTANEOUS at 08:07

## 2020-07-04 RX ADMIN — HEPARIN SODIUM 5000 UNITS: 5000 INJECTION INTRAVENOUS; SUBCUTANEOUS at 10:07

## 2020-07-04 RX ADMIN — HEPARIN SODIUM 5000 UNITS: 5000 INJECTION INTRAVENOUS; SUBCUTANEOUS at 02:07

## 2020-07-04 RX ADMIN — HEPARIN SODIUM 5000 UNITS: 5000 INJECTION INTRAVENOUS; SUBCUTANEOUS at 05:07

## 2020-07-04 NOTE — PLAN OF CARE
Pt is A, A, Ox4. Calm, cooperative. Free of falls, trauma, and injuries. Skin intact. Pt educated on treatment plan. Pt demonstrates and verbalizes understanding. VSS. Pending rehab placement. Plan of care reviewed with pt.

## 2020-07-04 NOTE — PLAN OF CARE
DX:Aneurysm to middle cerebral artery    Shift Events: Neuro checks q4 hr and V/S q 4hrs     Plan of Care:D/C to rehab     Neuro: A/O x 4     Respiratory: R/A     Cardiac:     Diet: Dyshagia (Mech Soft )     Gtts: N/A     Skin:N/A

## 2020-07-04 NOTE — PROGRESS NOTES
Hospital Medicine  Progress note    Team: [facility] A Robert Sharma MD  Admit Date: 7/1/2020  KYLEIGH 7/4/2020  Length of Stay:  LOS: 3 days   Code status: Full Code    Principal Problem:  Aneurysm of middle cerebral artery    HPI / Hospital Course     Interval hx:  7/04 Feeling well. Will ambulate today. Would like to go back to rehab, at [facility] medically ready for transfer    ROS     Respiratory: neg for cough neg for shortness of breath  Cardiovascular: neg for chest pain neg for palpitations  Gastrointestinal: neg for nausea neg for vomiting, neg for abdominal pain neg for diarrhea neg for constipation   Behavioral/Psych: neg for depression neg for anxiety    PEx  Temp:  [97.5 °F (36.4 °C)-98.2 °F (36.8 °C)]   Pulse:  [52-92]   Resp:  [14-20]   BP: (111-135)/(60-80)   SpO2:  [85 %-98 %]     Intake/Output Summary (Last 24 hours) at 7/4/2020 1119  Last data filed at 7/3/2020 2000  Gross per 24 hour   Intake 240 ml   Output --   Net 240 ml       General Appearance: no acute distress   Heart: regular rate and rhythm  Respiratory: Normal respiratory effort, no crackles   Abdomen: Soft, non-tender; bowel sounds active  Skin: intact.Skin intact  Neurologic:  No focal numbness or weakness  Mental status: Alert, oriented x 4, affect appropriate     Recent Labs   Lab 07/02/20  0323 07/03/20  0515 07/04/20  0611   WBC 6.32 5.13 5.65   HGB 13.5 13.7 13.3   HCT 42.6 44.1 42.3    309 292     Recent Labs   Lab 07/02/20  0323 07/03/20  0515 07/04/20  0611    138 140   K 4.4 4.3 4.1    103 104   CO2 23 24 26   BUN 41* 39* 38*   CREATININE 1.3 1.3 1.1   GLU 94 84 87   CALCIUM 10.3 10.2 10.3   MG 2.1 2.1 2.2   PHOS 3.9 3.2 3.4     Recent Labs   Lab 07/01/20  1334 07/02/20  0323 07/03/20  0515 07/04/20  0611   ALKPHOS 67 59 61 58   ALT 28 24 21 21   AST 27 21 18 19   ALBUMIN 3.6 3.4* 3.6 3.6   PROT 7.5 7.1 7.4 7.2   BILITOT 0.5 0.5 0.6 0.5   INR 1.0  --   --   --         Recent Labs   Lab 07/01/20  2438    POCTGLUCOSE 102       Scheduled Meds:   aspirin  81 mg Oral Daily    atorvastatin  80 mg Oral QHS    clopidogreL  75 mg Oral Daily    heparin (porcine)  5,000 Units Subcutaneous Q8H     Continuous Infusions:  As Needed:  acetaminophen, melatonin, ondansetron, ondansetron, sodium chloride 0.9%        Active Hospital Problems    Diagnosis  POA    *Aneurysm of middle cerebral artery [I67.1]  Yes    ST elevation on ECG [R94.31]  Yes    SANTOSH (acute kidney injury) [N17.9]  Yes    Mixed hyperlipidemia [E78.2]  Yes    TIA (transient ischemic attack) [G45.9]  Yes    Uncontrolled hypertension [I10]  Yes      Resolved Hospital Problems   No resolved problems to display.       Assessment and Plan  / Problems managed today  ST elevation on ECG  83 yo F with no significant past cardiac history that presents with slurred speech, confusion, and facial droop. Patient had L arm pain, denies chest pain/SOB. EKG showed ST elevations in I and aVL that resolved on repeat EKG. Trop wnl. Patient with recent TIA c/b R-sided weakness. Per Vascular Neurology, patient's intracranial abnormalities are not contraindications for anticoagulation/ACS protocol. Given negative troponin and resolution of ST abnormalities on repeat EKG, suspect type II NSTEMI 2/2 demand ischemia. Symptoms more consistent with TIA/stroke.     Plan:  - Step down to Hosp Med  - Neuro checks q4hr  - Continue ASA, plavix  - Continue statin  - Hold on heparin gtt given negative troponin  - Consult Vascular Neuro, recs appreciated  - Obtain info on aneurysm hardware (ie MRI-compatible) for possible MRI brain  - Telemetry     SANTOSH (acute kidney injury)  Baseline sCr 0.8, sCr 2 on admission  No indications for HD at this time  Cr improving (2 > 1.3)     Plan:  - Trend Cr  - Strict I&Os  - Avoid nephrotoxic agents  - Renally adjust medications     Aneurysm of middle cerebral artery  TIA (transient ischemic attack)  Recently suffered TIA with R-sided residual  weakness  CTH negative for acute abnormalities, extensive chronic microvascular changes + small remote infarcts, stable calcified R MCA bifurcation aneurysm     Plan:  - Continue ASA, plavix, statin  - Vascular Neuro consulted, recs appreciated  - See above     STEMI (ST elevation myocardial infarction)  - ST elevation I and aVL left arm pain  - R rADIAL LHC +/- PCI     Mixed hyperlipidemia  Continue home statin     Uncontrolled hypertension  Hold home BP meds while normotensive               VTE Risk Mitigation (From admission, onward)                  Ordered        heparin (porcine) injection 5,000 Units  Every 8 hours      07/01/20 1702        IP VTE HIGH RISK PATIENT  Once      07/01/20 1454                 Goals of Care:  Return to prior functional status     Discharge plan:    Time (minutes) spent in care of the patient (Greater than 1/2 spent in direct face-to-face contact)  35 minutes    Robert Sharma MD

## 2020-07-05 LAB
ALBUMIN SERPL BCP-MCNC: 3.6 G/DL (ref 3.5–5.2)
ALP SERPL-CCNC: 58 U/L (ref 55–135)
ALT SERPL W/O P-5'-P-CCNC: 22 U/L (ref 10–44)
ANION GAP SERPL CALC-SCNC: 11 MMOL/L (ref 8–16)
AST SERPL-CCNC: 19 U/L (ref 10–40)
BASOPHILS # BLD AUTO: 0.06 K/UL (ref 0–0.2)
BASOPHILS NFR BLD: 1.1 % (ref 0–1.9)
BILIRUB SERPL-MCNC: 0.6 MG/DL (ref 0.1–1)
BUN SERPL-MCNC: 28 MG/DL (ref 8–23)
CALCIUM SERPL-MCNC: 10.7 MG/DL (ref 8.7–10.5)
CHLORIDE SERPL-SCNC: 104 MMOL/L (ref 95–110)
CO2 SERPL-SCNC: 25 MMOL/L (ref 23–29)
CREAT SERPL-MCNC: 1 MG/DL (ref 0.5–1.4)
DIFFERENTIAL METHOD: ABNORMAL
EOSINOPHIL # BLD AUTO: 0.6 K/UL (ref 0–0.5)
EOSINOPHIL NFR BLD: 11.2 % (ref 0–8)
ERYTHROCYTE [DISTWIDTH] IN BLOOD BY AUTOMATED COUNT: 12.6 % (ref 11.5–14.5)
EST. GFR  (AFRICAN AMERICAN): >60 ML/MIN/1.73 M^2
EST. GFR  (NON AFRICAN AMERICAN): 52.2 ML/MIN/1.73 M^2
GLUCOSE SERPL-MCNC: 119 MG/DL (ref 70–110)
HCT VFR BLD AUTO: 43.1 % (ref 37–48.5)
HGB BLD-MCNC: 13.3 G/DL (ref 12–16)
IMM GRANULOCYTES # BLD AUTO: 0.01 K/UL (ref 0–0.04)
IMM GRANULOCYTES NFR BLD AUTO: 0.2 % (ref 0–0.5)
LYMPHOCYTES # BLD AUTO: 2 K/UL (ref 1–4.8)
LYMPHOCYTES NFR BLD: 37.1 % (ref 18–48)
MAGNESIUM SERPL-MCNC: 1.9 MG/DL (ref 1.6–2.6)
MCH RBC QN AUTO: 29.8 PG (ref 27–31)
MCHC RBC AUTO-ENTMCNC: 30.9 G/DL (ref 32–36)
MCV RBC AUTO: 97 FL (ref 82–98)
MONOCYTES # BLD AUTO: 0.4 K/UL (ref 0.3–1)
MONOCYTES NFR BLD: 7.6 % (ref 4–15)
NEUTROPHILS # BLD AUTO: 2.3 K/UL (ref 1.8–7.7)
NEUTROPHILS NFR BLD: 42.8 % (ref 38–73)
NRBC BLD-RTO: 0 /100 WBC
PHOSPHATE SERPL-MCNC: 2.8 MG/DL (ref 2.7–4.5)
PLATELET # BLD AUTO: 284 K/UL (ref 150–350)
PMV BLD AUTO: 10.3 FL (ref 9.2–12.9)
POTASSIUM SERPL-SCNC: 4 MMOL/L (ref 3.5–5.1)
PROT SERPL-MCNC: 7.4 G/DL (ref 6–8.4)
RBC # BLD AUTO: 4.46 M/UL (ref 4–5.4)
SODIUM SERPL-SCNC: 140 MMOL/L (ref 136–145)
WBC # BLD AUTO: 5.36 K/UL (ref 3.9–12.7)

## 2020-07-05 PROCEDURE — 84100 ASSAY OF PHOSPHORUS: CPT

## 2020-07-05 PROCEDURE — 83735 ASSAY OF MAGNESIUM: CPT

## 2020-07-05 PROCEDURE — 80053 COMPREHEN METABOLIC PANEL: CPT

## 2020-07-05 PROCEDURE — 94761 N-INVAS EAR/PLS OXIMETRY MLT: CPT

## 2020-07-05 PROCEDURE — 99232 PR SUBSEQUENT HOSPITAL CARE,LEVL II: ICD-10-PCS | Mod: ,,, | Performed by: INTERNAL MEDICINE

## 2020-07-05 PROCEDURE — 25000003 PHARM REV CODE 250: Performed by: INTERNAL MEDICINE

## 2020-07-05 PROCEDURE — 36415 COLL VENOUS BLD VENIPUNCTURE: CPT

## 2020-07-05 PROCEDURE — 99232 SBSQ HOSP IP/OBS MODERATE 35: CPT | Mod: ,,, | Performed by: INTERNAL MEDICINE

## 2020-07-05 PROCEDURE — 20600001 HC STEP DOWN PRIVATE ROOM

## 2020-07-05 PROCEDURE — 85025 COMPLETE CBC W/AUTO DIFF WBC: CPT

## 2020-07-05 PROCEDURE — 63600175 PHARM REV CODE 636 W HCPCS: Performed by: STUDENT IN AN ORGANIZED HEALTH CARE EDUCATION/TRAINING PROGRAM

## 2020-07-05 RX ADMIN — ATORVASTATIN CALCIUM 80 MG: 20 TABLET, FILM COATED ORAL at 08:07

## 2020-07-05 RX ADMIN — HEPARIN SODIUM 5000 UNITS: 5000 INJECTION INTRAVENOUS; SUBCUTANEOUS at 09:07

## 2020-07-05 RX ADMIN — HEPARIN SODIUM 5000 UNITS: 5000 INJECTION INTRAVENOUS; SUBCUTANEOUS at 05:07

## 2020-07-05 RX ADMIN — CLOPIDOGREL BISULFATE 75 MG: 75 TABLET ORAL at 08:07

## 2020-07-05 RX ADMIN — HEPARIN SODIUM 5000 UNITS: 5000 INJECTION INTRAVENOUS; SUBCUTANEOUS at 02:07

## 2020-07-05 RX ADMIN — ASPIRIN 81 MG: 81 TABLET, COATED ORAL at 08:07

## 2020-07-05 NOTE — PLAN OF CARE
Pt is a 85 yo F who was admitted for Aneurysm of middle cerebral artery. Pt is AAOx , and assist x1. Pt is calm and cooperative. Fall precautions in place -- SR x2, call light in reach, bed in lowest position. Free of falls, traumas, and injury. Pt educated on medication compliance. Pt demonstrates and verbalizes understanding. VSS on RA. Pt is being monitored in Lead II and V1 and is in NSR. Pt denies pain. Plan of care reviewed with pt. Questions encouraged, and answered.

## 2020-07-05 NOTE — PROGRESS NOTES
Hospital Medicine  Progress note    Team: [facility] MED A Robert Sharma MD  Admit Date: 7/1/2020  KYLEIGH 7/4/2020  Length of Stay:  LOS: 4 days   Code status: Full Code    Principal Problem:  Aneurysm of middle cerebral artery    HPI / Hospital Course     Interval hx:  7/05 Feeling well. Will ambulate today. Would like to go back to rehab, at [facility] medically ready for transfer    ROS     Respiratory: neg for cough neg for shortness of breath  Cardiovascular: neg for chest pain neg for palpitations  Gastrointestinal: neg for nausea neg for vomiting, neg for abdominal pain neg for diarrhea neg for constipation   Behavioral/Psych: neg for depression neg for anxiety    PEx  Temp:  [97.6 °F (36.4 °C)-98.5 °F (36.9 °C)]   Pulse:  [60-91]   Resp:  [16-18]   BP: (129-144)/(60-78)   SpO2:  [80 %-99 %]     Intake/Output Summary (Last 24 hours) at 7/5/2020 1122  Last data filed at 7/5/2020 0800  Gross per 24 hour   Intake 500 ml   Output --   Net 500 ml       General Appearance: no acute distress   Heart: regular rate and rhythm  Respiratory: Normal respiratory effort, no crackles   Abdomen: Soft, non-tender; bowel sounds active  Skin: intact.Skin intact  Neurologic:  No focal numbness or weakness  Mental status: Alert, oriented x 4, affect appropriate     Recent Labs   Lab 07/03/20  0515 07/04/20  0611 07/05/20  0815   WBC 5.13 5.65 5.36   HGB 13.7 13.3 13.3   HCT 44.1 42.3 43.1    292 284     Recent Labs   Lab 07/03/20  0515 07/04/20  0611 07/05/20  0815    140 140   K 4.3 4.1 4.0    104 104   CO2 24 26 25   BUN 39* 38* 28*   CREATININE 1.3 1.1 1.0   GLU 84 87 119*   CALCIUM 10.2 10.3 10.7*   MG 2.1 2.2 1.9   PHOS 3.2 3.4 2.8     Recent Labs   Lab 07/01/20  1334  07/03/20  0515 07/04/20  0611 07/05/20  0815   ALKPHOS 67   < > 61 58 58   ALT 28   < > 21 21 22   AST 27   < > 18 19 19   ALBUMIN 3.6   < > 3.6 3.6 3.6   PROT 7.5   < > 7.4 7.2 7.4   BILITOT 0.5   < > 0.6 0.5 0.6   INR 1.0  --   --   --   --     < >  = values in this interval not displayed.        Recent Labs   Lab 07/01/20  1256   POCTGLUCOSE 102       Scheduled Meds:   aspirin  81 mg Oral Daily    atorvastatin  80 mg Oral QHS    clopidogreL  75 mg Oral Daily    heparin (porcine)  5,000 Units Subcutaneous Q8H     Continuous Infusions:  As Needed:  acetaminophen, melatonin, ondansetron, ondansetron, sodium chloride 0.9%        Active Hospital Problems    Diagnosis  POA    *Aneurysm of middle cerebral artery [I67.1]  Yes    ST elevation on ECG [R94.31]  Yes    SANTOSH (acute kidney injury) [N17.9]  Yes    Mixed hyperlipidemia [E78.2]  Yes    TIA (transient ischemic attack) [G45.9]  Yes    Uncontrolled hypertension [I10]  Yes      Resolved Hospital Problems   No resolved problems to display.       Assessment and Plan  / Problems managed today  ST elevation on ECG  83 yo F with no significant past cardiac history that presents with slurred speech, confusion, and facial droop. Patient had L arm pain, denies chest pain/SOB. EKG showed ST elevations in I and aVL that resolved on repeat EKG. Trop wnl. Patient with recent TIA c/b R-sided weakness. Per Vascular Neurology, patient's intracranial abnormalities are not contraindications for anticoagulation/ACS protocol. Given negative troponin and resolution of ST abnormalities on repeat EKG, suspect type II NSTEMI 2/2 demand ischemia. Symptoms more consistent with TIA/stroke.     Plan:  - Step down to Hosp Med  - Neuro checks q4hr  - Continue ASA, plavix  - Continue statin  - Hold on heparin gtt given negative troponin  - Consult Vascular Neuro, recs appreciated  - Obtain info on aneurysm hardware (ie MRI-compatible) for possible MRI brain  - Telemetry     SANTOSH (acute kidney injury)  Baseline sCr 0.8, sCr 2 on admission  No indications for HD at this time  Cr improving (2 > 1.3)     Plan:  - Trend Cr  - Strict I&Os  - Avoid nephrotoxic agents  - Renally adjust medications     Aneurysm of middle cerebral artery  TIA  (transient ischemic attack)  Recently suffered TIA with R-sided residual weakness  CTH negative for acute abnormalities, extensive chronic microvascular changes + small remote infarcts, stable calcified R MCA bifurcation aneurysm     Plan:  - Continue ASA, plavix, statin  - Vascular Neuro consulted, recs appreciated  - See above     STEMI (ST elevation myocardial infarction)  - ST elevation I and aVL left arm pain  - R rADIAL LHC +/- PCI     Mixed hyperlipidemia  Continue home statin     Uncontrolled hypertension  Hold home BP meds while normotensive               VTE Risk Mitigation (From admission, onward)                  Ordered        heparin (porcine) injection 5,000 Units  Every 8 hours      07/01/20 1702        IP VTE HIGH RISK PATIENT  Once      07/01/20 1454                 Goals of Care:  Return to prior functional status     Discharge plan:    Time (minutes) spent in care of the patient (Greater than 1/2 spent in direct face-to-face contact)  35 minutes    Robert Sharma MD

## 2020-07-06 PROBLEM — Z78.9 IMPAIRED MOBILITY AND ADLS: Status: ACTIVE | Noted: 2020-07-06

## 2020-07-06 PROBLEM — Z74.09 IMPAIRED MOBILITY AND ADLS: Status: ACTIVE | Noted: 2020-07-06

## 2020-07-06 LAB
ALBUMIN SERPL BCP-MCNC: 3.2 G/DL (ref 3.5–5.2)
ALP SERPL-CCNC: 52 U/L (ref 55–135)
ALT SERPL W/O P-5'-P-CCNC: 19 U/L (ref 10–44)
ANION GAP SERPL CALC-SCNC: 7 MMOL/L (ref 8–16)
AST SERPL-CCNC: 16 U/L (ref 10–40)
BASOPHILS # BLD AUTO: 0.06 K/UL (ref 0–0.2)
BASOPHILS NFR BLD: 1.2 % (ref 0–1.9)
BILIRUB SERPL-MCNC: 0.5 MG/DL (ref 0.1–1)
BUN SERPL-MCNC: 23 MG/DL (ref 8–23)
CALCIUM SERPL-MCNC: 9.7 MG/DL (ref 8.7–10.5)
CHLORIDE SERPL-SCNC: 105 MMOL/L (ref 95–110)
CO2 SERPL-SCNC: 26 MMOL/L (ref 23–29)
CREAT SERPL-MCNC: 0.9 MG/DL (ref 0.5–1.4)
DIFFERENTIAL METHOD: ABNORMAL
EOSINOPHIL # BLD AUTO: 0.5 K/UL (ref 0–0.5)
EOSINOPHIL NFR BLD: 10 % (ref 0–8)
ERYTHROCYTE [DISTWIDTH] IN BLOOD BY AUTOMATED COUNT: 12.5 % (ref 11.5–14.5)
EST. GFR  (AFRICAN AMERICAN): >60 ML/MIN/1.73 M^2
EST. GFR  (NON AFRICAN AMERICAN): 59.3 ML/MIN/1.73 M^2
GLUCOSE SERPL-MCNC: 98 MG/DL (ref 70–110)
HCT VFR BLD AUTO: 37.1 % (ref 37–48.5)
HGB BLD-MCNC: 11.9 G/DL (ref 12–16)
IMM GRANULOCYTES # BLD AUTO: 0.02 K/UL (ref 0–0.04)
IMM GRANULOCYTES NFR BLD AUTO: 0.4 % (ref 0–0.5)
LYMPHOCYTES # BLD AUTO: 2 K/UL (ref 1–4.8)
LYMPHOCYTES NFR BLD: 38.2 % (ref 18–48)
MAGNESIUM SERPL-MCNC: 1.9 MG/DL (ref 1.6–2.6)
MCH RBC QN AUTO: 30.2 PG (ref 27–31)
MCHC RBC AUTO-ENTMCNC: 32.1 G/DL (ref 32–36)
MCV RBC AUTO: 94 FL (ref 82–98)
MONOCYTES # BLD AUTO: 0.5 K/UL (ref 0.3–1)
MONOCYTES NFR BLD: 9.2 % (ref 4–15)
NEUTROPHILS # BLD AUTO: 2.1 K/UL (ref 1.8–7.7)
NEUTROPHILS NFR BLD: 41 % (ref 38–73)
NRBC BLD-RTO: 0 /100 WBC
PHOSPHATE SERPL-MCNC: 2.9 MG/DL (ref 2.7–4.5)
PLATELET # BLD AUTO: 264 K/UL (ref 150–350)
PMV BLD AUTO: 10.5 FL (ref 9.2–12.9)
POTASSIUM SERPL-SCNC: 4 MMOL/L (ref 3.5–5.1)
PROT SERPL-MCNC: 6.4 G/DL (ref 6–8.4)
RBC # BLD AUTO: 3.94 M/UL (ref 4–5.4)
SODIUM SERPL-SCNC: 138 MMOL/L (ref 136–145)
WBC # BLD AUTO: 5.11 K/UL (ref 3.9–12.7)

## 2020-07-06 PROCEDURE — 99222 1ST HOSP IP/OBS MODERATE 55: CPT | Mod: ,,, | Performed by: NURSE PRACTITIONER

## 2020-07-06 PROCEDURE — 92507 TX SP LANG VOICE COMM INDIV: CPT

## 2020-07-06 PROCEDURE — 99232 SBSQ HOSP IP/OBS MODERATE 35: CPT | Mod: ,,, | Performed by: INTERNAL MEDICINE

## 2020-07-06 PROCEDURE — 97530 THERAPEUTIC ACTIVITIES: CPT

## 2020-07-06 PROCEDURE — 97116 GAIT TRAINING THERAPY: CPT

## 2020-07-06 PROCEDURE — 36415 COLL VENOUS BLD VENIPUNCTURE: CPT

## 2020-07-06 PROCEDURE — 84100 ASSAY OF PHOSPHORUS: CPT

## 2020-07-06 PROCEDURE — 63600175 PHARM REV CODE 636 W HCPCS: Performed by: STUDENT IN AN ORGANIZED HEALTH CARE EDUCATION/TRAINING PROGRAM

## 2020-07-06 PROCEDURE — 20600001 HC STEP DOWN PRIVATE ROOM

## 2020-07-06 PROCEDURE — 85025 COMPLETE CBC W/AUTO DIFF WBC: CPT

## 2020-07-06 PROCEDURE — 80053 COMPREHEN METABOLIC PANEL: CPT

## 2020-07-06 PROCEDURE — 25000003 PHARM REV CODE 250: Performed by: INTERNAL MEDICINE

## 2020-07-06 PROCEDURE — 97535 SELF CARE MNGMENT TRAINING: CPT

## 2020-07-06 PROCEDURE — 83735 ASSAY OF MAGNESIUM: CPT

## 2020-07-06 PROCEDURE — 99222 PR INITIAL HOSPITAL CARE,LEVL II: ICD-10-PCS | Mod: ,,, | Performed by: NURSE PRACTITIONER

## 2020-07-06 PROCEDURE — 92526 ORAL FUNCTION THERAPY: CPT

## 2020-07-06 PROCEDURE — 97112 NEUROMUSCULAR REEDUCATION: CPT

## 2020-07-06 PROCEDURE — 99232 PR SUBSEQUENT HOSPITAL CARE,LEVL II: ICD-10-PCS | Mod: ,,, | Performed by: INTERNAL MEDICINE

## 2020-07-06 RX ADMIN — CLOPIDOGREL BISULFATE 75 MG: 75 TABLET ORAL at 08:07

## 2020-07-06 RX ADMIN — ASPIRIN 81 MG: 81 TABLET, COATED ORAL at 08:07

## 2020-07-06 RX ADMIN — HEPARIN SODIUM 5000 UNITS: 5000 INJECTION INTRAVENOUS; SUBCUTANEOUS at 10:07

## 2020-07-06 RX ADMIN — HEPARIN SODIUM 5000 UNITS: 5000 INJECTION INTRAVENOUS; SUBCUTANEOUS at 01:07

## 2020-07-06 RX ADMIN — HEPARIN SODIUM 5000 UNITS: 5000 INJECTION INTRAVENOUS; SUBCUTANEOUS at 05:07

## 2020-07-06 RX ADMIN — ATORVASTATIN CALCIUM 80 MG: 20 TABLET, FILM COATED ORAL at 08:07

## 2020-07-06 NOTE — ASSESSMENT & PLAN NOTE
-on arrival ST elevations on EKG with normal troponin  -repeat EKG with resolution of ST abnormalities  -Cardiology consulted and presentation likely demand ischemia due to neurologic issue that led to temporary EKG changes  -continue medical management

## 2020-07-06 NOTE — PT/OT/SLP PROGRESS
Speech Language Pathology Treatment    Patient Name:  Margarita Chávez   MRN:  [id number]  Admitting Diagnosis: Aneurysm of middle cerebral artery    Recommendations:                 General Recommendations:  Dysphagia therapy, Speech/language therapy and Cognitive-linguistic therapy  Diet recommendations:  Regular, Liquid Diet Level: Thin   Aspiration Precautions: Standard aspiration precautions   General Precautions: Standard, fall  Communication strategies:  none    Subjective     Patient awake;alert. Son present.   ·      Objective:     Has the patient been evaluated by SLP for swallowing?   Yes  Keep patient NPO? No   Current Respiratory Status: room air      Pt tolerated thin liquids via straw sips over 7oz and consumed 1 cracker with no overt signs of airway compromise. Patient appearing appropriate for diet advancement to regular solids and thin liquids at this time. Patient with increased response time and increased alertness/engagement with SLP this service date.  Patient completed three step commands with 85% acc indep.  Patient oriented x4 indep. Simple compare/contrast completed with 60% acc iindep improving to 100% acc given mod A.  Skilled education was provided to patient and family members re: diet recs, standard aspiration precautions of which to follow, and ongoing  plan of care.       Assessment:     Margarita Chávez is a 84 y.o. female with an SLP diagnosis of Cognitive-Linguistic Impairment.      Goals:   Multidisciplinary Problems     SLP Goals        Problem: SLP Goal    Goal Priority Disciplines Outcome   SLP Goal     SLP Ongoing, Progressing   Description: Speech Language Pathology Goals  Goals expected to be met by 7/9:  1. Patient will tolerate a mechanical soft diet and thin liquids with no overt signs of airway compromise.   2. Patient will complete mild to moderate level problem solving tasks with 70% acc given mod A.   3. Patient will provide 6-10 items within concrete categories  indep.   4. Patient will attend to structured therapy tasks for 3 minutes without redirect cues.   5. Patient will participate in ongoing assessment of R,W,and VS.                          Plan:     · Patient to be seen:  4 x/week   · Plan of Care expires:  08/02/20  · Plan of Care reviewed with:  patient   · SLP Follow-Up:  Yes       Discharge recommendations:  rehabilitation facility   Barriers to Discharge:  None    Time Tracking:     SLP Treatment Date:   07/06/20  Speech Start Time:  1025  Speech Stop Time:  1041     Speech Total Time (min):  16 min    Billable Minutes: Speech Therapy Individual 8 and Treatment Swallowing Dysfunction 8    Emily Abadie, CCC-SLP  07/06/2020

## 2020-07-06 NOTE — PROGRESS NOTES
Hospital Medicine  Progress note    Team: [facility] MED A Robert Sharma MD  Admit Date: 7/1/2020  KYLEIGH 7/4/2020  Length of Stay:  LOS: 5 days   Code status: Full Code    Principal Problem:  Aneurysm of middle cerebral artery    HPI / Hospital Course     Interval hx:  7/06 Feeling well. Will ambulate today. Would like to go back to rehab, at [facility] medically ready for transfer    ROS     Respiratory: neg for cough neg for shortness of breath  Cardiovascular: neg for chest pain neg for palpitations  Gastrointestinal: neg for nausea neg for vomiting, neg for abdominal pain neg for diarrhea neg for constipation   Behavioral/Psych: neg for depression neg for anxiety    PEx  Temp:  [97.2 °F (36.2 °C)-98.7 °F (37.1 °C)]   Pulse:  []   Resp:  [10-22]   BP: (126-137)/(60-78)   SpO2:  [95 %-99 %]     Intake/Output Summary (Last 24 hours) at 7/6/2020 0850  Last data filed at 7/6/2020 0200  Gross per 24 hour   Intake 300 ml   Output 2 ml   Net 298 ml       General Appearance: no acute distress   Heart: regular rate and rhythm  Respiratory: Normal respiratory effort, no crackles   Abdomen: Soft, non-tender; bowel sounds active  Skin: intact.Skin intact  Neurologic:  No focal numbness or weakness  Mental status: Alert, oriented x 4, affect appropriate     Recent Labs   Lab 07/04/20  0611 07/05/20  0815 07/06/20  0709   WBC 5.65 5.36 5.11   HGB 13.3 13.3 11.9*   HCT 42.3 43.1 37.1    284 264     Recent Labs   Lab 07/03/20  0515 07/04/20  0611 07/05/20  0815    140 140   K 4.3 4.1 4.0    104 104   CO2 24 26 25   BUN 39* 38* 28*   CREATININE 1.3 1.1 1.0   GLU 84 87 119*   CALCIUM 10.2 10.3 10.7*   MG 2.1 2.2 1.9   PHOS 3.2 3.4 2.8     Recent Labs   Lab 07/01/20  1334  07/03/20  0515 07/04/20  0611 07/05/20  0815   ALKPHOS 67   < > 61 58 58   ALT 28   < > 21 21 22   AST 27   < > 18 19 19   ALBUMIN 3.6   < > 3.6 3.6 3.6   PROT 7.5   < > 7.4 7.2 7.4   BILITOT 0.5   < > 0.6 0.5 0.6   INR 1.0  --   --   --   --      < > = values in this interval not displayed.        Recent Labs   Lab 07/01/20  1256   POCTGLUCOSE 102       Scheduled Meds:   aspirin  81 mg Oral Daily    atorvastatin  80 mg Oral QHS    clopidogreL  75 mg Oral Daily    heparin (porcine)  5,000 Units Subcutaneous Q8H     Continuous Infusions:  As Needed:  acetaminophen, melatonin, ondansetron, ondansetron, sodium chloride 0.9%        Active Hospital Problems    Diagnosis  POA    *Aneurysm of middle cerebral artery [I67.1]  Yes    ST elevation on ECG [R94.31]  Yes    SANTOSH (acute kidney injury) [N17.9]  Yes    Mixed hyperlipidemia [E78.2]  Yes    TIA (transient ischemic attack) [G45.9]  Yes    Uncontrolled hypertension [I10]  Yes      Resolved Hospital Problems   No resolved problems to display.       Assessment and Plan  / Problems managed today  ST elevation on ECG  83 yo F with no significant past cardiac history that presents with slurred speech, confusion, and facial droop. Patient had L arm pain, denies chest pain/SOB. EKG showed ST elevations in I and aVL that resolved on repeat EKG. Trop wnl. Patient with recent TIA c/b R-sided weakness. Per Vascular Neurology, patient's intracranial abnormalities are not contraindications for anticoagulation/ACS protocol. Given negative troponin and resolution of ST abnormalities on repeat EKG, suspect type II NSTEMI 2/2 demand ischemia. Symptoms more consistent with TIA/stroke.     Plan:  - Step down to Hosp Med  - Neuro checks q4hr  - Continue ASA, plavix  - Continue statin  - Hold on heparin gtt given negative troponin  - Consult Vascular Neuro, recs appreciated  - Obtain info on aneurysm hardware (ie MRI-compatible) for possible MRI brain  - Telemetry     SANTOSH (acute kidney injury)  Baseline sCr 0.8, sCr 2 on admission  No indications for HD at this time  Cr improving (2 > 1.3)     Plan:  - Trend Cr  - Strict I&Os  - Avoid nephrotoxic agents  - Renally adjust medications     Aneurysm of middle cerebral  artery  TIA (transient ischemic attack)  Recently suffered TIA with R-sided residual weakness  CTH negative for acute abnormalities, extensive chronic microvascular changes + small remote infarcts, stable calcified R MCA bifurcation aneurysm     Plan:  - Continue ASA, plavix, statin  - Vascular Neuro consulted, recs appreciated  - See above     STEMI (ST elevation myocardial infarction)  - ST elevation I and aVL left arm pain  - R rADIAL LHC +/- PCI     Mixed hyperlipidemia  Continue home statin     Uncontrolled hypertension  Hold home BP meds while normotensive               VTE Risk Mitigation (From admission, onward)                  Ordered        heparin (porcine) injection 5,000 Units  Every 8 hours      07/01/20 1702        IP VTE HIGH RISK PATIENT  Once      07/01/20 1454                 Goals of Care:  Return to prior functional status     Discharge plan:    Time (minutes) spent in care of the patient (Greater than 1/2 spent in direct face-to-face contact)  35 minutes    Robert Shrama MD

## 2020-07-06 NOTE — HOSPITAL COURSE
7/2/2020:  Evaluated by SLP.  Found to have dysphagia and cognitive-linguistic impairment.  SLP recommendation: mechanical soft diet and thin liquids.  7/3/2020:  Evaluated by PT and OT.  Bed mobility maxA.  Sit to stand modA.  Transfers maxA.  UBD maxA and LBD totalA.  7/6/2020:  Participated with OT.  Bed mobility min-maxA.  EOB SBA-CGA.  Sit to stand totalA (Kris x 2).  Ambulated 1 side step MaxA & RW. UBD modA and LBD modA.  Toileting maxA.

## 2020-07-06 NOTE — PLAN OF CARE
Problem: Occupational Therapy Goal  Goal: Occupational Therapy Goal  Description: Goals to be met by: 7 days 7/10/2020     Patient will increase functional independence with ADLs by performing:    Pt to complete self feeding with set-up  Pt to complete g/h skills with set-up  Pt to complete UE dressing with MIN A   Pt to complete LE dressing with MIN A- progressing   Pt to complete t/f to BSC with MIN A   Pt to tolerate OOB to chair x 3 hours to increase endurance for functional activity.     Outcome: Ongoing, Progressing     Pt progressing well towards OT goals. OT changing disposition to inpatient rehab facility to maximize pt's functional independence. Pt will continue to benefit from skilled OT to address the deficit affecting her occupational performance.     Viji Pan OTR/L  7/6/20

## 2020-07-06 NOTE — PLAN OF CARE
Plan of care discussed with patient. Patient is free of fall/trauma/injury. Denies CP, SOB, or pain/discomfort. Pt awaiting rehab placement. All questions addressed. Will continue to monitor

## 2020-07-06 NOTE — SUBJECTIVE & OBJECTIVE
Past Medical History:   Diagnosis Date    Aneurysm of middle cerebral artery     Hypertension     TIA (transient ischemic attack)      Past Surgical History:   Procedure Laterality Date    BRAIN SURGERY      HYSTERECTOMY       Review of patient's allergies indicates:   Allergen Reactions    Penicillins Nausea And Vomiting       Scheduled Medications:    aspirin  81 mg Oral Daily    atorvastatin  80 mg Oral QHS    clopidogreL  75 mg Oral Daily    heparin (porcine)  5,000 Units Subcutaneous Q8H       PRN Medications: acetaminophen, melatonin, ondansetron, ondansetron, sodium chloride 0.9%    Family History     Problem Relation (Age of Onset)    Cancer Paternal Aunt        Tobacco Use    Smoking status: Never Smoker   Substance and Sexual Activity    Alcohol use: Never     Frequency: Never    Drug use: Never    Sexual activity: Not on file     Review of Systems   Constitutional: Positive for activity change and fatigue. Negative for chills and fever.   HENT: Negative for trouble swallowing and voice change.    Eyes: Negative for pain and visual disturbance.   Respiratory: Negative for cough and shortness of breath.    Cardiovascular: Negative for chest pain and palpitations.   Gastrointestinal: Positive for abdominal distention. Negative for nausea and vomiting.   Genitourinary: Negative for difficulty urinating and flank pain.   Musculoskeletal: Positive for arthralgias, gait problem and myalgias.   Skin: Negative for rash and wound.   Neurological: Positive for weakness. Negative for dizziness, numbness and headaches.   Psychiatric/Behavioral: Negative for agitation. The patient is not nervous/anxious.      Objective:     Vital Signs (Most Recent):  Temp: 99.6 °F (37.6 °C) (07/06/20 1508)  Pulse: 85 (07/06/20 1510)  Resp: 14 (07/06/20 1508)  BP: 134/62 (07/06/20 1508)  SpO2: 96 % (07/06/20 1508)    Vital Signs (24h Range):  Temp:  [97.2 °F (36.2 °C)-99.6 °F (37.6 °C)] 99.6 °F (37.6 °C)  Pulse:  []  85  Resp:  [10-22] 14  SpO2:  [95 %-98 %] 96 %  BP: (126-154)/(60-78) 134/62     Body mass index is 27.79 kg/m².    Physical Exam  Vitals signs reviewed.   Constitutional:       General: She is not in acute distress.     Appearance: She is well-developed.   HENT:      Head: Normocephalic and atraumatic.      Right Ear: External ear normal.      Left Ear: External ear normal.      Nose: Nose normal.   Eyes:      General: No scleral icterus.        Right eye: No discharge.         Left eye: No discharge.   Neck:      Musculoskeletal: Normal range of motion.   Cardiovascular:      Rate and Rhythm: Normal rate.   Pulmonary:      Effort: Pulmonary effort is normal. No respiratory distress.   Abdominal:      General: There is no distension.      Palpations: Abdomen is soft.      Tenderness: There is no abdominal tenderness.   Musculoskeletal: Normal range of motion.         General: Tenderness present.   Skin:     General: Skin is warm and dry.      Findings: No rash.   Neurological:      Mental Status: She is alert and oriented to person, place, and time.      Motor: Weakness present. No abnormal muscle tone.      Gait: Gait abnormal.   Psychiatric:         Behavior: Behavior normal.         Thought Content: Thought content normal.       NEUROLOGICAL EXAMINATION:     MENTAL STATUS   Oriented to person, place, and time.       Diagnostic Results:   Labs: Reviewed  X-Ray: Reviewed  CT: Reviewed

## 2020-07-06 NOTE — PLAN OF CARE
07/06/20 1555   Discharge Reassessment   Assessment Type Discharge Planning Reassessment   Provided patient/caregiver education on the expected discharge date and the discharge plan Yes   Do you have any problems affording any of your prescribed medications? No   Discharge Plan A Rehab   Discharge Plan B Rehab   DME Needed Upon Discharge  none   Anticipated Discharge Disposition Rehab     CM to bedside for dc planning reassessment. Plan is to dc patient to [facility] rehab.Pt is in agreement with this plan.    Julie Haase RN  Case Management [phone]

## 2020-07-06 NOTE — ASSESSMENT & PLAN NOTE
-IRF prior to current admission   See hospital course for functional, cognitive/speech/language, and nutrition/swallow status.    Recommendations  -  Encourage mobility, OOB in chair, and early ambulation as appropriate   -  PT, OT, SLP evaluate and treat  -  Monitor mood and sleep disturbances and establish consistent sleep-wake cycle  -  Monitor for bowel and bladder dysfunction  -  Monitor for shoulder pain/subluxation and spasticity  -  DVT prophylaxis if appropriate  -  Monitor for and prevent skin breakdown and pressure ulcers  · Early mobility, repositioning/weight shifting every 20-30 minutes when sitting, turn patient every 2 hours, proper mattress/overlay and chair cushioning, pressure relief/heel protector boots

## 2020-07-06 NOTE — PT/OT/SLP PROGRESS
Physical Therapy Treatment    Patient Name:  Margarita Chávez   MRN:  [id number]    Recommendations:     Discharge Recommendations:  rehabilitation facility   Discharge Equipment Recommendations: other (see comments)(continue to assess needs; determine at rehab)   Barriers to discharge: Decreased caregiver support    Assessment:     Margarita Chávez is a 84 y.o. female admitted with a medical diagnosis of Aneurysm of middle cerebral artery.  She presents with the following impairments/functional limitations:  weakness, impaired endurance, impaired functional mobilty, impaired balance, gait instability, decreased upper extremity function, decreased lower extremity function, impaired coordination . Patient participated well  Demonstrating improved movement RU/LE. Patient performs five sit to stands w/ RW and min of 2 to mod of 2. Able to take a few side steps to the left w/ RW and assist of 2. Patient progressing well and will benefit from return to rehab for comprehensive program to improve safety and functional mobility.  Rehab Prognosis: Good; patient would benefit from acute skilled PT services to address these deficits and reach maximum level of function.    Recent Surgery: Procedure(s) (LRB):  Left heart cath (Left)      Plan:     During this hospitalization, patient to be seen 4 x/week to address the identified rehab impairments via gait training, therapeutic activities, therapeutic exercises, neuromuscular re-education and progress toward the following goals:    · Plan of Care Expires:  08/01/20    Subjective     Chief Complaint: Feeling okay.  Patient/Family Comments/goals: agreeable to session  Pain/Comfort:  · Pain Rating 1: 3/10  · Location - Side 1: Bilateral  · Location 1: knee  · Pain Rating Post-Intervention 1: 2/10      Objective:     Communicated with nurse prior to session.  Patient found HOB elevated with telemetry upon PT entry to room.     General Precautions: Standard, fall   Orthopedic  Precautions:N/A   Braces: N/A     Functional Mobility:  · Bed Mobility:     · Rolling Right: contact guard assistance  · Supine to Sit: moderate assistance and with HOB elevated  · Sit to Supine: maximal assistance and for trunk and LE guidance  · Transfers:     · Sit to Stand:  minimum assistance and of 2 persons with rolling walker  · Gait: 1 side steps to left toward HOB w/ RW and max assist of1 and min assist of 1 w/ cues, seated rest break, then 3 side steps, assist for sequence, RW management, able to scoot LLE to left, assist for weight shift to LLE and then able to move RLE w/ trunk support, facilitation at knee  · Balance: sits EOB w/ SBA/CGA  · Stands w/ RW and CG/min assist      AM-PAC 6 CLICK MOBILITY  Turning over in bed (including adjusting bedclothes, sheets and blankets)?: 2  Sitting down on and standing up from a chair with arms (e.g., wheelchair, bedside commode, etc.): 2  Moving from lying on back to sitting on the side of the bed?: 2  Moving to and from a bed to a chair (including a wheelchair)?: 2  Need to walk in hospital room?: 1  Climbing 3-5 steps with a railing?: 1  Basic Mobility Total Score: 10       Therapeutic Activities and Exercises:   patient performs 10x BLE exercises LAQ, AP seated EOB  Facilitation in sit and standing for erect posture, anterior pelvic tilt.   Patient performs sit to/from stand w/ RW from EOB x 5 trials w/ min assist of 2 and last stand trial w/ mod assist of 2. Cues for sequence and technique. Improved forward weight shift during session and hip elevation, cues/facilitation for posture.     Performs ADL of brief change  during standing/sitting w/ OT facilitation    Patient stands w/ RW and min assist of 2, performs pre-gait activities of weight shift, knee flexion and extension, cues for RLE extension w/ patient standing w/ knee flexed; progressing to small forward step w/ LLE and assist for weight acceptance on RLE/ weight shift to right. After seated rest  break, patient stands w/ RW for gait as above, sidesteps to the left.     Patient left HOB elevated with all lines intact and call button in reach..    GOALS:   Multidisciplinary Problems     Physical Therapy Goals        Problem: Physical Therapy Goal    Goal Priority Disciplines Outcome Goal Variances Interventions   Physical Therapy Goal     PT, PT/OT Ongoing, Progressing     Description: Goals to be met by: 20    Patient will increase functional independence with mobility by performin. Supine to sit with MInimal Assistance -not met  2. Sit to stand transfer with Minimal Assistance -not met  3. Bed to chair transfer with Moderate Assistance -not met  4. Gait  x 10 feet with Moderate Assistance using AD- not met                     Time Tracking:     PT Received On: 20  PT Start Time: 1330     PT Stop Time: 1356  PT Total Time (min): 26 min     Billable Minutes: Gait Training 13 and Neuromuscular Re-education 13  (co-treatment performed with OT due to low activity tolerance; level of assistance required for mobility and skilled treatment)    Treatment Type: Treatment  PT/PTA: PT     PTA Visit Number: 0     Karen Grossman, PT  2020

## 2020-07-06 NOTE — PT/OT/SLP PROGRESS
Occupational Therapy   Treatment    Name: Margarita Chávez  MRN: [id number]  Admitting Diagnosis:  Aneurysm of middle cerebral artery       Recommendations:     Discharge Recommendations: rehabilitation facility  Discharge Equipment Recommendations:  (TBD pending progress in therapy)  Barriers to discharge:  (not functioning at baseline)    Assessment:     Margarita Chávez is a 84 y.o. female with a medical diagnosis of Aneurysm of middle cerebral artery.  She presents with the following performance deficits affecting function: weakness, impaired endurance, impaired balance, decreased coordination, impaired self care skills, impaired functional mobilty, gait instability, decreased lower extremity function, decreased upper extremity function. Pt tolerated session well this date requiring decreased assistance for STS transfer from bed compared to previous session. This date, pt required mod-max A for ADLs EOB, min-mod A of 2 persons to stand from the bed, and max A for side steps along the bed with RW. Pt progressing well towards OT goals; however, continues to require increased assistance for functional tasks in comparison to pt's baseline. OT changing disposition recommendation to inpatient rehab facility to further maximize pt's functional independence upon returning home. Pt will continue to benefit from skilled OT to build strength and endurance for ADL/IADLs.    Rehab Prognosis:  Good; patient would benefit from acute skilled OT services to address these deficits and reach maximum level of function.       Plan:     Patient to be seen 4 x/week to address the above listed problems via self-care/home management, therapeutic activities, therapeutic exercises, neuromuscular re-education  · Plan of Care Expires: 08/04/20  · Plan of Care Reviewed with: patient    Subjective     Pain/Comfort:  · Pain Rating 1: 3/10  · Location - Side 1: Bilateral  · Location 1: knee  · Pain Addressed 1: Reposition, Distraction  · Pain  Rating Post-Intervention 1: 2/10  · Pain Addressed 2: Reposition    Objective:     Communicated with: RN prior to session.  Patient found HOB elevated with telemetry upon OT entry to room.    General Precautions: Standard, fall   Orthopedic Precautions:N/A   Braces: N/A     Occupational Performance:     Bed Mobility:    · Patient completed Rolling/Turning to Left with  moderate assistance for pad adjustment and to prepare to sit EOB  · Patient completed Scooting/Bridging with minimum assistance and moderate assistance for anterior hip scooting; max A of 2 persons for posterior positioning in bed via draw sheet   · Patient completed Supine to Sit with moderate assistance for BLE guidance over bed and trunk elevation   · Patient completed Sit to Supine with maximal assistance for trunk and LB placement into bed     Functional Mobility/Transfers:  · Patient completed Sit <> Stand Transfer from bed  with minimum assistance and of 2 persons  with  rolling walker   · x5 trials completed from the bed  · x1 trial: Min A of 2 persons with RW with verbal cues for upright standing; forward steps attempted: See PT note for details   · x2 trial: Min A of 2 persons with RW; verbal cues required to hold onto walker during toileting care  · X3 trial: Min A of 2 persons with RW to stand and don depend; ~1 minutes of static standing   · x4 trial: Mod A of 2 persons with RW to attempt side steps along the bed   · x5 trial: Mod A of 2 persons with RW to re-attempt side steps for posterior positioning in bed   · Functional Mobility: x1 side step performed alongside the bed with max A with cues provided for walker usage, lateral weight shifting, and R knee buckling   · Increased time required  · 1 instance of R knee buckling occurred     Activities of Daily Living:  · Feeding:  set up A for lunch tray with HOB elevated   · Upper Body Dressing: moderate assistance to don gown as jacket EOB with A for placement of RUE into gown  · Lower  Body Dressing: moderate assistance to don sock over toes while EOB and hold extremity into place while donning sock over ankle  · Toileting: maximal assistance in standing to don brief while EOB with RW; Max A for standing balance      [facility] 6 Click ADL: 14    Treatment & Education:  - Role of OT/ OT POC  - Self care safety/ independence  - Functional transfer/ mobility safety  - Bed mobility safety  - Pursed lip breathing  - Energy conservation techniques such as taking rest breaks as needed  - Pt sat EOB with SBA; intermittent CGA required for safety. Pt performed ADLs EOB to increase self care independence and ability to tolerate ax's in upright position.     Additional staff present: PT present to further mobility distance and assist in standing ADLs      Patient left HOB elevated with all lines intact, call button in reach, bed alarm on and RN notifiedEducation:      GOALS:   Multidisciplinary Problems     Occupational Therapy Goals        Problem: Occupational Therapy Goal    Goal Priority Disciplines Outcome Interventions   Occupational Therapy Goal     OT, PT/OT Ongoing, Progressing    Description: Goals to be met by: 7 days 7/10/2020     Patient will increase functional independence with ADLs by performing:    Pt to complete self feeding with set-up  Pt to complete g/h skills with set-up  Pt to complete UE dressing with MIN A   Pt to complete LE dressing with MIN A- progressing   Pt to complete t/f to BSC with MIN A   Pt to tolerate OOB to chair x 3 hours to increase endurance for functional activity.                      Time Tracking:     OT Date of Treatment: 07/06/20  OT Start Time: 1330  OT Stop Time: 1359  OT Total Time (min): 29 min co tx with PT     Billable Minutes:Self Care/Home Management 14  Therapeutic Activity 10    Viji Pan OT  7/6/2020

## 2020-07-06 NOTE — ASSESSMENT & PLAN NOTE
-known R MCA aneurysm s/p clipping with multifocal intracranial arterial stenosis  -vascular neurology consulted--> patient returned to baseline, imaging negative for new event   -continue medical management

## 2020-07-06 NOTE — HPI
Margarita Chávez is an 84-year-old female with PMHx of HTN, HLD, R MCA aneurysm s/p clipping with multifocal intracranial arterial stenosis, multinodular thyroid goiter, arthritis, and infarcts, and recent admission for TIA with remote embolic strokes complicated by SANTOSH and UTI with discharge to [facility].  While in rehab, patient with neuro status change concerning for stroke.  Transferred to [facility] on 7/1/2020 for further evaluation and management.  On arrival, CTH unremarkable.  Found to have ST elevations on EKG with normal troponin.  Repeat EKG with resolution of ST abnormalities.  Cardiology consulted and presentation likely demand ischemia due to neurologic issue that led to temporary EKG changes.  Vascular Neurology consulted and less concerns for stroke etiology; continue optimal medical management.     Functional History: Patient lives alone in a single story home without steps to enter.  Prior to admission, she was (I) with ADLs, including driving, and mod(I) with mobility.  IRF prior to current admission.  DME: .

## 2020-07-06 NOTE — PLAN OF CARE
Problem: Physical Therapy Goal  Goal: Physical Therapy Goal  Description: Goals to be met by: 20    Patient will increase functional independence with mobility by performin. Supine to sit with MInimal Assistance -not met  2. Sit to stand transfer with Minimal Assistance -not met  3. Bed to chair transfer with Moderate Assistance -not met  4. Gait  x 10 feet with Moderate Assistance using AD- not met    Outcome: Ongoing, Progressing  Goals remain appropriate. Continue with Physical therapy Plan of Care. Karen Grossman, PT 2020

## 2020-07-06 NOTE — CONSULTS
Inpatient consult to Physical Medicine Rehab  Consult performed by: Trinidad Tan NP  Consult ordered by: Robert Sharma MD  Reason for consult: Assess rehab needs      Reviewed patient history and current admission.  Rehab team following.  Full consult to follow.    LUCHO Ramírez, FNP-C  Physical Medicine & Rehabilitation   07/06/2020  Spectralink: [id number]

## 2020-07-06 NOTE — CONSULTS
[facility]  Physical Medicine & Rehab  Consult Note    Patient Name: Margarita Chávez  MRN: [id number]  Admission Date: 7/1/2020  Hospital Length of Stay: 5 days  Attending Physician: Robert Sharma MD     Inpatient consult to Physical Medicine & Rehabilitation  Consult performed by: Nel Cartagena NP  Consult requested by:  Robert Sharma MD    Collaborating Physician: Karey Donis MD  Reason for Consult:  assess rehabilitation needs    Consults  Subjective:     Principal Problem: Aneurysm of middle cerebral artery    HPI: Margarita Chávez is an 84-year-old female with PMHx of HTN, HLD, R MCA aneurysm s/p clipping with multifocal intracranial arterial stenosis, multinodular thyroid goiter, arthritis, and infarcts, and recent admission for TIA with remote embolic strokes complicated by SANTOSH and UTI with discharge to [facility].  While in rehab, patient with neuro status change concerning for stroke.  [place] [facility] [place] for further evaluation and management.  On arrival, CTH unremarkable.  Found to have ST elevations on EKG with normal troponin.  Repeat EKG with resolution of ST abnormalities.  Cardiology consulted and presentation likely demand ischemia due to neurologic issue that led to temporary EKG changes.  Vascular Neurology consulted and less concerns for stroke etiology; continue optimal medical management.     Functional History: Patient lives alone in a single story home without steps to enter.  Prior to admission, she was (I) with ADLs, including driving, and mod(I) with mobility.  IRF prior to current admission.  DME: [facility].    Hospital Course:   7/2/2020:  Evaluated by SLP.  Found to have dysphagia and cognitive-linguistic impairment.  SLP recommendation: mechanical soft diet and thin liquids.  7/3/2020:  Evaluated by PT and OT.  Bed mobility maxA.  Sit to stand modA.  Transfers maxA.  UBD maxA and LBD totalA.  7/6/2020:  Participated with OT.  Bed mobility min-maxA.  EOB  SBA-CGA.  Sit to stand totalA (Kris x 2).  UBD modA and LBD modA.  Toileting maxA.     Past Medical History:   Diagnosis Date    Aneurysm of middle cerebral artery     Hypertension     TIA (transient ischemic attack)      Past Surgical History:   Procedure Laterality Date    BRAIN SURGERY      HYSTERECTOMY       Review of patient's allergies indicates:   Allergen Reactions    Penicillins Nausea And Vomiting       Scheduled Medications:    aspirin  81 mg Oral Daily    atorvastatin  80 mg Oral QHS    clopidogreL  75 mg Oral Daily    heparin (porcine)  5,000 Units Subcutaneous Q8H       PRN Medications: acetaminophen, melatonin, ondansetron, ondansetron, sodium chloride 0.9%    Family History     Problem Relation (Age of Onset)    Cancer Paternal Aunt        Tobacco Use    Smoking status: Never Smoker   Substance and Sexual Activity    Alcohol use: Never     Frequency: Never    Drug use: Never    Sexual activity: Not on file     Review of Systems   Constitutional: Positive for activity change and fatigue. Negative for chills and fever.   HENT: Negative for trouble swallowing and voice change.    Eyes: Negative for pain and visual disturbance.   Respiratory: Negative for cough and shortness of breath.    Cardiovascular: Negative for chest pain and palpitations.   Gastrointestinal: Positive for abdominal distention. Negative for nausea and vomiting.   Genitourinary: Negative for difficulty urinating and flank pain.   Musculoskeletal: Positive for arthralgias, gait problem and myalgias.   Skin: Negative for rash and wound.   Neurological: Positive for weakness. Negative for dizziness, numbness and headaches.   Psychiatric/Behavioral: Negative for agitation. The patient is not nervous/anxious.      Objective:     Vital Signs (Most Recent):  Temp: 99.6 °F (37.6 °C) (07/06/20 1508)  Pulse: 85 (07/06/20 1510)  Resp: 14 (07/06/20 1508)  BP: 134/62 (07/06/20 1508)  SpO2: 96 % (07/06/20 1508)    Vital Signs (24h  Range):  Temp:  [97.2 °F (36.2 °C)-99.6 °F (37.6 °C)] 99.6 °F (37.6 °C)  Pulse:  [] 85  Resp:  [10-22] 14  SpO2:  [95 %-98 %] 96 %  BP: (126-154)/(60-78) 134/62     Body mass index is 27.79 kg/m².    Physical Exam  Vitals signs reviewed.   Constitutional:       General: She is not in acute distress.     Appearance: She is well-developed.   HENT:      Head: Normocephalic and atraumatic.      Right Ear: External ear normal.      Left Ear: External ear normal.      Nose: Nose normal.   Eyes:      General: No scleral icterus.        Right eye: No discharge.         Left eye: No discharge.   Neck:      Musculoskeletal: Normal range of motion.   Cardiovascular:      Rate and Rhythm: Normal rate.   Pulmonary:      Effort: Pulmonary effort is normal. No respiratory distress.   Abdominal:      General: There is no distension.      Palpations: Abdomen is soft.      Tenderness: There is no abdominal tenderness.   Musculoskeletal: Normal range of motion.         General: Tenderness present.   Skin:     General: Skin is warm and dry.      Findings: No rash.   Neurological:      Mental Status: She is alert and oriented to person, place, and time.      Motor: Weakness present. No abnormal muscle tone.      Gait: Gait abnormal.   Psychiatric:         Behavior: Behavior normal.         Thought Content: Thought content normal.     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    Recommendations  -  Encourage mobility, OOB in chair, and early ambulation as appropriate   -  PT, OT, SLP  evaluate and treat  -  Monitor mood and sleep disturbances and establish consistent sleep-wake cycle  -  Monitor for bowel and bladder dysfunction  -  Monitor for shoulder pain/subluxation and spasticity  -  DVT prophylaxis if appropriate  -  Monitor for and prevent skin breakdown and pressure ulcers  · Early mobility, repositioning/weight shifting every 20-30 minutes when sitting, turn patient every 2 hours, proper mattress/overlay and chair cushioning, pressure relief/heel protector boots    ST elevation on ECG  -on arrival ST elevations on EKG with normal troponin  -repeat EKG with resolution of ST abnormalities  -Cardiology consulted and presentation likely demand ischemia due to neurologic issue that led to temporary EKG changes  -continue medical management    Post-acute recommendation: Inpatient Rehab     Thank you for your consult.     LUCINA Clancy  Department of Physical Medicine & Rehab  [facility]

## 2020-07-06 NOTE — PROGRESS NOTES
Hospital Medicine  Progress note    Team: [facility] [place] HOSP MED A Robert Sharma MD  Admit Date: 7/1/2020  KYLEIGH 7/4/2020  Length of Stay:  LOS: 5 days   Code status: Full Code    Principal Problem:  Aneurysm of middle cerebral artery    HPI / Hospital Course     Interval hx:  7/06 Feeling well. Will ambulate today. Would like to go back to rehab, at [facility] [place] medically ready for transfer    ROS     Respiratory: neg for cough neg for shortness of breath  Cardiovascular: neg for chest pain neg for palpitations  Gastrointestinal: neg for nausea neg for vomiting, neg for abdominal pain neg for diarrhea neg for constipation   Behavioral/Psych: neg for depression neg for anxiety    PEx  Temp:  [97.2 °F (36.2 °C)-99.6 °F (37.6 °C)]   Pulse:  [63-92]   Resp:  [14-22]   BP: (126-154)/(60-73)   SpO2:  [95 %-97 %]     Intake/Output Summary (Last 24 hours) at 7/6/2020 1830  Last data filed at 7/6/2020 0200  Gross per 24 hour   Intake 120 ml   Output 2 ml   Net 118 ml       General Appearance: no acute distress   Heart: regular rate and rhythm  Respiratory: Normal respiratory effort, no crackles   Abdomen: Soft, non-tender; bowel sounds active  Skin: intact.Skin intact  Neurologic:  No focal numbness or weakness  Mental status: Alert, oriented x 4, affect appropriate     Recent Labs   Lab 07/04/20  0611 07/05/20  0815 07/06/20  0709   WBC 5.65 5.36 5.11   HGB 13.3 13.3 11.9*   HCT 42.3 43.1 37.1    284 264     Recent Labs   Lab 07/04/20  0611 07/05/20  0815 07/06/20  0709    140 138   K 4.1 4.0 4.0    104 105   CO2 26 25 26   BUN 38* 28* 23   CREATININE 1.1 1.0 0.9   GLU 87 119* 98   CALCIUM 10.3 10.7* 9.7   MG 2.2 1.9 1.9   PHOS 3.4 2.8 2.9     Recent Labs   Lab 07/01/20  1334  07/04/20  0611 07/05/20  0815 07/06/20  0709   ALKPHOS 67   < > 58 58 52*   ALT 28   < > 21 22 19   AST 27   < > 19 19 16   ALBUMIN 3.6   < > 3.6 3.6 3.2*   PROT 7.5   < > 7.2 7.4 6.4   BILITOT 0.5   < > 0.5 0.6 0.5   INR 1.0  --   --   --   --     <  > = values in this interval not displayed.        Recent Labs   Lab 07/01/20  1256   POCTGLUCOSE 102       Scheduled Meds:   aspirin  81 mg Oral Daily    atorvastatin  80 mg Oral QHS    clopidogreL  75 mg Oral Daily    heparin (porcine)  5,000 Units Subcutaneous Q8H     Continuous Infusions:  As Needed:  acetaminophen, melatonin, ondansetron, ondansetron, sodium chloride 0.9%        Active Hospital Problems    Diagnosis  POA    *Aneurysm of middle cerebral artery [I67.1]  Yes    Impaired mobility and ADLs [Z74.09]  Yes    ST elevation on ECG [R94.31]  Yes    SANTOSH (acute kidney injury) [N17.9]  Yes    Mixed hyperlipidemia [E78.2]  Yes    TIA (transient ischemic attack) [G45.9]  Yes    Uncontrolled hypertension [I10]  Yes      Resolved Hospital Problems   No resolved problems to display.       Assessment and Plan  / Problems managed today  ST elevation on ECG  83 yo F with no significant past cardiac history that presents with slurred speech, confusion, and facial droop. Patient had L arm pain, denies chest pain/SOB. EKG showed ST elevations in I and aVL that resolved on repeat EKG. Trop wnl. Patient with recent TIA c/b R-sided weakness. Per Vascular Neurology, patient's intracranial abnormalities are not contraindications for anticoagulation/ACS protocol. Given negative troponin and resolution of ST abnormalities on repeat EKG, suspect type II NSTEMI 2/2 demand ischemia. Symptoms more consistent with TIA/stroke.     Plan:  - Step down to Hosp Med  - Neuro checks q4hr  - Continue ASA, plavix  - Continue statin  - Hold on heparin gtt given negative troponin  - Consult Vascular Neuro, recs appreciated  - Obtain info on aneurysm hardware (ie MRI-compatible) for possible MRI brain  - Telemetry     SANTOSH (acute kidney injury)  Baseline sCr 0.8, sCr 2 on admission  No indications for HD at this time  Cr improving (2 > 1.3)     Plan:  - Trend Cr  - Strict I&Os  - Avoid nephrotoxic agents  - Renally adjust  medications     Aneurysm of middle cerebral artery  TIA (transient ischemic attack)  Recently suffered TIA with R-sided residual weakness  CTH negative for acute abnormalities, extensive chronic microvascular changes + small remote infarcts, stable calcified R MCA bifurcation aneurysm     Plan:  - Continue ASA, plavix, statin  - Vascular Neuro consulted, recs appreciated  - See above     STEMI (ST elevation myocardial infarction)  - ST elevation I and aVL left arm pain  - R rADIAL LHC +/- PCI     Mixed hyperlipidemia  Continue home statin     Uncontrolled hypertension  Hold home BP meds while normotensive               VTE Risk Mitigation (From admission, onward)                  Ordered        heparin (porcine) injection 5,000 Units  Every 8 hours      07/01/20 1702        IP VTE HIGH RISK PATIENT  Once      07/01/20 1454                 Goals of Care:  Return to prior functional status     Discharge plan:    Time (minutes) spent in care of the patient (Greater than 1/2 spent in direct face-to-face contact)  35 minutes    Robert Sharma MD   Cell [phone]

## 2020-07-06 NOTE — PLAN OF CARE
Problem: SLP Goal  Goal: SLP Goal  Description: Speech Language Pathology Goals  Goals expected to be met by 7/9:  1. Patient will tolerate a mechanical soft diet and thin liquids with no overt signs of airway compromise.   2. Patient will complete mild to moderate level problem solving tasks with 70% acc given mod A.   3. Patient will provide 6-10 items within concrete categories indep.   4. Patient will attend to structured therapy tasks for 3 minutes without redirect cues.   5. Patient will participate in ongoing assessment of R,W,and VS.         Outcome: Ongoing, Progressing     Patient appropriate for diet advancement to regular consistencies and thin liquids.   Emily P. Abadie M.S., CCC-SLP  Speech Language Pathologist  [phone]  07/06/2020

## 2020-07-07 LAB
ALBUMIN SERPL BCP-MCNC: 3.3 G/DL (ref 3.5–5.2)
ALP SERPL-CCNC: 57 U/L (ref 55–135)
ALT SERPL W/O P-5'-P-CCNC: 22 U/L (ref 10–44)
ANION GAP SERPL CALC-SCNC: 8 MMOL/L (ref 8–16)
AST SERPL-CCNC: 19 U/L (ref 10–40)
BASOPHILS # BLD AUTO: 0.08 K/UL (ref 0–0.2)
BASOPHILS NFR BLD: 1.3 % (ref 0–1.9)
BILIRUB SERPL-MCNC: 0.5 MG/DL (ref 0.1–1)
BUN SERPL-MCNC: 22 MG/DL (ref 8–23)
CALCIUM SERPL-MCNC: 10.2 MG/DL (ref 8.7–10.5)
CHLORIDE SERPL-SCNC: 105 MMOL/L (ref 95–110)
CO2 SERPL-SCNC: 25 MMOL/L (ref 23–29)
CREAT SERPL-MCNC: 0.9 MG/DL (ref 0.5–1.4)
DIFFERENTIAL METHOD: ABNORMAL
EOSINOPHIL # BLD AUTO: 0.6 K/UL (ref 0–0.5)
EOSINOPHIL NFR BLD: 9 % (ref 0–8)
ERYTHROCYTE [DISTWIDTH] IN BLOOD BY AUTOMATED COUNT: 12.4 % (ref 11.5–14.5)
EST. GFR  (AFRICAN AMERICAN): >60 ML/MIN/1.73 M^2
EST. GFR  (NON AFRICAN AMERICAN): 59.3 ML/MIN/1.73 M^2
GLUCOSE SERPL-MCNC: 88 MG/DL (ref 70–110)
HCT VFR BLD AUTO: 38.6 % (ref 37–48.5)
HGB BLD-MCNC: 11.9 G/DL (ref 12–16)
IMM GRANULOCYTES # BLD AUTO: 0.01 K/UL (ref 0–0.04)
IMM GRANULOCYTES NFR BLD AUTO: 0.2 % (ref 0–0.5)
LYMPHOCYTES # BLD AUTO: 2.6 K/UL (ref 1–4.8)
LYMPHOCYTES NFR BLD: 42.4 % (ref 18–48)
MAGNESIUM SERPL-MCNC: 1.9 MG/DL (ref 1.6–2.6)
MCH RBC QN AUTO: 29.8 PG (ref 27–31)
MCHC RBC AUTO-ENTMCNC: 30.8 G/DL (ref 32–36)
MCV RBC AUTO: 97 FL (ref 82–98)
MONOCYTES # BLD AUTO: 0.5 K/UL (ref 0.3–1)
MONOCYTES NFR BLD: 8.4 % (ref 4–15)
NEUTROPHILS # BLD AUTO: 2.4 K/UL (ref 1.8–7.7)
NEUTROPHILS NFR BLD: 38.7 % (ref 38–73)
NRBC BLD-RTO: 0 /100 WBC
PHOSPHATE SERPL-MCNC: 3 MG/DL (ref 2.7–4.5)
PLATELET # BLD AUTO: 249 K/UL (ref 150–350)
PMV BLD AUTO: 10.7 FL (ref 9.2–12.9)
POTASSIUM SERPL-SCNC: 4 MMOL/L (ref 3.5–5.1)
PROT SERPL-MCNC: 6.4 G/DL (ref 6–8.4)
RBC # BLD AUTO: 3.99 M/UL (ref 4–5.4)
SODIUM SERPL-SCNC: 138 MMOL/L (ref 136–145)
WBC # BLD AUTO: 6.08 K/UL (ref 3.9–12.7)

## 2020-07-07 PROCEDURE — 92526 ORAL FUNCTION THERAPY: CPT

## 2020-07-07 PROCEDURE — 94761 N-INVAS EAR/PLS OXIMETRY MLT: CPT

## 2020-07-07 PROCEDURE — 97535 SELF CARE MNGMENT TRAINING: CPT

## 2020-07-07 PROCEDURE — 92507 TX SP LANG VOICE COMM INDIV: CPT

## 2020-07-07 PROCEDURE — 99232 PR SUBSEQUENT HOSPITAL CARE,LEVL II: ICD-10-PCS | Mod: ,,, | Performed by: INTERNAL MEDICINE

## 2020-07-07 PROCEDURE — 36415 COLL VENOUS BLD VENIPUNCTURE: CPT

## 2020-07-07 PROCEDURE — 97112 NEUROMUSCULAR REEDUCATION: CPT

## 2020-07-07 PROCEDURE — 97110 THERAPEUTIC EXERCISES: CPT

## 2020-07-07 PROCEDURE — 99232 SBSQ HOSP IP/OBS MODERATE 35: CPT | Mod: ,,, | Performed by: INTERNAL MEDICINE

## 2020-07-07 PROCEDURE — 83735 ASSAY OF MAGNESIUM: CPT

## 2020-07-07 PROCEDURE — 97530 THERAPEUTIC ACTIVITIES: CPT

## 2020-07-07 PROCEDURE — 85025 COMPLETE CBC W/AUTO DIFF WBC: CPT

## 2020-07-07 PROCEDURE — 84100 ASSAY OF PHOSPHORUS: CPT

## 2020-07-07 PROCEDURE — 99232 SBSQ HOSP IP/OBS MODERATE 35: CPT | Mod: ,,, | Performed by: NURSE PRACTITIONER

## 2020-07-07 PROCEDURE — 99232 PR SUBSEQUENT HOSPITAL CARE,LEVL II: ICD-10-PCS | Mod: ,,, | Performed by: NURSE PRACTITIONER

## 2020-07-07 PROCEDURE — 20600001 HC STEP DOWN PRIVATE ROOM

## 2020-07-07 PROCEDURE — 63600175 PHARM REV CODE 636 W HCPCS: Performed by: STUDENT IN AN ORGANIZED HEALTH CARE EDUCATION/TRAINING PROGRAM

## 2020-07-07 PROCEDURE — 80053 COMPREHEN METABOLIC PANEL: CPT

## 2020-07-07 PROCEDURE — 25000003 PHARM REV CODE 250: Performed by: INTERNAL MEDICINE

## 2020-07-07 RX ADMIN — HEPARIN SODIUM 5000 UNITS: 5000 INJECTION INTRAVENOUS; SUBCUTANEOUS at 03:07

## 2020-07-07 RX ADMIN — HEPARIN SODIUM 5000 UNITS: 5000 INJECTION INTRAVENOUS; SUBCUTANEOUS at 05:07

## 2020-07-07 RX ADMIN — ATORVASTATIN CALCIUM 80 MG: 20 TABLET, FILM COATED ORAL at 09:07

## 2020-07-07 RX ADMIN — ASPIRIN 81 MG: 81 TABLET, COATED ORAL at 10:07

## 2020-07-07 RX ADMIN — CLOPIDOGREL BISULFATE 75 MG: 75 TABLET ORAL at 10:07

## 2020-07-07 RX ADMIN — HEPARIN SODIUM 5000 UNITS: 5000 INJECTION INTRAVENOUS; SUBCUTANEOUS at 09:07

## 2020-07-07 NOTE — SUBJECTIVE & OBJECTIVE
Interval History 7/7/2020:  Patient is seen for follow-up PM&R evaluation and recommendations: Participating w/ therapy.    HPI, Past Medical, Family, and Social History remains the same as documented in the initial encounter.    Scheduled Medications:    aspirin  81 mg Oral Daily    atorvastatin  80 mg Oral QHS    clopidogreL  75 mg Oral Daily    heparin (porcine)  5,000 Units Subcutaneous Q8H       Diagnostic Results: Labs: Reviewed    PRN Medications: acetaminophen, melatonin, ondansetron, ondansetron, sodium chloride 0.9%    Review of Systems   Constitutional: Positive for activity change.   Respiratory: Negative for cough and shortness of breath.    Cardiovascular: Negative for chest pain and palpitations.   Musculoskeletal: Positive for gait problem.   Neurological: Positive for weakness.   Psychiatric/Behavioral: Positive for confusion.     Objective:     Vital Signs (Most Recent):  Temp: 98 °F (36.7 °C) (07/07/20 0525)  Pulse: 65 (07/07/20 0727)  Resp: 16 (07/07/20 0525)  BP: (!) 154/67 (07/07/20 0525)  SpO2: 98 % (07/07/20 0525)    Vital Signs (24h Range):  Temp:  [98 °F (36.7 °C)-99.6 °F (37.6 °C)] 98 °F (36.7 °C)  Pulse:  [60-92] 65  Resp:  [14-18] 16  SpO2:  [93 %-98 %] 98 %  BP: (133-154)/(60-70) 154/67     Physical Exam  Vitals signs reviewed.   Constitutional:       General: She is not in acute distress.     Appearance: She is well-developed.   HENT:      Head: Normocephalic and atraumatic.      Right Ear: External ear normal.      Left Ear: External ear normal.      Nose: Nose normal.   Eyes:      General:         Right eye: No discharge.         Left eye: No discharge.   Neck:      Musculoskeletal: Normal range of motion.   Cardiovascular:      Rate and Rhythm: Normal rate.   Pulmonary:      Effort: Pulmonary effort is normal. No respiratory distress.   Abdominal:      General: There is no distension.      Palpations: Abdomen is soft.      Tenderness: There is no abdominal tenderness.    Musculoskeletal: Normal range of motion.         General: Tenderness present.   Skin:     General: Skin is warm and dry.      Findings: No rash.   Neurological:      Mental Status: She is alert and oriented to person, place, and time.      Motor: Weakness present.   Psychiatric:         Behavior: Behavior normal.         Thought Content: Thought content normal.         Cognition and Memory: Cognition is impaired.       NEUROLOGICAL EXAMINATION:     MENTAL STATUS   Oriented to person, place, and time.

## 2020-07-07 NOTE — PLAN OF CARE
Problem: SLP Goal  Goal: SLP Goal  Description: Speech Language Pathology Goals  Goals expected to be met by 7/9:  1. Patient will tolerate a mechanical soft diet and thin liquids with no overt signs of airway compromise.   2. Patient will complete mild to moderate level problem solving tasks with 70% acc given mod A.   3. Patient will provide 6-10 items within concrete categories indep.   4. Patient will attend to structured therapy tasks for 3 minutes without redirect cues.   5. Patient will participate in ongoing assessment of R,W,and VS.     Outcome: Ongoing, Progressing     Goals remain appropriate.   Emily P. Abadie M.S., CCC-SLP  Speech Language Pathologist  [phone]  07/07/2020

## 2020-07-07 NOTE — PT/OT/SLP PROGRESS
"Occupational Therapy   Treatment    Name: Margarita Chávez  MRN: [id number]  Admitting Diagnosis:  Aneurysm of middle cerebral artery       Recommendations:     Discharge Recommendations: rehabilitation facility  Discharge Equipment Recommendations:  (TBD pending progress)  Barriers to discharge:  (not functioning at baseline)    Assessment:     Margarita Chávez is a 84 y.o. female with a medical diagnosis of Aneurysm of middle cerebral artery.  She presents with the following performance deficits affecting function: weakness, impaired balance, impaired self care skills, impaired functional mobilty, pain, impaired cardiopulmonary response to activity. Pt tolerated session well this date as noted in her ability to tolerate multiple STS transfers, ADLs EOB, and transfer to the bedside chair. Pt presented with overall improvements in strength and endurance requiring decreased assistance for functional tasks. Pt advanced to using RUE during ADLs with max encouragement and increased time 2/2 decreased ROM and strength in extremity. Pt progressing well towards OT goals. Pt motivated to return to PLOF. OT POC remains appropriate for patient on this date. Pt will continue to benefit from skilled OT to address the deficits affecting her occupational performance.     Rehab Prognosis:  Good; patient would benefit from acute skilled OT services to address these deficits and reach maximum level of function.       Plan:     Patient to be seen 4 x/week to address the above listed problems via self-care/home management, therapeutic activities, therapeutic exercises, neuromuscular re-education  · Plan of Care Expires: 08/04/20  · Plan of Care Reviewed with: patient    Subjective     Pain/Comfort: "I want to sit in that chair today."  · Pain Rating 1: 0/10  · Pain Rating Post-Intervention 1: (Pt c/o R knee pain while EOB; pt did not rate)    Objective:     Communicated with: RN prior to session.  Patient found HOB elevated with " telemetry upon OT entry to room.    General Precautions: Standard, fall   Orthopedic Precautions:N/A   Braces: N/A     Occupational Performance:     Bed Mobility:    · Patient completed Rolling/Turning to Right with contact guard assistance  · Patient completed Scooting/Bridging with minimum assistance for anterior hip scooting   · Patient completed Supine to Sit with contact guard assistance for safety    Functional Mobility/Transfers:  · Patient completed Sit <> Stand Transfer from bed with minimum assistance and of 2 persons  with  rolling walker  · On trial 1, pt stood to assess standing tolerance lasting ~40 secs without AD  · On trial 2, pt stood to don brief in standing lasting ~40 secs requiring min A for balance during task with RW  · On trial 3, pt stood to perform side steps along the bed.   · Patient completed Bed <> Chair Transfer using Stand Pivot technique with minimum assistance with rolling walker    Activities of Daily Living:  · Grooming: supervision in sitting for facial hygiene   · Increased time while encouraging reaching with RUE to grasp cloth anteriorly placed   · Upper Body Dressing: minimum assistance to fully place BUEs into frontward gown due to weakness  · Lower Body Dressing: supervision seated EOB to adjust RLE sock with LUE  · Toileting: contact guard assistance while weightbearing onto RUE to perform anterior pericare with LUE      AMPAC 6 Click ADL: 14    Treatment & Education:  - Role of OT/ OT POC  - Self care safety/ independence  - Functional transfer/ mobility safety  - Bed mobility safety  - Pursed lip breathing  - Importance of sitting UIC throughout the day as tolerated; importance of calling for nsg when ready to return to bed   - Energy conservation techniques such as pacing and taking rest breaking as needed   - Functional performance this date   - Pt sat EOB in between STS trials to perform self care requiring SPV for sitting balance.    Additional staff present: PT to  assist with skilled transfers and mobility     Patient left up in chair with all lines intact, call button in reach and RN notifiedEducation:      GOALS:   Multidisciplinary Problems     Occupational Therapy Goals        Problem: Occupational Therapy Goal    Goal Priority Disciplines Outcome Interventions   Occupational Therapy Goal     OT, PT/OT Ongoing, Progressing    Description: Goals to be met by: 7 days 7/10/2020     Patient will increase functional independence with ADLs by performing:    Pt to complete self feeding with set-up  Pt to complete g/h skills with set-up  Pt to complete UE dressing with MIN A   Pt to complete LE dressing with MIN A- progressing   Pt to complete t/f to BSC with MIN A   Pt to tolerate OOB to chair x 3 hours to increase endurance for functional activity.                      Time Tracking:     OT Date of Treatment: 07/07/20  OT Start Time: 0932  OT Stop Time: 1006  OT Total Time (min): 34 min co tx with PT     Billable Minutes:Self Care/Home Management 24  Therapeutic Activity 10    Viji Pan OT  7/7/2020

## 2020-07-07 NOTE — PHYSICIAN QUERY
PT Name: Margarita Chávez  MR #: [id number]    Physician Query Form - Myocardial Infarct Specificity     CDS/: Baylee Rose               Contact information:   This form is a permanent document in the medical record.     Query Date: July 7, 2020    By submitting this query, we are merely seeking further clarification of documentation.. Please utilize your independent clinical judgment when addressing the question(s) below.    The Medical record contains the following:   Indicators  Supporting Clinical Findings Location in Medical Record   x MI documented Given negative troponin and resolution of ST abnormalities on repeat EKG, suspect type II NSTEMI 2/2 demand ischemia.     STEMI (ST elevation myocardial infarction   HM note 7-7   x EKG/Cardiac Cath  EKG showed ST elevations in I and aVL that resolved on repeat EKG.    HM note 7-7   x Medications/Treatment Continue ASA/plavix, statin   H&P    Other       Please further specify the conflicting MI documentation.         [ x ] Nstemi evolved to ST elevation (STEMI) specify location___________________    [  ] Nstemi  secondary to demand ischemia    [  ] Nstemi    [  ] Other (please specify) __________________________    [  ] Clinically undetermined

## 2020-07-07 NOTE — PROGRESS NOTES
Hospital Medicine  Progress note    Team: [facility] [place] [place] HOSP MED A Robert Sharma MD  Admit Date: 7/1/2020  KYLEIGH 7/4/2020  Length of Stay:  LOS: 6 days   Code status: Full Code    Principal Problem:  Aneurysm of middle cerebral artery    HPI / Hospital Course     Interval hx:  7/07 Feeling well. Will ambulate today. Would like to go back to rehab, at [facility] [place] [place] medically ready for transfer.    ROS     Respiratory: neg for cough neg for shortness of breath  Cardiovascular: neg for chest pain neg for palpitations  Gastrointestinal: neg for nausea neg for vomiting, neg for abdominal pain neg for diarrhea neg for constipation   Behavioral/Psych: neg for depression neg for anxiety    PEx  Temp:  [98 °F (36.7 °C)-99.6 °F (37.6 °C)]   Pulse:  [60-85]   Resp:  [14-18]   BP: (133-154)/(60-70)   SpO2:  [93 %-98 %]   No intake or output data in the 24 hours ending 07/07/20 1018    General Appearance: no acute distress   Heart: regular rate and rhythm  Respiratory: Normal respiratory effort, no crackles   Abdomen: Soft, non-tender; bowel sounds active  Skin: intact.Skin intact  Neurologic:  No focal numbness or weakness  Mental status: Alert, oriented x 4, affect appropriate     Recent Labs   Lab 07/05/20  0815 07/06/20  0709 07/07/20  0433   WBC 5.36 5.11 6.08   HGB 13.3 11.9* 11.9*   HCT 43.1 37.1 38.6    264 249     Recent Labs   Lab 07/05/20  0815 07/06/20  0709 07/07/20  0433    138 138   K 4.0 4.0 4.0    105 105   CO2 25 26 25   BUN 28* 23 22   CREATININE 1.0 0.9 0.9   * 98 88   CALCIUM 10.7* 9.7 10.2   MG 1.9 1.9 1.9   PHOS 2.8 2.9 3.0     Recent Labs   Lab 07/01/20  1334  07/05/20  0815 07/06/20  0709 07/07/20  0433   ALKPHOS 67   < > 58 52* 57   ALT 28   < > 22 19 22   AST 27   < > 19 16 19   ALBUMIN 3.6   < > 3.6 3.2* 3.3*   PROT 7.5   < > 7.4 6.4 6.4   BILITOT 0.5   < > 0.6 0.5 0.5   INR 1.0  --   --   --   --     < > = values in this interval not displayed.        Recent Labs   Lab 07/01/20  8115    POCTGLUCOSE 102       Scheduled Meds:   aspirin  81 mg Oral Daily    atorvastatin  80 mg Oral QHS    clopidogreL  75 mg Oral Daily    heparin (porcine)  5,000 Units Subcutaneous Q8H     Continuous Infusions:  As Needed:  acetaminophen, melatonin, ondansetron, ondansetron, sodium chloride 0.9%        Active Hospital Problems    Diagnosis  POA    *Aneurysm of middle cerebral artery [I67.1]  Yes    Impaired mobility and ADLs [Z74.09]  Yes    ST elevation on ECG [R94.31]  Yes    SANTOSH (acute kidney injury) [N17.9]  Yes    Mixed hyperlipidemia [E78.2]  Yes    TIA (transient ischemic attack) [G45.9]  Yes    Uncontrolled hypertension [I10]  Yes      Resolved Hospital Problems   No resolved problems to display.       Assessment and Plan  / Problems managed today  ST elevation on ECG  83 yo F with no significant past cardiac history that presents with slurred speech, confusion, and facial droop. Patient had L arm pain, denies chest pain/SOB. EKG showed ST elevations in I and aVL that resolved on repeat EKG. Trop wnl. Patient with recent TIA c/b R-sided weakness. Per Vascular Neurology, patient's intracranial abnormalities are not contraindications for anticoagulation/ACS protocol. Given negative troponin and resolution of ST abnormalities on repeat EKG, suspect type II NSTEMI 2/2 demand ischemia. Symptoms more consistent with TIA/stroke.     Plan:  - Step down to Hosp Med  - Neuro checks q4hr  - Continue ASA, plavix  - Continue statin  - Hold on heparin gtt given negative troponin  - Consult Vascular Neuro, recs appreciated  - Obtain info on aneurysm hardware (ie MRI-compatible) for possible MRI brain  - Telemetry     SANTOSH (acute kidney injury)  Baseline sCr 0.8, sCr 2 on admission  No indications for HD at this time  Cr improving (2 > 1.3)     Plan:  - Trend Cr  - Strict I&Os  - Avoid nephrotoxic agents  - Renally adjust medications     Aneurysm of middle cerebral artery  TIA (transient ischemic attack)  Recently  suffered TIA with R-sided residual weakness  CTH negative for acute abnormalities, extensive chronic microvascular changes + small remote infarcts, stable calcified R MCA bifurcation aneurysm     Plan:  - Continue ASA, plavix, statin  - Vascular Neuro consulted, recs appreciated  - See above     STEMI (ST elevation myocardial infarction)  - ST elevation I and aVL left arm pain  - R rADIAL LHC +/- PCI     Mixed hyperlipidemia  Continue home statin     Uncontrolled hypertension  Hold home BP meds while normotensive               VTE Risk Mitigation (From admission, onward)                  Ordered        heparin (porcine) injection 5,000 Units  Every 8 hours      07/01/20 1702        IP VTE HIGH RISK PATIENT  Once      07/01/20 1454                 Goals of Care:  Return to prior functional status     Discharge plan:    Time (minutes) spent in care of the patient (Greater than 1/2 spent in direct face-to-face contact)  35 minutes    Robert Sharma MD   Cell [phone]

## 2020-07-07 NOTE — PLAN OF CARE
07/07/20 1335   Post-Acute Status   Post-Acute Authorization Placement   Post-Acute Placement Status Pending Payor Review     Pt accepted to return to [facility] pending insurance authorization.    Paulette Souza LMSW  [facility]  [id number]

## 2020-07-07 NOTE — PLAN OF CARE
Problem: Occupational Therapy Goal  Goal: Occupational Therapy Goal  Description: Goals to be met by: 7 days 7/10/2020     Patient will increase functional independence with ADLs by performing:    Pt to complete self feeding with set-up  Pt to complete g/h skills with set-up  Pt to complete UE dressing with MIN A   Pt to complete LE dressing with MIN A- progressing   Pt to complete t/f to BSC with MIN A   Pt to tolerate OOB to chair x 3 hours to increase endurance for functional activity.     Outcome: Ongoing, Progressing     Pt progressing well. OT POC remains appropriate for patient on this date. Pt will continue to benefit from skilled OT to address the deficits affecting her occupational performance.     Viji Pan OTR/L  7/7/20

## 2020-07-07 NOTE — PT/OT/SLP PROGRESS
Speech Language Pathology Treatment    Patient Name:  Margarita Chávez   MRN:  [id number]  Admitting Diagnosis: Aneurysm of middle cerebral artery    Recommendations:                 General Recommendations:  Dysphagia therapy, Speech/language therapy and Cognitive-linguistic therapy  Diet recommendations:  Regular, Liquid Diet Level: Thin   Aspiration Precautions: Standard aspiration precautions   General Precautions: Standard, fall  Communication strategies:  none    Subjective     Patient awake;alert. Son present.   Pain Rating 1: 0/10    Objective:     Has the patient been evaluated by SLP for swallowing?   Yes  Keep patient NPO? No   Current Respiratory Status: room air      Pt tolerated thin liquids via straw sips over 7oz and consumed 1 cracker with no overt signs of airway compromise. Patient remains on mechanical soft diet in epic though  appearing appropriate for diet advancement to regular solids and thin liquids at this time. Team secure chatted regarding recs.    Patient oriented x4 indep. Simple compare/contrast completed with 60% acc iindep improving to 100% acc given mod A.  Patient completed simple math/time tasks with 25% acc, improving to 50% acc given mod A and coaching.  Skilled education was provided to patient  re: diet recs, standard aspiration precautions of which to follow, and ongoing  plan of care.     Assessment:     Margarita Chávez is a 84 y.o. female with an SLP diagnosis of Cognitive-Linguistic Impairment.      Goals:   Multidisciplinary Problems     SLP Goals        Problem: SLP Goal    Goal Priority Disciplines Outcome   SLP Goal     SLP Ongoing, Progressing   Description: Speech Language Pathology Goals  Goals expected to be met by 7/9:  1. Patient will tolerate a mechanical soft diet and thin liquids with no overt signs of airway compromise.   2. Patient will complete mild to moderate level problem solving tasks with 70% acc given mod A.   3. Patient will provide 6-10 items  within concrete categories indep.   4. Patient will attend to structured therapy tasks for 3 minutes without redirect cues.   5. Patient will participate in ongoing assessment of R,W,and VS.                          Plan:     · Patient to be seen:  4 x/week   · Plan of Care expires:  08/02/20  · Plan of Care reviewed with:  patient   · SLP Follow-Up:  Yes       Discharge recommendations:  rehabilitation facility   Barriers to Discharge:  None    Time Tracking:     SLP Treatment Date:   07/07/20  Speech Start Time:  0855  Speech Stop Time:  0911     Speech Total Time (min):  16 min    Billable Minutes: Speech Therapy Individual 8 and Treatment Swallowing Dysfunction 8    Emily Abadie, CCC-SLP  07/07/2020

## 2020-07-07 NOTE — PLAN OF CARE
CM to bedside. Pt's sister and daughter at bedside. Updated patient and family on plan to discharge to [facility]. Explained we were waiting on authorization from [facility]. They are in agreement with plan for [facility].      Julie Haase RN  Case Management [phone]

## 2020-07-08 LAB
ALBUMIN SERPL BCP-MCNC: 3.2 G/DL (ref 3.5–5.2)
ALP SERPL-CCNC: 56 U/L (ref 55–135)
ALT SERPL W/O P-5'-P-CCNC: 23 U/L (ref 10–44)
ANION GAP SERPL CALC-SCNC: 9 MMOL/L (ref 8–16)
AST SERPL-CCNC: 20 U/L (ref 10–40)
BASOPHILS # BLD AUTO: 0.05 K/UL (ref 0–0.2)
BASOPHILS NFR BLD: 0.7 % (ref 0–1.9)
BILIRUB SERPL-MCNC: 0.4 MG/DL (ref 0.1–1)
BUN SERPL-MCNC: 18 MG/DL (ref 8–23)
CALCIUM SERPL-MCNC: 9.9 MG/DL (ref 8.7–10.5)
CHLORIDE SERPL-SCNC: 106 MMOL/L (ref 95–110)
CO2 SERPL-SCNC: 23 MMOL/L (ref 23–29)
CREAT SERPL-MCNC: 0.7 MG/DL (ref 0.5–1.4)
DIFFERENTIAL METHOD: ABNORMAL
EOSINOPHIL # BLD AUTO: 0.5 K/UL (ref 0–0.5)
EOSINOPHIL NFR BLD: 6.3 % (ref 0–8)
ERYTHROCYTE [DISTWIDTH] IN BLOOD BY AUTOMATED COUNT: 12.5 % (ref 11.5–14.5)
EST. GFR  (AFRICAN AMERICAN): >60 ML/MIN/1.73 M^2
EST. GFR  (NON AFRICAN AMERICAN): >60 ML/MIN/1.73 M^2
GLUCOSE SERPL-MCNC: 100 MG/DL (ref 70–110)
HCT VFR BLD AUTO: 36.5 % (ref 37–48.5)
HGB BLD-MCNC: 11.7 G/DL (ref 12–16)
IMM GRANULOCYTES # BLD AUTO: 0.02 K/UL (ref 0–0.04)
IMM GRANULOCYTES NFR BLD AUTO: 0.3 % (ref 0–0.5)
LYMPHOCYTES # BLD AUTO: 2.4 K/UL (ref 1–4.8)
LYMPHOCYTES NFR BLD: 33 % (ref 18–48)
MAGNESIUM SERPL-MCNC: 2 MG/DL (ref 1.6–2.6)
MCH RBC QN AUTO: 29.9 PG (ref 27–31)
MCHC RBC AUTO-ENTMCNC: 32.1 G/DL (ref 32–36)
MCV RBC AUTO: 93 FL (ref 82–98)
MONOCYTES # BLD AUTO: 0.6 K/UL (ref 0.3–1)
MONOCYTES NFR BLD: 7.8 % (ref 4–15)
NEUTROPHILS # BLD AUTO: 3.8 K/UL (ref 1.8–7.7)
NEUTROPHILS NFR BLD: 51.9 % (ref 38–73)
NRBC BLD-RTO: 0 /100 WBC
PHOSPHATE SERPL-MCNC: 3 MG/DL (ref 2.7–4.5)
PLATELET # BLD AUTO: 246 K/UL (ref 150–350)
PMV BLD AUTO: 10.3 FL (ref 9.2–12.9)
POTASSIUM SERPL-SCNC: 3.7 MMOL/L (ref 3.5–5.1)
PROT SERPL-MCNC: 6.5 G/DL (ref 6–8.4)
RBC # BLD AUTO: 3.91 M/UL (ref 4–5.4)
SODIUM SERPL-SCNC: 138 MMOL/L (ref 136–145)
WBC # BLD AUTO: 7.31 K/UL (ref 3.9–12.7)

## 2020-07-08 PROCEDURE — 99232 SBSQ HOSP IP/OBS MODERATE 35: CPT | Mod: ,,, | Performed by: INTERNAL MEDICINE

## 2020-07-08 PROCEDURE — 94761 N-INVAS EAR/PLS OXIMETRY MLT: CPT

## 2020-07-08 PROCEDURE — 85025 COMPLETE CBC W/AUTO DIFF WBC: CPT

## 2020-07-08 PROCEDURE — 83735 ASSAY OF MAGNESIUM: CPT

## 2020-07-08 PROCEDURE — 99232 PR SUBSEQUENT HOSPITAL CARE,LEVL II: ICD-10-PCS | Mod: ,,, | Performed by: INTERNAL MEDICINE

## 2020-07-08 PROCEDURE — 63600175 PHARM REV CODE 636 W HCPCS: Performed by: STUDENT IN AN ORGANIZED HEALTH CARE EDUCATION/TRAINING PROGRAM

## 2020-07-08 PROCEDURE — 80053 COMPREHEN METABOLIC PANEL: CPT

## 2020-07-08 PROCEDURE — 84100 ASSAY OF PHOSPHORUS: CPT

## 2020-07-08 PROCEDURE — 25000003 PHARM REV CODE 250: Performed by: INTERNAL MEDICINE

## 2020-07-08 PROCEDURE — 97129 THER IVNTJ 1ST 15 MIN: CPT

## 2020-07-08 PROCEDURE — 36415 COLL VENOUS BLD VENIPUNCTURE: CPT

## 2020-07-08 PROCEDURE — 20600001 HC STEP DOWN PRIVATE ROOM

## 2020-07-08 RX ADMIN — CLOPIDOGREL BISULFATE 75 MG: 75 TABLET ORAL at 08:07

## 2020-07-08 RX ADMIN — HEPARIN SODIUM 5000 UNITS: 5000 INJECTION INTRAVENOUS; SUBCUTANEOUS at 09:07

## 2020-07-08 RX ADMIN — ASPIRIN 81 MG: 81 TABLET, COATED ORAL at 08:07

## 2020-07-08 RX ADMIN — HEPARIN SODIUM 5000 UNITS: 5000 INJECTION INTRAVENOUS; SUBCUTANEOUS at 05:07

## 2020-07-08 RX ADMIN — HEPARIN SODIUM 5000 UNITS: 5000 INJECTION INTRAVENOUS; SUBCUTANEOUS at 01:07

## 2020-07-08 RX ADMIN — ATORVASTATIN CALCIUM 80 MG: 20 TABLET, FILM COATED ORAL at 09:07

## 2020-07-08 NOTE — PLAN OF CARE
Plan of care discussed with patient. Patient is free of fall/trauma/injury. Denies CP, SOB, or pain/discomfort. Patient remains on dysphagia mechanical soft diet. Ambulates with x1 assistance. Brief in place for incontinence. Afebrile. VSS. AAOx4. Neuro checks performed q4h. All questions addressed. Will continue to monitor

## 2020-07-08 NOTE — PHYSICIAN QUERY
PT Name: Margarita Chávez  MR #: [id number]    Physician Query Form - Myocardial Infarct Specificity     CDS/: Baylee Rose               Contact information: [email or web address]  This form is a permanent document in the medical record.     Query Date: July 8, 2020    By submitting this query, we are merely seeking further clarification of documentation.. Please utilize your independent clinical judgment when addressing the question(s) below.    The Medical record contains the following:   Indicators  Supporting Clinical Findings Location in Medical Record   X MI documented Given negative troponin and resolution of ST abnormalities on repeat EKG, suspect type II NSTEMI 2/2 demand ischemia.      STEMI (ST elevation myocardial infarction   HM note 7-7   X EKG/Cardiac Cath EKG showed ST elevations in I and aVL that resolved on repeat EKG.    HM note 7-7   X Medications/Treatment Continue ASA/plavix, statin   H&P    Other       Please specify type and location of myocardial infarction:         [  ] NSTEMI evolved ST elevation (STEMI)specify location    [  ] NSTEMI secondary to demand ischemia    [x  ] NSTEMI    [  ] Other (please specify) __________________________    [  ] Clinically undetermined         2. Location:     [  ] Anterior    [  ] Lateral  [  ] Anteroapical   [  ] Posterior  [  ] Anterolateral   [  ] Posterobasal  [  ] Anteroseptal   [  ] Posteroseptal  [  ] Apical-lateral   [  ] Septal  [  ] Basal-lateral   [  ] True  [  ] Inferio   [  ] Other (please specify) ________________________  [  ] Inferolateral   [x  ] Clinically undetermined  [  ] Inferoposterior  [  ] Inferoposterior transmural (Q wave)

## 2020-07-08 NOTE — PLAN OF CARE
Pt AAOx4, no c/o pain, no c/o sob, no obvious s/s of distress noted, reviewed care plan with Pt and Pt's sister, Pt and Pt's sister verbalized understanding, no falls experienced during shift, will continue to monitor Pt's condition

## 2020-07-08 NOTE — PROGRESS NOTES
Hospital Medicine  Progress note    [place]: [facility] A Robert Sharma MD  Admit Date: 7/1/2020  KYLEIGH 7/4/2020  Length of Stay:  LOS: 7 days   Code status: Full Code    Principal Problem:  Aneurysm of middle cerebral artery    HPI / Hospital Course     85 yo F with recent TIA (R sided residual weakness), cerebral aneurysm s/p clip w/ recent subacute increase in size, HTN, and HLD that presents with confusion, slurred speech, and facial droop.     Patient recently suffered TIA stroke. She was discharged to [facility] and progressing well. On morning of 7/1, son visited patient. During visit, patient showed some intermittent confusion that was different from day prior. He also noticed some slurred speech and facial droop. Patient was transferred to [facility] for evaluation of possible recurrent TIA and admitted to cardiology.    Symptoms have resolved back to her new baseline and is awaiting transfer back to rehab. Being followed by PM and R    Interval hx:  7/08 Feeling well. Will ambulate today. Would like to go back to rehab, at [facility] medically ready for transfer. Awaiting transfer to rehab. I have discussed discharge to home with both PT and family, they both feel she still needs in patient rehab.    ROS     Respiratory: neg for cough neg for shortness of breath  Cardiovascular: neg for chest pain neg for palpitations  Gastrointestinal: neg for nausea neg for vomiting, neg for abdominal pain neg for diarrhea neg for constipation   Behavioral/Psych: neg for depression neg for anxiety  Still reports some difficulty walking    PEx  Temp:  [96.3 °F (35.7 °C)-98 °F (36.7 °C)]   Pulse:  [57-86]   Resp:  [16-20]   BP: (131-163)/(65-77)   SpO2:  [92 %-99 %]   No intake or output data in the 24 hours ending 07/08/20 0805    Vitals signs reviewed.   Constitutional:       General: She is not in acute distress.     Appearance: She is well-developed.   HENT:      Head: Normocephalic and atraumatic.      Right Ear: External ear normal.       Left Ear: External ear normal.      Nose: Nose normal.   Eyes:      General:         Right eye: No discharge.         Left eye: No discharge.   Neck:      Musculoskeletal: Normal range of motion.   Cardiovascular:      Rate and Rhythm: Normal rate.   Pulmonary:      Effort: Pulmonary effort is normal. No respiratory distress.   Abdominal:      General: There is no distension.      Palpations: Abdomen is soft.      Tenderness: There is no abdominal tenderness.   Musculoskeletal: Normal range of motion.         General: Tenderness present.   Skin:     General: Skin is warm and dry.      Findings: No rash.   Neurological:      Mental Status: She is alert and oriented to person, place, and time.      Motor: Weakness present.   Psychiatric:         Behavior: Behavior normal.         Thought Content: Thought content normal.         Cognition and Memory: Cognition is impaired.    Recent Labs   Lab 07/06/20  0709 07/07/20  0433 07/08/20  0412   WBC 5.11 6.08 7.31   HGB 11.9* 11.9* 11.7*   HCT 37.1 38.6 36.5*    249 246     Recent Labs   Lab 07/06/20  0709 07/07/20  0433 07/08/20  0412    138 138   K 4.0 4.0 3.7    105 106   CO2 26 25 23   BUN 23 22 18   CREATININE 0.9 0.9 0.7   GLU 98 88 100   CALCIUM 9.7 10.2 9.9   MG 1.9 1.9 2.0   PHOS 2.9 3.0 3.0     Recent Labs   Lab 07/01/20  1334  07/06/20  0709 07/07/20  0433 07/08/20  0412   ALKPHOS 67   < > 52* 57 56   ALT 28   < > 19 22 23   AST 27   < > 16 19 20   ALBUMIN 3.6   < > 3.2* 3.3* 3.2*   PROT 7.5   < > 6.4 6.4 6.5   BILITOT 0.5   < > 0.5 0.5 0.4   INR 1.0  --   --   --   --     < > = values in this interval not displayed.        Recent Labs   Lab 07/01/20  1256   POCTGLUCOSE 102       Scheduled Meds:   aspirin  81 mg Oral Daily    atorvastatin  80 mg Oral QHS    clopidogreL  75 mg Oral Daily    heparin (porcine)  5,000 Units Subcutaneous Q8H     Continuous Infusions:  As Needed:  acetaminophen, melatonin, ondansetron, ondansetron, sodium chloride  0.9%        Active Hospital Problems    Diagnosis  POA    *Aneurysm of middle cerebral artery [I67.1]  Yes    Impaired mobility and ADLs [Z74.09]  Yes    ST elevation on ECG [R94.31]  Yes    SANTOSH (acute kidney injury) [N17.9]  Yes    Mixed hyperlipidemia [E78.2]  Yes    TIA (transient ischemic attack) [G45.9]  Yes    Uncontrolled hypertension [I10]  Yes      Resolved Hospital Problems   No resolved problems to display.       Assessment and Plan  / Problems managed today  ST elevation on ECG  83 yo F with no significant past cardiac history that presents with slurred speech, confusion, and facial droop. Patient had L arm pain, denies chest pain/SOB. EKG showed ST elevations in I and aVL that resolved on repeat EKG. Trop wnl. Patient with recent TIA c/b R-sided weakness. Per Vascular Neurology, patient's intracranial abnormalities are not contraindications for anticoagulation/ACS protocol. Given negative troponin and resolution of ST abnormalities on repeat EKG, suspect type II NSTEMI 2/2 demand ischemia. Symptoms more consistent with TIA/stroke.     Plan:    - Neuro checks q4hr  - Continue ASA, plavix  - Continue statin  - Hold on heparin gtt given negative troponin  - Consult Vascular Neuro, recs appreciated  - Obtain info on aneurysm hardware (ie MRI-compatible) for possible MRI brain  - Telemetry     SANTOSH (acute kidney injury)  Baseline sCr 0.8, sCr 2 on admission  No indications for HD at this time  Cr improving (2 > 1.3)     Plan:  - Trend Cr  - Strict I&Os  - Avoid nephrotoxic agents  - Renally adjust medications     Aneurysm of middle cerebral artery  TIA (transient ischemic attack)  Recently suffered TIA with R-sided residual weakness  CTH negative for acute abnormalities, extensive chronic microvascular changes + small remote infarcts, stable calcified R MCA bifurcation aneurysm     Plan:  - Continue ASA, plavix, statin  - Vascular Neuro consulted, recs appreciated  - See above  Transfer back to  Rehab     STEMI (ST elevation myocardial infarction)  - ST elevation I and aVL left arm pain  - R rADIAL LHC +/- PCI     Mixed hyperlipidemia  Continue home statin     Uncontrolled hypertension  Hold home BP meds while normotensive               VTE Risk Mitigation (From admission, onward)                  Ordered        heparin (porcine) injection 5,000 Units  Every 8 hours      07/01/20 1702        IP VTE HIGH RISK PATIENT  Once      07/01/20 1454                 Goals of Care:  Return to prior functional status     [place] [facility]    Time (minutes) spent in care of the patient (Greater than 1/2 spent in direct face-to-face contact)  35 minutes    Robert Sharma MD   Cell [phone]

## 2020-07-08 NOTE — PLAN OF CARE
Problem: SLP Goal  Goal: SLP Goal  Description: Speech Language Pathology Goals  Goals expected to be met by 7/9:  1. Patient will tolerate a mechanical soft diet and thin liquids with no overt signs of airway compromise.   2. Patient will complete mild to moderate level problem solving tasks with 70% acc given mod A.   3. Patient will provide 6-10 items within concrete categories indep.   4. Patient will attend to structured therapy tasks for 3 minutes without redirect cues.   5. Patient will participate in ongoing assessment of R,W,and VS.     Outcome: Ongoing, Progressing     Goals remain appropriate.   Emily P. Abadie M.S., CCC-SLP  Speech Language Pathologist  [phone]  07/08/2020

## 2020-07-08 NOTE — PT/OT/SLP PROGRESS
Speech Language Pathology Treatment    Patient Name:  Margarita Chávez   MRN:  [id number]  Admitting Diagnosis: Aneurysm of middle cerebral artery    Recommendations:                 General Recommendations:  Cognitive-linguistic therapy  Diet recommendations:  Regular, Liquid Diet Level: Thin   Aspiration Precautions: Standard aspiration precautions   General Precautions: Standard, fall  Communication strategies:  none    Subjective     Patient awake;alert.     Objective:     Has the patient been evaluated by SLP for swallowing?   Yes  Keep patient NPO? No   Current Respiratory Status: room air      Patient oriented x4 indep.  Patient provided ~6 items within concrete categories indep, improving to 10-11 given semantic cues.  Patient provided 2 abstract category items, improving to ~5 given semantic cues. Patient complete simple money calculation problem solving tasks with 0% acc improving to 60% given max A.     Assessment:     Margarita Chávez is a 84 y.o. female with an SLP diagnosis of Cognitive-Linguistic Impairment.      Goals:   Multidisciplinary Problems     SLP Goals        Problem: SLP Goal    Goal Priority Disciplines Outcome   SLP Goal     SLP Ongoing, Progressing   Description: Speech Language Pathology Goals  Goals expected to be met by 7/9:  1. Patient will tolerate a mechanical soft diet and thin liquids with no overt signs of airway compromise.   2. Patient will complete mild to moderate level problem solving tasks with 70% acc given mod A.   3. Patient will provide 6-10 items within concrete categories indep.   4. Patient will attend to structured therapy tasks for 3 minutes without redirect cues.   5. Patient will participate in ongoing assessment of R,W,and VS.                          Plan:     · Patient to be seen:  4 x/week   · Plan of Care expires:  08/02/20  · Plan of Care reviewed with:  patient   · SLP Follow-Up:  Yes       Discharge recommendations:  rehabilitation facility   Barriers to  Discharge:  None    Time Tracking:     SLP Treatment Date:   07/07/20  Speech Start Time:  0855  Speech Stop Time:  0911     Speech Total Time (min):  16 min    Billable Minutes: Speech Therapy Individual 16    Emily Abadie, CCC-GHISLAINE  07/08/2020

## 2020-07-09 LAB
ALBUMIN SERPL BCP-MCNC: 3.5 G/DL (ref 3.5–5.2)
ALP SERPL-CCNC: 66 U/L (ref 55–135)
ALT SERPL W/O P-5'-P-CCNC: 29 U/L (ref 10–44)
ANION GAP SERPL CALC-SCNC: 10 MMOL/L (ref 8–16)
AST SERPL-CCNC: 22 U/L (ref 10–40)
BASOPHILS # BLD AUTO: 0.04 K/UL (ref 0–0.2)
BASOPHILS NFR BLD: 0.6 % (ref 0–1.9)
BILIRUB SERPL-MCNC: 0.5 MG/DL (ref 0.1–1)
BUN SERPL-MCNC: 14 MG/DL (ref 8–23)
CALCIUM SERPL-MCNC: 10.4 MG/DL (ref 8.7–10.5)
CHLORIDE SERPL-SCNC: 107 MMOL/L (ref 95–110)
CO2 SERPL-SCNC: 23 MMOL/L (ref 23–29)
CREAT SERPL-MCNC: 0.8 MG/DL (ref 0.5–1.4)
DIFFERENTIAL METHOD: ABNORMAL
EOSINOPHIL # BLD AUTO: 0.5 K/UL (ref 0–0.5)
EOSINOPHIL NFR BLD: 7.6 % (ref 0–8)
ERYTHROCYTE [DISTWIDTH] IN BLOOD BY AUTOMATED COUNT: 12.6 % (ref 11.5–14.5)
EST. GFR  (AFRICAN AMERICAN): >60 ML/MIN/1.73 M^2
EST. GFR  (NON AFRICAN AMERICAN): >60 ML/MIN/1.73 M^2
GLUCOSE SERPL-MCNC: 80 MG/DL (ref 70–110)
HCT VFR BLD AUTO: 40.2 % (ref 37–48.5)
HGB BLD-MCNC: 12.6 G/DL (ref 12–16)
IMM GRANULOCYTES # BLD AUTO: 0.01 K/UL (ref 0–0.04)
IMM GRANULOCYTES NFR BLD AUTO: 0.2 % (ref 0–0.5)
LYMPHOCYTES # BLD AUTO: 2 K/UL (ref 1–4.8)
LYMPHOCYTES NFR BLD: 32.3 % (ref 18–48)
MAGNESIUM SERPL-MCNC: 1.9 MG/DL (ref 1.6–2.6)
MCH RBC QN AUTO: 29.7 PG (ref 27–31)
MCHC RBC AUTO-ENTMCNC: 31.3 G/DL (ref 32–36)
MCV RBC AUTO: 95 FL (ref 82–98)
MONOCYTES # BLD AUTO: 0.5 K/UL (ref 0.3–1)
MONOCYTES NFR BLD: 7.6 % (ref 4–15)
NEUTROPHILS # BLD AUTO: 3.3 K/UL (ref 1.8–7.7)
NEUTROPHILS NFR BLD: 51.7 % (ref 38–73)
NRBC BLD-RTO: 0 /100 WBC
PHOSPHATE SERPL-MCNC: 3 MG/DL (ref 2.7–4.5)
PLATELET # BLD AUTO: 270 K/UL (ref 150–350)
PMV BLD AUTO: 10.6 FL (ref 9.2–12.9)
POTASSIUM SERPL-SCNC: 3.8 MMOL/L (ref 3.5–5.1)
PROT SERPL-MCNC: 7 G/DL (ref 6–8.4)
RBC # BLD AUTO: 4.24 M/UL (ref 4–5.4)
SODIUM SERPL-SCNC: 140 MMOL/L (ref 136–145)
WBC # BLD AUTO: 6.29 K/UL (ref 3.9–12.7)

## 2020-07-09 PROCEDURE — 36415 COLL VENOUS BLD VENIPUNCTURE: CPT

## 2020-07-09 PROCEDURE — 97530 THERAPEUTIC ACTIVITIES: CPT

## 2020-07-09 PROCEDURE — 94761 N-INVAS EAR/PLS OXIMETRY MLT: CPT

## 2020-07-09 PROCEDURE — 63600175 PHARM REV CODE 636 W HCPCS: Performed by: STUDENT IN AN ORGANIZED HEALTH CARE EDUCATION/TRAINING PROGRAM

## 2020-07-09 PROCEDURE — 84100 ASSAY OF PHOSPHORUS: CPT

## 2020-07-09 PROCEDURE — 85025 COMPLETE CBC W/AUTO DIFF WBC: CPT

## 2020-07-09 PROCEDURE — 92526 ORAL FUNCTION THERAPY: CPT

## 2020-07-09 PROCEDURE — 97110 THERAPEUTIC EXERCISES: CPT

## 2020-07-09 PROCEDURE — 80053 COMPREHEN METABOLIC PANEL: CPT

## 2020-07-09 PROCEDURE — 97535 SELF CARE MNGMENT TRAINING: CPT

## 2020-07-09 PROCEDURE — 83735 ASSAY OF MAGNESIUM: CPT

## 2020-07-09 PROCEDURE — 92507 TX SP LANG VOICE COMM INDIV: CPT

## 2020-07-09 PROCEDURE — 99232 PR SUBSEQUENT HOSPITAL CARE,LEVL II: ICD-10-PCS | Mod: ,,, | Performed by: HOSPITALIST

## 2020-07-09 PROCEDURE — 20600001 HC STEP DOWN PRIVATE ROOM

## 2020-07-09 PROCEDURE — 25000003 PHARM REV CODE 250: Performed by: INTERNAL MEDICINE

## 2020-07-09 PROCEDURE — 99232 SBSQ HOSP IP/OBS MODERATE 35: CPT | Mod: ,,, | Performed by: HOSPITALIST

## 2020-07-09 RX ORDER — AMLODIPINE BESYLATE 10 MG/1
10 TABLET ORAL DAILY
Status: DISCONTINUED | OUTPATIENT
Start: 2020-07-10 | End: 2020-07-17 | Stop reason: HOSPADM

## 2020-07-09 RX ADMIN — HEPARIN SODIUM 5000 UNITS: 5000 INJECTION INTRAVENOUS; SUBCUTANEOUS at 01:07

## 2020-07-09 RX ADMIN — ATORVASTATIN CALCIUM 80 MG: 20 TABLET, FILM COATED ORAL at 09:07

## 2020-07-09 RX ADMIN — ASPIRIN 81 MG: 81 TABLET, COATED ORAL at 08:07

## 2020-07-09 RX ADMIN — HEPARIN SODIUM 5000 UNITS: 5000 INJECTION INTRAVENOUS; SUBCUTANEOUS at 06:07

## 2020-07-09 RX ADMIN — CLOPIDOGREL BISULFATE 75 MG: 75 TABLET ORAL at 08:07

## 2020-07-09 RX ADMIN — HEPARIN SODIUM 5000 UNITS: 5000 INJECTION INTRAVENOUS; SUBCUTANEOUS at 09:07

## 2020-07-09 NOTE — PLAN OF CARE
"Notified this morning that [facility] has denied inpatient rehab. Spoke with Pauline MENDEZ from [facility] , who requested additional clinic information, including therapy notes.    Requested information hard faxed per her request to [phone].      Your fax has been successfully sent to [phone] at [phone].  ------------------------------------------------------------  From: jhaase  ------------------------------------------------------------  7/9/2020 10:58:31 AM Transmission Record  Sent to [phone] with remote ID "[email or web address]"  Result: (0/339;0/0) Success  Page record: 1 - 12  Elapsed time: 04:26 on channel 11          Julie Haase RN  Case Management [phone]    "

## 2020-07-09 NOTE — PLAN OF CARE
07/09/20 1651   Discharge Reassessment   Assessment Type Discharge Planning Reassessment   Provided patient/caregiver education on the expected discharge date and the discharge plan Yes   Do you have any problems affording any of your prescribed medications? No   Discharge Plan A Rehab   Discharge Plan B Skilled Nursing Facility   DME Needed Upon Discharge  none   Anticipated Discharge Disposition Rehab   Can the patient/caregiver answer the patient profile reliably? Yes, cognitively intact   Describe the patient's ability to walk at the present time. Major restrictions/daily assistance from another person     Julie Haase RN  Case Management [phone]     normal...

## 2020-07-09 NOTE — PT/OT/SLP PROGRESS
"Physical Therapy  Treatment    Margarita Chávez   [id number]    Time Tracking:     PT Received On: 07/09/20   PT Start Time: 0945   PT Stop Time: 1010   PT Total Time (min): 25 min    Billable Minutes: Therapeutic Activity 15 and Therapeutic Exercise 10      Recommendations:     Discharge recommendations: Inpatient Rehabilitation (this patient can tolerate 3 hours/day of therapist, she is very motivated, even commenting "I've been able to walk for 84 years, not being able to do so now is killing me mentally"     Equipment recommendations: Rolling Walker and Bedside Commode    Barriers to Discharge: Not ready to discharge home from a mobility standpoint, needs further therapy.    Patient Information:     Recent Surgery: Procedure(s) (LRB):  Left heart cath (Left)      Diagnosis: Aneurysm of middle cerebral artery    Length of Stay: 8 days    General Precautions: Standard, fall  Orthopedic Precautions: None   Brace: None    Assessment:     Margarita Chávez tolerated treatment well today. She was sitting up in chair with no family present upon my entry to room. She is very motivated to participate, stating "I've been able to walk for 84 years and now I can't, I need to get going." She is requiring mod A to stand from BS chair to RW x 3 trials today, I did place a sofa cushion under patient to raise height for standing in future trials. We attempted gait facilitation forward with RW but she is having some arthritic pain at R knee in weightbearing, which is her primary limitation as well as fatigue for gait. Decreased stance time on R, cueing for 3-pt sequence for 1-2 steps fwd today then needing sit break. Participated in various LE therex in chair, motivated to participate and performs more reps than asked. I'm confident this patient can participate in 3 hours/day of PT/OT/SLP at  rehab; she is typically independent and very motivated to return to this PLOF. Discussed PT role, POC, goals and recommendations (Inpatient " Rehabilitation) with patient and/or family; verbalized understanding. Margarita Chávez will continue to benefit from acute PT services to promote mobility during this admission and improve return to PLOF.    Problem List: weakness, decreased endurance, impaired self-care skills, impaired mobility, decreased sitting or standing balance, gait instability and pain    Rehab Prognosis: Good; patient would benefit from acute skilled PT services to address these deficits and reach maximum level of function.    Plan:     Patient to be seen 4 x/week to address the above listed problems via therapeutic activities, gait training, therapeutic exercises, neuromuscular re-education    Plan of Care Expires: 08/01/20  Plan of Care reviewed with: patient    Subjective:     Communicated with RN prior to treatment, appropriate to see for treatment.    Pt found reclined in bedside chair upon PT entry to room, agreeable to treatment.    Does this patient have any cultural, spiritual, Zoroastrianism conflicts given the current situation? Patient/family has no barriers to learning. Patient/family verbalizes understanding of his/her program and goals and demonstrates them correctly. No cultural, spiritual, or educational needs identified.    Objective:     Patient found with: telemetry    Pain:  Pain Rating 1: 6/10 at R generalized knee  Pain Rating Post-Intervention 1: 7/10 at R generalized knee    Functional Mobility:    · Bed Mobility:  · NT; OOBTC before and after session    · Transfers:  · Sit to Stand: mod A from BS chair with RW x 3 trial(s)    · Gait:  · 1-2 steps fwd with RW and min-mod A, limited by R knee buckling and pain 2* arthritis. Cueing for 3-pt gait sequencing, using UE on RW for increased support to offload RLE. Also limited by fatigue    · Assist level: Mod Assist  · Device: Rolling walker    · Balance:  · Static Sit: Stand-By Assist at edge of chair    · Static Stand: Contact-Guard Assist with Rolling walker for 3 trials  "of 30-45 seconds on each    Additional Therapeutic Activity/Exercises:     1. She is very motivated to participate, stating "I've been able to walk for 84 years and now I can't, I need to get going." She is requiring mod A to stand from BS chair to RW x 3 trials today, I did place a sofa cushion under patient to raise height for standing in future trials.    2. Participated in various LE therex in chair, motivated to participate and performs more reps than asked.   A. Alternated marching in sitting x 20 reps (stronger on L than R)   B. LAQ 2 x 10 reps at either leg (Stronger on L than R but gets full ext at either knee)    3. I'm confident this patient can participate in 3 hours/day of PT/OT/SLP at  rehab; she is typically independent and very motivated to return to this PLOF.    4. Discussed PT role, POC, goals and recommendations (Inpatient Rehabilitation) with patient and/or family; verbalized understanding.    5. Whiteboard was updated.    AM-PAC 6 CLICK MOBILITY  Turning over in bed (including adjusting bedclothes, sheets and blankets)?: 3  Sitting down on and standing up from a chair with arms (e.g., wheelchair, bedside commode, etc.): 3  Moving from lying on back to sitting on the side of the bed?: 3  Moving to and from a bed to a chair (including a wheelchair)?: 2  Need to walk in hospital room?: 2  Climbing 3-5 steps with a railing?: 1  Basic Mobility Total Score: 14    Patient was left reclined in bedside chair with all lines intact and call button in reach.    GOALS:   Multidisciplinary Problems     Physical Therapy Goals        Problem: Physical Therapy Goal    Goal Priority Disciplines Outcome Goal Variances Interventions   Physical Therapy Goal     PT, PT/OT Ongoing, Progressing     Description: Goals to be met by: 20    Patient will increase functional independence with mobility by performin. Supine <>sit with Minimal Assistance -not met  2. Sit to stand transfer with Minimal Assistance " -not met  3. Bed to chair transfer with Moderate Assistance -met (7/7/2020)  3a. New Goal (7/7/2020): Bed<>chair transfer with Carly, using RW. Not met  4. Gait  x 10 feet with Moderate Assistance using AD- not met                 Ryan Chavis, PT   7/9/2020

## 2020-07-09 NOTE — PLAN OF CARE
Problem: SLP Goal  Goal: SLP Goal  Description: Speech Language Pathology Goals  Goals expected to be met by 7/9:  1. Patient will tolerate a mechanical soft diet and thin liquids with no overt signs of airway compromise.   2. Patient will complete mild to moderate level problem solving tasks with 70% acc given mod A.   3. Patient will provide 6-10 items within concrete categories indep.   4. Patient will attend to structured therapy tasks for 3 minutes without redirect cues.   5. Patient will participate in ongoing assessment of R,W,and VS.   Outcome: Ongoing, Progressing    Rec advance diet to regular solids with thin liquids with aspiration precautions. ELBA Chaney, CCC/SLP  7/9/2020

## 2020-07-09 NOTE — PLAN OF CARE
Pt free of falls and injury. Fall precautions remain in place. Pt remains on room air. SB-NSR on telemetry. Educated pt on importance of accurate I/Os. Plan of care reviewed with pt, all questions addressed. Pt resting comfortably with no complaints of pain. Pt VSS, no distress, will continue to monitor.

## 2020-07-09 NOTE — PLAN OF CARE
"Margarita Chávez tolerated treatment well today. She was sitting up in chair with no family present upon my entry to room. She is very motivated to participate, stating "I've been able to walk for 84 years and now I can't, I need to get going." She is requiring mod A to stand from BS chair to RW x 3 trials today, I did place a sofa cushion under patient to raise height for standing in future trials. We attempted gait facilitation forward with RW but she is having some arthritic pain at R knee in weightbearing, which is her primary limitation as well as fatigue for gait. Decreased stance time on R, cueing for 3-pt sequence for 1-2 steps fwd today then needing sit break. Participated in various LE therex in chair, motivated to participate and performs more reps than asked. I'm confident this patient can participate in 3 hours/day of PT/OT/SLP at IP rehab; she is typically independent and very motivated to return to this PLOF. Discussed PT role, POC, goals and recommendations (Inpatient Rehabilitation) with patient and/or family; verbalized understanding. Margarita Chávez will continue to benefit from acute PT services to promote mobility during this admission and improve return to PLOF.    Problem: Physical Therapy Goal  Goal: Physical Therapy Goal  Description: Goals to be met by: 20    Patient will increase functional independence with mobility by performin. Supine <>sit with Minimal Assistance -not met  2. Sit to stand transfer with Minimal Assistance -not met  3. Bed to chair transfer with Moderate Assistance -met (2020)  3a. New Goal (2020): Bed<>chair transfer with Carly, using RW. Not met  4. Gait  x 10 feet with Moderate Assistance using AD- not met  Outcome: Ongoing, Progressing    Ryan Chavis, PT  2020  "

## 2020-07-09 NOTE — PLAN OF CARE
Pt maintained free from falls/ trauma/ injuries and skin breakdown. She was complaint of some minor pain on the left elbow, hot pack applied and helped. She worked with PT/OT this am, sit in chairs for meals and get up to the bedside commode for bowel movement. This shift, her IV was  and taken out, she refused to get more sticks and therefore currently have no IV access. Visi applied.  is working on discharging her to SNF. Plan of care reviewed. Pt verbalized understanding. All questions and concerns addressed. Will continue to monitor.

## 2020-07-09 NOTE — PT/OT/SLP PROGRESS
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 I have teeth!"     Pain/Comfort:  · Pain Rating 1: 0/10  · Pain Rating Post-Intervention 1: 0/10    Objective:     Has the patient been evaluated by SLP for swallowing?   Yes  Keep patient NPO? No   Current Respiratory Status: room air      Pt seen bedside, requesting assistance with set up of breakfast tray  Pt verbalized displeasure with continued mechanical soft solids.  Previous SLP recs are for regular/thin.  Pt observed self feeding mechanical soft breakfast with no overt s/s aspiration.  Mild oral stasis noted though pt indptly using good intake management including slow rate, small bites and liquid wash to clear.  R ec advancing diet to regular.  No significant visual deficits noted in function as pt able to locate all items on tray and read menu indptly.  Fair recall of recent events evident with appropriate problem solving with set up of tray and R hand weakness.  She was able to name 8 items in a concrete cat given increased time and min encouragement.  10 items provided given min A.  SLP reviewed ongoing SLP POC and swallow precs.  She verbalized understanding.       Assessment:     Margarita Chávez is a 84 y.o. female with an SLP diagnosis of Cognitive-Linguistic Impairment.      Goals:   Multidisciplinary Problems     SLP Goals        Problem: SLP Goal    Goal Priority Disciplines Outcome   SLP Goal     SLP Ongoing, Progressing   Description: Speech Language " Pathology Goals  Goals expected to be met by 7/9:  1. Patient will tolerate a mechanical soft diet and thin liquids with no overt signs of airway compromise.   2. Patient will complete mild to moderate level problem solving tasks with 70% acc given mod A.   3. Patient will provide 6-10 items within concrete categories indep.   4. Patient will attend to structured therapy tasks for 3 minutes without redirect cues.   5. Patient will participate in ongoing assessment of R,W,and VS.                          Plan:     · Patient to be seen:  4 x/week   · Plan of Care expires:  08/02/20  · Plan of Care reviewed with:  patient   · SLP Follow-Up:  Yes       Discharge recommendations:  rehabilitation facility   Barriers to Discharge:  Level of Skilled Assistance Needed      Time Tracking:     SLP Treatment Date:   07/09/20  Speech Start Time:  0732  Speech Stop Time:  0749     Speech Total Time (min):  17 min    Billable Minutes: Speech Therapy Individual 9 and Treatment Swallowing Dysfunction 8    ELBA Chaney, CCC-SLP  07/09/2020

## 2020-07-09 NOTE — NURSING
Pt's IV is 4 days past due. she refuse to be stuck and get a new one. The IV is still working but it is hurting her when i flush it. She does not have any IV medication and is waiting to be discharge to SNF. (not today).     Team notified and Dr. Lindsay instructed to take the IV out. Pt is now have no IV access.

## 2020-07-09 NOTE — PT/OT/SLP PROGRESS
Occupational Therapy   Treatment    Name: Margarita Chávez  MRN: [id number]  Admitting Diagnosis:  Aneurysm of middle cerebral artery       Recommendations:     Discharge Recommendations: rehabilitation facility  Discharge Equipment Recommendations:  (TBD pending progress)  Barriers to discharge:  (not functioning at baseline)    Assessment:     Margarita Chávez is a 84 y.o. female with a medical diagnosis of Aneurysm of middle cerebral artery.  She presents with the following performance deficits affecting function are weakness, impaired balance, impaired endurance, pain, impaired self care skills, impaired functional mobilty, decreased ROM. Pt tolerated session well this date as noted in pt's transferring ability, ADL performance, and functional mobility. Pt performed ADLs EOB with min A with verbal cues to utilize RUE during bimanual tasks. Pt limited in functional mobility due to R knee affecting her ability to progress towards step transfer goal. Pt continues to require verbal cues on joaquin technique for UB dressing. PTA, pt mod I for ADLs and mobility and now requires increased assistance for self care and mobility. Due to this change in functional performance, pt's overall progress in therapy, and social support, pt remains a good rehab candidate. Pt has participated well and demonstrates gooD endurance to tolerate therapy sessions. Pt required rest break for pain alleviation; however, recovers quickly with motivation present to continue with task at hand. Pt's motivation and endurance makes pt a good candidate to tolerate 3 hrs required at an IPR. Pt will continue to benefit from skilled OT to endure pt builds strength and endurance to assist pt in returning to normal roles and routines.     Rehab Prognosis:  Good; patient would benefit from acute skilled OT services to address these deficits and reach maximum level of function.       Plan:     Patient to be seen 4 x/week to address the above listed problems via  self-care/home management, therapeutic activities, therapeutic exercises, neuromuscular re-education  · Plan of Care Expires: 08/04/20  · Plan of Care Reviewed with: patient    Subjective     Pain/Comfort:  · Pain Rating 1: (pt did not rate; c/o L shoulder pain and R knee pain)  · Pain Addressed 1: Pre-medicate for activity, Reposition  · Pain Rating Post-Intervention 1: (pain remained)  · Pain Addressed 2: Cessation of Activity, Nurse notified    Objective:     Communicated with: RN prior to session.  Patient found HOB elevated with telemetry upon OT entry to room.    General Precautions: Standard, fall   Orthopedic Precautions:N/A   Braces: N/A     Occupational Performance:     Bed Mobility:    · Patient completed Rolling/Turning to Right with stand by assistance  · Patient completed Scooting/Bridging with minimum assistance for anterior hip scooting   · Patient completed Supine to Sit with stand by assistance with HOB elevated      Functional Mobility/Transfers:  · Patient completed Sit <> Stand Transfer from bed with minimum assistance and of 2 persons  with  rolling walker   · Mod A for sit<>stand from the chair with RW: Verbal cues for upright posture in standing; Tactile cue for placement of RUE onto walker   · Patient completed Bed <> Chair Transfer using Stand Pivot technique with minimum assistance and of 2 persons with no assistive device  · Verbal cues for BLE placement to widen RINKU for safety with static standing; R knee buckling noted once  · Functional Mobility: Pt took one step from the bed with RW and max A due to knee pain, narrow stance, and impaired balance. Encouraged pushing through BUEs onto walker to make weight shifting of BLEs easier.     Activities of Daily Living:  · Grooming: minimum assistance for verbal cues to utilize RUE during routine: Pt brushed teeth with LUE while applying tooth paste onto the brush with the RUE  · Upper Body Dressing: moderate assistance to don gown as jacket  using joaquin technqiue; difficulty noted iwth lifting LUE around to pull extremity into gown  · Toileting: brief donned upon arrival       [facility] 6 Click ADL: 14    Treatment & Education:  - Role of OT/ OT POC  - Self care safety/ independence  - Functional transfer/ mobility safety  - Bed mobility safety  - Importance of sitting UIC throughout the day as tolerated; pt reported compliance   - Energy conservation techniques such as pacing and taking rest breaks as needed  - Pt completed multiple functional tasks with the A level listed above   - [place]an wrapped washcloth made to improve pt's RUE  strength: Ex's taught along with placing cloth onto table for mild anterior leaning to roll cloth while placing weight with RUE.     Additional staff present: Tech to transfer and mobility assistance     Patient left up in chair with all lines intact, call button in reach and RN notifiedEducation:      GOALS:   Multidisciplinary Problems     Occupational Therapy Goals        Problem: Occupational Therapy Goal    Goal Priority Disciplines Outcome Interventions   Occupational Therapy Goal     OT, PT/OT Ongoing, Progressing    Description: Goals to be met by: 7 days 7/10/2020     Patient will increase functional independence with ADLs by performing:    Pt to complete self feeding with set-up  Pt to complete g/h skills with set-up progressing  Pt to complete UE dressing with MIN A progressing   Pt to complete LE dressing with MIN A- progressing   Pt to complete t/f to BSC with MIN A   Pt to tolerate OOB to chair x 3 hours to increase endurance for functional activity. - progressing                      Time Tracking:     OT Date of Treatment: 07/09/20  OT Start Time: 0824  OT Stop Time: 0854  OT Total Time (min): 30 min    Billable Minutes:Self Care/Home Management 15  Therapeutic Activity 15    Viji Pan OT  7/9/2020

## 2020-07-09 NOTE — PROGRESS NOTES
PM&R consult follow up.  Please see original consult for detailed note.        PAC rec: inpatient rehab.     LUCHO Rey, FNP-C  Physical Medicine & Rehabilitation   07/09/2020

## 2020-07-09 NOTE — PLAN OF CARE
Spoke with patient in room and son Adam on the phone.    Updated them that [facility] has not approved authorization for rehab and we have sent additional clinical documentation for their medical director to review. Stated that they are willing to approve SNF. Explained the difference between SNF and rehab. Explained that treatment team is willing to complete a fast appeal if the denial is upheld after N reviews information.     He and patient are in agreement to appeal if the denial is upheld.    Julie Haase RN  Case Management [phone]

## 2020-07-09 NOTE — PLAN OF CARE
Problem: Occupational Therapy Goal  Goal: Occupational Therapy Goal  Description: Goals to be met by: 7 days 7/10/2020     Patient will increase functional independence with ADLs by performing:    Pt to complete self feeding with set-up  Pt to complete g/h skills with set-up progressing  Pt to complete UE dressing with MIN A progressing   Pt to complete LE dressing with MIN A- progressing   Pt to complete t/f to BSC with MIN A   Pt to tolerate OOB to chair x 3 hours to increase endurance for functional activity. - progressing     Outcome: Ongoing, Progressing     Pt progressing well towards OT goals. OT POC remains appropriate for patient on this date. Pt will continue to benefit from skilled OT to address the deficits affecting her occupational performance.     Viji Pan OTR/L  7/9/20

## 2020-07-10 LAB
ALBUMIN SERPL BCP-MCNC: 3.3 G/DL (ref 3.5–5.2)
ALP SERPL-CCNC: 60 U/L (ref 55–135)
ALT SERPL W/O P-5'-P-CCNC: 27 U/L (ref 10–44)
ANION GAP SERPL CALC-SCNC: 6 MMOL/L (ref 8–16)
AST SERPL-CCNC: 20 U/L (ref 10–40)
BASOPHILS # BLD AUTO: 0.06 K/UL (ref 0–0.2)
BASOPHILS NFR BLD: 0.8 % (ref 0–1.9)
BILIRUB SERPL-MCNC: 0.6 MG/DL (ref 0.1–1)
BUN SERPL-MCNC: 14 MG/DL (ref 8–23)
CALCIUM SERPL-MCNC: 9.9 MG/DL (ref 8.7–10.5)
CHLORIDE SERPL-SCNC: 107 MMOL/L (ref 95–110)
CO2 SERPL-SCNC: 27 MMOL/L (ref 23–29)
CREAT SERPL-MCNC: 0.8 MG/DL (ref 0.5–1.4)
DIFFERENTIAL METHOD: ABNORMAL
EOSINOPHIL # BLD AUTO: 0.4 K/UL (ref 0–0.5)
EOSINOPHIL NFR BLD: 4.7 % (ref 0–8)
ERYTHROCYTE [DISTWIDTH] IN BLOOD BY AUTOMATED COUNT: 12.7 % (ref 11.5–14.5)
EST. GFR  (AFRICAN AMERICAN): >60 ML/MIN/1.73 M^2
EST. GFR  (NON AFRICAN AMERICAN): >60 ML/MIN/1.73 M^2
GLUCOSE SERPL-MCNC: 89 MG/DL (ref 70–110)
HCT VFR BLD AUTO: 38.4 % (ref 37–48.5)
HGB BLD-MCNC: 12.2 G/DL (ref 12–16)
IMM GRANULOCYTES # BLD AUTO: 0.03 K/UL (ref 0–0.04)
IMM GRANULOCYTES NFR BLD AUTO: 0.4 % (ref 0–0.5)
LYMPHOCYTES # BLD AUTO: 2.2 K/UL (ref 1–4.8)
LYMPHOCYTES NFR BLD: 30.3 % (ref 18–48)
MAGNESIUM SERPL-MCNC: 1.8 MG/DL (ref 1.6–2.6)
MCH RBC QN AUTO: 30.2 PG (ref 27–31)
MCHC RBC AUTO-ENTMCNC: 31.8 G/DL (ref 32–36)
MCV RBC AUTO: 95 FL (ref 82–98)
MONOCYTES # BLD AUTO: 0.6 K/UL (ref 0.3–1)
MONOCYTES NFR BLD: 7.4 % (ref 4–15)
NEUTROPHILS # BLD AUTO: 4.2 K/UL (ref 1.8–7.7)
NEUTROPHILS NFR BLD: 56.4 % (ref 38–73)
NRBC BLD-RTO: 0 /100 WBC
PHOSPHATE SERPL-MCNC: 3.2 MG/DL (ref 2.7–4.5)
PLATELET # BLD AUTO: 266 K/UL (ref 150–350)
PMV BLD AUTO: 10.6 FL (ref 9.2–12.9)
POTASSIUM SERPL-SCNC: 3.7 MMOL/L (ref 3.5–5.1)
PROT SERPL-MCNC: 6.7 G/DL (ref 6–8.4)
RBC # BLD AUTO: 4.04 M/UL (ref 4–5.4)
SODIUM SERPL-SCNC: 140 MMOL/L (ref 136–145)
WBC # BLD AUTO: 7.4 K/UL (ref 3.9–12.7)

## 2020-07-10 PROCEDURE — 84100 ASSAY OF PHOSPHORUS: CPT

## 2020-07-10 PROCEDURE — 99232 SBSQ HOSP IP/OBS MODERATE 35: CPT | Mod: GT,,, | Performed by: HOSPITALIST

## 2020-07-10 PROCEDURE — 25000003 PHARM REV CODE 250: Performed by: INTERNAL MEDICINE

## 2020-07-10 PROCEDURE — 94761 N-INVAS EAR/PLS OXIMETRY MLT: CPT

## 2020-07-10 PROCEDURE — 97535 SELF CARE MNGMENT TRAINING: CPT

## 2020-07-10 PROCEDURE — 97530 THERAPEUTIC ACTIVITIES: CPT

## 2020-07-10 PROCEDURE — 63600175 PHARM REV CODE 636 W HCPCS: Performed by: STUDENT IN AN ORGANIZED HEALTH CARE EDUCATION/TRAINING PROGRAM

## 2020-07-10 PROCEDURE — 25000003 PHARM REV CODE 250: Performed by: HOSPITALIST

## 2020-07-10 PROCEDURE — 97802 MEDICAL NUTRITION INDIV IN: CPT

## 2020-07-10 PROCEDURE — 99232 PR SUBSEQUENT HOSPITAL CARE,LEVL II: ICD-10-PCS | Mod: GT,,, | Performed by: HOSPITALIST

## 2020-07-10 PROCEDURE — 25000003 PHARM REV CODE 250: Performed by: STUDENT IN AN ORGANIZED HEALTH CARE EDUCATION/TRAINING PROGRAM

## 2020-07-10 PROCEDURE — 20600001 HC STEP DOWN PRIVATE ROOM

## 2020-07-10 PROCEDURE — 36415 COLL VENOUS BLD VENIPUNCTURE: CPT

## 2020-07-10 PROCEDURE — 80053 COMPREHEN METABOLIC PANEL: CPT

## 2020-07-10 PROCEDURE — 92507 TX SP LANG VOICE COMM INDIV: CPT

## 2020-07-10 PROCEDURE — 83735 ASSAY OF MAGNESIUM: CPT

## 2020-07-10 PROCEDURE — 97110 THERAPEUTIC EXERCISES: CPT

## 2020-07-10 PROCEDURE — 85025 COMPLETE CBC W/AUTO DIFF WBC: CPT

## 2020-07-10 RX ORDER — TRAMADOL HYDROCHLORIDE 50 MG/1
50 TABLET ORAL EVERY 6 HOURS PRN
Status: DISCONTINUED | OUTPATIENT
Start: 2020-07-10 | End: 2020-07-11

## 2020-07-10 RX ADMIN — ACETAMINOPHEN 650 MG: 325 TABLET ORAL at 08:07

## 2020-07-10 RX ADMIN — HEPARIN SODIUM 5000 UNITS: 5000 INJECTION INTRAVENOUS; SUBCUTANEOUS at 02:07

## 2020-07-10 RX ADMIN — HEPARIN SODIUM 5000 UNITS: 5000 INJECTION INTRAVENOUS; SUBCUTANEOUS at 06:07

## 2020-07-10 RX ADMIN — ASPIRIN 81 MG: 81 TABLET, COATED ORAL at 08:07

## 2020-07-10 RX ADMIN — HEPARIN SODIUM 5000 UNITS: 5000 INJECTION INTRAVENOUS; SUBCUTANEOUS at 08:07

## 2020-07-10 RX ADMIN — ATORVASTATIN CALCIUM 80 MG: 20 TABLET, FILM COATED ORAL at 08:07

## 2020-07-10 RX ADMIN — AMLODIPINE BESYLATE 10 MG: 10 TABLET ORAL at 08:07

## 2020-07-10 RX ADMIN — CLOPIDOGREL BISULFATE 75 MG: 75 TABLET ORAL at 08:07

## 2020-07-10 NOTE — PT/OT/SLP PROGRESS
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   Yes  Keep patient NPO? No   Current Respiratory Status: room air      Pt seen bedside, alert and appropriate.  Low vocal intensity for majority of conversation though pt noted in indptly increased as needed to clarify.  Recall of recent events was good with good insight into deficits and need for rehab.  Pt with appropriate questions re: discharge plan and hospital course.  SLP updated pt re: current recs and plan and encouraged pt to speak to her  for updates.  She was Ox4 with min cues for year.  Reading WFL for sentence length utterance.  Dominant hand affected, decreased intelligibility with writing  She was able to tell time on clock indptly with no evidence of neglect.  SLP reviewed memory strategies and swallow strategies with newly updated diet.  Pt verbalized understanding.     Assessment:     Margarita Chávez is a 84 y.o. female with an SLP diagnosis of Cognitive-Linguistic Impairment.  She presents with mild deficits.    Goals:   Multidisciplinary Problems     SLP Goals        Problem: SLP Goal    Goal Priority Disciplines Outcome   SLP Goal     SLP Ongoing, Progressing   Description: Speech Language Pathology Goals  Goals expected to be met by 7/9:  1. Patient will " tolerate a mechanical soft diet and thin liquids with no overt signs of airway compromise.   2. Patient will complete mild to moderate level problem solving tasks with 70% acc given mod A.   3. Patient will provide 6-10 items within concrete categories indep.   4. Patient will attend to structured therapy tasks for 3 minutes without redirect cues.   5. Patient will participate in ongoing assessment of R,W,and VS.                          Plan:     · Patient to be seen:  4 x/week   · Plan of Care expires:  08/02/20  · Plan of Care reviewed with:  patient   · SLP Follow-Up:  Yes       Discharge recommendations:  rehabilitation facility   Barriers to Discharge:  Level of Skilled Assistance Needed      Time Tracking:     SLP Treatment Date:   07/10/20  Speech Start Time:  1018  Speech Stop Time:  1035     Speech Total Time (min):  17 min    Billable Minutes: Speech Therapy Individual 9 and Seld Care/Home Management Training 8    ELBA Chaney, CCC-SLP  07/10/2020

## 2020-07-10 NOTE — PROGRESS NOTES
07/10/20 1526   Vital Signs   Pulse 70   Heart Rate Source Continuous   Resp 14   SpO2 95 %   O2 Device (Oxygen Therapy) room air   BP (!) 176/84   MAP (mmHg) 128   BP Method cNIBP   Paged Virtual team 9 MD [place], awaiting response  Patient a symptomatic  Will continue to monitor

## 2020-07-10 NOTE — PT/OT/SLP PROGRESS
Occupational Therapy   Treatment    Name: Margarita Chávez  MRN: [id number]  Admitting Diagnosis:  Aneurysm of middle cerebral artery       Recommendations:     Discharge Recommendations: rehabilitation facility  Discharge Equipment Recommendations:  walker, rolling, bedside commode  Barriers to discharge:  (requires increased assistance for functional tasks)    Assessment:     Margarita Chávez is a 84 y.o. female with a medical diagnosis of Aneurysm of middle cerebral artery.  She presents with the following performance deficits affecting function: weakness, impaired endurance, impaired functional mobilty, gait instability, impaired balance, pain, impaired cardiopulmonary response to activity, decreased lower extremity function, impaired self care skills, decreased coordination, decreased upper extremity function. Pt tolerated session well this date as noted in her ability to complete multiple sit<>stand trials, prolonged static standing, and ADLs EOB with minimal fatigue reported. This date, pt noted to have improvement in overall strength requiring decreased assistance for transfers. Pt initiated the use of RUE during self care routine which was taught in previous sessions. Pt did have elevated BP seated in chair after the session; pt assmptomatic throughout. BP read 193/118; RN present. 2nd reading obtained by RN read 153/70 while seated in chair. RN to contact the primary team. Pt remains motivated to return to PLOF. Pt progressing well towards OT goals. OT POC remains appropriate for patient on this date. Pt will continue to benefit from skilled OT to address the deficits listed above.     Rehab Prognosis:  Good; patient would benefit from acute skilled OT services to address these deficits and reach maximum level of function.       Plan:     Patient to be seen 4 x/week to address the above listed problems via self-care/home management, therapeutic activities, therapeutic exercises, neuromuscular  "re-education  · Plan of Care Expires: 08/04/20  · Plan of Care Reviewed with: patient    Subjective     Pain/Comfort: "I want to do as much on my own now because I'll have to when I get home."  · Pain Rating 1: (pt did not rate; c/o R knee pain)  · Pain Addressed 1: Distraction, Reposition  · Pain Rating Post-Intervention 1: (R knee pain remained)  · Pain Addressed 2: Cessation of Activity, Nurse notified    Objective:     Communicated with: RN prior to session.  Patient found HOB elevated with telemetry upon OT entry to room.    General Precautions: Standard, fall, aspiration   Orthopedic Precautions:N/A   Braces: N/A     Occupational Performance:     Bed Mobility:    · Patient completed Rolling/Turning to Left with  stand by assistance  · Patient completed Scooting/Bridging with stand by assistance initially requiring min A for anterior hip scooting for BLE placement onto the floor   · Patient completed Supine to Sit with stand by assistance     Functional Mobility/Transfers:  · Patient completed Sit <> Stand Transfer form bed with moderate assistance  with  rolling walker   · From the bed, pt required verbal cues for upright standing with RW.   · X2 trials completed from the chair with min A and RW: Pt required physical and verbal cues for BUE placement onto walker  · Increased time to reach upright standing to begin taking steps   · Patient completed Bed <> Chair Transfer using Step Transfer technique with moderate assistance with rolling walker  · Functional Mobility: Pt took several steps towards the bedside chair with mod A and RW; close guarding or RLE provided 2/2 h/o buckling. Pt required RB seated in chair, then performed standing marches in place for ~2 minutes requiring max A for balance with RW. Pt required multiple standing breaks during that time frame to adjust BLE foot placement and to regroup. On 2nd trial, pt stood and took one step forward and 2 steps back toward the chair with mod A and RW. "     Activities of Daily Living:  · Grooming: supervision Seated EOB to perform facial hygiene  · Upper Body Dressing: moderate assistance to don gown as jacket for A to place RUE into gown; max A to place gown as jacket due to RUE ROM and LUE discomfort reaching over head  · Lower Body Dressing: stand by assistance seated EOB to adjust RLE sock  · Toileting: contact guard assistance for sitting balance to       [facility] 6 Click ADL: 14    Treatment & Education:  - Role of OT/ OT POC  - Self care safety/ independence  - Functional transfer/ mobility safety  - Bed mobility safety  - Importance of sitting UIC throughout the day as tolerated   - Energy conservation techniques such as taking rest breaks as needed  - Functional performance this date; progress thus far  - Disposition recommendation  - Pt completed multiple functional tasks with the A level listed above    Additional staff present: Tech to provide transfer and mobility assistance      Patient left up in chair with all lines intact, call button in reach, RN notified and RN presentEducation:      GOALS:   Multidisciplinary Problems     Occupational Therapy Goals        Problem: Occupational Therapy Goal    Goal Priority Disciplines Outcome Interventions   Occupational Therapy Goal     OT, PT/OT Ongoing, Progressing    Description: Goals to be met by: 7 days 7/10/2020     Patient will increase functional independence with ADLs by performing:    Pt to complete self feeding with set-up  Pt to complete g/h skills with set-up progressing  Pt to complete UE dressing with MIN A progressing   Pt to complete LE dressing with MIN A- progressing   Pt to complete t/f to BSC with MIN A   Pt to tolerate OOB to chair x 3 hours to increase endurance for functional activity. - progressing                      Time Tracking:     OT Date of Treatment: 07/10/20  OT Start Time: 1055  OT Stop Time: 1133  OT Total Time (min): 38 min    Billable Minutes:Self Care/Home Management  25  Therapeutic Activity 13    Viji Pan OT  7/10/2020

## 2020-07-10 NOTE — PLAN OF CARE
Spoke with Pauline from [facility] and updated her with Dr. Lindsay's number for MRU.  Explained that attended had rotated since Dr. Sharma put in the order and Dr. Lindsay was the attending. Stated I could re-enter PMR if need be. This is not needed. She stated the earliest we would hear something would be Monday.    Updated son Adam and patient on insurance authorization process.    Julie Haase RN  Case Management [phone]

## 2020-07-10 NOTE — PROGRESS NOTES
Hospital Medicine  Progress note    Team: [facility] VIRTUAL TEAM 9 Nikolay Lindsay MD  Admit Date: 7/1/2020  KYLEIGH 7/14/2020  Code status: DNR (Do Not Resuscitate)    This service was provided through telemedicine. Patient was transferred to the telemedicine service on: 7/10 The patient consents to virtual visits. Total 15 min      Present with the patient at the time of the Telemed / virtual assessment: RN  I have assessed these findings virtually using a Telemed platform and with assistance of bedside nurse.     The attending portion of this evaluation, treatment, and documentation was performed per Nikolay Lindsay MD M[facility] via audiovisual modality.    Principal Problem:  TIA    Brief HPI / Hospital Course 83 yo F with recent CVA (R sided residual weakness) was transferred to [facility] from Rehab for onset of confusion, slurred speech, and facial droop. Imaging was negative for CVA.  EKG showed ST elevation in lateral limb leads which resolved w neg troponins likely demand ischemia. Confusion, slurred speech, and facial droop resolved overnight.  Prior to recent CVA the patient was living independently. Acute neurologic findings may have been representation of earlier CVA (same areas) associated with cardiac event    PT  7/10    Patient unable to weight shift to R due to knee buckling, pain on R, hips/knees flexed, relying heavily on RW to offload R LE, standing tolerance limited by fatigue and pain in R knee. Moderate assistance with RW from bedside chair, 2 reps; cued for scooting to edge of chair, LE positioning to widen RINKU, hand placement, anterior weight shift. Based on the patient's progress with therapy, motivation to participate in treatment, and prior level of independence, they are an excellent candidate for inpatient rehabilitation and they would benefit from rehab to maximize their functional potential.      Interval Hx. Patient is feeling better and is highly motivated to participate in rehabilitation program. See PT  note above . C/o severe pain in knee w weight bearing    ROS    HEENT: decreased hearing  Respiratory: neg for cough neg for shortness of breath  Cardiovascular: neg for chest pain neg for palpitations  Gastrointestinal: neg for nausea neg for vomiting, neg for abdominal pain neg for diarrhea pos for constipation   Behavioral/Psych: neg for depression neg for anxiety  Neuro (RT handed): Weakness rt side (upper >> lower). Unable to eat with rt hand     PEx  Temp:  [97.4 °F (36.3 °C)-97.9 °F (36.6 °C)]   Pulse:  []   Resp:  [14-20]   BP: (114-193)/()   SpO2:  [95 %-98 %]     Intake/Output Summary (Last 24 hours) at 7/10/2020 1709  Last data filed at 7/10/2020 1100  Gross per 24 hour   Intake 605 ml   Output --   Net 605 ml     General Appearance: no acute distress   Heart: regular rate and rhythm *  Respiratory: Normal expansion. No crackles *  Abdomen: Soft, non-tender; bowel sounds active*   Mental status: Alert, oriented x 4, affect appropriate   Weakness rt side RE >> LE     * per nurse who was present during the visit      Recent Labs   Lab 07/08/20  0412 07/09/20  0425 07/10/20  0651   WBC 7.31 6.29 7.40   HGB 11.7* 12.6 12.2   HCT 36.5* 40.2 38.4    270 266     Recent Labs   Lab 07/08/20  0412 07/09/20  0425 07/10/20  0651    140 140   K 3.7 3.8 3.7    107 107   CO2 23 23 27   BUN 18 14 14   CREATININE 0.7 0.8 0.8    80 89   CALCIUM 9.9 10.4 9.9   MG 2.0 1.9 1.8   PHOS 3.0 3.0 3.2     Recent Labs   Lab 07/08/20  0412 07/09/20  0425 07/10/20  0651   ALKPHOS 56 66 60   ALT 23 29 27   AST 20 22 20   ALBUMIN 3.2* 3.5 3.3*   PROT 6.5 7.0 6.7   BILITOT 0.4 0.5 0.6      CTH negative for acute abnormalities, extensive chronic microvascular changes + small remote infarcts, stable calcified R MCA bifurcation aneurysm    CTA Head and neck (6/17)    CT head: No hemorrhage or major vascular distribution infarction.  Further evaluation with MRI at the discretion of the clinical  service.     CTA: No definite large vessel occlusion.  Severe stenosis of both branches of the right M2 as seen on series 5, image 412 and 416.  Multifocal intracranial arterial stenoses including multifocal posterior circulation moderate to severe stenoses and moderate right M1 MCA stenosis.     Enlarging right MCA aneurysm in comparison the prior exam of 2010.     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         Assessment and Plan    CVA  TIA  Rt sided weakness  Resolved  Likely 2/2 Type 2 NSTEMI (stress related)   Continue ASA, plavix, statin  Vascular Neuro consulted, recs appreciated  Transfer back to Rehab    DJD rt knee  Limiting PT  Started on Toradol  NSAID's contra indicated due to risk of CNS bleed    NSTEMI type 2  Resolved    SANTOSH (acute kidney injury)  Cr 2 > > 0.8  Resolved     Hypertension  Uncontrolled  Restart amlodipine 10 mg/ daily (7/9)      Goals of Care:  Return to prior state or health / functional mobility    Nikolay Lindsay MD, Staff Physician, [facility] Medicine,

## 2020-07-10 NOTE — PROGRESS NOTES
Hospital Medicine  Progress note    Team: [facility] VIRTUAL TEAM 9 Nikolay Lindsay MD  Admit Date: 7/1/2020  KYLEIGH 7/4/2020  Code status: DNR (Do Not Resuscitate)    This service was provided through telemedicine. Patient was transferred to the telemedicine service on: 7/9 The patient consents to virtual visits. Location [facility]. Start:9:40   End 9:55 Total 15 min  Present with the patient at the time of the Telemed /virtual assessment: RN  I have assessed these findings virtually using a Telemed platform and with assistance of bedside nurse. The attending portion of this evaluation, treatment, and documentation was performed per Nikolay Lindsay MD [facility] via audiovisual modality.    Patient son Ian  was present.    Principal Problem:  Aneurysm of middle cerebral artery    Brief HPI / Hospital Course 83 yo F with recent TIA (R sided residual weakness), cerebral aneurysm s/p clip w/ recent subacute increase in size, HTN, and HLD that presents with confusion, slurred speech, and facial droop.     Patient recently suffered TIA stroke. She was discharged to [facility] and progressing well. On morning of 7/1, son visited patient. During visit, patient showed some intermittent confusion that was different from day prior. He also noticed some slurred speech and facial droop. Patient was transferred to [facility] for evaluation of possible recurrent TIA and admitted to cardiology,   EKG showed ST elevation in lateral limb leads., which resolved w neg troponins likely demand ischemia.     Interval hx:  Symptoms have resolved back to her new baseline and is awaiting transfer back to rehab.  PT 7/9 ok for 3 hrs therapy  Feeling OK. Transferring with max assistance. Appetite poor (does not like food)    ROS    HEENT: decreased hearing  Respiratory: neg for cough neg for shortness of breath  Cardiovascular: neg for chest pain neg for palpitations  Gastrointestinal: neg for nausea neg for vomiting, neg for abdominal pain neg for diarrhea pos for  constipation   Behavioral/Psych: neg for depression neg for anxiety    PEx  Temp:  [97.6 °F (36.4 °C)-99.2 °F (37.3 °C)]   Pulse:  [53-92]   Resp:  [14-20]   BP: (124-179)/()   SpO2:  [92 %-99 %]     Intake/Output Summary (Last 24 hours) at 7/9/2020 2222  Last data filed at 7/9/2020 1200  Gross per 24 hour   Intake 560 ml   Output --   Net 560 ml     General Appearance: no acute distress   Heart: regular rate and rhythm *  Respiratory: Normal expansion. No crackles *  Abdomen: Soft, non-tender; bowel sounds active*   Mental status: Alert, oriented x 4, affect appropriate   Weakness rt side RE >> LE      * per nurse who was present during the visit       Recent Labs   Lab 07/07/20 0433 07/08/20 0412 07/09/20  0425   WBC 6.08 7.31 6.29   HGB 11.9* 11.7* 12.6   HCT 38.6 36.5* 40.2    246 270     Recent Labs   Lab 07/07/20 0433 07/08/20 0412 07/09/20  0425    138 140   K 4.0 3.7 3.8    106 107   CO2 25 23 23   BUN 22 18 14   CREATININE 0.9 0.7 0.8   GLU 88 100 80   CALCIUM 10.2 9.9 10.4   MG 1.9 2.0 1.9   PHOS 3.0 3.0 3.0     Recent Labs   Lab 07/07/20 0433 07/08/20 0412 07/09/20  0425   ALKPHOS 57 56 66   ALT 22 23 29   AST 19 20 22   ALBUMIN 3.3* 3.2* 3.5   PROT 6.4 6.5 7.0   BILITOT 0.5 0.4 0.5        Assessment and Plan    Aneurysm of middle cerebral artery  TIA (transient ischemic attack)  Recently suffered TIA with R-sided residual weakness  CTH negative for acute abnormalities, extensive chronic microvascular changes + small remote infarcts, stable calcified R MCA bifurcation aneurysm     Plan:  Continue ASA, plavix, statin  Vascular Neuro consulted, recs appreciated  Transfer back to Rehab    SANTOSH (acute kidney injury)  Cr 2 > > 0.8  Resolved     Hypertension  Uncontrolled  Restart amlodipine 10 mg/ daily     Goals of Care:  Return to prior state or health / functional mobility      Nikolay Lindsay MD, Staff Physician, [facility] Medicine,

## 2020-07-10 NOTE — PLAN OF CARE
Pt free of falls and injury. Fall precautions remain in place. Pt remains on room air. NSR on telemetry. Educated pt on importance of accurate I/Os. Purewick remains in place. Pt no longer has IV access. Team is aware. Plan of care reviewed with pt, all questions addressed. Pt resting comfortably with no complaints of pain. Pt VSS, no distress, will continue to monitor.

## 2020-07-10 NOTE — PROGRESS NOTES
This note also relates to the following rows which could not be included:  Pulse - Cannot attach notes to unvalidated device data  Heart Rate Source - Cannot attach notes to unvalidated device data  Resp - Cannot attach notes to unvalidated device data  BP - Cannot attach notes to unvalidated device data  MAP (mmHg) - Cannot attach notes to unvalidated device data  BP Method - Cannot attach notes to unvalidated device data       07/10/20 1117 07/10/20 1131   Vital Signs   Pulse 74 74   Heart Rate Source Continuous Monitor   Resp 18 18   SpO2 98 % 97 %   Pulse Oximetry Type  --  Intermittent   O2 Device (Oxygen Therapy) room air room air   BP (!) 193/118 (!) 153/70   MAP (mmHg) 154 100   BP Location  --  Right arm   BP Method cNIBP  --    Patient Position  --  Sitting   Clemencia Washington, awaiting call back  Patient asymptomatic  Will continue to monitor

## 2020-07-10 NOTE — PROGRESS NOTES
This note also relates to the following rows which could not be included:  Pulse - Cannot attach notes to unvalidated device data  Heart Rate Source - Cannot attach notes to unvalidated device data  Resp - Cannot attach notes to unvalidated device data  BP - Cannot attach notes to unvalidated device data  MAP (mmHg) - Cannot attach notes to unvalidated device data  BP Method - Cannot attach notes to unvalidated device data       07/10/20 1538 07/10/20 1539   Vital Signs   Pulse 69 68   Heart Rate Source Continuous Continuous   Resp 14 15   SpO2 98 % 98 %   /66 (!) 114/54   MAP (mmHg)  --  83   BP Method Manual cNIBP   Clemencia Urrutia virtual team 9  Awaiting response

## 2020-07-10 NOTE — PLAN OF CARE
Recommendations  1. Continue regular diet as tolerated.   2. If PO intake decreases < 50%, add Boost Plus TID.   3. RD to monitor.     Goals: Adequate PO intake to meet > 75% EEN/EPN by RD follow up  Nutrition Goal Status: new  Communication of RD Recs: other (comment)(POC)

## 2020-07-10 NOTE — PLAN OF CARE
Problem: SLP Goal  Goal: SLP Goal  Description: Speech Language Pathology Goals  Goals expected to be met by 7/9:  1. Patient will tolerate a mechanical soft diet and thin liquids with no overt signs of airway compromise.   2. Patient will complete mild to moderate level problem solving tasks with 70% acc given mod A.   3. Patient will provide 6-10 items within concrete categories indep.   4. Patient will attend to structured therapy tasks for 3 minutes without redirect cues.   5. Patient will participate in ongoing assessment of R,W,and VS.   Outcome: Ongoing, Progressing    Goals remain appropriate, cont POC. ELBA Chaney, CCC/SLP  7/10/2020

## 2020-07-10 NOTE — PLAN OF CARE
Plan of care reviewed with pt, verbalized understanding  Answered questions  Free from falls and injury  Afebrile  SR on tele  PT OT seen  Will continue to monitor

## 2020-07-10 NOTE — PLAN OF CARE
Problem: Physical Therapy Goal  Goal: Physical Therapy Goal  Description: Goals to be met by: 20    Patient will increase functional independence with mobility by performin. Supine <>sit with Minimal Assistance -not met  2. Sit to stand transfer with Minimal Assistance -not met  3. Bed to chair transfer with Moderate Assistance -met (2020)  3a. New Goal (2020): Bed<>chair transfer with Carly, using RW. Not met  4. Gait  x 10 feet with Moderate Assistance using AD- not met  Outcome: Ongoing, Progressing   Continue with plan of care.   Aileen Odonnell, PT  7/10/2020

## 2020-07-10 NOTE — PROGRESS NOTES
"[facility]  Adult Nutrition  Progress Note    SUMMARY       Recommendations  1. Continue regular diet as tolerated.   2. If PO intake decreases < 50%, add Boost Plus TID.   3. RD to monitor.    Goals: Adequate PO intake to meet > 75% EEN/EPN by RD follow up  Nutrition Goal Status: new  Communication of RD Recs: other (comment)(POC)    Reason for Assessment    Reason For Assessment: length of stay  Diagnosis: (Aneurysm of middle cerebral artery)  Relevant Medical History: HTN, TIA, HLD  Interdisciplinary Rounds: did not attend  General Information Comments: Pt resting in bed with no family at bedside this AM. Pt reports good appetite and states she has been consuming > 50% of meals. % intake confirmed per RN documentation. Pt reports wt loss over the past 6 months of unknown amount, and states her UBW is 179#. Recent wt hx limited, last wt 190# 8/2017 reflects a 10.5% wt loss x 3 years (average 3.5% per year), not clinically significant. Wt has been stable 167-170# in the past month per chart. NFPE completed today, pt with mild age appropriate wasting and does not meet criteria for malnutrition at this time.  Nutrition Discharge Planning: d/c on cardiac diet    Nutrition Risk Screen    Nutrition Risk Screen: no indicators present    Nutrition/Diet History    Spiritual, Cultural Beliefs, Advent Practices, Values that Affect Care: no    Anthropometrics    Temp: 97.4 °F (36.3 °C)  Height: 5' 5" (165.1 cm)  Height (inches): 65 in  Weight Method: Bed Scale  Weight: 76.2 kg (167 lb 15.9 oz)  Weight (lb): 167.99 lb  Ideal Body Weight (IBW), Female: 125 lb  % Ideal Body Weight, Female (lb): 134.39 %  BMI (Calculated): 28    Lab/Procedures/Meds    Pertinent Labs Reviewed: reviewed  Pertinent Labs Comments: Alb 3.3  Pertinent Medications Reviewed: reviewed  Pertinent Medications Comments: statin, heparin, ondansetron    Estimated/Assessed Needs    Weight Used For Calorie Calculations: 76.2 kg (167 " lb 15.9 oz)  Energy Calorie Requirements (kcal): 1522 kcal/day(x 1.25 PAL)  Energy Need Method: [place]  Protein Requirements: 76-92 g/day(1-1.2 g/kg)  Weight Used For Protein Calculations: 76.2 kg (167 lb 15.9 oz)  Fluid Requirements (mL): per MD or 1 mL/kcal     RDA Method (mL): 1522    Nutrition Prescription Ordered    Current Diet Order: Regular    Evaluation of Received Nutrient/Fluid Intake    Comments: LBM 7/9  % Intake of Estimated Energy Needs: %  % Meal Intake: %    Nutrition Risk    Level of Risk/Frequency of Follow-up: low(1x/week)     Assessment and Plan    No nutrition diagnosis at this time.     Monitor and Evaluation    Food and Nutrient Intake: energy intake, food and beverage intake  Food and Nutrient Adminstration: diet order  Anthropometric Measurements: weight, weight change, body mass index  Biochemical Data, Medical Tests and Procedures: electrolyte and renal panel, gastrointestinal profile, glucose/endocrine profile, inflammatory profile, lipid profile  Nutrition-Focused Physical Findings: overall appearance     Malnutrition Assessment  Orbital Region (Subcutaneous Fat Loss): well nourished  Upper Arm Region (Subcutaneous Fat Loss): well nourished   [place]anda[place] Region (Muscle Loss): mild depletion  Clavicle Bone Region (Muscle Loss): well nourished  Clavicle and Acromion Bone Region (Muscle Loss): well nourished  Dorsal Hand (Muscle Loss): mild depletion  Anterior Thigh Region (Muscle Loss): well nourished  Posterior Calf Region (Muscle Loss): mild depletion     Nutrition Follow-Up    RD Follow-up?: Yes

## 2020-07-10 NOTE — PLAN OF CARE
Discharge pending result of appeal of inpatient rehab denial.  SW sent multiple SNF referrals as back-up.  142 received and saved to .  Will continue to follow.    Paulette Souza LMSW  [facility]  [id number]

## 2020-07-10 NOTE — PT/OT/SLP PROGRESS
"Physical Therapy Treatment    Patient Name:  Margarita Chávez   MRN:  [id number]    Recommendations:     Discharge Recommendations:  rehabilitation facility   Discharge Equipment Recommendations: walker, rolling, wheelchair, bedside commode, shower chair   Barriers to discharge: Inaccessible home, Decreased caregiver support and patient below functional baseline    Assessment:     Margarita Chávez is a 84 y.o. female admitted with a medical diagnosis of Aneurysm of middle cerebral artery.  She presents with the following impairments/functional limitations:  weakness, impaired endurance, gait instability, impaired balance, impaired self care skills, impaired functional mobilty, pain, decreased lower extremity function.The patient shows improvement in R LE strength, but her primary functional limitation at this point is R knee arthritic pain in weight bearing. She performed standing trials with moderate assistance, but her standing tolerance and ability to weight shift to R LE is limited by pain.  She remains very motivated to participate in therapy and progress towards PLOF. Based on the patient's progress with therapy, motivation to participate in treatment, and prior level of independence, they are an excellent candidate for inpatient rehabilitation and they would benefit from rehab to maximize their functional potential.      Rehab Prognosis: Good; patient would benefit from acute skilled PT services to address these deficits and reach maximum level of function.    Recent Surgery: Procedure(s) (LRB):  Left heart cath (Left)      Plan:     During this hospitalization, patient to be seen 4 x/week to address the identified rehab impairments via gait training, therapeutic activities, therapeutic exercises, neuromuscular re-education and progress toward the following goals:    · Plan of Care Expires:  08/01/20    Subjective     Chief Complaint: R knee pain in standing  Patient/Family Comments/goals: "I want to be able to " "stand, but that knee pain is so bad"  Pain/Comfort:  · Pain Rating 1: 0/10  · Location - Side 1: Right  · Location - Orientation 1: generalized  · Location 1: knee  · Pain Addressed 1: Distraction  · Pain Rating Post-Intervention 1: (significant arthritis pain in standing, did not rate, limits standing tolerance)      Objective:     Communicated with RN prior to session.  Patient found up in chair with bed alarm, telemetry upon PT entry to room.     General Precautions: Standard, aspiration, fall   Orthopedic Precautions:N/A   Braces: N/A     Functional Mobility:    Bed Mobility  Sit to supine: minimum assistance for LE management    Transfers Sit to Stand:  moderate assistance with RW from bedside chair, 2 reps; cued for scooting to edge of chair, LE positioning to widen RINKU, hand placement, anterior weight shift   Gait  Pre-gait weight shifting with RW: minimum assistance, 40 sec+ 30 sec  Pre-gait static marching with RW: moderate assistance  Assistance Level: minimum assistance to moderate assistance   Description: unable to weight shift to R due to knee buckling, pain on R, hips/knees flexed, relying heavily on RW to offload R LE, standing tolerance limited by fatigue and pain in R knee    Gait training: manual and verbal cues for R knee extension, R knee blocked to prevent buckling, cued to offload weight through UE, facilitation at pelvis for weight shift            AM-PAC 6 CLICK MOBILITY  Turning over in bed (including adjusting bedclothes, sheets and blankets)?: 3  Sitting down on and standing up from a chair with arms (e.g., wheelchair, bedside commode, etc.): 2  Moving from lying on back to sitting on the side of the bed?: 3  Moving to and from a bed to a chair (including a wheelchair)?: 2  Need to walk in hospital room?: 2  Climbing 3-5 steps with a railing?: 1  Basic Mobility Total Score: 13       Therapeutic Activities and Exercises:   Patient safe to t/f with RN assist of 1 to L side, RN alerted, " whiteboard updated.   R LE therex to improve strength and activity tolerance to increase independence with mobility:   -LAQ, 10 reps  -SAQ 10 reps  -SLR, 10 reps, cued for terminal knee extension, mild quad lag    Encouraged patient to sit up in chair 3x/day for meals, discussed therapy weekend schedule that she would likely not be seen but to request RN assist to t/f to chair. Encouraged to perform therex 10 reps 3x/day. She verbalized understanding  Patient educated on role of therapy, goals of session, benefits of out of bed mobility. Patient agreeable to mobilize with therapy.  Discussed PT plan of care during hospitalization. Patient educated that they need to call for assistance to mobilize out of bed. Whiteboard updated as appropriate. Patient educated on how their diagnosis impacts their mobility within PT scope of practice.   MD alerted that patient's progress with therapy limited by R knee pain,  attempt to coordinate session with pain meds.     Patient left HOB elevated with all lines intact, call button in reach, bed alarm on and RN present..    GOALS:   Multidisciplinary Problems     Physical Therapy Goals        Problem: Physical Therapy Goal    Goal Priority Disciplines Outcome Goal Variances Interventions   Physical Therapy Goal     PT, PT/OT Ongoing, Progressing     Description: Goals to be met by: 20    Patient will increase functional independence with mobility by performin. Supine <>sit with Minimal Assistance -not met  2. Sit to stand transfer with Minimal Assistance -not met  3. Bed to chair transfer with Moderate Assistance -met (2020)  3a. New Goal (2020): Bed<>chair transfer with Carly, using RW. Not met  4. Gait  x 10 feet with Moderate Assistance using AD- not met                   Time Tracking:     PT Received On: 07/10/20  PT Start Time: 1409     PT Stop Time: 1434  PT Total Time (min): 25 min     Billable Minutes: Therapeutic Activity 12 and Therapeutic Exercise  13    Treatment Type: Treatment  PT/PTA: PT     PTA Visit Number: 0     Aileen Odonnell, PT  07/10/2020

## 2020-07-11 LAB
ALBUMIN SERPL BCP-MCNC: 3.1 G/DL (ref 3.5–5.2)
ALP SERPL-CCNC: 59 U/L (ref 55–135)
ALT SERPL W/O P-5'-P-CCNC: 23 U/L (ref 10–44)
ANION GAP SERPL CALC-SCNC: 8 MMOL/L (ref 8–16)
AST SERPL-CCNC: 17 U/L (ref 10–40)
BASOPHILS # BLD AUTO: 0.05 K/UL (ref 0–0.2)
BASOPHILS NFR BLD: 1 % (ref 0–1.9)
BILIRUB SERPL-MCNC: 0.5 MG/DL (ref 0.1–1)
BUN SERPL-MCNC: 13 MG/DL (ref 8–23)
CALCIUM SERPL-MCNC: 9.5 MG/DL (ref 8.7–10.5)
CHLORIDE SERPL-SCNC: 108 MMOL/L (ref 95–110)
CO2 SERPL-SCNC: 25 MMOL/L (ref 23–29)
CREAT SERPL-MCNC: 0.8 MG/DL (ref 0.5–1.4)
DIFFERENTIAL METHOD: ABNORMAL
EOSINOPHIL # BLD AUTO: 0.4 K/UL (ref 0–0.5)
EOSINOPHIL NFR BLD: 7.4 % (ref 0–8)
ERYTHROCYTE [DISTWIDTH] IN BLOOD BY AUTOMATED COUNT: 12.8 % (ref 11.5–14.5)
EST. GFR  (AFRICAN AMERICAN): >60 ML/MIN/1.73 M^2
EST. GFR  (NON AFRICAN AMERICAN): >60 ML/MIN/1.73 M^2
GLUCOSE SERPL-MCNC: 85 MG/DL (ref 70–110)
HCT VFR BLD AUTO: 36.3 % (ref 37–48.5)
HGB BLD-MCNC: 11.5 G/DL (ref 12–16)
IMM GRANULOCYTES # BLD AUTO: 0.02 K/UL (ref 0–0.04)
IMM GRANULOCYTES NFR BLD AUTO: 0.4 % (ref 0–0.5)
LYMPHOCYTES # BLD AUTO: 1.9 K/UL (ref 1–4.8)
LYMPHOCYTES NFR BLD: 36.1 % (ref 18–48)
MAGNESIUM SERPL-MCNC: 1.8 MG/DL (ref 1.6–2.6)
MCH RBC QN AUTO: 30.3 PG (ref 27–31)
MCHC RBC AUTO-ENTMCNC: 31.7 G/DL (ref 32–36)
MCV RBC AUTO: 96 FL (ref 82–98)
MONOCYTES # BLD AUTO: 0.5 K/UL (ref 0.3–1)
MONOCYTES NFR BLD: 9.7 % (ref 4–15)
NEUTROPHILS # BLD AUTO: 2.3 K/UL (ref 1.8–7.7)
NEUTROPHILS NFR BLD: 45.4 % (ref 38–73)
NRBC BLD-RTO: 0 /100 WBC
PHOSPHATE SERPL-MCNC: 3.8 MG/DL (ref 2.7–4.5)
PLATELET # BLD AUTO: 260 K/UL (ref 150–350)
PMV BLD AUTO: 10.3 FL (ref 9.2–12.9)
POTASSIUM SERPL-SCNC: 3.7 MMOL/L (ref 3.5–5.1)
PROT SERPL-MCNC: 6.2 G/DL (ref 6–8.4)
RBC # BLD AUTO: 3.8 M/UL (ref 4–5.4)
SODIUM SERPL-SCNC: 141 MMOL/L (ref 136–145)
WBC # BLD AUTO: 5.15 K/UL (ref 3.9–12.7)

## 2020-07-11 PROCEDURE — 63600175 PHARM REV CODE 636 W HCPCS: Performed by: STUDENT IN AN ORGANIZED HEALTH CARE EDUCATION/TRAINING PROGRAM

## 2020-07-11 PROCEDURE — 25000003 PHARM REV CODE 250: Performed by: INTERNAL MEDICINE

## 2020-07-11 PROCEDURE — 20600001 HC STEP DOWN PRIVATE ROOM

## 2020-07-11 PROCEDURE — 25000003 PHARM REV CODE 250: Performed by: HOSPITALIST

## 2020-07-11 PROCEDURE — 83735 ASSAY OF MAGNESIUM: CPT

## 2020-07-11 PROCEDURE — 36415 COLL VENOUS BLD VENIPUNCTURE: CPT

## 2020-07-11 PROCEDURE — 99232 SBSQ HOSP IP/OBS MODERATE 35: CPT | Mod: GT,,, | Performed by: HOSPITALIST

## 2020-07-11 PROCEDURE — 85025 COMPLETE CBC W/AUTO DIFF WBC: CPT

## 2020-07-11 PROCEDURE — 84100 ASSAY OF PHOSPHORUS: CPT

## 2020-07-11 PROCEDURE — 80053 COMPREHEN METABOLIC PANEL: CPT

## 2020-07-11 PROCEDURE — 99232 PR SUBSEQUENT HOSPITAL CARE,LEVL II: ICD-10-PCS | Mod: GT,,, | Performed by: HOSPITALIST

## 2020-07-11 RX ORDER — TRAMADOL HYDROCHLORIDE 50 MG/1
50 TABLET ORAL EVERY 8 HOURS
Status: DISCONTINUED | OUTPATIENT
Start: 2020-07-11 | End: 2020-07-17 | Stop reason: HOSPADM

## 2020-07-11 RX ADMIN — HEPARIN SODIUM 5000 UNITS: 5000 INJECTION INTRAVENOUS; SUBCUTANEOUS at 01:07

## 2020-07-11 RX ADMIN — TRAMADOL HYDROCHLORIDE 50 MG: 50 TABLET, FILM COATED ORAL at 01:07

## 2020-07-11 RX ADMIN — TRAMADOL HYDROCHLORIDE 50 MG: 50 TABLET, FILM COATED ORAL at 09:07

## 2020-07-11 RX ADMIN — ATORVASTATIN CALCIUM 80 MG: 20 TABLET, FILM COATED ORAL at 09:07

## 2020-07-11 RX ADMIN — HEPARIN SODIUM 5000 UNITS: 5000 INJECTION INTRAVENOUS; SUBCUTANEOUS at 06:07

## 2020-07-11 RX ADMIN — HEPARIN SODIUM 5000 UNITS: 5000 INJECTION INTRAVENOUS; SUBCUTANEOUS at 09:07

## 2020-07-11 RX ADMIN — ASPIRIN 81 MG: 81 TABLET, COATED ORAL at 09:07

## 2020-07-11 RX ADMIN — AMLODIPINE BESYLATE 10 MG: 10 TABLET ORAL at 09:07

## 2020-07-11 RX ADMIN — CLOPIDOGREL BISULFATE 75 MG: 75 TABLET ORAL at 09:07

## 2020-07-11 NOTE — PLAN OF CARE
Pt is AAOx4. Calm and cooperative, currently resting comfortably. Denies pain or discomfort. VSS. Plan of care reviewed. All concerns addressed. Call bell in reach, fall precautions discussed. WCTM.       Problem: Adult Inpatient Plan of Care  Goal: Plan of Care Review  Outcome: Ongoing, Progressing    Plan of care reviewed with patient. Pt denies questions or concerns.    Problem: Adult Inpatient Plan of Care  Goal: Optimal Comfort and Wellbeing  Outcome: Ongoing, Progressing    Pain control discussed, pt denies pain or discomfort at this time.     Problem: Fall Injury Risk  Goal: Absence of Fall and Fall-Related Injury  Outcome: Ongoing, Progressing    Pt is compliant with call bell use, and all fall precautions. Remains free of fall or injuries.

## 2020-07-11 NOTE — PROGRESS NOTES
Hospital Medicine  Progress note    Team: [facility] VIRTUAL TEAM 9 Nikolay Lindsay MD  Admit Date: 7/1/2020  KYLEIGH 7/13/2020  Code status: DNR (Do Not Resuscitate)    This service was provided through telemedicine.  Start: 9:12  End 9: 32 Total 20  min  I have assessed these findings virtually using a Telemed platform and with assistance of bedside nurse.    Principal Problem:  TIA    Brief HPI / Hospital Course 83 yo F with recent CVA (R sided residual weakness) was transferred to [facility] from Rehab for onset of confusion, slurred speech, and facial droop. Imaging was negative for CVA.  EKG showed ST elevation in lateral limb leads which resolved w neg troponins likely demand ischemia. Confusion, slurred speech, and facial droop resolved overnight.  Prior to recent CVA the patient was living independently. Acute neurologic findings may have been representation of earlier CVA (same areas) associated with cardiac event    PT  7/10    Patient unable to weight shift to R due to knee buckling, pain on R, hips/knees flexed, relying heavily on RW to offload R LE, standing tolerance limited by fatigue and pain in R knee. Moderate assistance with RW from bedside chair, 2 reps; cued for scooting to edge of chair, LE positioning to widen RINKU, hand placement, anterior weight shift. Based on the patient's progress with therapy, motivation to participate in treatment, and prior level of independence, they are an excellent candidate for inpatient rehabilitation and they would benefit from rehab to maximize their functional potential.      Interval Hx. Patient is feeling better and is highly motivated to participate in rehabilitation program. See PT note above . C/o severe pain in knee w weight bearing    ROS    HEENT: decreased hearing  Respiratory: neg for cough neg for shortness of breath  Cardiovascular: neg for chest pain neg for palpitations  Gastrointestinal: neg for nausea neg for vomiting, neg for abdominal pain neg for diarrhea pos  for constipation   Behavioral/Psych: neg for depression neg for anxiety  Neuro (RT handed): Weakness rt side (upper >> lower). Unable to eat with rt hand     PEx  Temp:  [98 °F (36.7 °C)-98.5 °F (36.9 °C)]   Pulse:  [54-85]   Resp:  [14-22]   BP: (114-176)/(54-86)   SpO2:  [95 %-99 %]     Intake/Output Summary (Last 24 hours) at 7/11/2020 1509  Last data filed at 7/11/2020 1300  Gross per 24 hour   Intake 730 ml   Output --   Net 730 ml     General Appearance: no acute distress   Heart: regular rate and rhythm *  Respiratory: Normal expansion. No crackles *  Abdomen: Soft, non-tender; bowel sounds active*   Mental status: Alert, oriented x 4, affect appropriate   Weakness rt side RE >> LE     * per nurse who was present during the visit      Recent Labs   Lab 07/09/20  0425 07/10/20  0651 07/11/20  0345   WBC 6.29 7.40 5.15   HGB 12.6 12.2 11.5*   HCT 40.2 38.4 36.3*    266 260     Recent Labs   Lab 07/09/20  0425 07/10/20  0651 07/11/20  0345    140 141   K 3.8 3.7 3.7    107 108   CO2 23 27 25   BUN 14 14 13   CREATININE 0.8 0.8 0.8   GLU 80 89 85   CALCIUM 10.4 9.9 9.5   MG 1.9 1.8 1.8   PHOS 3.0 3.2 3.8     Recent Labs   Lab 07/09/20  0425 07/10/20  0651 07/11/20  0345   ALKPHOS 66 60 59   ALT 29 27 23   AST 22 20 17   ALBUMIN 3.5 3.3* 3.1*   PROT 7.0 6.7 6.2   BILITOT 0.5 0.6 0.5      CTH negative for acute abnormalities, extensive chronic microvascular changes + small remote infarcts, stable calcified R MCA bifurcation aneurysm    CTA Head and neck (6/17)    CT head: No hemorrhage or major vascular distribution infarction.  Further evaluation with MRI at the discretion of the clinical service.     CTA: No definite large vessel occlusion.  Severe stenosis of both branches of the right M2 as seen on series 5, image 412 and 416.  Multifocal intracranial arterial stenoses including multifocal posterior circulation moderate to severe stenoses and moderate right M1 MCA stenosis.     Enlarging  right MCA aneurysm in comparison the prior exam of 2010.     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       Assessment and Plan    CVA  TIA  Rt sided weakness  Resolved  Likely 2/2 Type 2 NSTEMI (stress related)   Continue ASA, plavix, statin  Vascular Neuro consulted, recs appreciated  Transfer back to Rehab    DJD rt knee  Severe pain limiting PT  Started on Toradol  NSAID's contra indicated due to risk of CNS bleed  XR rt knee (ordered)  Tramadol 500 mg q 8 ordered    NSTEMI type 2  Resolved    SANTOSH (acute kidney injury)  Cr 2 > > 0.8  Resolved     Hypertension  Uncontrolled  Restart amlodipine 10 mg/ daily (7/9)      Goals of Care:  Return to prior state or health / functional mobility    Nikolay Lindsay MD, Staff Physician, [facility] Medicine,

## 2020-07-12 LAB
ALBUMIN SERPL BCP-MCNC: 3.2 G/DL (ref 3.5–5.2)
ALP SERPL-CCNC: 55 U/L (ref 55–135)
ALT SERPL W/O P-5'-P-CCNC: 22 U/L (ref 10–44)
ANION GAP SERPL CALC-SCNC: 8 MMOL/L (ref 8–16)
AST SERPL-CCNC: 16 U/L (ref 10–40)
BASOPHILS # BLD AUTO: 0.05 K/UL (ref 0–0.2)
BASOPHILS NFR BLD: 0.9 % (ref 0–1.9)
BILIRUB SERPL-MCNC: 0.5 MG/DL (ref 0.1–1)
BUN SERPL-MCNC: 12 MG/DL (ref 8–23)
CALCIUM SERPL-MCNC: 9.8 MG/DL (ref 8.7–10.5)
CHLORIDE SERPL-SCNC: 108 MMOL/L (ref 95–110)
CO2 SERPL-SCNC: 25 MMOL/L (ref 23–29)
CREAT SERPL-MCNC: 0.8 MG/DL (ref 0.5–1.4)
DIFFERENTIAL METHOD: ABNORMAL
EOSINOPHIL # BLD AUTO: 0.4 K/UL (ref 0–0.5)
EOSINOPHIL NFR BLD: 6.6 % (ref 0–8)
ERYTHROCYTE [DISTWIDTH] IN BLOOD BY AUTOMATED COUNT: 13 % (ref 11.5–14.5)
EST. GFR  (AFRICAN AMERICAN): >60 ML/MIN/1.73 M^2
EST. GFR  (NON AFRICAN AMERICAN): >60 ML/MIN/1.73 M^2
GLUCOSE SERPL-MCNC: 86 MG/DL (ref 70–110)
HCT VFR BLD AUTO: 36.5 % (ref 37–48.5)
HGB BLD-MCNC: 11.2 G/DL (ref 12–16)
IMM GRANULOCYTES # BLD AUTO: 0.01 K/UL (ref 0–0.04)
IMM GRANULOCYTES NFR BLD AUTO: 0.2 % (ref 0–0.5)
LYMPHOCYTES # BLD AUTO: 1.9 K/UL (ref 1–4.8)
LYMPHOCYTES NFR BLD: 34.1 % (ref 18–48)
MAGNESIUM SERPL-MCNC: 1.8 MG/DL (ref 1.6–2.6)
MCH RBC QN AUTO: 29.7 PG (ref 27–31)
MCHC RBC AUTO-ENTMCNC: 30.7 G/DL (ref 32–36)
MCV RBC AUTO: 97 FL (ref 82–98)
MONOCYTES # BLD AUTO: 0.5 K/UL (ref 0.3–1)
MONOCYTES NFR BLD: 9.7 % (ref 4–15)
NEUTROPHILS # BLD AUTO: 2.7 K/UL (ref 1.8–7.7)
NEUTROPHILS NFR BLD: 48.5 % (ref 38–73)
NRBC BLD-RTO: 0 /100 WBC
PHOSPHATE SERPL-MCNC: 4.2 MG/DL (ref 2.7–4.5)
PLATELET # BLD AUTO: 260 K/UL (ref 150–350)
PMV BLD AUTO: 10.5 FL (ref 9.2–12.9)
POTASSIUM SERPL-SCNC: 3.6 MMOL/L (ref 3.5–5.1)
PROT SERPL-MCNC: 6.3 G/DL (ref 6–8.4)
RBC # BLD AUTO: 3.77 M/UL (ref 4–5.4)
SODIUM SERPL-SCNC: 141 MMOL/L (ref 136–145)
WBC # BLD AUTO: 5.46 K/UL (ref 3.9–12.7)

## 2020-07-12 PROCEDURE — 99232 SBSQ HOSP IP/OBS MODERATE 35: CPT | Mod: GT,,, | Performed by: HOSPITALIST

## 2020-07-12 PROCEDURE — 25000003 PHARM REV CODE 250: Performed by: INTERNAL MEDICINE

## 2020-07-12 PROCEDURE — 85025 COMPLETE CBC W/AUTO DIFF WBC: CPT

## 2020-07-12 PROCEDURE — 84100 ASSAY OF PHOSPHORUS: CPT

## 2020-07-12 PROCEDURE — 25000003 PHARM REV CODE 250: Performed by: HOSPITALIST

## 2020-07-12 PROCEDURE — 80053 COMPREHEN METABOLIC PANEL: CPT

## 2020-07-12 PROCEDURE — 83735 ASSAY OF MAGNESIUM: CPT

## 2020-07-12 PROCEDURE — 99232 PR SUBSEQUENT HOSPITAL CARE,LEVL II: ICD-10-PCS | Mod: GT,,, | Performed by: HOSPITALIST

## 2020-07-12 PROCEDURE — 63600175 PHARM REV CODE 636 W HCPCS: Performed by: STUDENT IN AN ORGANIZED HEALTH CARE EDUCATION/TRAINING PROGRAM

## 2020-07-12 PROCEDURE — 36415 COLL VENOUS BLD VENIPUNCTURE: CPT

## 2020-07-12 PROCEDURE — 20600001 HC STEP DOWN PRIVATE ROOM

## 2020-07-12 RX ADMIN — AMLODIPINE BESYLATE 10 MG: 10 TABLET ORAL at 08:07

## 2020-07-12 RX ADMIN — TRAMADOL HYDROCHLORIDE 50 MG: 50 TABLET, FILM COATED ORAL at 02:07

## 2020-07-12 RX ADMIN — ATORVASTATIN CALCIUM 80 MG: 20 TABLET, FILM COATED ORAL at 09:07

## 2020-07-12 RX ADMIN — TRAMADOL HYDROCHLORIDE 50 MG: 50 TABLET, FILM COATED ORAL at 09:07

## 2020-07-12 RX ADMIN — TRAMADOL HYDROCHLORIDE 50 MG: 50 TABLET, FILM COATED ORAL at 05:07

## 2020-07-12 RX ADMIN — CLOPIDOGREL BISULFATE 75 MG: 75 TABLET ORAL at 08:07

## 2020-07-12 RX ADMIN — HEPARIN SODIUM 5000 UNITS: 5000 INJECTION INTRAVENOUS; SUBCUTANEOUS at 05:07

## 2020-07-12 RX ADMIN — ASPIRIN 81 MG: 81 TABLET, COATED ORAL at 08:07

## 2020-07-12 RX ADMIN — HEPARIN SODIUM 5000 UNITS: 5000 INJECTION INTRAVENOUS; SUBCUTANEOUS at 09:07

## 2020-07-12 RX ADMIN — HEPARIN SODIUM 5000 UNITS: 5000 INJECTION INTRAVENOUS; SUBCUTANEOUS at 02:07

## 2020-07-12 NOTE — PROGRESS NOTES
Hospital Medicine  Progress note    Team: [facility] VIRTUAL TEAM 9 Nikolay Lindsay MD  Admit Date: 7/1/2020  KYLEIGH 7/13/2020  Code status: DNR (Do Not Resuscitate)    This service was provided through telemedicine.  Start: 9:12  End 9: 32 Total 20  min  I have assessed these findings virtually using a Telemed platform and with assistance of bedside nurse.    Principal Problem:  TIA    Brief HPI / Hospital Course 83 yo F with recent CVA (R sided residual weakness) was transferred to [facility] from Rehab for onset of confusion, slurred speech, and facial droop. Imaging was negative for CVA.  EKG showed ST elevation in lateral limb leads which resolved w neg troponins likely demand ischemia. Confusion, slurred speech, and facial droop resolved overnight.  Prior to recent CVA the patient was living independently.. Son Adam     PT  7/10    Patient unable to weight shift to R due to knee buckling, pain on R, hips/knees flexed, relying heavily on RW to offload R LE, standing tolerance limited by fatigue and pain in R knee. Moderate assistance with RW from bedside chair, 2 reps; cued for scooting to edge of chair, LE positioning to widen RINKU, hand placement, anterior weight shift. Based on the patient's progress with therapy, motivation to participate in treatment, and prior level of independence, they are an excellent candidate for inpatient rehabilitation and they would benefit from rehab to maximize their functional potential.      Interval Hx.     7/11 Patient is feeling better and is highly motivated to participate in rehabilitation program. See PT note above . C/o severe pain in knee w weight bearing    7/12 much less the pain with tramadol.  Patient also reports increasing strength in her right hand which on examination is 5/5 in both hands.  She also feels as though her legs are stronger.  On exam she was unable to hold up her legs against gravity.  ROS    HEENT: decreased hearing  Respiratory: neg for cough neg for  shortness of breath  Cardiovascular: neg for chest pain neg for palpitations  Gastrointestinal: neg for nausea neg for vomiting, neg for abdominal pain neg for diarrhea pos for constipation   Behavioral/Psych: neg for depression neg for anxiety  Neuro (RT handed): Weakness rt side (upper >> lower). Unable to eat with rt hand     PEx  Temp:  [97.5 °F (36.4 °C)-98.4 °F (36.9 °C)]   Pulse:  [59-88]   Resp:  [14-22]   BP: (132-161)/(63-80)   SpO2:  [94 %-99 %]     Intake/Output Summary (Last 24 hours) at 7/12/2020 0836  Last data filed at 7/12/2020 0500  Gross per 24 hour   Intake 1210 ml   Output 700 ml   Net 510 ml     General Appearance: no acute distress   Heart: regular rate and rhythm *  Respiratory: Normal expansion. No crackles *  Abdomen: Soft, non-tender; bowel sounds active*   Mental status: Alert, oriented x 4, affect appropriate   Weakness rt side RE >> LE Improved. BL UE now 5/5. LLE (rt) unable to hold up against gravity. RLE flexion 4/5**    * with assistance of nurse who was present during the visit      Recent Labs   Lab 07/10/20  0651 07/11/20  0345 07/12/20  0451   WBC 7.40 5.15 5.46   HGB 12.2 11.5* 11.2*   HCT 38.4 36.3* 36.5*    260 260     Recent Labs   Lab 07/10/20  0651 07/11/20  0345 07/12/20  0451    141 141   K 3.7 3.7 3.6    108 108   CO2 27 25 25   BUN 14 13 12   CREATININE 0.8 0.8 0.8   GLU 89 85 86   CALCIUM 9.9 9.5 9.8   MG 1.8 1.8 1.8   PHOS 3.2 3.8 4.2     Recent Labs   Lab 07/10/20  0651 07/11/20 0345 07/12/20  0451   ALKPHOS 60 59 55   ALT 27 23 22   AST 20 17 16   ALBUMIN 3.3* 3.1* 3.2*   PROT 6.7 6.2 6.3   BILITOT 0.6 0.5 0.5      CTH negative for acute abnormalities, extensive chronic microvascular changes + small remote infarcts, stable calcified R MCA bifurcation aneurysm    CTA Head and neck (6/17)    CT head: No hemorrhage or major vascular distribution infarction.  Further evaluation with MRI at the discretion of the clinical service.     CTA: No definite  large vessel occlusion.  Severe stenosis of both branches of the right M2 as seen on series 5, image 412 and 416.  Multifocal intracranial arterial stenoses including multifocal posterior circulation moderate to severe stenoses and moderate right M1 MCA stenosis.     Enlarging right MCA aneurysm in comparison the prior exam of 2010.     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       Assessment and Plan    CVA  TIA  Rt sided weakness RUE improved  Likely 2/2 Type 2 NSTEMI (stress related)   Continue ASA, plavix, statin  Vascular Neuro consulted, recs appreciated  Transfer back to Rehab / SNF likely tomorrow    DJD rt knee  Severe pain limiting PT  Started on Toradol  NSAID's contra indicated due to risk of CNS bleed  XR rt knee (ordered)  Tramadol 500 mg q 8 ordered >> improvement in pain    NSTEMI type 2  Resolved    SANTOSH (acute kidney injury)  Cr 2 > > 0.8  Resolved     Hypertension  Restart amlodipine 10 mg/ daily (7/9)   Improved     Goals of Care:  Return to prior state or health / functional mobility    Nikolay Lindsay MD, Staff Physician, [facility],

## 2020-07-12 NOTE — PLAN OF CARE
Care plan reviewed with patient, verbalizes understanding. Neuro checks WDL- no changes. C/o right knee pain- per MD orders, tramadol given per orders- tolerating well. NSR on telemetry. Pure wick in place. X2 assist up to BSC. PLAN= REHAB.

## 2020-07-12 NOTE — PLAN OF CARE
Pt is AAOx4. Calm and cooperative, currently resting comfortably. Pain controlled by newly added med today, per pt.. VSS. Plan of care reviewed. All concerns addressed. Call bell in reach, fall precautions discussed. WCTM.       Problem: Adult Inpatient Plan of Care  Goal: Plan of Care Review  Outcome: Ongoing, Progressing    Plan of care reviewed with patient. Pt denies questions or concerns.    Problem: Adult Inpatient Plan of Care  Goal: Optimal Comfort and Wellbeing  Outcome: Ongoing, Progressing    Pain control discussed, pt tolerating new medication well.      Problem: Fall Injury Risk  Goal: Absence of Fall and Fall-Related Injury  Outcome: Ongoing, Progressing    Pt is compliant with call bell use, and all fall precautions. Remains free of fall or injuries.

## 2020-07-13 LAB
ALBUMIN SERPL BCP-MCNC: 3.1 G/DL (ref 3.5–5.2)
ALP SERPL-CCNC: 57 U/L (ref 55–135)
ALT SERPL W/O P-5'-P-CCNC: 22 U/L (ref 10–44)
ANION GAP SERPL CALC-SCNC: 8 MMOL/L (ref 8–16)
AST SERPL-CCNC: 16 U/L (ref 10–40)
BASOPHILS # BLD AUTO: 0.05 K/UL (ref 0–0.2)
BASOPHILS NFR BLD: 0.9 % (ref 0–1.9)
BILIRUB SERPL-MCNC: 0.5 MG/DL (ref 0.1–1)
BUN SERPL-MCNC: 12 MG/DL (ref 8–23)
CALCIUM SERPL-MCNC: 9.6 MG/DL (ref 8.7–10.5)
CHLORIDE SERPL-SCNC: 105 MMOL/L (ref 95–110)
CO2 SERPL-SCNC: 27 MMOL/L (ref 23–29)
CREAT SERPL-MCNC: 0.8 MG/DL (ref 0.5–1.4)
DIFFERENTIAL METHOD: ABNORMAL
EOSINOPHIL # BLD AUTO: 0.4 K/UL (ref 0–0.5)
EOSINOPHIL NFR BLD: 6.7 % (ref 0–8)
ERYTHROCYTE [DISTWIDTH] IN BLOOD BY AUTOMATED COUNT: 12.9 % (ref 11.5–14.5)
EST. GFR  (AFRICAN AMERICAN): >60 ML/MIN/1.73 M^2
EST. GFR  (NON AFRICAN AMERICAN): >60 ML/MIN/1.73 M^2
GLUCOSE SERPL-MCNC: 88 MG/DL (ref 70–110)
HCT VFR BLD AUTO: 36.1 % (ref 37–48.5)
HGB BLD-MCNC: 11.1 G/DL (ref 12–16)
IMM GRANULOCYTES # BLD AUTO: 0.01 K/UL (ref 0–0.04)
IMM GRANULOCYTES NFR BLD AUTO: 0.2 % (ref 0–0.5)
LYMPHOCYTES # BLD AUTO: 1.9 K/UL (ref 1–4.8)
LYMPHOCYTES NFR BLD: 34.8 % (ref 18–48)
MAGNESIUM SERPL-MCNC: 1.7 MG/DL (ref 1.6–2.6)
MCH RBC QN AUTO: 30 PG (ref 27–31)
MCHC RBC AUTO-ENTMCNC: 30.7 G/DL (ref 32–36)
MCV RBC AUTO: 98 FL (ref 82–98)
MONOCYTES # BLD AUTO: 0.5 K/UL (ref 0.3–1)
MONOCYTES NFR BLD: 8.7 % (ref 4–15)
NEUTROPHILS # BLD AUTO: 2.7 K/UL (ref 1.8–7.7)
NEUTROPHILS NFR BLD: 48.7 % (ref 38–73)
NRBC BLD-RTO: 0 /100 WBC
PHOSPHATE SERPL-MCNC: 3.5 MG/DL (ref 2.7–4.5)
PLATELET # BLD AUTO: 244 K/UL (ref 150–350)
PMV BLD AUTO: 10 FL (ref 9.2–12.9)
POTASSIUM SERPL-SCNC: 3.7 MMOL/L (ref 3.5–5.1)
PROT SERPL-MCNC: 6.1 G/DL (ref 6–8.4)
RBC # BLD AUTO: 3.7 M/UL (ref 4–5.4)
SODIUM SERPL-SCNC: 140 MMOL/L (ref 136–145)
WBC # BLD AUTO: 5.54 K/UL (ref 3.9–12.7)

## 2020-07-13 PROCEDURE — 83735 ASSAY OF MAGNESIUM: CPT

## 2020-07-13 PROCEDURE — 25000003 PHARM REV CODE 250: Performed by: INTERNAL MEDICINE

## 2020-07-13 PROCEDURE — 92507 TX SP LANG VOICE COMM INDIV: CPT

## 2020-07-13 PROCEDURE — 20600001 HC STEP DOWN PRIVATE ROOM

## 2020-07-13 PROCEDURE — 80053 COMPREHEN METABOLIC PANEL: CPT

## 2020-07-13 PROCEDURE — 84100 ASSAY OF PHOSPHORUS: CPT

## 2020-07-13 PROCEDURE — 63600175 PHARM REV CODE 636 W HCPCS: Performed by: STUDENT IN AN ORGANIZED HEALTH CARE EDUCATION/TRAINING PROGRAM

## 2020-07-13 PROCEDURE — 97530 THERAPEUTIC ACTIVITIES: CPT

## 2020-07-13 PROCEDURE — 25000003 PHARM REV CODE 250: Performed by: HOSPITALIST

## 2020-07-13 PROCEDURE — 85025 COMPLETE CBC W/AUTO DIFF WBC: CPT

## 2020-07-13 PROCEDURE — 36415 COLL VENOUS BLD VENIPUNCTURE: CPT

## 2020-07-13 PROCEDURE — 99232 PR SUBSEQUENT HOSPITAL CARE,LEVL II: ICD-10-PCS | Mod: GT,,, | Performed by: HOSPITALIST

## 2020-07-13 PROCEDURE — 99232 SBSQ HOSP IP/OBS MODERATE 35: CPT | Mod: GT,,, | Performed by: HOSPITALIST

## 2020-07-13 RX ADMIN — TRAMADOL HYDROCHLORIDE 50 MG: 50 TABLET, FILM COATED ORAL at 05:07

## 2020-07-13 RX ADMIN — AMLODIPINE BESYLATE 10 MG: 10 TABLET ORAL at 08:07

## 2020-07-13 RX ADMIN — HEPARIN SODIUM 5000 UNITS: 5000 INJECTION INTRAVENOUS; SUBCUTANEOUS at 02:07

## 2020-07-13 RX ADMIN — HEPARIN SODIUM 5000 UNITS: 5000 INJECTION INTRAVENOUS; SUBCUTANEOUS at 05:07

## 2020-07-13 RX ADMIN — TRAMADOL HYDROCHLORIDE 50 MG: 50 TABLET, FILM COATED ORAL at 10:07

## 2020-07-13 RX ADMIN — HEPARIN SODIUM 5000 UNITS: 5000 INJECTION INTRAVENOUS; SUBCUTANEOUS at 10:07

## 2020-07-13 RX ADMIN — ASPIRIN 81 MG: 81 TABLET, COATED ORAL at 08:07

## 2020-07-13 RX ADMIN — TRAMADOL HYDROCHLORIDE 50 MG: 50 TABLET, FILM COATED ORAL at 02:07

## 2020-07-13 RX ADMIN — ATORVASTATIN CALCIUM 80 MG: 20 TABLET, FILM COATED ORAL at 08:07

## 2020-07-13 RX ADMIN — CLOPIDOGREL BISULFATE 75 MG: 75 TABLET ORAL at 08:07

## 2020-07-13 NOTE — PLAN OF CARE
Problem: SLP Goal  Goal: SLP Goal  Description: Speech Language Pathology Goals  Goals expected to be met by 7/9:  1. Patient will tolerate a mechanical soft diet and thin liquids with no overt signs of airway compromise.   2. Patient will complete mild to moderate level problem solving tasks with 70% acc given mod A.   3. Patient will provide 6-10 items within concrete categories indep.   4. Patient will attend to structured therapy tasks for 3 minutes without redirect cues.   5. Patient will participate in ongoing assessment of R,W,and VS.         7/13/2020 1337 by Emily Abadie, CCC-SLP  Outcome: Ongoing, Progressing  7/13/2020 1336 by Emily Abadie, CCC-SLP  Outcome: Ongoing, Progressing   Goals remain appropriate.   Emily P. Abadie M.S., CCC-SLP  Speech Language Pathologist  [phone]  07/13/2020

## 2020-07-13 NOTE — PT/OT/SLP PROGRESS
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     Objective:     Has the patient been evaluated by SLP for swallowing?   Yes  Keep patient NPO? No   Current Respiratory Status: room air      Patient oriented x4 indep. Son present for session. Patient completed 4 item mental manipulation tasks with 25% acc indep, improving to 100% given max A and multiple repetitions of stimulus items.   Patient complete simple time problem solving tasks with 0% acc improving to 60% given max A and additional repetitions of stim.  Patient benefited from environmental changes for increased focus throughout session. Skilled education was provided to patient  re: diet recs, standard aspiration precautions of which to follow, progress thus far, and ongoing  plan of care with goals and objectives.     Assessment:     Margarita Chávez is a 84 y.o. female with an SLP diagnosis of Cognitive-Linguistic Impairment.      Goals:   Multidisciplinary Problems     SLP Goals        Problem: SLP Goal    Goal Priority Disciplines Outcome   SLP Goal     SLP Ongoing, Progressing   Description: Speech Language Pathology Goals  Goals expected to be met by 7/9:  1. Patient will tolerate a mechanical soft diet and thin liquids with no overt signs of airway compromise.   2. Patient will complete mild to moderate level problem solving tasks with 70% acc given mod A.   3. Patient will provide 6-10 items within concrete categories indep.   4. Patient will attend to structured therapy tasks for 3 minutes without redirect cues.   5. Patient will  participate in ongoing assessment of R,W,and VS.                          Plan:     · Patient to be seen:  4 x/week   · Plan of Care expires:  08/02/20  · Plan of Care reviewed with:  patient   · SLP Follow-Up:  Yes       Discharge recommendations:  rehabilitation facility   Barriers to Discharge:  None    Time Tracking:     SLP Treatment Date:   07/13/20  Speech Start Time:  1030  Speech Stop Time:  1046     Speech Total Time (min):  16 min    Billable Minutes: Speech Therapy Individual 8 and Seld Care/Home Management Training 8    Emily P. Abadie M.S., CCC-SLP  Speech Language Pathologist  [phone]  07/13/2020

## 2020-07-13 NOTE — PLAN OF CARE
Spoke with patient and pt's son Adam. Stated that [employer] had upheld denial after MRU review. I stated we were waiting for official denial letter so we could appeal this decision.  Encouraged patient to start to think of a SNF he would want his mother to go to in the event denial was upheld. He asked for a life of facilities and this was emailed to him.     Called Pauline with [employer] to find out why denial letter has not been faxed to  office. Has left message earlier this morning with fax number.      Julie Haase RN  Case Management [phone]

## 2020-07-13 NOTE — PLAN OF CARE
Pt is AAOx4. Calm and cooperative, currently resting comfortably. Pain controlled with tramadol PO .VSS. Plan of care reviewed. All concerns addressed. Call bell in reach, fall precautions discussed. WCTM.       Problem: Adult Inpatient Plan of Care  Goal: Plan of Care Review  Outcome: Ongoing, Progressing    Plan of care reviewed with patient. Pt denies questions or concerns.    Problem: Adult Inpatient Plan of Care  Goal: Optimal Comfort and Wellbeing  Outcome: Ongoing, Progressing    Pain control discussed, effective round the clock PO meds..     Problem: Fall Injury Risk  Goal: Absence of Fall and Fall-Related Injury  Outcome: Ongoing, Progressing    Pt is compliant with call bell use, and all fall precautions. Remains free of fall or injuries.

## 2020-07-13 NOTE — PLAN OF CARE
Problem: Occupational Therapy Goal  Goal: Occupational Therapy Goal  Description: Goals to be met by: 7 days 7/10/2020  Extended to 7/23/20    Patient will increase functional independence with ADLs by performing:    Pt to complete self feeding with set-up  Pt to complete g/h skills with set-up progressing  Pt to complete UE dressing with MIN A progressing   Pt to complete LE dressing with MIN A- progressing   Pt to complete t/f to BSC with MIN A   Pt to tolerate OOB to chair x 3 hours to increase endurance for functional activity. - progressing     Outcome: Ongoing, Progressing     Pt with limited progress 2/2 R knee pain. Pt will continue to benefit from skilled OT to address he deficits affecting occupational performance.       Viji Pan OTR/L  7/13/20

## 2020-07-13 NOTE — PLAN OF CARE
Pt has been accepted by the following SNF providers in the event that the denial of rehab auth is upheld following appeal:  [facility], [facility], and [facility].  Still under review with [facility], [facility], and [facility].  SW will continue to follow.    Paulette Souza LMSW  [facility]  [id number]

## 2020-07-13 NOTE — PLAN OF CARE
"Request for rapid appeal faxed to [facility].        Your fax has been successfully sent to [phone] at [phone].  ------------------------------------------------------------  From: philippaa  ------------------------------------------------------------  7/13/2020 4:51:12 PM Transmission Record  Sent to [phone] with remote ID "ÿÿÿÿ "  Result: (0/339;0/0) Success  Page record: 1 - 39  Elapsed time: 13:54 on channel 65      Julie Haase RN  Case Management [phone]    "

## 2020-07-13 NOTE — PLAN OF CARE
07/13/20 1415   Discharge Reassessment   Assessment Type Discharge Planning Reassessment   Provided patient/caregiver education on the expected discharge date and the discharge plan Yes   Do you have any problems affording any of your prescribed medications? No   Discharge Plan A Rehab   Discharge Plan B Skilled Nursing Facility   DME Needed Upon Discharge  none   Anticipated Discharge Disposition SNF   Can the patient/caregiver answer the patient profile reliably? Yes, cognitively intact     Updated patient and son on plan of care.    Julie Haase RN  Case Management [phone]

## 2020-07-13 NOTE — PT/OT/SLP PROGRESS
Occupational Therapy   Treatment    Name: Margarita Chávez  MRN: [id number]  Admitting Diagnosis:  Aneurysm of middle cerebral artery       Recommendations:     Discharge Recommendations: rehabilitation facility  Discharge Equipment Recommendations:  walker, rolling, wheelchair, bedside commode, shower chair  Barriers to discharge:  (requires increased assistance for functional tasks)    Assessment:     Margarita Chávez is a 84 y.o. female with a medical diagnosis of Aneurysm of middle cerebral artery.  She presents with the following performance deficits affecting function: gait instability, impaired functional mobilty, decreased lower extremity function, decreased ROM, impaired self care skills, pain, impaired endurance, weakness, impaired balance. Pt tolerated session fair this date with R knee pain being her main functional limitation. Pt pre-medicated for activity; however pain continues to increase in R knee in standing and during mobility. Pt's pain affected pt's LB quality of movement during transfers and mobility. Pt continues to present with RUE weakness, however, improved ROM noted during UB dressing. Pt remains a good rehab candidate due to her PLOF compared to current level of function, good social support, and motivation to regain function. During session, pt reported of walking through through the [place] and taking care of her sister's grandchildren PTA. Pt expressed readiness to return to PLOF and resume normal roles and routines. OT POC remains appropriate for patient on this date. Pt will continue to benefit from skilled OT to address the deficits listed above.     Rehab Prognosis:  Good; patient would benefit from acute skilled OT services to address these deficits and reach maximum level of function.       Plan:     Patient to be seen 4 x/week to address the above listed problems via self-care/home management, therapeutic activities, therapeutic exercises, neuromuscular re-education  · Plan of Care  "Expires: 08/04/20  · Plan of Care Reviewed with: patient    Subjective     Pain/Comfort: "I'll be better if I can get this old right knee working."  · Pain Rating 1: 0/10  · Pain Rating Post-Intervention 1: (pt did not rate; c/o R knee pain in standing and mobility)  · Pain Addressed 2: Cessation of Activity, Nurse notified    Objective:     Communicated with: RN prior to session.  Patient found HOB elevated with telemetry, PureWick upon OT entry to room.    General Precautions: Standard, fall, aspiration   Orthopedic Precautions:Full weight bearing   Braces: N/A     Occupational Performance:     Bed Mobility:    · Patient completed Rolling/Turning to Left with  stand by assistance  · Patient completed Scooting/Bridging with stand by assistance and with side rail  · Patient completed Supine to Sit with stand by assistance      Functional Mobility/Transfers:  · Patient completed Sit <> Stand Transfer from bed with minimum assistance  with  rolling walker   · On 2nd trial, pt required mod A and RW for sit<>stand transfer: Decreased hip and R knee extension noted during 2nd stand.   · Patient completed Bed <> Chair Transfer using Step Transfer technique with minimum assistance with rolling walker  · Functional Mobility: From the bed, pt took 3 steps forward and back to the bed with max A and RW. Pt required a RB before transferring to bedside chair. Pt took ~4 L lateral steps to sit onto chair requiring min A and RW for transfer. Standing in front of the chair, pt took 2 steps forward and back towoards the chair with max A and RW.  · During all aspects of mobility, R knee facilitation provided to prevent knee buckling. Encouraged BUE pushing in RW to offset weight on BLEs.   · No LOB noted  · No dizziness reported     Activities of Daily Living:  · Lower Body Dressing: stand by assistance seated EOB to adjust LB socks  · Upper body dressing: Min A overall requiring increased time to lift RUE into gown  · Toileting: " "dependence with use of brief for voiding       AMPA 6 Click ADL: 14    Treatment & Education:  - Role of OT/ OT POC  - Self care safety/ independence  - Functional transfer/ mobility safety  - Bed mobility safety  - Pursed lip breathing  - Importance of sitting UIC  - Energy conservation techniques such as pacing and taking RBs as needed  - Functional performance this date  - During 1st RB, pt performed x10 bilateral leg kicks to increase blood flow. Pt wanted to "loosen up" her muscles.  - VSS stable throughout   - Coban wrapped washcloth provided     Additional staff present: Tech for mobility and chair follow assistance     Patient left up in chair with all lines intact, call button in reach, RN notified and son presentEducation:      GOALS:   Multidisciplinary Problems     Occupational Therapy Goals        Problem: Occupational Therapy Goal    Goal Priority Disciplines Outcome Interventions   Occupational Therapy Goal     OT, PT/OT Ongoing, Progressing    Description: Goals to be met by: 7 days 7/10/2020  Extended to 7/23/20    Patient will increase functional independence with ADLs by performing:    Pt to complete self feeding with set-up  Pt to complete g/h skills with set-up progressing  Pt to complete UE dressing with MIN A progressing   Pt to complete LE dressing with MIN A- progressing   Pt to complete t/f to BSC with MIN A   Pt to tolerate OOB to chair x 3 hours to increase endurance for functional activity. - progressing                      Time Tracking:     OT Date of Treatment: 07/13/20  OT Start Time: 0941  OT Stop Time: 1009  OT Total Time (min): 28 min    Billable Minutes:Therapeutic Activity 28    Viji Pan OT  7/13/2020    "

## 2020-07-13 NOTE — PLAN OF CARE
Notified by [facility] that patient's rehab authorization is denied. Waiting for denial letter so I can proceed with formal appeal.    Julie Haase RN  Case Management [phone]

## 2020-07-14 LAB
ALBUMIN SERPL BCP-MCNC: 3.1 G/DL (ref 3.5–5.2)
ALP SERPL-CCNC: 53 U/L (ref 55–135)
ALT SERPL W/O P-5'-P-CCNC: 23 U/L (ref 10–44)
ANION GAP SERPL CALC-SCNC: 7 MMOL/L (ref 8–16)
AST SERPL-CCNC: 18 U/L (ref 10–40)
BASOPHILS # BLD AUTO: 0.05 K/UL (ref 0–0.2)
BASOPHILS NFR BLD: 0.9 % (ref 0–1.9)
BILIRUB SERPL-MCNC: 0.5 MG/DL (ref 0.1–1)
BUN SERPL-MCNC: 9 MG/DL (ref 8–23)
CALCIUM SERPL-MCNC: 9.6 MG/DL (ref 8.7–10.5)
CHLORIDE SERPL-SCNC: 105 MMOL/L (ref 95–110)
CO2 SERPL-SCNC: 27 MMOL/L (ref 23–29)
CREAT SERPL-MCNC: 0.8 MG/DL (ref 0.5–1.4)
DIFFERENTIAL METHOD: ABNORMAL
EOSINOPHIL # BLD AUTO: 0.4 K/UL (ref 0–0.5)
EOSINOPHIL NFR BLD: 7.6 % (ref 0–8)
ERYTHROCYTE [DISTWIDTH] IN BLOOD BY AUTOMATED COUNT: 13.1 % (ref 11.5–14.5)
EST. GFR  (AFRICAN AMERICAN): >60 ML/MIN/1.73 M^2
EST. GFR  (NON AFRICAN AMERICAN): >60 ML/MIN/1.73 M^2
GLUCOSE SERPL-MCNC: 90 MG/DL (ref 70–110)
HCT VFR BLD AUTO: 35.3 % (ref 37–48.5)
HGB BLD-MCNC: 10.9 G/DL (ref 12–16)
IMM GRANULOCYTES # BLD AUTO: 0.02 K/UL (ref 0–0.04)
IMM GRANULOCYTES NFR BLD AUTO: 0.3 % (ref 0–0.5)
LYMPHOCYTES # BLD AUTO: 1.9 K/UL (ref 1–4.8)
LYMPHOCYTES NFR BLD: 32.2 % (ref 18–48)
MAGNESIUM SERPL-MCNC: 1.7 MG/DL (ref 1.6–2.6)
MCH RBC QN AUTO: 29.9 PG (ref 27–31)
MCHC RBC AUTO-ENTMCNC: 30.9 G/DL (ref 32–36)
MCV RBC AUTO: 97 FL (ref 82–98)
MONOCYTES # BLD AUTO: 0.5 K/UL (ref 0.3–1)
MONOCYTES NFR BLD: 8.7 % (ref 4–15)
NEUTROPHILS # BLD AUTO: 2.9 K/UL (ref 1.8–7.7)
NEUTROPHILS NFR BLD: 50.3 % (ref 38–73)
NRBC BLD-RTO: 0 /100 WBC
PHOSPHATE SERPL-MCNC: 3.3 MG/DL (ref 2.7–4.5)
PLATELET # BLD AUTO: 249 K/UL (ref 150–350)
PMV BLD AUTO: 10.3 FL (ref 9.2–12.9)
POTASSIUM SERPL-SCNC: 3.7 MMOL/L (ref 3.5–5.1)
PROT SERPL-MCNC: 6.3 G/DL (ref 6–8.4)
RBC # BLD AUTO: 3.64 M/UL (ref 4–5.4)
SODIUM SERPL-SCNC: 139 MMOL/L (ref 136–145)
WBC # BLD AUTO: 5.78 K/UL (ref 3.9–12.7)

## 2020-07-14 PROCEDURE — 84100 ASSAY OF PHOSPHORUS: CPT

## 2020-07-14 PROCEDURE — 25000003 PHARM REV CODE 250: Performed by: INTERNAL MEDICINE

## 2020-07-14 PROCEDURE — 99231 PR SUBSEQUENT HOSPITAL CARE,LEVL I: ICD-10-PCS | Mod: GT,,, | Performed by: HOSPITALIST

## 2020-07-14 PROCEDURE — 36415 COLL VENOUS BLD VENIPUNCTURE: CPT

## 2020-07-14 PROCEDURE — 97530 THERAPEUTIC ACTIVITIES: CPT

## 2020-07-14 PROCEDURE — 20600001 HC STEP DOWN PRIVATE ROOM

## 2020-07-14 PROCEDURE — 83735 ASSAY OF MAGNESIUM: CPT

## 2020-07-14 PROCEDURE — 92507 TX SP LANG VOICE COMM INDIV: CPT

## 2020-07-14 PROCEDURE — 99231 SBSQ HOSP IP/OBS SF/LOW 25: CPT | Mod: GT,,, | Performed by: HOSPITALIST

## 2020-07-14 PROCEDURE — 85025 COMPLETE CBC W/AUTO DIFF WBC: CPT

## 2020-07-14 PROCEDURE — 25000003 PHARM REV CODE 250: Performed by: HOSPITALIST

## 2020-07-14 PROCEDURE — 63600175 PHARM REV CODE 636 W HCPCS: Performed by: STUDENT IN AN ORGANIZED HEALTH CARE EDUCATION/TRAINING PROGRAM

## 2020-07-14 PROCEDURE — 97535 SELF CARE MNGMENT TRAINING: CPT

## 2020-07-14 PROCEDURE — 80053 COMPREHEN METABOLIC PANEL: CPT

## 2020-07-14 RX ADMIN — TRAMADOL HYDROCHLORIDE 50 MG: 50 TABLET, FILM COATED ORAL at 02:07

## 2020-07-14 RX ADMIN — HEPARIN SODIUM 5000 UNITS: 5000 INJECTION INTRAVENOUS; SUBCUTANEOUS at 02:07

## 2020-07-14 RX ADMIN — TRAMADOL HYDROCHLORIDE 50 MG: 50 TABLET, FILM COATED ORAL at 09:07

## 2020-07-14 RX ADMIN — ATORVASTATIN CALCIUM 80 MG: 20 TABLET, FILM COATED ORAL at 09:07

## 2020-07-14 RX ADMIN — ASPIRIN 81 MG: 81 TABLET, COATED ORAL at 08:07

## 2020-07-14 RX ADMIN — TRAMADOL HYDROCHLORIDE 50 MG: 50 TABLET, FILM COATED ORAL at 06:07

## 2020-07-14 RX ADMIN — HEPARIN SODIUM 5000 UNITS: 5000 INJECTION INTRAVENOUS; SUBCUTANEOUS at 06:07

## 2020-07-14 RX ADMIN — AMLODIPINE BESYLATE 10 MG: 10 TABLET ORAL at 08:07

## 2020-07-14 RX ADMIN — HEPARIN SODIUM 5000 UNITS: 5000 INJECTION INTRAVENOUS; SUBCUTANEOUS at 09:07

## 2020-07-14 RX ADMIN — CLOPIDOGREL BISULFATE 75 MG: 75 TABLET ORAL at 08:07

## 2020-07-14 NOTE — PROGRESS NOTES
Hospital Medicine  Progress note    Team: [facility] VIRTUAL TEAM 9 Nikolay Lindsay MD  Admit Date: 7/1/2020  KYLEIGH 7/13/2020  Code status: DNR (Do Not Resuscitate)    This service was provided through telemedicine.  Start: 9:12  End 9: 32 Total 20  min  I have assessed these findings virtually using a Telemed platform and with assistance of bedside nurse.    Principal Problem:  TIA    Brief HPI / Hospital Course 83 yo F with recent CVA (R sided residual weakness) was transferred to [facility] from Rehab for onset of confusion, slurred speech, and facial droop. Imaging was negative for CVA.  EKG showed ST elevation in lateral limb leads which resolved w neg troponins likely demand ischemia. Confusion, slurred speech, and facial droop resolved overnight.  Prior to recent CVA the patient was living independently.. Son Adam     PT  7/10    Patient unable to weight shift to R due to knee buckling, pain on R, hips/knees flexed, relying heavily on RW to offload R LE, standing tolerance limited by fatigue and pain in R knee. Moderate assistance with RW from bedside chair, 2 reps; cued for scooting to edge of chair, LE positioning to widen RINKU, hand placement, anterior weight shift. Based on the patient's progress with therapy, motivation to participate in treatment, and prior level of independence, they are an excellent candidate for inpatient rehabilitation and they would benefit from rehab to maximize their functional potential.      Interval Hx.     7/11 Patient is feeling better and is highly motivated to participate in rehabilitation program. See PT note above . C/o severe pain in knee w weight bearing    7/12 much less the pain with tramadol.  Patient also reports increasing strength in her right hand which on examination is 5/5 in both hands.  She also feels as though her legs are stronger.  On exam she was unable to hold up her legs against gravity.    7/13 patient is feeling better with less pain in her right knee.   She feels that she is close to baseline in terms of the strength on her right side.  She is able to walk in the [place] but is concerned that she will not be safe living alone.  Insurance has denied rehab at [facility].  An appeal has been filed.  She may be able to live with family for a while.  She will discuss this with her family overnight.    ROS    HEENT: decreased hearing  Respiratory: neg for cough neg for shortness of breath  Cardiovascular: neg for chest pain neg for palpitations  Gastrointestinal: neg for nausea neg for vomiting, neg for abdominal pain neg for diarrhea pos for constipation   Behavioral/Psych: neg for depression neg for anxiety  Neuro (RT handed): Weakness rt side (upper >> lower). Unable to eat with rt hand     PEx  Temp:  [97.3 °F (36.3 °C)-98.3 °F (36.8 °C)]   Pulse:  [58-81]   Resp:  [16-20]   BP: (137-157)/(65-79)   SpO2:  [92 %-97 %]     Intake/Output Summary (Last 24 hours) at 7/13/2020 1906  Last data filed at 7/13/2020 1400  Gross per 24 hour   Intake 942 ml   Output 350 ml   Net 592 ml     General Appearance: no acute distress   Heart: regular rate and rhythm *  Respiratory: Normal expansion. No crackles *  Abdomen: Soft, non-tender; bowel sounds active*   Mental status: Alert, oriented x 4, affect appropriate   Weakness rt side RE >> LE Improved. BL UE now 5/5. LLE (rt) unable to hold up against gravity. RLE flexion 4/5**    * with assistance of nurse who was present during the visit      Recent Labs   Lab 07/11/20 0345 07/12/20 0451 07/13/20  0441   WBC 5.15 5.46 5.54   HGB 11.5* 11.2* 11.1*   HCT 36.3* 36.5* 36.1*    260 244     Recent Labs   Lab 07/11/20 0345 07/12/20 0451 07/13/20  0441    141 140   K 3.7 3.6 3.7    108 105   CO2 25 25 27   BUN 13 12 12   CREATININE 0.8 0.8 0.8   GLU 85 86 88   CALCIUM 9.5 9.8 9.6   MG 1.8 1.8 1.7   PHOS 3.8 4.2 3.5     Recent Labs   Lab 07/11/20  0345 07/12/20  0451 07/13/20  0441   ALKPHOS 59 55 57   ALT 23 22 22   AST 17 16  16   ALBUMIN 3.1* 3.2* 3.1*   PROT 6.2 6.3 6.1   BILITOT 0.5 0.5 0.5      CTH negative for acute abnormalities, extensive chronic microvascular changes + small remote infarcts, stable calcified R MCA bifurcation aneurysm    CTA Head and neck (6/17)    CT head: No hemorrhage or major vascular distribution infarction.  Further evaluation with MRI at the discretion of the clinical service.     CTA: No definite large vessel occlusion.  Severe stenosis of both branches of the right M2 as seen on series 5, image 412 and 416.  Multifocal intracranial arterial stenoses including multifocal posterior circulation moderate to severe stenoses and moderate right M1 MCA stenosis.     Enlarging right MCA aneurysm in comparison the prior exam of 2010.     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       Assessment and Plan    CVA  TIA  Rt sided weakness RUE improved  Likely 2/2 Type 2 NSTEMI (stress related)   Continue ASA, plavix, statin  Vascular Neuro consulted, recs appreciated  Transfer back to Rehab / SNF likely tomorrow    DJD rt knee  Severe pain limiting PT  Started on Toradol  NSAID's contra indicated due to risk of CNS bleed  XR rt knee (ordered)  Tramadol 500 mg q 8 ordered >> improvement in pain    NSTEMI type 2  Resolved    SANTOSH (acute kidney injury)  Cr 2 > > 0.8  Resolved     Hypertension  Restart amlodipine 10 mg/ daily (7/9)   Improved     Goals of Care:  Return to prior state or health / functional mobility    Nikolay Lindsay MD, Staff Physician, [facility], [phone]

## 2020-07-14 NOTE — PLAN OF CARE
Plan of care discussed with patient. Tramadol q 8 hours continued for right knee pain. Pending placement to [facility]. An appeal of the insurance denial has been filed.  Patient is free of fall/trauma/injury. Denies CP, SOB, or pain/discomfort. All questions addressed. Will continue to monitor

## 2020-07-14 NOTE — PLAN OF CARE
Spoke with Mignon from [facility]. They are working on appeal. They may do a peer to peer with Dr. Lindsay. They have his number on file.  They did not give me any other specifics.      Julie Haase RN  Case Management [phone]

## 2020-07-14 NOTE — PLAN OF CARE
DX:Aneurysm of middle cerebral artery    Shift Events:V/S qhrs has saritha     Plan of Care:Discharge to SNF     Neuro: A/O     Respiratory: R/A     Cardiac:NS     Diet:Regular       Gtts: N/A     Skin:intact          Pt was examined at the bedside, no acute events overnight were reported    ICU Vital Signs Last 24 Hrs  T(C): 36.5, Max: 36.8 (06-10 @ 09:25)  T(F): 97.7, Max: 98.2 (06-10 @ 09:25)  HR: 76 (66 - 94)  BP: 145/60 (134/58 - 161/73)  BP(mean): 87 (84 - 105)  ABP: --  ABP(mean): --  RR: 18 (16 - 18)  SpO2: 96% (96% - 97%)                          10.4   9.5   )-----------( 492      ( 11 Jun 2017 06:14 )             30.9   06-11    139  |  100  |  18  ----------------------------<  117<H>  3.4<L>   |  26  |  1.20    Ca    9.3      11 Jun 2017 06:14  Phos  3.5     06-11  Mg     1.7     06-11        Exam:  Alert and awake, tracks b/l, expressive aphasia, follows simple commands  PERRL, EOMI  LUE and LLE anti gravity, UE>LE, RUE spastic, RLE plegic   Head: incision is healing well, no drainage, no erythema, no edema    Plan:  PT/OT  SCDs, IS, Bowel Regimen  Dr. Araujo note is appreciated  CT Head on Monday for stability  Cont   Hold Lovenox for now due to mild increase in IPH  D/w Dr. Rajput

## 2020-07-14 NOTE — PLAN OF CARE
Problem: Occupational Therapy Goal  Goal: Occupational Therapy Goal  Description: Goals to be met by: 7 days 7/10/2020  Extended to 7/23/20    Patient will increase functional independence with ADLs by performing:    Pt to complete self feeding with set-up  Pt to complete g/h skills with set-up progressing  Pt to complete UE dressing with MIN A progressing   Pt to complete LE dressing with MIN A- progressing   Pt to complete t/f to BSC with MIN A   Pt to tolerate OOB to chair x 3 hours to increase endurance for functional activity. - progressing     Outcome: Ongoing, Progressing     Limited in functional performance due to R knee pain. OT POC remains appropriate for patient on this date. Pt will continue to benefit from skilled OT to address the deficits affecting her occupational performance.       Viji Pan OTR/L  7/14/20

## 2020-07-14 NOTE — PLAN OF CARE
"Spoke with Marlen from [facility] ([phone] option 7) to confirm that she has received rapid appeal information. She stated that she had not received anything. Stated that I had sent the fax yesterday and had received confirmation that it had been sent. Explained we are in rapid appeal and timeliness is important to get a decision in the appropriate amount of time. The fax confirmation is time stamped.     She requested I resend all the information in addition to the confirmation sheet from yesterday. She has confirmed that I sent it to the correct number.     Information resent.    Your fax has been successfully sent to [phone] at [phone].  ------------------------------------------------------------  From: jhaase  ------------------------------------------------------------  7/14/2020 12:50:26 PM Transmission Record  Sent to [phone] with remote ID "[email or web address] "  Result: (0/339;0/0) Success  Page record: 1 - 45  Elapsed time: 14:38 on channel 29    Situation escalated to  leadership.        Julie Haase RN  Case Management [phone]    "

## 2020-07-14 NOTE — PT/OT/SLP PROGRESS
"Speech Language Pathology Treatment    Patient Name:  Margarita Chávez   MRN:  [id number]  Admitting Diagnosis: Aneurysm of middle cerebral artery    Recommendations:                 General Recommendations:  Dysphagia therapy, Speech/language therapy and Cognitive-linguistic therapy  Diet recommendations:  Regular, Liquid Diet Level: Thin   Aspiration Precautions: Strict aspiration precautions   General Precautions: Standard, aspiration, fall  Communication strategies:  provide increased time to answer    Subjective     "Laying in this bed has my mind not clicking."     Pain/Comfort:  · Pain Rating 1: 0/10  · Pain Rating Post-Intervention 1: 0/10    Objective:     Has the patient been evaluated by SLP for swallowing?   Yes  Keep patient NPO? No   Current Respiratory Status: room air      Pt seen bedside, alert and cooperative.  Pt with verbalized concern re: discharge plan and rehab denial.  She was bale to discuss deficits and effects on safety with good insight into physical deficits.  Decreased awareness of cognitive deficits and effects on safety.  Mod cues provided for pt to attend to money management task 2/2 distracted by previous concerns.  Word problems completed with 25% accy indptly and 75% with mod A.  Education re: ongoing cognitive stim and SLP POC provided.  Pt in agreement.         Assessment:     Margarita Chávez is a 84 y.o. female with an SLP diagnosis of Cognitive-Linguistic Impairment.       Goals:   Multidisciplinary Problems     SLP Goals        Problem: SLP Goal    Goal Priority Disciplines Outcome   SLP Goal     SLP Ongoing, Progressing   Description: Speech Language Pathology Goals  Goals expected to be met by 7/9:  1. Patient will tolerate a mechanical soft diet and thin liquids with no overt signs of airway compromise.   2. Patient will complete mild to moderate level problem solving tasks with 70% acc given mod A.   3. Patient will provide 6-10 items within concrete categories indep.   4. " Patient will attend to structured therapy tasks for 3 minutes without redirect cues.   5. Patient will participate in ongoing assessment of R,W,and VS.                          Plan:     · Patient to be seen:  4 x/week   · Plan of Care expires:  08/02/20  · Plan of Care reviewed with:  patient   · SLP Follow-Up:  Yes       Discharge recommendations:  rehabilitation facility   Barriers to Discharge:  Level of Skilled Assistance Needed      Time Tracking:     SLP Treatment Date:   07/14/20  Speech Start Time:  0933  Speech Stop Time:  0958     Speech Total Time (min):  25 min    Billable Minutes: Speech Therapy Individual 10 and Seld Care/Home Management Training 15    ELBA Chaney, CCC-SLP  07/14/2020

## 2020-07-14 NOTE — PLAN OF CARE
Problem: SLP Goal  Goal: SLP Goal  Description: Speech Language Pathology Goals  Goals expected to be met by 7/9:  1. Patient will tolerate a mechanical soft diet and thin liquids with no overt signs of airway compromise.   2. Patient will complete mild to moderate level problem solving tasks with 70% acc given mod A.   3. Patient will provide 6-10 items within concrete categories indep.   4. Patient will attend to structured therapy tasks for 3 minutes without redirect cues.   5. Patient will participate in ongoing assessment of R,W,and VS.     Outcome: Ongoing, Progressing    Goals remain appropriate, cont POC. ELBA Chaney, CCC/SLP  7/14/2020

## 2020-07-14 NOTE — PROGRESS NOTES
Hospital Medicine  Progress note    Team: [facility] VIRTUAL TEAM 9 Nikolay Lindsay MD  Admit Date: 7/1/2020  KYLEIGH 7/13/2020  Code status: DNR (Do Not Resuscitate)    This service was provided through telemedicine.  Start: 9:12  End 9: 22 Total 10  min  I have assessed these findings virtually using a Telemed platform and with assistance of bedside nurse.    Principal Problem:  TIA    Brief HPI / Hospital Course 85 yo F with recent CVA (R sided residual weakness) was transferred to [facility] from Rehab for onset of confusion, slurred speech, and facial droop. Imaging was negative for CVA.  EKG showed ST elevation in lateral limb leads which resolved w neg troponins likely demand ischemia. Confusion, slurred speech, and facial droop resolved overnight.  Prior to recent CVA the patient was living independently.. Son Adam     PT  7/10    Patient unable to weight shift to R due to knee buckling, pain on R, hips/knees flexed, relying heavily on RW to offload R LE, standing tolerance limited by fatigue and pain in R knee. Moderate assistance with RW from bedside chair, 2 reps; cued for scooting to edge of chair, LE positioning to widen RINKU, hand placement, anterior weight shift. Based on the patient's progress with therapy, motivation to participate in treatment, and prior level of independence, they are an excellent candidate for inpatient rehabilitation and they would benefit from rehab to maximize their functional potential.      Interval Hx.     7/11 Patient is feeling better and is highly motivated to participate in rehabilitation program. See PT note above . C/o severe pain in knee w weight bearing    7/12 much less the pain with tramadol.  Patient also reports increasing strength in her right hand which on examination is 5/5 in both hands.  She also feels as though her legs are stronger.  On exam she was unable to hold up her legs against gravity.    7/13 patient is feeling better with less pain in her right knee.   She feels that she is close to baseline in terms of the strength on her right side.  She is able to walk in the [place] but is concerned that she will not be safe living alone.  Insurance has denied rehab at [facility].  An appeal has been filed.  She may be able to live with family for a while.  She will discuss this with her family overnight.    7/13 OT  Functional Mobility: From the bed, pt took 3 steps forward and back to the bed with max A and RW. Pt required a RB before transferring to bedside chair. Pt took ~4 L lateral steps to sit onto chair requiring min A and RW for transfer. Standing in front of the chair, pt took 2 steps forward and back towoards the chair with max A and RW    7/14 the patient and family feel led she would not be safe at home.  OT evaluation from yesterday noted above.  An appeal of the insurance denial has been filed with an expected response by Thursday.    ROS    HEENT: decreased hearing  Respiratory: neg for cough neg for shortness of breath  Cardiovascular: neg for chest pain neg for palpitations  Gastrointestinal: neg for nausea neg for vomiting, neg for abdominal pain neg for diarrhea pos for constipation   Behavioral/Psych: neg for depression neg for anxiety  Neuro (RT handed): Weakness rt side (upper >> lower). Unable to eat with rt hand     PEx  Temp:  [98.2 °F (36.8 °C)-98.7 °F (37.1 °C)]   Pulse:  [59-98]   Resp:  [16-20]   BP: (118-166)/(65-74)   SpO2:  [92 %-98 %]     Intake/Output Summary (Last 24 hours) at 7/14/2020 0900  Last data filed at 7/14/2020 0500  Gross per 24 hour   Intake 222 ml   Output 600 ml   Net -378 ml     General Appearance: no acute distress   Heart: regular rate and rhythm *  Respiratory: Normal expansion. No crackles *  Abdomen: Soft, non-tender; bowel sounds active*   Mental status: Alert, oriented x 4, affect appropriate   Weakness rt side RE >> LE Improved. BL UE now 5/5. LLE (rt) unable to hold up against gravity. RLE flexion 4/5**    * with assistance of  nurse who was present during the visit      Recent Labs   Lab 07/12/20 0451 07/13/20 0441 07/14/20  0455   WBC 5.46 5.54 5.78   HGB 11.2* 11.1* 10.9*   HCT 36.5* 36.1* 35.3*    244 249     Recent Labs   Lab 07/12/20 0451 07/13/20 0441 07/14/20  0455    140 139   K 3.6 3.7 3.7    105 105   CO2 25 27 27   BUN 12 12 9   CREATININE 0.8 0.8 0.8   GLU 86 88 90   CALCIUM 9.8 9.6 9.6   MG 1.8 1.7 1.7   PHOS 4.2 3.5 3.3     Recent Labs   Lab 07/12/20 0451 07/13/20 0441 07/14/20  0455   ALKPHOS 55 57 53*   ALT 22 22 23   AST 16 16 18   ALBUMIN 3.2* 3.1* 3.1*   PROT 6.3 6.1 6.3   BILITOT 0.5 0.5 0.5      CTH negative for acute abnormalities, extensive chronic microvascular changes + small remote infarcts, stable calcified R MCA bifurcation aneurysm    CTA Head and neck (6/17)    CT head: No hemorrhage or major vascular distribution infarction.  Further evaluation with MRI at the discretion of the clinical service.     CTA: No definite large vessel occlusion.  Severe stenosis of both branches of the right M2 as seen on series 5, image 412 and 416.  Multifocal intracranial arterial stenoses including multifocal posterior circulation moderate to severe stenoses and moderate right M1 MCA stenosis.     Enlarging right MCA aneurysm in comparison the prior exam of 2010.     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       Assessment and Plan    CVA  TIA  Rt sided weakness RUE improved  Likely 2/2 Type 2 NSTEMI (stress related)   Continue ASA, plavix, statin  Vascular Neuro consulted, recs appreciated  Transfer back to Rehab / SNF on hold    DJD rt knee  Severe pain limiting PT  Started on Toradol  NSAID's contra indicated due to risk of CNS bleed  XR rt knee > no fx or dislocation  Tramadol 500 mg q 8 ordered >> improvement in pain    NSTEMI type 2   Resolved    SANTOSH (acute kidney injury)  Cr 2 > > 0.8  Resolved     Hypertension  Restart amlodipine 10 mg/ daily (7/9)   Improved     Goals  of Care:  Return to prior state or health / functional mobility    Nikolay Lindsay MD, Staff Physician, [facility],

## 2020-07-14 NOTE — PT/OT/SLP PROGRESS
Physical Therapy      Patient Name:  Margarita Chávez   MRN:  [id number]  Admitting Diagnosis:  Aneurysm of middle cerebral artery   Recent Surgery: Procedure(s) (LRB):  Left heart cath (Left)    Admit Date: 7/1/2020  Length of Stay: 13 days     Margarita Chávez's plan of care and PT goals reviewed on this date and remain appropriate. Will follow-up for progressive mobility tomorrow, 7/15/2020.    Nel Tabor PT, DPT  7/14/2020   Pager: [phone]

## 2020-07-14 NOTE — PT/OT/SLP PROGRESS
Occupational Therapy   Treatment    Name: Margarita Chávez  MRN: [id number]  Admitting Diagnosis:  Aneurysm of middle cerebral artery       Recommendations:     Discharge Recommendations: rehabilitation facility  Discharge Equipment Recommendations:  walker, rolling, wheelchair, bedside commode, shower chair  Barriers to discharge:  (requires increased assistance for functional tasks)    Assessment:     Margarita Chávez is a 84 y.o. female with a medical diagnosis of Aneurysm of middle cerebral artery.  She presents with the following performance deficits affecting function: weakness, impaired endurance, impaired self care skills, impaired functional mobilty, gait instability, impaired balance, pain. Pt tolerated session fair this date with pain being her main functional limitation. Pt not able to advance to dynamic standing tasks 2/2 increased pain in RLE affecting posture to tolerate prolonged standing. Pt only able to take side steps along side the bed demonstrating the inability to fully clear BLEs from the floor due to impaired weight shifting ability 2/2 pain. Pt utilized RUE more in bimanual tasks in comparison to previous sessions. Pt required decreased assistance for UB dressing due to improved RUE ROM and coordination. OT POC remains appropriate for patient on this date. Pt will continue to benefit from skilled OT to address the deficits affecting her occupational performance.    Rehab Prognosis:  Good; patient would benefit from acute skilled OT services to address these deficits and reach maximum level of function.       Plan:     Patient to be seen 4 x/week to address the above listed problems via self-care/home management, therapeutic activities, therapeutic exercises, neuromuscular re-education  · Plan of Care Expires: 08/04/20  · Plan of Care Reviewed with: patient    Subjective     Pain/Comfort:  · Pain Rating 1: 0/10  · Pain Rating Post-Intervention 1: (did not rate; c/o R knee pain in  standing)  · Pain Rating Post-Intervention 2: (pain remained; decreased at rest: did not rate)    Objective:     Communicated with: RN prior to session.  Patient found HOB elevated with telemetry, PureWick upon OT entry to room.    General Precautions: Standard, fall   Orthopedic Precautions:N/A   Braces: N/A     Occupational Performance:     Bed Mobility:    · Patient completed Rolling/Turning to Left with  stand by assistance and with side rail  · Patient completed Scooting/Bridging with minimum assistance  · Patient completed Supine to Sit with contact guard assistance  · Patient completed Sit to Supine with contact guard assistance     Functional Mobility/Transfers:  · Patient completed Sit <> Stand Transfer from the bed with minimum assistance and of 2 persons  with  rolling walker   · X3 trials completed   · On all trials, verbal cues for R knee extension to prevent buckling; difficulty with extension 2/2 pain  · Impaired postural control in standing; cues for cervical extension for neutral gaze   · Functional Mobility: Several side steps along side bed with max A and RW. Sliding of BLEs noted to advance when pain increased. R knee blocking provided.    Activities of Daily Living:  · Feeding:  supervision for EOB balance while drinking water  · Upper Body Dressing: minimum assistance to don gown as jacket EOB  · Toileting: dependence with use of purewick for voiding       AMPAC 6 Click ADL: 14    Treatment & Education:  - Role of OT/ OT POC  - Self care safety/ independence  - Functional transfer/ mobility safety  - Bed mobility safety  - Pursed lip breathing 2/2 impaired endurance  - Importance of sitting UIC throughout the day: PCT to complete once bed bath is complete   - Energy conservation techniques such as taking rest breaks as needed    Additional staff present: Tech for transfer and mobility assistance     Patient left HOB elevated with all lines intact, call button in reach and RN and PCT  notifiedEducation:      GOALS:   Multidisciplinary Problems     Occupational Therapy Goals        Problem: Occupational Therapy Goal    Goal Priority Disciplines Outcome Interventions   Occupational Therapy Goal     OT, PT/OT Ongoing, Progressing    Description: Goals to be met by: 7 days 7/10/2020  Extended to 7/23/20    Patient will increase functional independence with ADLs by performing:    Pt to complete self feeding with set-up  Pt to complete g/h skills with set-up progressing  Pt to complete UE dressing with MIN A progressing   Pt to complete LE dressing with MIN A- progressing   Pt to complete t/f to BSC with MIN A   Pt to tolerate OOB to chair x 3 hours to increase endurance for functional activity. - progressing                      Time Tracking:     OT Date of Treatment: 07/14/20  OT Start Time: 1011  OT Stop Time: 1039  OT Total Time (min): 28 min    Billable Minutes:Self Care/Home Management 10  Therapeutic Activity 18    Viji Pan OT  7/14/2020

## 2020-07-14 NOTE — PLAN OF CARE
Spoke with patient's son Adam and updated him on plan of care and that appeal had been filed.      Julie Haase RN  Case Management [phone]

## 2020-07-15 LAB
ALBUMIN SERPL BCP-MCNC: 3.1 G/DL (ref 3.5–5.2)
ALP SERPL-CCNC: 55 U/L (ref 55–135)
ALT SERPL W/O P-5'-P-CCNC: 22 U/L (ref 10–44)
ANION GAP SERPL CALC-SCNC: 9 MMOL/L (ref 8–16)
AST SERPL-CCNC: 18 U/L (ref 10–40)
BASOPHILS # BLD AUTO: 0.04 K/UL (ref 0–0.2)
BASOPHILS NFR BLD: 0.8 % (ref 0–1.9)
BILIRUB SERPL-MCNC: 0.6 MG/DL (ref 0.1–1)
BUN SERPL-MCNC: 9 MG/DL (ref 8–23)
CALCIUM SERPL-MCNC: 10.1 MG/DL (ref 8.7–10.5)
CHLORIDE SERPL-SCNC: 104 MMOL/L (ref 95–110)
CO2 SERPL-SCNC: 26 MMOL/L (ref 23–29)
CREAT SERPL-MCNC: 0.8 MG/DL (ref 0.5–1.4)
DIFFERENTIAL METHOD: ABNORMAL
EOSINOPHIL # BLD AUTO: 0.4 K/UL (ref 0–0.5)
EOSINOPHIL NFR BLD: 7 % (ref 0–8)
ERYTHROCYTE [DISTWIDTH] IN BLOOD BY AUTOMATED COUNT: 13.1 % (ref 11.5–14.5)
EST. GFR  (AFRICAN AMERICAN): >60 ML/MIN/1.73 M^2
EST. GFR  (NON AFRICAN AMERICAN): >60 ML/MIN/1.73 M^2
GLUCOSE SERPL-MCNC: 90 MG/DL (ref 70–110)
HCT VFR BLD AUTO: 37.7 % (ref 37–48.5)
HGB BLD-MCNC: 11.8 G/DL (ref 12–16)
IMM GRANULOCYTES # BLD AUTO: 0.01 K/UL (ref 0–0.04)
IMM GRANULOCYTES NFR BLD AUTO: 0.2 % (ref 0–0.5)
LYMPHOCYTES # BLD AUTO: 1.6 K/UL (ref 1–4.8)
LYMPHOCYTES NFR BLD: 29.8 % (ref 18–48)
MAGNESIUM SERPL-MCNC: 1.8 MG/DL (ref 1.6–2.6)
MCH RBC QN AUTO: 30.6 PG (ref 27–31)
MCHC RBC AUTO-ENTMCNC: 31.3 G/DL (ref 32–36)
MCV RBC AUTO: 98 FL (ref 82–98)
MONOCYTES # BLD AUTO: 0.5 K/UL (ref 0.3–1)
MONOCYTES NFR BLD: 9.3 % (ref 4–15)
NEUTROPHILS # BLD AUTO: 2.8 K/UL (ref 1.8–7.7)
NEUTROPHILS NFR BLD: 52.9 % (ref 38–73)
NRBC BLD-RTO: 0 /100 WBC
PHOSPHATE SERPL-MCNC: 3.5 MG/DL (ref 2.7–4.5)
PLATELET # BLD AUTO: 255 K/UL (ref 150–350)
PMV BLD AUTO: 10 FL (ref 9.2–12.9)
POTASSIUM SERPL-SCNC: 3.9 MMOL/L (ref 3.5–5.1)
PROT SERPL-MCNC: 6.4 G/DL (ref 6–8.4)
RBC # BLD AUTO: 3.86 M/UL (ref 4–5.4)
SODIUM SERPL-SCNC: 139 MMOL/L (ref 136–145)
WBC # BLD AUTO: 5.26 K/UL (ref 3.9–12.7)

## 2020-07-15 PROCEDURE — 25000003 PHARM REV CODE 250: Performed by: HOSPITALIST

## 2020-07-15 PROCEDURE — 36415 COLL VENOUS BLD VENIPUNCTURE: CPT

## 2020-07-15 PROCEDURE — 97110 THERAPEUTIC EXERCISES: CPT | Mod: CQ

## 2020-07-15 PROCEDURE — 25000003 PHARM REV CODE 250: Performed by: INTERNAL MEDICINE

## 2020-07-15 PROCEDURE — 92507 TX SP LANG VOICE COMM INDIV: CPT

## 2020-07-15 PROCEDURE — 84100 ASSAY OF PHOSPHORUS: CPT

## 2020-07-15 PROCEDURE — 83735 ASSAY OF MAGNESIUM: CPT

## 2020-07-15 PROCEDURE — 63600175 PHARM REV CODE 636 W HCPCS: Performed by: STUDENT IN AN ORGANIZED HEALTH CARE EDUCATION/TRAINING PROGRAM

## 2020-07-15 PROCEDURE — 99231 SBSQ HOSP IP/OBS SF/LOW 25: CPT | Mod: GT,,, | Performed by: HOSPITALIST

## 2020-07-15 PROCEDURE — 80053 COMPREHEN METABOLIC PANEL: CPT

## 2020-07-15 PROCEDURE — 97116 GAIT TRAINING THERAPY: CPT | Mod: CQ

## 2020-07-15 PROCEDURE — 99231 PR SUBSEQUENT HOSPITAL CARE,LEVL I: ICD-10-PCS | Mod: GT,,, | Performed by: HOSPITALIST

## 2020-07-15 PROCEDURE — 85025 COMPLETE CBC W/AUTO DIFF WBC: CPT

## 2020-07-15 PROCEDURE — 20600001 HC STEP DOWN PRIVATE ROOM

## 2020-07-15 RX ADMIN — TRAMADOL HYDROCHLORIDE 50 MG: 50 TABLET, FILM COATED ORAL at 02:07

## 2020-07-15 RX ADMIN — CLOPIDOGREL BISULFATE 75 MG: 75 TABLET ORAL at 08:07

## 2020-07-15 RX ADMIN — ATORVASTATIN CALCIUM 80 MG: 20 TABLET, FILM COATED ORAL at 10:07

## 2020-07-15 RX ADMIN — HEPARIN SODIUM 5000 UNITS: 5000 INJECTION INTRAVENOUS; SUBCUTANEOUS at 10:07

## 2020-07-15 RX ADMIN — TRAMADOL HYDROCHLORIDE 50 MG: 50 TABLET, FILM COATED ORAL at 06:07

## 2020-07-15 RX ADMIN — HEPARIN SODIUM 5000 UNITS: 5000 INJECTION INTRAVENOUS; SUBCUTANEOUS at 06:07

## 2020-07-15 RX ADMIN — ASPIRIN 81 MG: 81 TABLET, COATED ORAL at 08:07

## 2020-07-15 RX ADMIN — HEPARIN SODIUM 5000 UNITS: 5000 INJECTION INTRAVENOUS; SUBCUTANEOUS at 02:07

## 2020-07-15 RX ADMIN — AMLODIPINE BESYLATE 10 MG: 10 TABLET ORAL at 08:07

## 2020-07-15 RX ADMIN — TRAMADOL HYDROCHLORIDE 50 MG: 50 TABLET, FILM COATED ORAL at 10:07

## 2020-07-15 NOTE — PLAN OF CARE
Problem: SLP Goal  Goal: SLP Goal  Description: Speech Language Pathology Goals  Goals expected to be met by 7/9:  1. Patient will tolerate a mechanical soft diet and thin liquids with no overt signs of airway compromise.   2. Patient will complete mild to moderate level problem solving tasks with 70% acc given mod A.   3. Patient will provide 6-10 items within concrete categories indep.   4. Patient will attend to structured therapy tasks for 3 minutes without redirect cues.   5. Patient will participate in ongoing assessment of R,W,and VS.     Outcome: Ongoing, Progressing     Goals remain appropriate.   Emily P. Abadie M.S., CCC-SLP  Speech Language Pathologist  [phone]  07/15/2020

## 2020-07-15 NOTE — PT/OT/SLP PROGRESS
Speech Language Pathology Treatment    Patient Name:  Margarita Chávez   MRN:  [id number]  Admitting Diagnosis: Aneurysm of middle cerebral artery    Recommendations:                 General Recommendations:  Cognitive-linguistic therapy  Diet recommendations:  Regular, Liquid Diet Level: Thin   Aspiration Precautions: Standard aspiration precautions   General Precautions: Standard, aspiration, fall  Communication strategies:  provide increased time to answer    Subjective     Patient awake and alert.     Pain/Comfort:  · Pain Rating 1: 0/10    Objective:     Has the patient been evaluated by SLP for swallowing?   Yes  Keep patient NPO? No   Current Respiratory Status: room air      Patient seen for ongoing cognitive/linguistic therapy.  Patient recalled memory strategies with 100% acc after review.  Patient with increased attention to therapy tasks this service date requiring no cues to redirect to task at hand across 5 minute intervals.  Moderate problem solving tasks involving money completed with 10% acc indep.  Patient able to sequence 3 item tasks with 40% acc improving to 60% acc given max A.. SLP to continue to follow.     Assessment:     Margarita Chávez is a 84 y.o. female with an SLP diagnosis of Cognitive-Linguistic Impairment.      Goals:   Multidisciplinary Problems     SLP Goals        Problem: SLP Goal    Goal Priority Disciplines Outcome   SLP Goal     SLP Ongoing, Progressing   Description: Speech Language Pathology Goals  Goals expected to be met by 7/9:  1. Patient will tolerate a mechanical soft diet and thin liquids with no overt signs of airway compromise.   2. Patient will complete mild to moderate level problem solving tasks with 70% acc given mod A.   3. Patient will provide 6-10 items within concrete categories indep.   4. Patient will attend to structured therapy tasks for 3 minutes without redirect cues.   5. Patient will participate in ongoing assessment of R,W,and VS.                           Plan:     · Patient to be seen:  4 x/week   · Plan of Care expires:  08/02/20  · Plan of Care reviewed with:  patient   · SLP Follow-Up:  Yes       Discharge recommendations:  rehabilitation facility   Barriers to Discharge:  None    Time Tracking:     SLP Treatment Date:   07/15/20  Speech Start Time:  0750  Speech Stop Time:  0758     Speech Total Time (min):  8 min    Billable Minutes: Speech Therapy Individual 8    Emily Abadie, CCC-SLP  07/15/2020

## 2020-07-15 NOTE — PT/OT/SLP PROGRESS
"Physical Therapy Treatment    Patient Name:  Margarita Chávez   MRN:  [id number]    Recommendations:     Discharge Recommendations:  rehabilitation facility   Discharge Equipment Recommendations: walker, rolling, wheelchair, bedside commode, shower chair   Barriers to discharge: increased assist level for all mobility and self care'    Assessment:     Margarita Chávez is a 84 y.o. female admitted with a medical diagnosis of Aneurysm of middle cerebral artery.  She presents with the following impairments/functional limitations:  weakness, impaired endurance, impaired self care skills, impaired functional mobilty, gait instability, impaired balance(fear of falling) .    Rehab Prognosis: Good; patient would benefit from acute skilled PT services to address these deficits and reach maximum level of function.    Recent Surgery: Procedure(s) (LRB):  Left heart cath (Left)      Plan:     During this hospitalization, patient to be seen 4 x/week to address the identified rehab impairments via gait training, therapeutic activities, therapeutic exercises, neuromuscular re-education and progress toward the following goals:    · Plan of Care Expires:  08/01/20    Subjective     Chief Complaint: fear of falling, RLE pain with wb'ing  Patient/Family Comments/goals: "I got to get rid of this fear of falling".   Pain/Comfort:  · Pain Rating 1: 0/10      Objective:     Communicated with nurse prior to session.  Patient found supine with telemetry, PureWick upon PT entry to room.     General Precautions: Standard, aspiration, fall   Orthopedic Precautions:N/A   Braces:       Functional Mobility:  · Bed Mobility:     · Scooting: stand by assistance  · Supine to Sit: stand by assistance  · Transfers:     · Sit to Stand:  minimum assistance and moderate assistance with rolling walker  · Gait: 4 lateral steps to L then 6 ft forward with Min a and RW, vcs for sequencing.       AM-PAC 6 CLICK MOBILITY  Turning over in bed (including adjusting " bedclothes, sheets and blankets)?: 3  Sitting down on and standing up from a chair with arms (e.g., wheelchair, bedside commode, etc.): 2  Moving from lying on back to sitting on the side of the bed?: 3  Moving to and from a bed to a chair (including a wheelchair)?: 3  Need to walk in hospital room?: 3  Climbing 3-5 steps with a railing?: 1  Basic Mobility Total Score: 15       Therapeutic Activities and Exercises:   white board updated  Excellent progress today with therapy. Patient is very motivated and will benefit highly from IPR stay.   Performed BLE seated exercises including laq,hip flex,ap all 15 reps each    Patient left up in chair with all lines intact, call button in reach and nurse notified..    GOALS:   Multidisciplinary Problems     Physical Therapy Goals        Problem: Physical Therapy Goal    Goal Priority Disciplines Outcome Goal Variances Interventions   Physical Therapy Goal     PT, PT/OT Ongoing, Progressing     Description: Goals to be met by: 20    Patient will increase functional independence with mobility by performin. Supine <>sit with Minimal Assistance -not met  2. Sit to stand transfer with Minimal Assistance -not met  3. Bed to chair transfer with Moderate Assistance -met (2020)  3a. New Goal (2020): Bed<>chair transfer with Carly, using RW. Not met  4. Gait  x 10 feet with Moderate Assistance using AD- not met                   Time Tracking:     PT Received On: 07/15/20  PT Start Time: 1016     PT Stop Time: 1040  PT Total Time (min): 24 min     Billable Minutes: Gait Training 12 and Therapeutic Exercise 12    Treatment Type: Treatment  PT/PTA: PTA     PTA Visit Number: 1     Peyton Grey PTA  07/15/2020

## 2020-07-15 NOTE — PROGRESS NOTES
Hospital Medicine  Progress note    Team: [facility] VIRTUAL TEAM 9 Nikolay Lindsay MD  Admit Date: 7/1/2020  KYLEIGH 7/16/2020  Code status: DNR (Do Not Resuscitate)    This service was provided through telemedicine.  Start: 9:12  End 9: 22 Total 10  min  I have assessed these findings virtually using a Telemed platform and with assistance of bedside nurse.    Principal Problem:  TIA    Brief HPI / Hospital Course 85 yo F with recent CVA (R sided residual weakness) was transferred to [facility] from Rehab for onset of confusion, slurred speech, and facial droop. Imaging was negative for CVA.  EKG showed ST elevation in lateral limb leads which resolved w neg troponins likely demand ischemia. Confusion, slurred speech, and facial droop resolved overnight.  Prior to recent CVA the patient was living independently.. Son Adam     PT  7/10    Patient unable to weight shift to R due to knee buckling, pain on R, hips/knees flexed, relying heavily on RW to offload R LE, standing tolerance limited by fatigue and pain in R knee. Moderate assistance with RW from bedside chair, 2 reps; cued for scooting to edge of chair, LE positioning to widen RINKU, hand placement, anterior weight shift. Based on the patient's progress with therapy, motivation to participate in treatment, and prior level of independence, they are an excellent candidate for inpatient rehabilitation and they would benefit from rehab to maximize their functional potential.      Interval Hx.     7/11 Patient is feeling better and is highly motivated to participate in rehabilitation program. See PT note above . C/o severe pain in knee w weight bearing    7/12 much less the pain with tramadol.  Patient also reports increasing strength in her right hand which on examination is 5/5 in both hands.  She also feels as though her legs are stronger.  On exam she was unable to hold up her legs against gravity.    7/13 patient is feeling better with less pain in her right knee.   She feels that she is close to baseline in terms of the strength on her right side.  She is able to walk in the [place] but is concerned that she will not be safe living alone.  Insurance has denied rehab at [facility].  An appeal has been filed.  She may be able to live with family for a while.  She will discuss this with her family overnight.    7/13 OT  Functional Mobility: From the bed, pt took 3 steps forward and back to the bed with max A and RW. Pt required a RB before transferring to bedside chair. Pt took ~4 L lateral steps to sit onto chair requiring min A and RW for transfer. Standing in front of the chair, pt took 2 steps forward and back towoards the chair with max A and RW    7/14 the patient and family feel led she would not be safe at home.  OT evaluation from yesterday noted above.  An appeal of the insurance denial has been filed with an expected response by Thursday.    7/15 Up in chair. Required max assist for walking  Pain in rt knee 8/10 > 5/10 with tramadol.  Patient need for post acute care d/w outsider reviewer . Approval decision tomorrow    7/15 PT Sit to Stand:  minimum assistance and moderate assistance with rolling walker/  Gait: 4 lateral steps to L then 6 ft forward with Min a and RW, vcs for sequencing testing                                ROS    HEENT: decreased hearing  Respiratory: neg for cough neg for shortness of breath  Cardiovascular: neg for chest pain neg for palpitations  Gastrointestinal: neg for nausea neg for vomiting, neg for abdominal pain neg for diarrhea pos for constipation   Behavioral/Psych: neg for depression neg for anxiety  Neuro (RT handed): Weakness rt side (upper >> lower). Unable to eat with rt hand     PEx  Temp:  [98 °F (36.7 °C)-98.1 °F (36.7 °C)]   Pulse:  [54-88]   Resp:  [5-21]   BP: (134-161)/(62-93)   SpO2:  [87 %-97 %]     Intake/Output Summary (Last 24 hours) at 7/15/2020 1712  Last data filed at 7/15/2020 0856  Gross per 24 hour   Intake 600 ml   Output  1400 ml   Net -800 ml     General Appearance: no acute distress   Respiratory: Normal expansion.   Mental status: Alert, oriented x 4, affect appropriate     Recent Labs   Lab 07/13/20 0441 07/14/20 0455 07/15/20  0533   WBC 5.54 5.78 5.26   HGB 11.1* 10.9* 11.8*   HCT 36.1* 35.3* 37.7    249 255     Recent Labs   Lab 07/13/20  0441 07/14/20  0455 07/15/20  0533    139 139   K 3.7 3.7 3.9    105 104   CO2 27 27 26   BUN 12 9 9   CREATININE 0.8 0.8 0.8   GLU 88 90 90   CALCIUM 9.6 9.6 10.1   MG 1.7 1.7 1.8   PHOS 3.5 3.3 3.5     Recent Labs   Lab 07/13/20  0441 07/14/20  0455 07/15/20  0533   ALKPHOS 57 53* 55   ALT 22 23 22   AST 16 18 18   ALBUMIN 3.1* 3.1* 3.1*   PROT 6.1 6.3 6.4   BILITOT 0.5 0.5 0.6      CTH negative for acute abnormalities, extensive chronic microvascular changes + small remote infarcts, stable calcified R MCA bifurcation aneurysm    CTA Head and neck (6/17)    CT head: No hemorrhage or major vascular distribution infarction.  Further evaluation with MRI at the discretion of the clinical service.     CTA: No definite large vessel occlusion.  Severe stenosis of both branches of the right M2 as seen on series 5, image 412 and 416.  Multifocal intracranial arterial stenoses including multifocal posterior circulation moderate to severe stenoses and moderate right M1 MCA stenosis.     Enlarging right MCA aneurysm in comparison the prior exam of 2010.     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    Assessment and Plan    CVA  TIA  Rt sided weakness RUE improved  Likely 2/2 Type 2 NSTEMI (stress related)   Continue ASA, plavix, statin  Vascular Neuro consulted, recs appreciated  Transfer back to Rehab    DJD rt knee  Severe pain limiting PT  Started on Toradol  NSAID's contra indicated due to risk of CNS bleed  XR rt knee > no fx or dislocation  Tramadol 500 mg q 8 ordered >> improvement in pain    NSTEMI type 2   Resolved    SANTOSH (acute kidney injury)  Cr  2 > > 0.8  Resolved     Hypertension  Restart amlodipine 10 mg/ daily (7/9)   Improved     Goals of Care:  Return to prior state or health / functional mobility    Nikolay Lindsay MD, Staff Physician, [facility],

## 2020-07-15 NOTE — PLAN OF CARE
Problem: Physical Therapy Goal  Goal: Physical Therapy Goal  Description: Goals to be met by: 20    Patient will increase functional independence with mobility by performin. Supine <>sit with Minimal Assistance -met 7/15  2. Sit to stand transfer with Minimal Assistance - not met  3. Bed to chair transfer with Moderate Assistance -met (2020)  3a. New Goal (2020): Bed<>chair transfer with Carly, using RW. -met 7/15  4. Gait  x 10 feet with Moderate Assistance using AD- not met  Outcome: Ongoing, Progressing

## 2020-07-15 NOTE — PLAN OF CARE
Pt awaiting place at [facility]; appeal has been filed regarding insurance denial. Patient free from falls and injuries. Bed alarm on, pt instructed to call for assistance. Skin clean, dry, and intact. Reviewed plan of care with patient. Patient AAOX4, vital signs stable. In NAD. Tramadol q 8 hours for R knee pain. No questions or concerns at this time. Will continue to monitor.

## 2020-07-15 NOTE — PLAN OF CARE
Spoke with patient's son, Adam. Updated him on status of appeal. Appeal has been sent to external reviewer as of 3:46pm on 7/14.    Stated that [facility] will probably start admitting tomorrow and she would be able to go to [facility] as a plan B.      Julie Haase RN  Case Management [phone]

## 2020-07-16 PROBLEM — I69.359 HEMIPARESIS AFFECTING DOMINANT SIDE AS LATE EFFECT OF CEREBROVASCULAR ACCIDENT: Status: ACTIVE | Noted: 2020-07-16

## 2020-07-16 PROBLEM — I21.3 STEMI (ST ELEVATION MYOCARDIAL INFARCTION): Status: RESOLVED | Noted: 2020-07-01 | Resolved: 2020-07-16

## 2020-07-16 LAB
ALBUMIN SERPL BCP-MCNC: 3 G/DL (ref 3.5–5.2)
ALP SERPL-CCNC: 54 U/L (ref 55–135)
ALT SERPL W/O P-5'-P-CCNC: 19 U/L (ref 10–44)
ANION GAP SERPL CALC-SCNC: 4 MMOL/L (ref 8–16)
AST SERPL-CCNC: 16 U/L (ref 10–40)
BASOPHILS # BLD AUTO: 0.04 K/UL (ref 0–0.2)
BASOPHILS NFR BLD: 0.6 % (ref 0–1.9)
BILIRUB SERPL-MCNC: 0.6 MG/DL (ref 0.1–1)
BUN SERPL-MCNC: 9 MG/DL (ref 8–23)
CALCIUM SERPL-MCNC: 9.7 MG/DL (ref 8.7–10.5)
CHLORIDE SERPL-SCNC: 103 MMOL/L (ref 95–110)
CO2 SERPL-SCNC: 31 MMOL/L (ref 23–29)
CREAT SERPL-MCNC: 0.8 MG/DL (ref 0.5–1.4)
DIFFERENTIAL METHOD: ABNORMAL
EOSINOPHIL # BLD AUTO: 0.4 K/UL (ref 0–0.5)
EOSINOPHIL NFR BLD: 5.8 % (ref 0–8)
ERYTHROCYTE [DISTWIDTH] IN BLOOD BY AUTOMATED COUNT: 13.2 % (ref 11.5–14.5)
EST. GFR  (AFRICAN AMERICAN): >60 ML/MIN/1.73 M^2
EST. GFR  (NON AFRICAN AMERICAN): >60 ML/MIN/1.73 M^2
GLUCOSE SERPL-MCNC: 89 MG/DL (ref 70–110)
HCT VFR BLD AUTO: 36.1 % (ref 37–48.5)
HGB BLD-MCNC: 11.1 G/DL (ref 12–16)
IMM GRANULOCYTES # BLD AUTO: 0.02 K/UL (ref 0–0.04)
IMM GRANULOCYTES NFR BLD AUTO: 0.3 % (ref 0–0.5)
LYMPHOCYTES # BLD AUTO: 2.1 K/UL (ref 1–4.8)
LYMPHOCYTES NFR BLD: 32.4 % (ref 18–48)
MAGNESIUM SERPL-MCNC: 1.7 MG/DL (ref 1.6–2.6)
MCH RBC QN AUTO: 29.8 PG (ref 27–31)
MCHC RBC AUTO-ENTMCNC: 30.7 G/DL (ref 32–36)
MCV RBC AUTO: 97 FL (ref 82–98)
MONOCYTES # BLD AUTO: 0.6 K/UL (ref 0.3–1)
MONOCYTES NFR BLD: 9.3 % (ref 4–15)
NEUTROPHILS # BLD AUTO: 3.3 K/UL (ref 1.8–7.7)
NEUTROPHILS NFR BLD: 51.6 % (ref 38–73)
NRBC BLD-RTO: 0 /100 WBC
PHOSPHATE SERPL-MCNC: 3.7 MG/DL (ref 2.7–4.5)
PLATELET # BLD AUTO: 274 K/UL (ref 150–350)
PMV BLD AUTO: 10 FL (ref 9.2–12.9)
POTASSIUM SERPL-SCNC: 3.6 MMOL/L (ref 3.5–5.1)
PROT SERPL-MCNC: 6.3 G/DL (ref 6–8.4)
RBC # BLD AUTO: 3.72 M/UL (ref 4–5.4)
SODIUM SERPL-SCNC: 138 MMOL/L (ref 136–145)
WBC # BLD AUTO: 6.33 K/UL (ref 3.9–12.7)

## 2020-07-16 PROCEDURE — 99239 HOSP IP/OBS DSCHRG MGMT >30: CPT | Mod: ,,, | Performed by: HOSPITALIST

## 2020-07-16 PROCEDURE — 99239 PR HOSPITAL DISCHARGE DAY,>30 MIN: ICD-10-PCS | Mod: ,,, | Performed by: HOSPITALIST

## 2020-07-16 PROCEDURE — 36415 COLL VENOUS BLD VENIPUNCTURE: CPT

## 2020-07-16 PROCEDURE — 80053 COMPREHEN METABOLIC PANEL: CPT

## 2020-07-16 PROCEDURE — 97130 THER IVNTJ EA ADDL 15 MIN: CPT

## 2020-07-16 PROCEDURE — 84100 ASSAY OF PHOSPHORUS: CPT

## 2020-07-16 PROCEDURE — 97129 THER IVNTJ 1ST 15 MIN: CPT

## 2020-07-16 PROCEDURE — U0003 INFECTIOUS AGENT DETECTION BY NUCLEIC ACID (DNA OR RNA); SEVERE ACUTE RESPIRATORY SYNDROME CORONAVIRUS 2 (SARS-COV-2) (CORONAVIRUS DISEASE [COVID-19]), AMPLIFIED PROBE TECHNIQUE, MAKING USE OF HIGH THROUGHPUT TECHNOLOGIES AS DESCRIBED BY CMS-2020-01-R: HCPCS

## 2020-07-16 PROCEDURE — 63600175 PHARM REV CODE 636 W HCPCS: Performed by: STUDENT IN AN ORGANIZED HEALTH CARE EDUCATION/TRAINING PROGRAM

## 2020-07-16 PROCEDURE — 83735 ASSAY OF MAGNESIUM: CPT

## 2020-07-16 PROCEDURE — 20600001 HC STEP DOWN PRIVATE ROOM

## 2020-07-16 PROCEDURE — 25000003 PHARM REV CODE 250: Performed by: HOSPITALIST

## 2020-07-16 PROCEDURE — 85025 COMPLETE CBC W/AUTO DIFF WBC: CPT

## 2020-07-16 PROCEDURE — 25000003 PHARM REV CODE 250: Performed by: INTERNAL MEDICINE

## 2020-07-16 RX ORDER — TRAMADOL HYDROCHLORIDE 50 MG/1
50 TABLET ORAL EVERY 8 HOURS
Start: 2020-07-16 | End: 2020-07-17

## 2020-07-16 RX ADMIN — TRAMADOL HYDROCHLORIDE 50 MG: 50 TABLET, FILM COATED ORAL at 05:07

## 2020-07-16 RX ADMIN — HEPARIN SODIUM 5000 UNITS: 5000 INJECTION INTRAVENOUS; SUBCUTANEOUS at 10:07

## 2020-07-16 RX ADMIN — ATORVASTATIN CALCIUM 80 MG: 20 TABLET, FILM COATED ORAL at 08:07

## 2020-07-16 RX ADMIN — CLOPIDOGREL BISULFATE 75 MG: 75 TABLET ORAL at 09:07

## 2020-07-16 RX ADMIN — TRAMADOL HYDROCHLORIDE 50 MG: 50 TABLET, FILM COATED ORAL at 02:07

## 2020-07-16 RX ADMIN — ASPIRIN 81 MG: 81 TABLET, COATED ORAL at 09:07

## 2020-07-16 RX ADMIN — HEPARIN SODIUM 5000 UNITS: 5000 INJECTION INTRAVENOUS; SUBCUTANEOUS at 05:07

## 2020-07-16 RX ADMIN — HEPARIN SODIUM 5000 UNITS: 5000 INJECTION INTRAVENOUS; SUBCUTANEOUS at 02:07

## 2020-07-16 RX ADMIN — AMLODIPINE BESYLATE 10 MG: 10 TABLET ORAL at 09:07

## 2020-07-16 RX ADMIN — TRAMADOL HYDROCHLORIDE 50 MG: 50 TABLET, FILM COATED ORAL at 10:07

## 2020-07-16 NOTE — DISCHARGE SUMMARY
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]  Yes    Essential hypertension [I10]  Yes    Stroke [I63.9]  Yes      Resolved Hospital Problems    Diagnosis Date Resolved POA    STEMI (ST elevation myocardial infarction) [I21.3] 07/16/2020 Yes    SANTOSH (acute kidney injury) [N17.9] 07/16/2020 Yes     Brief HPI and Hospital Course    84 yr old female with medical history of HTN; HLD, intracranial aneurysms (right MCA bifurcation - measuring 1.7 x 1.3 x 1.6 cm; reported of mesh repair 10 yr ago & bilateral supraclinoid ICA measuring 9 x 3 mm on the right and a more fusiform aneurysm on the left measuring approximately 5 x 4 mm); recent CVA - right sided weakness (TIA On DAPT, statins - for intracranial atheor etiology - multifocal including stenosis of the post-aneurysmal segment of the right M1 MCA. Additional severe stenosis of the M2 MCA affecting both M2 branches; Multifocal mild stenoses of the left M1 MCA without occlusion and more distal stenosis with probable high-grade stenosis of about the M2/M3 bifurcation on the left + Multifocal narrowing of the vertebrobasilar system) presenting to the ER with acute onset fatigue, dysarthria; reduced level of alertness or wakefulness. Presentation within the spectrum of ACS. Given intracranial aneurysm - consult reasons for clearance for ACS intervention from stroke standpoint. Also rule out stroke DDx for presentation.      CT head: Negative for acute stroke. Extensive microvascular ishcemic  changes. Remote infarcts in left BG, Left cerebellum.      Patient back to baseline - with residual subtle RUE drift. Recuded concentration, however no aphasia, no facial droop. Negative concerns for any LVO. Less concerns for acute stroke etiology. However, intracranial aneurysm needs IR evaluation in near future. Shall have F/u set-up on op basis. And optimal medical management for initiation of anticoagulation from cardiac, ACS standpoint.     Prior to recent CVA the patient was living independently    TIA  Rt sided facial weakness and dysarthria resolved  Continue ASA, plavix, statin  Transfer back to Rehab     CVA  From recent stroke  Rt sided weakness improved  Requires max assist to walk    DJD rt knee  Severe pain limiting PT  Started on Toradol  NSAID's contra indicated due to risk of CNS bleed  XR rt knee > no fx or dislocation  Tramadol 500 mg q 8 ordered >> improvement in pain 8/10 >> 5/10     NSTEMI type 2   Resolved     SANTOSH (acute kidney injury)  Cr 2 > > 0.8  Resolved     Hypertension  Restart amlodipine 10 mg/ daily (7/9)   Improved     Goals of Care:  Return to prior state or health / functional mobility      Recent Labs   Lab 07/12/20  0451 07/13/20 0441 07/14/20 0455 07/15/20  0533 07/16/20  0352   WBC 5.46 5.54 5.78 5.26 6.33   HGB 11.2* 11.1* 10.9* 11.8* 11.1*   HCT 36.5* 36.1* 35.3* 37.7 36.1*    244 249 255 274     Recent Labs   Lab 07/12/20 0451 07/13/20 0441 07/14/20 0455 07/15/20  0533 07/16/20  0352    140 139 139 138   K 3.6 3.7 3.7 3.9 3.6    105 105 104 103   CO2 25 27 27 26 31*   BUN 12 12 9 9 9   CREATININE 0.8 0.8 0.8 0.8 0.8   CALCIUM 9.8 9.6 9.6 10.1 9.7   MG 1.8 1.7 1.7 1.8 1.7   PHOS 4.2 3.5 3.3 3.5 3.7   PROT 6.3 6.1 6.3 6.4 6.3   BILITOT 0.5 0.5 0.5 0.6 0.6   ALKPHOS 55 57 53* 55 54*   ALT 22 22 23 22 19   AST 16 16 18 18 16       Current Discharge Medication List      START taking these medications    Details   traMADoL (ULTRAM) 50 mg tablet Take 1  tablet (50 mg total) by mouth every 8 (eight) hours.  Qty:      Comments: Quantity prescribed more than 7 day supply? Yes, quantity medically necessary         CONTINUE these medications which have NOT CHANGED    Details   amLODIPine (NORVASC) 10 MG tablet Take 1 tablet (10 mg total) by mouth once daily.  Qty: 30 tablet, Refills: 11    Comments: .      aspirin 81 MG Chew Chew and swallow 1 tablet (81 mg total) by mouth once daily.  Qty: 30 tablet, Refills: 5      atorvastatin (LIPITOR) 80 MG tablet Take 1 tablet (80 mg total) by mouth once daily.  Qty: 90 tablet, Refills: 5      clopidogreL (PLAVIX) 75 mg tablet Take 1 tablet (75 mg total) by mouth once daily.  Qty: 30 tablet, Refills: 0      hydroCHLOROthiazide (HYDRODIURIL) 12.5 MG Tab Take 2 tablets (25 mg total) by mouth once daily.  Qty: 30 tablet, Refills: 5    Comments: .      lisinopriL (PRINIVIL,ZESTRIL) 20 MG tablet Take 1 tablet (20 mg total) by mouth once daily.  Qty: 90 tablet, Refills: 3    Comments: .           Discharge Diet:cardiac diet with Normal Fluid intake of 1500 - 2000 mL per day    Activity: activity as tolerated    Discharge Condition: Good    Disposition: Rehab Facility    Tests pending at the time of discharge: none      No future appointments.    Discharge examination Patient was seen and examined on the date of discharge and determined to be suitable for discharge.    Time spent  on the discharge of the patient including review of hospital course with the patient. reviewing discharge medications and arranging follow-up care 40 minutes    Nikolay Lindsay MD

## 2020-07-16 NOTE — PROGRESS NOTES
Hospital Medicine  Progress note    Team: [facility] VIRTUAL TEAM 9 Nikolay Lindsay MD  Admit Date: 7/1/2020  KYLEIGH 7/16/2020  Code status: DNR (Do Not Resuscitate)    This service was provided through telemedicine.  Start: 9:12  End 9: 22 Total 10  min  I have assessed these findings virtually using a Telemed platform and with assistance of bedside nurse.    Principal Problem:  TIA    Brief HPI / Hospital Course 85 yo F with recent CVA (R sided residual weakness) [facility] Rehab for onset of confusion, slurred speech, and facial droop. Imaging was negative for CVA.  EKG showed ST elevation in lateral limb leads which resolved w neg troponins likely demand ischemia. Confusion, slurred speech, and facial droop resolved overnight.  Prior to recent CVA the patient was living independently.. Son Adam     PT  7/10    Patient unable to weight shift to R due to knee buckling, pain on R, hips/knees flexed, relying heavily on RW to offload R LE, standing tolerance limited by fatigue and pain in R knee. Moderate assistance with RW from bedside chair, 2 reps; cued for scooting to edge of chair, LE positioning to widen RINKU, hand placement, anterior weight shift. Based on the patient's progress with therapy, motivation to participate in treatment, and prior level of independence, they are an excellent candidate for inpatient rehabilitation and they would benefit from rehab to maximize their functional potential.      Interval Hx.     7/11 Patient is feeling better and is highly motivated to participate in rehabilitation program. See PT note above . C/o severe pain in knee w weight bearing    7/12 much less the pain with tramadol.  Patient also reports increasing strength in her right hand which on examination is 5/5 in both hands.  She also feels as though her legs are stronger.  On exam she was unable to hold up her legs against gravity.    7/13 patient is feeling better with less pain in her right knee.   She feels that she is close to baseline in terms of the strength on her right side.  She is able to walk in the [place] but is concerned that she will not be safe living alone.  Insurance has denied rehab at [facility].  An appeal has been filed.  She may be able to live with family for a while.  She will discuss this with her family overnight.    7/13 OT  Functional Mobility: From the bed, pt took 3 steps forward and back to the bed with max A and RW. Pt required a RB before transferring to bedside chair. Pt took ~4 L lateral steps to sit onto chair requiring min A and RW for transfer. Standing in front of the chair, pt took 2 steps forward and back towoards the chair with max A and RW    7/14 the patient and family feel led she would not be safe at home.  OT evaluation from yesterday noted above.  An appeal of the insurance denial has been filed with an expected response by Thursday.    7/15 Up in chair. Required max assist for walking  Pain in rt knee 8/10 > 5/10 with tramadol.  Patient need for post acute care d/w outsider reviewer . Approval decision tomorrow    7/15 PT Sit to Stand:  minimum assistance and moderate assistance with rolling walker/  Gait: 4 lateral steps to L then 6 ft forward with Min a and RW, vcs for sequencing testing         7/16 Patient accepted by SNF facility and will be discharged                       ROS    HEENT: decreased hearing  Respiratory: neg for cough neg for shortness of breath  Cardiovascular: neg for chest pain neg for palpitations  Gastrointestinal: neg for nausea neg for vomiting, neg for abdominal pain neg for diarrhea pos for constipation   Behavioral/Psych: neg for depression neg for anxiety  Neuro (RT handed): Weakness rt side (upper >> lower). Unable to eat with rt hand     PEx  Temp:  [97.8 °F (36.6 °C)-97.9 °F (36.6 °C)]   Pulse:  [56-88]   Resp:  [13-18]   BP: (148-164)/(78-88)   SpO2:  [94 %-97 %]     Intake/Output Summary (Last 24 hours) at 7/16/2020 6841  Last data filed  at 7/16/2020 0400  Gross per 24 hour   Intake 600 ml   Output 800 ml   Net -200 ml     General Appearance: no acute distress   Respiratory: Normal expansion.   Mental status: Alert, oriented x 4, affect appropriate     Recent Labs   Lab 07/14/20  0455 07/15/20  0533 07/16/20  0352   WBC 5.78 5.26 6.33   HGB 10.9* 11.8* 11.1*   HCT 35.3* 37.7 36.1*    255 274     Recent Labs   Lab 07/14/20  0455 07/15/20  0533 07/16/20  0352    139 138   K 3.7 3.9 3.6    104 103   CO2 27 26 31*   BUN 9 9 9   CREATININE 0.8 0.8 0.8   GLU 90 90 89   CALCIUM 9.6 10.1 9.7   MG 1.7 1.8 1.7   PHOS 3.3 3.5 3.7     Recent Labs   Lab 07/14/20  0455 07/15/20  0533 07/16/20  0352   ALKPHOS 53* 55 54*   ALT 23 22 19   AST 18 18 16   ALBUMIN 3.1* 3.1* 3.0*   PROT 6.3 6.4 6.3   BILITOT 0.5 0.6 0.6      CTH negative for acute abnormalities, extensive chronic microvascular changes + small remote infarcts, stable calcified R MCA bifurcation aneurysm    CTA Head and neck (6/17)    CT head: No hemorrhage or major vascular distribution infarction.  Further evaluation with MRI at the discretion of the clinical service.     CTA: No definite large vessel occlusion.  Severe stenosis of both branches of the right M2 as seen on series 5, image 412 and 416.  Multifocal intracranial arterial stenoses including multifocal posterior circulation moderate to severe stenoses and moderate right M1 MCA stenosis.     Enlarging right MCA aneurysm in comparison the prior exam of 2010.     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    Assessment and Plan    CVA  TIA  Rt sided weakness RUE improved  Likely 2/2 Type 2 NSTEMI (stress related)   Continue ASA, plavix, statin  Vascular Neuro consulted, recs appreciated  Transfer back to Rehab    DJD rt knee  Severe pain limiting PT  Started on Toradol  NSAID's contra indicated due to risk of CNS bleed  XR rt knee > no fx or dislocation  Tramadol 500 mg q 8 ordered >> improvement in  pain    NSTEMI type 2   Resolved    SANTOSH (acute kidney injury)  Cr 2 > > 0.8  Resolved     Hypertension  Restart amlodipine 10 mg/ daily (7/9)   Improved     Goals of Care:  Return to prior state or health / functional mobility    Nikolay Lindsay MD, Staff Physician, [facility], [phone]

## 2020-07-16 NOTE — PT/OT/SLP PROGRESS
"Speech Language Pathology Treatment    Patient Name:  Margarita Chávez   MRN:  [id number]  Admitting Diagnosis: TIA (transient ischemic attack)    Recommendations:                 General Recommendations:  Cognitive-linguistic therapy  Diet recommendations:  Regular, Liquid Diet Level: Thin   Aspiration Precautions: Strict aspiration precautions   General Precautions: Standard, aspiration, fall  Communication strategies:  provide increased time to answer and go to room if call light pushed    Subjective     "Laying up in here has me forgetting what I should know."     Pain/Comfort:  · Pain Rating 1: (a little)  · Location - Side 1: Left  · Location - Orientation 1: generalized  · Location 1: shoulder  · Pain Addressed 1: Distraction(pt reports nursing aware)  · Pain Rating Post-Intervention 1: (no change)    Objective:     Has the patient been evaluated by SLP for swallowing?   Yes  Keep patient NPO? No   Current Respiratory Status: room air      Pt seen bedside, alert and cooperative.  Prolonged response time with some perseveration evident.  5-10 items stated per concrete cat given redirection to stimulus category and increased time.  Max cues to complete mental manipulation task with 50% accy.  Difficulty recalling word sets and alphabetizing them evident.  Pt was attentive to all tasks.  Education re: ongoing SLP POC and cognitive stim provided.  Pt in agreement, reports not at baseline.        Assessment:     Margarita Chávez is a 84 y.o. female with an SLP diagnosis of Cognitive-Linguistic Impairment.      Goals:   Multidisciplinary Problems     SLP Goals        Problem: SLP Goal    Goal Priority Disciplines Outcome   SLP Goal     SLP Ongoing, Progressing   Description: Speech Language Pathology Goals  Goals expected to be met by 7/23:  1. Patient will tolerate a regular diet and thin liquids with no overt signs of airway compromise.   2. Patient will complete mild to moderate level problem solving tasks with 70% " acc given mod A.   3. Patient will provide 6-10 items within concrete categories indep.   4. Patient will attend to structured therapy tasks for 3 minutes without redirect cues.                          Plan:     · Patient to be seen:  4 x/week   · Plan of Care expires:  08/02/20  · Plan of Care reviewed with:  patient   · SLP Follow-Up:  Yes       Discharge recommendations:  rehabilitation facility   Barriers to Discharge:  Level of Skilled Assistance Needed      Time Tracking:     SLP Treatment Date:   07/16/20  Speech Start Time:  0922  Speech Stop Time:  0948     Speech Total Time (min):  26 min    Billable Minutes: Cognitive Skills Intervention 26 mins    ELBA Chaney, CCC-SLP  07/16/2020

## 2020-07-16 NOTE — PLAN OF CARE
Patient free from falls and injuries. Skin clean, dry, and intact. Heparin subq given q 8. Toradol PO given q 8 continuous for R knee pain. Reviewed plan of care with patient. Appeal sent to external reviewer as of yesterday. Family notified. Patient AAOX4, vitals signs stable. In NAD. No questions or concerns at this time. Will continue to monitor.

## 2020-07-16 NOTE — PLAN OF CARE
07/16/20 1317   Discharge Reassessment   Assessment Type Discharge Planning Reassessment   Provided patient/caregiver education on the expected discharge date and the discharge plan Yes   Do you have any problems affording any of your prescribed medications? No   Discharge Plan A Rehab   Discharge Plan B Skilled Nursing Facility   DME Needed Upon Discharge  none   Anticipated Discharge Disposition Rehab     Anticipate d/c to [facility] rehab. If denial is upheld, pt will have to go to an accepting SNF. Her first choice is [facility].  Should get outcome of rapid appeal today.    Julie Haase RN  Case Management [phone]

## 2020-07-16 NOTE — PLAN OF CARE
POV reviewed with patient and son. VSS. Patient A/ox4. Up with 2 assit. [place] in place for incontinence. No IV site at this time. MD aware. Covid screening in process for plan to discharge tto a SNF.Fall precautions in place. Will continue to monitor

## 2020-07-16 NOTE — PLAN OF CARE
Problem: SLP Goal  Goal: SLP Goal  Description: Speech Language Pathology Goals  Goals expected to be met by 7/23:  1. Patient will tolerate a regular diet and thin liquids with no overt signs of airway compromise.   2. Patient will complete mild to moderate level problem solving tasks with 70% acc given mod A.   3. Patient will provide 6-10 items within concrete categories indep.   4. Patient will attend to structured therapy tasks for 3 minutes without redirect cues.         Outcome: Ongoing, Progressing    Goals remain appropriate, cont POC. ELBA Chaney, CCC/SLP  7/16/2020

## 2020-07-17 VITALS
BODY MASS INDEX: 27.99 KG/M2 | TEMPERATURE: 98 F | HEIGHT: 65 IN | WEIGHT: 168 LBS | RESPIRATION RATE: 18 BRPM | HEART RATE: 78 BPM | SYSTOLIC BLOOD PRESSURE: 150 MMHG | DIASTOLIC BLOOD PRESSURE: 68 MMHG | OXYGEN SATURATION: 95 %

## 2020-07-17 LAB
ALBUMIN SERPL BCP-MCNC: 2.9 G/DL (ref 3.5–5.2)
ALP SERPL-CCNC: 56 U/L (ref 55–135)
ALT SERPL W/O P-5'-P-CCNC: 16 U/L (ref 10–44)
ANION GAP SERPL CALC-SCNC: 7 MMOL/L (ref 8–16)
AST SERPL-CCNC: 14 U/L (ref 10–40)
BASOPHILS # BLD AUTO: 0.05 K/UL (ref 0–0.2)
BASOPHILS NFR BLD: 1.1 % (ref 0–1.9)
BILIRUB SERPL-MCNC: 0.5 MG/DL (ref 0.1–1)
BUN SERPL-MCNC: 8 MG/DL (ref 8–23)
CALCIUM SERPL-MCNC: 9.8 MG/DL (ref 8.7–10.5)
CHLORIDE SERPL-SCNC: 106 MMOL/L (ref 95–110)
CO2 SERPL-SCNC: 26 MMOL/L (ref 23–29)
CREAT SERPL-MCNC: 0.8 MG/DL (ref 0.5–1.4)
DIFFERENTIAL METHOD: ABNORMAL
EOSINOPHIL # BLD AUTO: 0.3 K/UL (ref 0–0.5)
EOSINOPHIL NFR BLD: 6.4 % (ref 0–8)
ERYTHROCYTE [DISTWIDTH] IN BLOOD BY AUTOMATED COUNT: 13.2 % (ref 11.5–14.5)
EST. GFR  (AFRICAN AMERICAN): >60 ML/MIN/1.73 M^2
EST. GFR  (NON AFRICAN AMERICAN): >60 ML/MIN/1.73 M^2
GLUCOSE SERPL-MCNC: 91 MG/DL (ref 70–110)
HCT VFR BLD AUTO: 37.1 % (ref 37–48.5)
HGB BLD-MCNC: 11.3 G/DL (ref 12–16)
IMM GRANULOCYTES # BLD AUTO: 0.01 K/UL (ref 0–0.04)
IMM GRANULOCYTES NFR BLD AUTO: 0.2 % (ref 0–0.5)
LYMPHOCYTES # BLD AUTO: 1.5 K/UL (ref 1–4.8)
LYMPHOCYTES NFR BLD: 31.4 % (ref 18–48)
MAGNESIUM SERPL-MCNC: 1.8 MG/DL (ref 1.6–2.6)
MCH RBC QN AUTO: 29.7 PG (ref 27–31)
MCHC RBC AUTO-ENTMCNC: 30.5 G/DL (ref 32–36)
MCV RBC AUTO: 98 FL (ref 82–98)
MONOCYTES # BLD AUTO: 0.4 K/UL (ref 0.3–1)
MONOCYTES NFR BLD: 9.4 % (ref 4–15)
NEUTROPHILS # BLD AUTO: 2.4 K/UL (ref 1.8–7.7)
NEUTROPHILS NFR BLD: 51.5 % (ref 38–73)
NRBC BLD-RTO: 0 /100 WBC
PHOSPHATE SERPL-MCNC: 3.6 MG/DL (ref 2.7–4.5)
PLATELET # BLD AUTO: 271 K/UL (ref 150–350)
PMV BLD AUTO: 9.9 FL (ref 9.2–12.9)
POTASSIUM SERPL-SCNC: 3.9 MMOL/L (ref 3.5–5.1)
PROT SERPL-MCNC: 6.2 G/DL (ref 6–8.4)
RBC # BLD AUTO: 3.8 M/UL (ref 4–5.4)
SARS-COV-2 RNA RESP QL NAA+PROBE: NOT DETECTED
SODIUM SERPL-SCNC: 139 MMOL/L (ref 136–145)
WBC # BLD AUTO: 4.68 K/UL (ref 3.9–12.7)

## 2020-07-17 PROCEDURE — 86580 TB INTRADERMAL TEST: CPT | Performed by: INTERNAL MEDICINE

## 2020-07-17 PROCEDURE — 97803 MED NUTRITION INDIV SUBSEQ: CPT

## 2020-07-17 PROCEDURE — 85025 COMPLETE CBC W/AUTO DIFF WBC: CPT

## 2020-07-17 PROCEDURE — 30200315 PPD INTRADERMAL TEST REV CODE 302: Performed by: INTERNAL MEDICINE

## 2020-07-17 PROCEDURE — 84100 ASSAY OF PHOSPHORUS: CPT

## 2020-07-17 PROCEDURE — 92507 TX SP LANG VOICE COMM INDIV: CPT

## 2020-07-17 PROCEDURE — 36415 COLL VENOUS BLD VENIPUNCTURE: CPT

## 2020-07-17 PROCEDURE — 63600175 PHARM REV CODE 636 W HCPCS: Performed by: STUDENT IN AN ORGANIZED HEALTH CARE EDUCATION/TRAINING PROGRAM

## 2020-07-17 PROCEDURE — 25000003 PHARM REV CODE 250: Performed by: HOSPITALIST

## 2020-07-17 PROCEDURE — 25000003 PHARM REV CODE 250: Performed by: INTERNAL MEDICINE

## 2020-07-17 PROCEDURE — 97535 SELF CARE MNGMENT TRAINING: CPT

## 2020-07-17 PROCEDURE — 97530 THERAPEUTIC ACTIVITIES: CPT

## 2020-07-17 PROCEDURE — 83735 ASSAY OF MAGNESIUM: CPT

## 2020-07-17 PROCEDURE — 80053 COMPREHEN METABOLIC PANEL: CPT

## 2020-07-17 RX ORDER — TRAMADOL HYDROCHLORIDE 50 MG/1
50 TABLET ORAL 3 TIMES DAILY
Qty: 20 TABLET | Refills: 0 | Status: SHIPPED | OUTPATIENT
Start: 2020-07-17 | End: 2020-07-24

## 2020-07-17 RX ORDER — AMOXICILLIN 250 MG
1 CAPSULE ORAL 2 TIMES DAILY PRN
Status: DISCONTINUED | OUTPATIENT
Start: 2020-07-17 | End: 2020-07-17 | Stop reason: HOSPADM

## 2020-07-17 RX ADMIN — ASPIRIN 81 MG: 81 TABLET, COATED ORAL at 08:07

## 2020-07-17 RX ADMIN — HEPARIN SODIUM 5000 UNITS: 5000 INJECTION INTRAVENOUS; SUBCUTANEOUS at 02:07

## 2020-07-17 RX ADMIN — AMLODIPINE BESYLATE 10 MG: 10 TABLET ORAL at 08:07

## 2020-07-17 RX ADMIN — CLOPIDOGREL BISULFATE 75 MG: 75 TABLET ORAL at 08:07

## 2020-07-17 RX ADMIN — TRAMADOL HYDROCHLORIDE 50 MG: 50 TABLET, FILM COATED ORAL at 05:07

## 2020-07-17 RX ADMIN — HEPARIN SODIUM 5000 UNITS: 5000 INJECTION INTRAVENOUS; SUBCUTANEOUS at 05:07

## 2020-07-17 RX ADMIN — TRAMADOL HYDROCHLORIDE 50 MG: 50 TABLET, FILM COATED ORAL at 02:07

## 2020-07-17 RX ADMIN — TUBERCULIN PURIFIED PROTEIN DERIVATIVE 5 UNITS: 5 INJECTION, SOLUTION INTRADERMAL at 12:07

## 2020-07-17 NOTE — PLAN OF CARE
Problem: SLP Goal  Goal: SLP Goal  Description: Speech Language Pathology Goals  Goals expected to be met by 7/23:  1. Patient will tolerate a regular diet and thin liquids with no overt signs of airway compromise.   2. Patient will complete mild to moderate level problem solving tasks with 70% acc given mod A.   3. Patient will provide 6-10 items within concrete categories indep.   4. Patient will attend to structured therapy tasks for 3 minutes without redirect cues.     Outcome: Ongoing, Progressing     Goals remain appropriate.   Emily P. Abadie M.S., CCC-SLP  Speech Language Pathologist  [phone]  07/17/2020

## 2020-07-17 NOTE — PLAN OF CARE
POC reviewed with patient. Patient to be discharge to rehab facility. Patient with be transported by [facility]

## 2020-07-17 NOTE — NURSING
[facility] Wheelchair van transport here to  patient. Patient assisted to w/c. All belongsing including black suitcase sent with patient. Son Adam notified of the time she left.

## 2020-07-17 NOTE — PROGRESS NOTES
"[facility]  Adult Nutrition  Progress Note    SUMMARY       Recommendations  1. Continue regular diet as tolerated.   2. If PO intake decreases < 50%, add Boost Plus TID.   3. RD to monitor.    Goals: Adequate PO intake to meet > 75% EEN/EPN by RD follow up  Nutrition Goal Status: progressing towards goal  Communication of RD Recs: other (comment)(POC)    Reason for Assessment    Reason For Assessment: RD follow-up  Diagnosis: (Aneurysm of middle cerebral artery)  Relevant Medical History: HTN, TIA, HLD  Interdisciplinary Rounds: did not attend  General Information Comments: Pt sitting up in chair with no family at bedside this AM. She continues to report good appetite, intake of % of meals per RN documentation. No updated wt since 7/9. NFPE 7/10, pt continues with mild age appropriate wasting and does not meet criteria for malnutrition at this time.  Nutrition Discharge Planning: d/c on cardiac diet    Nutrition Risk Screen    Nutrition Risk Screen: no indicators present    Nutrition/Diet History    Spiritual, Cultural Beliefs, Hinduism Practices, Values that Affect Care: no    Anthropometrics    Temp: 97.6 °F (36.4 °C)  Height: 5' 5" (165.1 cm)  Height (inches): 65 in  Weight Method: Bed Scale  Weight: 76.2 kg (167 lb 15.9 oz)  Weight (lb): 167.99 lb  Ideal Body Weight (IBW), Female: 125 lb  % Ideal Body Weight, Female (lb): 134.39 %  BMI (Calculated): 28    Lab/Procedures/Meds    Pertinent Labs Reviewed: reviewed  Pertinent Labs Comments: Alb 2.9  Pertinent Medications Reviewed: reviewed  Pertinent Medications Comments: statin, heparin    Estimated/Assessed Needs    Weight Used For Calorie Calculations: 76.2 kg (167 lb 15.9 oz)  Energy Calorie Requirements (kcal): 1522 kcal/day(x 1.25 PAL)  Energy Need Method: Rhea-St Jeor  Protein Requirements: 76-92 g/day(1-1.2 g/kg)  Weight Used For Protein Calculations: 76.2 kg (167 lb 15.9 oz)  Fluid Requirements (mL): per MD or 1 mL/kcal   "   RDA Method (mL): 1522    Nutrition Prescription Ordered    Current Diet Order: Regular    Evaluation of Received Nutrient/Fluid Intake    Comments: LBM 7/15  % Intake of Estimated Energy Needs: %  % Meal Intake: %  Nutrition Risk    Level of Risk/Frequency of Follow-up: low(1x/week)     Assessment and Plan    No nutrition diagnosis at this time.    Monitor and Evaluation    Food and Nutrient Intake: energy intake, food and beverage intake  Food and Nutrient Adminstration: diet order  Anthropometric Measurements: weight, weight change, body mass index  Biochemical Data, Medical Tests and Procedures: electrolyte and renal panel, gastrointestinal profile, glucose/endocrine profile, inflammatory profile, lipid profile  Nutrition-Focused Physical Findings: overall appearance     Malnutrition Assessment  Orbital Region (Subcutaneous Fat Loss): well nourished  Upper Arm Region (Subcutaneous Fat Loss): well nourished   [place] Region (Muscle Loss): mild depletion  Clavicle Bone Region (Muscle Loss): well nourished  Clavicle and Acromion Bone Region (Muscle Loss): well nourished  Dorsal Hand (Muscle Loss): mild depletion  Anterior Thigh Region (Muscle Loss): well nourished  Posterior Calf Region (Muscle Loss): mild depletion     Nutrition Follow-Up    RD Follow-up?: Yes

## 2020-07-17 NOTE — PT/OT/SLP PROGRESS
Occupational Therapy   Treatment    Name: Margarita Chávez  MRN: [id number]  Admitting Diagnosis:  TIA (transient ischemic attack)       Recommendations:     Discharge Recommendations: rehabilitation facility  Discharge Equipment Recommendations:  walker, rolling, wheelchair, bedside commode, shower chair  Barriers to discharge:  (requires increased assistance for functional tasks)    Assessment:     Margarita Chávez is a 84 y.o. female with a medical diagnosis of TIA (transient ischemic attack).  She presents with the following performance deficits affecting function: weakness, impaired balance, impaired self care skills, impaired functional mobilty, decreased lower extremity function, decreased ROM, impaired endurance, impaired cardiopulmonary response to activity. Pt tolerated ADL focused session well this date demonstrating improvements in overall BUE function. Pt required decreased A for ADL routine while seated EOB. Pt resonded well to therapeutic intervention without any knee pain noted which has been a barrier in previous sessions. Pt remains motivated to return to PLOF. OT POC remains appropriate for patient on this date. Pt will continue to benefit from skilled OT to address the deficits listed above.    Rehab Prognosis:  Good; patient would benefit from acute skilled OT services to address these deficits and reach maximum level of function.       Plan:     Patient to be seen 4 x/week to address the above listed problems via self-care/home management, therapeutic activities, therapeutic exercises, neuromuscular re-education  · Plan of Care Expires: 08/04/20  · Plan of Care Reviewed with: patient    Subjective     Pain/Comfort:  · Pain Rating 1: 3/10  · Location - Side 1: Left  · Location - Orientation 1: generalized  · Location 1: shoulder  · Pain Addressed 1: Pre-medicate for activity, Reposition, Distraction  · Pain Rating Post-Intervention 1: 3/10  · Pain Addressed 2: Cessation of Activity, Nurse  notified    Objective:     Communicated with: RN prior to session.  Patient found HOB elevated with telemetry, PureWick, pulse ox (continuous) upon OT entry to room.    General Precautions: Standard, aspiration, fall   Orthopedic Precautions:N/A   Braces: N/A     Occupational Performance:     Bed Mobility:  HOB flat  · Patient completed Rolling/Turning to Left with  stand by assistance  · Patient completed Scooting/Bridging with stand by assistance  · Patient completed Supine to Sit with stand by assistance     Functional Mobility/Transfers:  · Patient completed Sit <> Stand Transfer from bed with moderate assistance  with  hand-held assist   · Patient completed Toilet Transfer Stand Pivot technique to reach bedside commode with maximal assistance and  no AD    Activities of Daily Living:  · Feeding:  supervision to grasp cup, bring to mouth, and drink with RUE while seated EOB  · Grooming: stand by assistance seated EOB to complete facial and dental hygiene      AMPAC 6 Click ADL: 14    Treatment & Education:  - Role of OT/ OT POC  - Self care safety/ independence  - Functional transfer/ mobility safety  - Bed mobility safety  - Importance of sitting UIC throughout the day as tolerated; deferred this date 2/2 pt undergoing PCT care after session  - ADLs completed EOB with set up A requiring SPV for balance. No c/o bilateral knee pain this date. Pt required increased time for ADL completion 2/2 RUE weakness affecting coordination and quality of movement.     Additional staff present: None     Patient left seated on bedside commode with all lines intact, call button in reach, RN notified and PCT presentEducation:      GOALS:   Multidisciplinary Problems     Occupational Therapy Goals        Problem: Occupational Therapy Goal    Goal Priority Disciplines Outcome Interventions   Occupational Therapy Goal     OT, PT/OT Ongoing, Progressing    Description: Goals to be met by: 7 days 7/10/2020  Extended to  7/23/20    Patient will increase functional independence with ADLs by performing:    Pt to complete self feeding with set-up  Pt to complete g/h skills with set-up progressing  Pt to complete UE dressing with MIN A progressing   Pt to complete LE dressing with MIN A- progressing   Pt to complete t/f to BSC with MIN A   Pt to tolerate OOB to chair x 3 hours to increase endurance for functional activity. - progressing                      Time Tracking:     OT Date of Treatment: 07/17/20  OT Start Time: 0848  OT Stop Time: 0917  OT Total Time (min): 29 min    Billable Minutes:Self Care/Home Management 20  Therapeutic Activity 9    Viji Pan, OT  7/17/2020

## 2020-07-17 NOTE — PLAN OF CARE
07/17/20 1351   Post-Acute Status   Post-Acute Authorization Placement   Post-Acute Placement Status Pending Service Contract     NIKA informed by pt's son Adam that he has chosen [facility] as SNF placement.  NIKA relayed this to [place] in admissions as [facility] and sent them updated notes and SNF orders.  NIKA informed by [place] that pt's son Adam was unable to travel from [place] to complete paperwork in time for their 4pm cut-off time today so NIKA suggested that [place] email the papers to him so that pt can still be admitted today.  NIKA also sent SNF orders to [facility] for SNF auth.    NIKA later informed by [place] that she was in the process of emailing the paperwork to pt's son Adam.  NIKA will continue to follow.    Paulette Souza LMSW  [facility]  [id number]

## 2020-07-17 NOTE — PLAN OF CARE
Received message from CM leadership team that denial had been upheld.  SW/CM notified family that denial had been upheld and there was nothing more that our team could do to facilitate admission to [facility].    Also explained that [facility] was not accepting admissions and team was unsure when they would be open for admission again.    Explained that patient was medically ready and that three different skilled nursing facilities had accepted her. Sent patient's son a copy of N list with accepting facilities and explained he had to select a facility by 12/12:30 today. Had sent patient's son a list earlier this week as well.    Discharge Planner will follow up with patient and/or family regarding referrals. Educated patient and/or family on the need for choice to reduce the possibility of financial responsibility since patient is medically stable for discharge.     Julie Haase RN  Case Management [phone]

## 2020-07-17 NOTE — PLAN OF CARE
Problem: Occupational Therapy Goal  Goal: Occupational Therapy Goal  Description: Goals to be met by: 7 days 7/10/2020  Extended to 7/23/20    Patient will increase functional independence with ADLs by performing:    Pt to complete self feeding with set-up  Pt to complete g/h skills with set-up progressing  Pt to complete UE dressing with MIN A progressing   Pt to complete LE dressing with MIN A- progressing   Pt to complete t/f to BSC with MIN A   Pt to tolerate OOB to chair x 3 hours to increase endurance for functional activity. - progressing     Outcome: Ongoing, Progressing     Pt progressing well towards OT goals. OT POC remains appropriate for patient on this date. Pt will continue to benefit from skilled OT to address the deficits affecting her occupational performance.     Viji Pan OTR/L  7/17/20

## 2020-07-17 NOTE — PLAN OF CARE
POC reviewed with patient; understanding verbalized. Pt had no complaints this shift. Voiding via purewick. No PIV at this time. MD aware. Pt with nonskid footwear on, bed in lowest position, and locked with bed rails up x2. Pt instructed to call prior to getting OOB. Pt has call light and personal items within reach. Patient ambulates in room with assistance x2. VSS and afebrile this shift. All questions and concerns addressed at this time. Will continue to monitor.

## 2020-07-17 NOTE — PLAN OF CARE
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]  Yes    Essential hypertension [I10]  Yes    Stroke [I63.9]  Yes      Resolved Hospital Problems    Diagnosis Date Resolved POA    STEMI (ST elevation myocardial infarction) [I21.3] 07/16/2020 Yes    SANTOSH (acute kidney injury) [N17.9] 07/16/2020 Yes       Patient is homebound due to:  TIA (transient ischemic attack)    Allergies:  Review of patient's allergies indicates:   Allergen Reactions    Penicillins Nausea And Vomiting       Vitals:      Every shift (Skilled Nursing patients)    Diet: cardiac diet   Supplement:  1 can every three times a day with meals                         Type:   Ensure        Acitivities:    - Up in a chair each morning as tolerated   - Ambulate with assistance to bathroom   - Scheduled walks once each shift (every 8 hours)   - May use walker, cane, or self-propelled wheelchair   - Weight bearing:     LABS:     CMP, CBC, mag, phos weekly for 3 weeks then Per facility protocol    Nursing Precautions:     - Aspiration precautions:             -  Upright 90 degrees before during and after meals             -  Suction at bedside          - Fall precautions per nursing home protocol   - Decubitus precautions:        -  for positioning   - Pressure reducing foam mattress   - Turn patient every two hours. Use wedge pillows to anchor  patient    CONSULTS:    Physical Therapy to evaluate and treat     Occupational Therapy to evaluate and treat     Speech Therapy  to evaluate and treat     Nutrition to evaluate and recommend diet        MISCELLANEOUS CARE:                 Routine Skin for Bedridden Patients:  Apply moisture barrier cream to all    skin folds and wet areas in perineal area daily and after baths and                           all bowel movements.           Medications: Discontinue all previous medication orders, if any. See new list below.     Kyler Margarita F   Home Medication Instructions JOANIE:[id number]    Printed on:07/17/20 3565   Medication Information                      amLODIPine (NORVASC) 10 MG tablet  Take 1 tablet (10 mg total) by mouth once daily.             aspirin 81 MG Chew  Chew and swallow 1 tablet (81 mg total) by mouth once daily.             atorvastatin (LIPITOR) 80 MG tablet  Take 1 tablet (80 mg total) by mouth once daily.             clopidogreL (PLAVIX) 75 mg tablet  Take 1 tablet (75 mg total) by mouth once daily.             hydroCHLOROthiazide (HYDRODIURIL) 12.5 MG Tab  Take 2 tablets (25 mg total) by mouth once daily.             lisinopriL (PRINIVIL,ZESTRIL) 20 MG tablet  Take 1 tablet (20 mg total) by mouth once daily.             traMADoL (ULTRAM) 50 mg tablet  Take 1 tablet (50 mg total) by mouth every 8 (eight) hours.                         _________________________________  Ronda Rabago MD  07/17/2020

## 2020-07-17 NOTE — PT/OT/SLP PROGRESS
Speech Language Pathology Treatment    Patient Name:  Margarita Chávez   MRN:  [id number]  Admitting Diagnosis: TIA (transient ischemic attack)    Recommendations:                 General Recommendations:  Cognitive-linguistic therapy  Diet recommendations:  Regular, Liquid Diet Level: Thin   Aspiration Precautions: Standard aspiration precautions   General Precautions: Standard, aspiration, fall  Communication strategies:  provide increased time to answer    Subjective     Patient awake and alert.     Pain/Comfort:  Pain Rating 1: 0/10    Objective:     Has the patient been evaluated by SLP for swallowing?   Yes  Keep patient NPO? No   Current Respiratory Status: room air      Patient seen for ongoing cognitive/linguistic therapy. Patient oriented x4.  Patient's daughter present and reported has not noticed any cognitive deficits with patient. Patient completed simple compare and contrasts tasks with 25% acc indep, improving to 60% acc given mod A and coaching. Patient perseverating on items from previous tasks. Patient named 5 items in concrete categories given min A.  SLP reviewed example mental stimulation activities with patient and daughter. Skilled education was provided to patient and family members re: diet recs, standard aspiration precautions of which to follow, and ongoing ST plan of care.    Assessment:     Margarita Chávez is a 84 y.o. female with an SLP diagnosis of Cognitive-Linguistic Impairment.      Goals:   Multidisciplinary Problems     SLP Goals        Problem: SLP Goal    Goal Priority Disciplines Outcome   SLP Goal     SLP Ongoing, Progressing   Description: Speech Language Pathology Goals  Goals expected to be met by 7/23:  1. Patient will tolerate a regular diet and thin liquids with no overt signs of airway compromise.   2. Patient will complete mild to moderate level problem solving tasks with 70% acc given mod A.   3. Patient will provide 6-10 items within concrete categories indep.   4.  Patient will attend to structured therapy tasks for 3 minutes without redirect cues.                          Plan:     · Patient to be seen:  4 x/week   · Plan of Care expires:  08/02/20  · Plan of Care reviewed with:  patient, daughter   · SLP Follow-Up:  Yes       Discharge recommendations:  rehabilitation facility   Barriers to Discharge:  None    Time Tracking:     SLP Treatment Date:   07/17/20  Speech Start Time:  1040  Speech Stop Time:  1050     Speech Total Time (min):  10 min    Billable Minutes: Speech Therapy Individual 10    Emily Abadie, CCC-SLP  07/17/2020

## 2020-07-20 NOTE — PLAN OF CARE
07/20/20 0907   Final Note   Assessment Type Final Discharge Note   Anticipated Discharge Disposition SNF   Hospital Follow Up  Appt(s) scheduled? Yes   Discharge plans and expectations educations in teach back method with documentation complete? Yes     Pt d/c to SNF.    Julie Haase RN  Case Management [phone]

## 2020-07-22 ENCOUNTER — DOCUMENTATION ONLY (OUTPATIENT)
Dept: PRIMARY CARE CLINIC | Facility: CLINIC | Age: 85
End: 2020-07-22

## 2020-07-24 RX ORDER — TRAMADOL HYDROCHLORIDE 50 MG/1
50 TABLET ORAL EVERY 8 HOURS PRN
Qty: 90 TABLET | Refills: 0 | Status: SHIPPED | OUTPATIENT
Start: 2020-07-24 | End: 2022-12-15

## 2020-07-24 NOTE — PROGRESS NOTES
[facility] [[email or web address]]   New Visit Progress Note   Recent Hospital Discharge  DOS 7/22/2020     PRESENTING HISTORY     Chief Complaint/Reason for Admission:  Follow up Hospital Discharge   PCP: Syeda Grace MD   Date of Admission:  7/1/2020     Date of Discharge: 7/20/2020      History of Present Illness:  Ms. Margarita Chávez is a 84 y.o. female with recent CVA (R sided residual weakness) was transferred to [facility] from Rehab for onset of confusion, slurred speech, and facial droop. Imaging was negative for CVA.  EKG showed ST elevation in lateral limb leads which resolved w neg troponins likely demand ischemia. Confusion, slurred speech, and facial droop resolved overnight.  Prior to recent CVA the patient was living independently.   Patient back to baseline - with residual subtle RUE drift. Negative concerns for any LVO. Less concerns for acute stroke etiology. However, intracranial aneurysm needs IR evaluation in near future. Need F/u set-up on op basis and optimal medical management for initiation of anticoagulation from cardiac, ACS standpoint.     Resident transferred to [facility] for PT/OT.   ____________________________________________________    Today:  The resident was seen for initial hospital follow up. She is currently sitting up in the wheelchair and self-propel. Working with PT/OT SNF for muscle strengthening. She is AAOX4. Pleasant and has no complaints. Denies CP,SOB, pain. LCTAB with no edema. No slurred speech and facial droop. Weakness rt side (upper >> lower).       Review of Systems  General ROS: negative for chills or chils  Psychological ROS: negative for hallucination, depression or suicidal ideation  Ophthalmic ROS: negative for blurry vision, photophobia or eye pain  ENT ROS: negative for epistaxis, sore throat or rhinorrhea  Respiratory ROS: no cough, shortness of breath, or wheezing  Cardiovascular ROS: no chest pain or dyspnea on exertion  Gastrointestinal ROS: no abdominal  pain, change in bowel habits, or black/ bloody stools  Genito-Urinary ROS: no dysuria, trouble voiding, or hematuria  Musculoskeletal ROS: negative for gait disturbance or muscular weakness  Neurological ROS: no syncope or seizures; no ataxia  Dermatological ROS: negative for pruritis, rash and jaundice        PAST HISTORY:     Past Medical History:   Diagnosis Date    Aneurysm of middle cerebral artery     Hypertension     TIA (transient ischemic attack)        Past Surgical History:   Procedure Laterality Date    BRAIN SURGERY      HYSTERECTOMY         Family History   Problem Relation Age of Onset    Cancer Paternal Aunt          MEDICATIONS & ALLERGIES:     Current Outpatient Medications on File Prior to Visit   Medication Sig Dispense Refill    amLODIPine (NORVASC) 10 MG tablet Take 1 tablet (10 mg total) by mouth once daily. 30 tablet 11    aspirin 81 MG Chew Chew and swallow 1 tablet (81 mg total) by mouth once daily. 30 tablet 5    atorvastatin (LIPITOR) 80 MG tablet Take 1 tablet (80 mg total) by mouth once daily. 90 tablet 5    clopidogreL (PLAVIX) 75 mg tablet Take 1 tablet (75 mg total) by mouth once daily. 30 tablet 0    hydroCHLOROthiazide (HYDRODIURIL) 12.5 MG Tab Take 2 tablets (25 mg total) by mouth once daily. 30 tablet 5    lisinopriL (PRINIVIL,ZESTRIL) 20 MG tablet Take 1 tablet (20 mg total) by mouth once daily. 90 tablet 3    [DISCONTINUED] traMADoL (ULTRAM) 50 mg tablet Take 1 tablet (50 mg total) by mouth 3 (three) times daily. 20 tablet 0     No current facility-administered medications on file prior to visit.         Review of patient's allergies indicates:   Allergen Reactions    Penicillins Nausea And Vomiting       OBJECTIVE:     Wt Readings from Last 1 Encounters:   07/09/20 0335 76.2 kg (167 lb 15.9 oz)   07/03/20 0800 75.7 kg (166 lb 15.6 oz)   07/01/20 1455 75.8 kg (167 lb)     There is no height or weight on file to calculate BMI.        Physical Exam:  General  appearance: alert, cooperative, no distress  Constitutional:Oriented to person, place, and time  + appears well-developed and well-nourished.   HEENT: Normocephalic, atraumatic, neck symmetrical, no nasal discharge   Eyes: conjunctivae/corneas clear, PERRL  Lungs: clear to auscultation bilaterally  Heart: regular rate and rhythm without rub  Abdomen: soft, non-tender; bowel sounds normoactive;  Extremities: extremities symmetric; no clubbing, cyanosis, or edema  Integument: Skin color, texture, turgor normal; no rashes; hair distrubution normal  Neurologic: Alert and oriented X 4,Weakness rt side (upper >> lower).   Psychiatric: no pressured speech; normal affect; no evidence of impaired cognition     Laboratory  Lab Results   Component Value Date    WBC 4.68 07/17/2020    HGB 11.3 (L) 07/17/2020    HCT 37.1 07/17/2020    MCV 98 07/17/2020     07/17/2020     BMP  Lab Results   Component Value Date     07/17/2020    K 3.9 07/17/2020     07/17/2020    CO2 26 07/17/2020    BUN 8 07/17/2020    CREATININE 0.8 07/17/2020    CALCIUM 9.8 07/17/2020    ANIONGAP 7 (L) 07/17/2020    ESTGFRAFRICA >60.0 07/17/2020    EGFRNONAA >60.0 07/17/2020     Lab Results   Component Value Date    ALT 16 07/17/2020    AST 14 07/17/2020    ALKPHOS 56 07/17/2020    BILITOT 0.5 07/17/2020     Lab Results   Component Value Date    INR 1.0 07/01/2020    INR 1.0 06/17/2020     Lab Results   Component Value Date    HGBA1C 5.8 (H) 06/17/2020         ASSESSMENT & PLAN:     CVA  TIA  Debility  - Rt sided weakness RUE   - Continue ASA, plavix, statin  - continue PT/OT SNF       DJD rt knee  NSAID's contra indicated due to risk of CNS bleed  XR rt knee > no fx or dislocation  Tramadol 500 mg q 8 ordered      NSTEMI   Resolved        Hypertension   - on amlodipine 10 mg daily, lisinopril 20 mg daily and HCTZ 25 mg daily    Instructions for the patient:      Scheduled Follow-up :  Future Appointments   Date Time Provider Department  [place]   8/10/2020 10:00 AM NEUROSURG IR, [facility] NEUROS7 Dez Nathan       Post Visit Medication List:     Medication List          Accurate as of July 22, 2020 11:59 PM. If you have any questions, ask your nurse or doctor.            CONTINUE taking these medications    amLODIPine 10 MG tablet  Commonly known as: NORVASC  Take 1 tablet (10 mg total) by mouth once daily.     aspirin 81 MG Chew  Chew and swallow 1 tablet (81 mg total) by mouth once daily.     atorvastatin 80 MG tablet  Commonly known as: LIPITOR  Take 1 tablet (80 mg total) by mouth once daily.     clopidogreL 75 mg tablet  Commonly known as: PLAVIX  Take 1 tablet (75 mg total) by mouth once daily.     hydroCHLOROthiazide 12.5 MG Tab  Commonly known as: HYDRODIURIL  Take 2 tablets (25 mg total) by mouth once daily.     lisinopriL 20 MG tablet  Commonly known as: PRINIVIL,ZESTRIL  Take 1 tablet (20 mg total) by mouth once daily.     traMADoL 50 mg tablet  Commonly known as: ULTRAM  Take 1 tablet (50 mg total) by mouth 3 (three) times daily.          Total time of the visit 65 min 11:20 am -12:25 pm   Non physical exam/ non charting time: 50 minutes   Description of non physical exam/non charting time:Extensive chart review completed including all consultation notes.  All pertinent laboratory and radiographical images reviewed.    Signing Physician:  Maggie Madden NP     Due to unresolved technical issue with EMR, Medication list on this note summary may not be accurate.

## 2020-07-29 ENCOUNTER — DOCUMENTATION ONLY (OUTPATIENT)
Dept: PRIMARY CARE CLINIC | Facility: CLINIC | Age: 85
End: 2020-07-29

## 2020-07-29 VITALS
RESPIRATION RATE: 18 BRPM | SYSTOLIC BLOOD PRESSURE: 128 MMHG | TEMPERATURE: 97 F | HEART RATE: 69 BPM | OXYGEN SATURATION: 98 % | DIASTOLIC BLOOD PRESSURE: 72 MMHG

## 2020-07-29 RX ORDER — DOCUSATE SODIUM 100 MG/1
100 CAPSULE, LIQUID FILLED ORAL 2 TIMES DAILY
COMMUNITY

## 2020-07-29 RX ORDER — POLYETHYLENE GLYCOL 3350 17 G/17G
17 POWDER, FOR SOLUTION ORAL DAILY
COMMUNITY

## 2020-07-29 NOTE — PROGRESS NOTES
[facility]   Re-evaluate Visit  DOS 7/29/2020     PRESENTING HISTORY     Chief Complaint/Reason for Admission:  Evaluate for debility, constipation and chronic diseases    PCP: Syeda Grace MD   Date of Admission:  7/1/2020     Date of Discharge: 7/20/2020      History of Present Illness:  Ms. Margarita Chávez is a 84 y.o. female with recent CVA (R sided residual weakness) was transferred to [facility] from Rehab for onset of confusion, slurred speech, and facial droop. Imaging was negative for CVA.  EKG showed ST elevation in lateral limb leads which resolved w neg troponins likely demand ischemia. Confusion, slurred speech, and facial droop resolved overnight.  Prior to recent CVA the patient was living independently.   Patient back to baseline - with residual subtle RUE drift. Negative concerns for any LVO. Less concerns for acute stroke etiology. However, intracranial aneurysm needs IR evaluation in near future. Need F/u set-up on op basis and optimal medical management for initiation of anticoagulation from cardiac, ACS standpoint.     Resident transferred to [facility] for PT/OT.   ____________________________________________________    Today:  The resident is currently sitting up in the wheelchair and self-propel. No report of acute changes. She states she is fine. Reports of constipation and feeling like she has to strain.   Working with PT/OT SNF for muscle strengthening. Denies CP,SOB, pain. LCTAB with no edema.  Weakness rt side (upper >> lower).       Review of Systems  General ROS: negative for chills or chils  Psychological ROS: negative for hallucination, depression or suicidal ideation  Ophthalmic ROS: negative for blurry vision, photophobia or eye pain  ENT ROS: negative for epistaxis, sore throat or rhinorrhea  Respiratory ROS: no cough, shortness of breath, or wheezing  Cardiovascular ROS: no chest pain or dyspnea on exertion  Gastrointestinal ROS: no abdominal pain; +  constipation  Genito-Urinary ROS: no dysuria, trouble voiding, or hematuria  Musculoskeletal ROS: negative for gait disturbance or muscular weakness  Neurological ROS: no syncope or seizures; no ataxia  Dermatological ROS: negative for pruritis, rash and jaundice        PAST HISTORY:     Past Medical History:   Diagnosis Date    Aneurysm of middle cerebral artery     Hypertension     TIA (transient ischemic attack)        Past Surgical History:   Procedure Laterality Date    BRAIN SURGERY      HYSTERECTOMY         Family History   Problem Relation Age of Onset    Cancer Paternal Aunt          MEDICATIONS & ALLERGIES:     Current Outpatient Medications on File Prior to Visit   Medication Sig Dispense Refill    amLODIPine (NORVASC) 10 MG tablet Take 1 tablet (10 mg total) by mouth once daily. 30 tablet 11    aspirin 81 MG Chew Chew and swallow 1 tablet (81 mg total) by mouth once daily. 30 tablet 5    atorvastatin (LIPITOR) 80 MG tablet Take 1 tablet (80 mg total) by mouth once daily. 90 tablet 5    clopidogreL (PLAVIX) 75 mg tablet Take 1 tablet (75 mg total) by mouth once daily. 30 tablet 0    hydroCHLOROthiazide (HYDRODIURIL) 12.5 MG Tab Take 2 tablets (25 mg total) by mouth once daily. 30 tablet 5    lisinopriL (PRINIVIL,ZESTRIL) 20 MG tablet Take 1 tablet (20 mg total) by mouth once daily. 90 tablet 3    traMADoL (ULTRAM) 50 mg tablet Take 1 tablet (50 mg total) by mouth every 8 (eight) hours as needed for Pain. 90 tablet 0     No current facility-administered medications on file prior to visit.         Review of patient's allergies indicates:   Allergen Reactions    Penicillins Nausea And Vomiting       OBJECTIVE:     Vitals:    07/29/20 1530   BP: 128/72   Pulse: 69   Resp: 18   Temp: 97.1 °F (36.2 °C)   SpO2: 98%     Wt Readings from Last 1 Encounters:   07/09/20 0335 76.2 kg (167 lb 15.9 oz)   07/03/20 0800 75.7 kg (166 lb 15.6 oz)   07/01/20 1455 75.8 kg (167 lb)     There is no height or  weight on file to calculate BMI.        Physical Exam:  General appearance: alert, cooperative, no distress  Constitutional:Oriented to person, place, and time  + appears well-developed and well-nourished.   HEENT: Normocephalic, atraumatic, neck symmetrical, no nasal discharge   Eyes: conjunctivae/corneas clear, PERRL  Lungs: clear to auscultation bilaterally  Heart: regular rate and rhythm without rub  Abdomen: soft, non-tender; bowel sounds normoactive;  Extremities: extremities symmetric; no clubbing, cyanosis, or edema  Integument: Skin color, texture, turgor normal; no rashes; hair distrubution normal  Neurologic: Alert and oriented X 4,Weakness rt side (upper >> lower).   Psychiatric: no pressured speech; normal affect; no evidence of impaired cognition       ASSESSMENT & PLAN:     Constipation  - 7/29 start on Miralax 17  Po daily and Colace 100 mg BID    CVA  TIA  Debility  - Rt sided weakness RUE   - Continue ASA, plavix, statin  - continue PT/OT SNF  - appt with neurosurgery on 8/10       DJD rt knee  NSAID's contra indicated due to risk of CNS bleed  XR rt knee > no fx or dislocation  Tramadol 500 mg q 8 ordered      NSTEMI   Resolved        Hypertension   - on amlodipine 10 mg daily, lisinopril 20 mg daily and HCTZ 25 mg daily    Instructions for the patient:      Scheduled Follow-up :  Future Appointments   Date Time Provider Department Center   8/10/2020 10:00 AM NEUROSURG IR, [facility] NEUROS7 Dez Nathan       Post Visit Medication List:     Medication List          Accurate as of July 29, 2020  3:38 PM. If you have any questions, ask your nurse or doctor.            CONTINUE taking these medications    amLODIPine 10 MG tablet  Commonly known as: NORVASC  Take 1 tablet (10 mg total) by mouth once daily.     aspirin 81 MG Chew  Chew and swallow 1 tablet (81 mg total) by mouth once daily.     atorvastatin 80 MG tablet  Commonly known as: LIPITOR  Take 1 tablet (80 mg total) by mouth once daily.      clopidogreL 75 mg tablet  Commonly known as: PLAVIX  Take 1 tablet (75 mg total) by mouth once daily.     docusate sodium 100 MG capsule  Commonly known as: COLACE     hydroCHLOROthiazide 12.5 MG Tab  Commonly known as: HYDRODIURIL  Take 2 tablets (25 mg total) by mouth once daily.     lisinopriL 20 MG tablet  Commonly known as: PRINIVIL,ZESTRIL  Take 1 tablet (20 mg total) by mouth once daily.     polyethylene glycol 17 gram/dose powder  Commonly known as: GLYCOLAX     traMADoL 50 mg tablet  Commonly known as: ULTRAM  Take 1 tablet (50 mg total) by mouth every 8 (eight) hours as needed for Pain.              Signing Physician:  Maggie Madden NP     Due to unresolved technical issue with EMR, Medication list on this note summary may not be accurate.

## 2020-08-05 ENCOUNTER — DOCUMENTATION ONLY (OUTPATIENT)
Dept: PRIMARY CARE CLINIC | Facility: CLINIC | Age: 85
End: 2020-08-05

## 2020-08-10 ENCOUNTER — OFFICE VISIT (OUTPATIENT)
Dept: NEUROSURGERY | Facility: CLINIC | Age: 85
End: 2020-08-10
Payer: MEDICARE

## 2020-08-10 ENCOUNTER — TELEPHONE (OUTPATIENT)
Dept: NEUROSURGERY | Facility: CLINIC | Age: 85
End: 2020-08-10

## 2020-08-10 VITALS
SYSTOLIC BLOOD PRESSURE: 106 MMHG | DIASTOLIC BLOOD PRESSURE: 63 MMHG | HEIGHT: 65 IN | BODY MASS INDEX: 27.96 KG/M2 | TEMPERATURE: 98 F | HEART RATE: 88 BPM

## 2020-08-10 DIAGNOSIS — I67.1 ANEURYSM OF MIDDLE CEREBRAL ARTERY: ICD-10-CM

## 2020-08-10 DIAGNOSIS — Z01.818 PRE-OP TESTING: Primary | ICD-10-CM

## 2020-08-10 DIAGNOSIS — Z78.9 KNOWN HEALTH PROBLEMS: NONE: ICD-10-CM

## 2020-08-10 DIAGNOSIS — E78.2 MIXED HYPERLIPIDEMIA: ICD-10-CM

## 2020-08-10 DIAGNOSIS — I10 ESSENTIAL HYPERTENSION: ICD-10-CM

## 2020-08-10 PROCEDURE — 99214 PR OFFICE/OUTPT VISIT, EST, LEVL IV, 30-39 MIN: ICD-10-PCS | Mod: S$GLB,,, | Performed by: PSYCHIATRY & NEUROLOGY

## 2020-08-10 PROCEDURE — 1159F MED LIST DOCD IN RCRD: CPT | Mod: S$GLB,,, | Performed by: PSYCHIATRY & NEUROLOGY

## 2020-08-10 PROCEDURE — 3078F DIAST BP <80 MM HG: CPT | Mod: CPTII,S$GLB,, | Performed by: PSYCHIATRY & NEUROLOGY

## 2020-08-10 PROCEDURE — 1159F PR MEDICATION LIST DOCUMENTED IN MEDICAL RECORD: ICD-10-PCS | Mod: S$GLB,,, | Performed by: PSYCHIATRY & NEUROLOGY

## 2020-08-10 PROCEDURE — 99214 OFFICE O/P EST MOD 30 MIN: CPT | Mod: S$GLB,,, | Performed by: PSYCHIATRY & NEUROLOGY

## 2020-08-10 PROCEDURE — 3078F PR MOST RECENT DIASTOLIC BLOOD PRESSURE < 80 MM HG: ICD-10-PCS | Mod: CPTII,S$GLB,, | Performed by: PSYCHIATRY & NEUROLOGY

## 2020-08-10 PROCEDURE — 99999 PR PBB SHADOW E&M-EST. PATIENT-LVL III: ICD-10-PCS | Mod: PBBFAC,,, | Performed by: PSYCHIATRY & NEUROLOGY

## 2020-08-10 PROCEDURE — 99999 PR PBB SHADOW E&M-EST. PATIENT-LVL III: CPT | Mod: PBBFAC,,, | Performed by: PSYCHIATRY & NEUROLOGY

## 2020-08-10 PROCEDURE — 1101F PT FALLS ASSESS-DOCD LE1/YR: CPT | Mod: CPTII,S$GLB,, | Performed by: PSYCHIATRY & NEUROLOGY

## 2020-08-10 PROCEDURE — 1101F PR PT FALLS ASSESS DOC 0-1 FALLS W/OUT INJ PAST YR: ICD-10-PCS | Mod: CPTII,S$GLB,, | Performed by: PSYCHIATRY & NEUROLOGY

## 2020-08-10 PROCEDURE — 3074F PR MOST RECENT SYSTOLIC BLOOD PRESSURE < 130 MM HG: ICD-10-PCS | Mod: CPTII,S$GLB,, | Performed by: PSYCHIATRY & NEUROLOGY

## 2020-08-10 PROCEDURE — 3074F SYST BP LT 130 MM HG: CPT | Mod: CPTII,S$GLB,, | Performed by: PSYCHIATRY & NEUROLOGY

## 2020-08-10 NOTE — LETTER
August 13, 2020      Olivia Hodge MD  [address]  [facility] [address]           [facility] Neurosurgery [facility]  [address]  [facility] [address]  Phone: [phone]  Fax: [phone]          Patient: Margarita Chávez   MR Number: [id number]   YOB: 1935   Date of Visit: 8/10/2020       Dear Dr. Olivia Hodge:    Thank you for referring Margarita Chávez to me for evaluation. Attached you will find relevant portions of my assessment and plan of care.    If you have questions, please do not hesitate to call me. I look forward to following Margarita Chávez along with you.    Sincerely,    Yunier Meade MD    Enclosure  CC:  No Recipients    If you would like to receive this communication electronically, please contact [email or web address] or [phone] to request more information on [facility] Link access.    For providers and/or their staff who would like to refer a patient to [facility], please contact us through our one-stop-shop provider referral line, [facility], [place], at [phone].    If you feel you have received this communication in error or would no longer like to receive these types of communications, please e-mail [email or web address]

## 2020-08-10 NOTE — PROGRESS NOTES
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     - CTA in 2020 reported increase in size of the aneurysm 3 mm   - will perform IR angiogram on next available date   - will discuss about the treatment options after the angiogram  - rec aggressive risk factor control             Cardiac/Vascular    Essential hypertension    Current Assessment & Plan     - rec SBP < 130 mmHg   - medication compliance   - mediterranean diet         Mixed hyperlipidemia    Current Assessment & Plan     - rec LDL < 70   - cont statin.                Reason For Visit (Chief Complaint):  Evaluation for right MCA bifurcation aneurysm..     HPI: 84 y.o.  female presented to the clinic for evaluation of right MCA bifurcation aneurysm.    Patient reports she had bifurcation bifurcation aneurysm diagnosed in 2010 and treated with Neurosurgery at [facility] with a mesh over the right MCA bifurcation aneurysm.  The size of the aneurysm in 2010 was 14 mm x 12 mm prior to treatment.  Patient was followed up at [facility] for the aneurysm.  CT angiogram head and neck evaluation was done in June of 2020 due to left MCA territory stroke.  The angiogram demonstrated 17 x 13 mm right MCA aneurysm.    Patient denies of any headache concerning for sentinel hemorrhage.  She denies of any new onset weakness numbness or changes in vision.    Vessel Imaging:    Right MCA aneurysm - 2010                                  right MC aneurysm - 2020        Relevant Labwork:  Recent Labs   Lab 06/17/20  1120 06/17/20  1341   Hemoglobin A1C  --  5.8 H   LDL Cholesterol 152.8  --    HDL 59  --    Triglycerides 146  --    Cholesterol 241 H  --        I independently viewed the above imaging  studies in addition to reviewing the report.  I reviewed the above labwork.    Review of Systems   Constitutional: Negative for chills and fever.   HENT: Negative for hearing loss and tinnitus.    Eyes: Negative for blurred vision and double vision.   Respiratory: Negative for cough and hemoptysis.    Cardiovascular: Negative for chest pain and palpitations.   Gastrointestinal: Negative for heartburn and nausea.   Genitourinary: Negative for dysuria and urgency.   Musculoskeletal: Positive for back pain and joint pain. Negative for myalgias and neck pain.   Skin: Negative for itching and rash.   Neurological: Negative for dizziness and headaches.   Endo/Heme/Allergies: Negative for environmental allergies. Does not bruise/bleed easily.   Psychiatric/Behavioral: Negative for depression and suicidal ideas.     Past Medical History  Past Medical History:   Diagnosis Date    Aneurysm of middle cerebral artery     Hypertension     TIA (transient ischemic attack)      Family History  No relevant history   Social History  Former Smoker     Medication List with Changes/Refills   Current Medications    AMLODIPINE (NORVASC) 10 MG TABLET    Take 1 tablet (10 mg total) by mouth once daily.    ASPIRIN 81 MG CHEW    Chew and swallow 1 tablet (81 mg total) by mouth once daily.    ATORVASTATIN (LIPITOR) 80 MG TABLET    Take 1 tablet (80 mg total) by mouth once daily.    CLOPIDOGREL (PLAVIX) 75 MG TABLET    Take 1 tablet (75 mg total) by mouth once daily.    DOCUSATE SODIUM (COLACE) 100 MG CAPSULE    Take 100 mg by mouth 2 (two) times daily.    HYDROCHLOROTHIAZIDE (HYDRODIURIL) 12.5 MG TAB    Take 2 tablets (25 mg total) by mouth once daily.    LISINOPRIL (PRINIVIL,ZESTRIL) 20 MG TABLET    Take 1 tablet (20 mg total) by mouth once daily.    POLYETHYLENE GLYCOL (GLYCOLAX) 17 GRAM/DOSE POWDER    Take 17 g by mouth once daily.    TRAMADOL (ULTRAM) 50 MG TABLET    Take 1 tablet (50 mg total) by mouth every 8 (eight) hours as needed  "for Pain.       EXAM  Vital Signs:Blood pressure 106/63, pulse 88, temperature 97.5 °F (36.4 °C), temperature source Temporal, height 5' 5" (1.651 m).  General: well appearing without discomfort   Mental Status:alert, oriented to person - place - age  Language: able to name, repeat, comprehend commands   Cranial Nerves: EOMI, PERRL, no facial asymmetry, tongue to midline, palate midline  Motor: right arm and leg - 4 / 5 bilaetrally  Coordination: no ataxia           Rudolph Groves MD  NeuroIR fellow    "

## 2020-08-12 DIAGNOSIS — I67.1 ANEURYSM OF MIDDLE CEREBRAL ARTERY: Primary | ICD-10-CM

## 2020-08-12 NOTE — ASSESSMENT & PLAN NOTE
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     - CTA in 2020 reported increase in size of the aneurysm 3 mm   - will perform IR angiogram on next available date   - will discuss about the treatment options after the angiogram  - rec aggressive risk factor control

## 2020-08-18 DIAGNOSIS — I67.1 ANEURYSM OF MIDDLE CEREBRAL ARTERY: Primary | ICD-10-CM

## 2020-08-18 DIAGNOSIS — R79.1 ABNORMAL COAGULATION PROFILE: ICD-10-CM

## 2020-08-18 NOTE — NURSING
Pre-op call complete.     Pre procedure instructions given for cerebral angiogram. Pt instructed not to eat or drink after midnight. Allergies reviewed. [facility] to provide transport and monitor pt 8 hrs. Pt reports anticoagulation medications, taking ASA 81mg and Plavix. Home medications reviewed with patient. Pt instructed to check in to second floor radiology/lab desk to have blood work drawn at 11am then to proceed to [facility] waiting area. Pt denied postioning restrictions.  Expected length of stay reviewed.  Covid screening complete.  Pt verbalizes understanding. Questions answered.

## 2020-08-19 ENCOUNTER — TELEPHONE (OUTPATIENT)
Dept: NEUROSURGERY | Facility: CLINIC | Age: 85
End: 2020-08-19

## 2020-08-19 ENCOUNTER — HOSPITAL ENCOUNTER (OUTPATIENT)
Facility: HOSPITAL | Age: 85
Discharge: HOME | End: 2020-08-19
Attending: PSYCHIATRY & NEUROLOGY | Admitting: PSYCHIATRY & NEUROLOGY
Payer: MEDICARE

## 2020-08-19 ENCOUNTER — EXTERNAL HOME HEALTH (OUTPATIENT)
Dept: HOME HEALTH SERVICES | Facility: HOSPITAL | Age: 85
End: 2020-08-19
Payer: MEDICARE

## 2020-08-19 VITALS
RESPIRATION RATE: 16 BRPM | HEART RATE: 94 BPM | HEIGHT: 65 IN | TEMPERATURE: 98 F | SYSTOLIC BLOOD PRESSURE: 142 MMHG | OXYGEN SATURATION: 99 % | DIASTOLIC BLOOD PRESSURE: 56 MMHG | WEIGHT: 160.94 LBS | BODY MASS INDEX: 26.81 KG/M2

## 2020-08-19 DIAGNOSIS — I67.1 ANEURYSM OF MIDDLE CEREBRAL ARTERY: ICD-10-CM

## 2020-08-19 PROCEDURE — 25500020 PHARM REV CODE 255: Performed by: PSYCHIATRY & NEUROLOGY

## 2020-08-19 PROCEDURE — G0180 PR HOME HEALTH MD CERTIFICATION: ICD-10-PCS | Mod: ,,, | Performed by: INTERNAL MEDICINE

## 2020-08-19 PROCEDURE — G0180 MD CERTIFICATION HHA PATIENT: HCPCS | Mod: ,,, | Performed by: INTERNAL MEDICINE

## 2020-08-19 PROCEDURE — 63600175 PHARM REV CODE 636 W HCPCS: Performed by: STUDENT IN AN ORGANIZED HEALTH CARE EDUCATION/TRAINING PROGRAM

## 2020-08-19 RX ORDER — FENTANYL CITRATE 50 UG/ML
INJECTION, SOLUTION INTRAMUSCULAR; INTRAVENOUS CODE/TRAUMA/SEDATION MEDICATION
Status: COMPLETED | OUTPATIENT
Start: 2020-08-19 | End: 2020-08-19

## 2020-08-19 RX ORDER — DIPHENHYDRAMINE HYDROCHLORIDE 50 MG/ML
INJECTION INTRAMUSCULAR; INTRAVENOUS CODE/TRAUMA/SEDATION MEDICATION
Status: COMPLETED | OUTPATIENT
Start: 2020-08-19 | End: 2020-08-19

## 2020-08-19 RX ORDER — SODIUM CHLORIDE 0.9 % (FLUSH) 0.9 %
10 SYRINGE (ML) INJECTION
Status: DISCONTINUED | OUTPATIENT
Start: 2020-08-19 | End: 2020-08-19 | Stop reason: HOSPADM

## 2020-08-19 RX ORDER — IODIXANOL 320 MG/ML
200 INJECTION, SOLUTION INTRAVASCULAR
Status: COMPLETED | OUTPATIENT
Start: 2020-08-19 | End: 2020-08-19

## 2020-08-19 RX ORDER — HEPARIN SODIUM 1000 [USP'U]/ML
5000 INJECTION, SOLUTION INTRAVENOUS; SUBCUTANEOUS ONCE
Status: DISCONTINUED | OUTPATIENT
Start: 2020-08-19 | End: 2020-08-19 | Stop reason: HOSPADM

## 2020-08-19 RX ORDER — FENTANYL CITRATE 50 UG/ML
50 INJECTION, SOLUTION INTRAMUSCULAR; INTRAVENOUS
Status: DISCONTINUED | OUTPATIENT
Start: 2020-08-19 | End: 2020-08-19 | Stop reason: HOSPADM

## 2020-08-19 RX ORDER — MIDAZOLAM HYDROCHLORIDE 1 MG/ML
1 INJECTION INTRAMUSCULAR; INTRAVENOUS
Status: DISCONTINUED | OUTPATIENT
Start: 2020-08-19 | End: 2020-08-19 | Stop reason: HOSPADM

## 2020-08-19 RX ORDER — SODIUM CHLORIDE 9 MG/ML
INJECTION, SOLUTION INTRAVENOUS CONTINUOUS
Status: DISCONTINUED | OUTPATIENT
Start: 2020-08-19 | End: 2020-08-19 | Stop reason: HOSPADM

## 2020-08-19 RX ORDER — MIDAZOLAM HYDROCHLORIDE 1 MG/ML
INJECTION INTRAMUSCULAR; INTRAVENOUS CODE/TRAUMA/SEDATION MEDICATION
Status: COMPLETED | OUTPATIENT
Start: 2020-08-19 | End: 2020-08-19

## 2020-08-19 RX ADMIN — MIDAZOLAM HYDROCHLORIDE 0.5 MG: 1 INJECTION, SOLUTION INTRAMUSCULAR; INTRAVENOUS at 03:08

## 2020-08-19 RX ADMIN — IODIXANOL 60 ML: 320 INJECTION, SOLUTION INTRAVASCULAR at 04:08

## 2020-08-19 RX ADMIN — FENTANYL CITRATE 50 MCG: 50 INJECTION, SOLUTION INTRAMUSCULAR; INTRAVENOUS at 03:08

## 2020-08-19 RX ADMIN — MIDAZOLAM HYDROCHLORIDE 1 MG: 1 INJECTION, SOLUTION INTRAMUSCULAR; INTRAVENOUS at 03:08

## 2020-08-19 RX ADMIN — FENTANYL CITRATE 25 MCG: 50 INJECTION, SOLUTION INTRAMUSCULAR; INTRAVENOUS at 03:08

## 2020-08-19 RX ADMIN — DIPHENHYDRAMINE HYDROCHLORIDE 25 MG: 50 INJECTION, SOLUTION INTRAMUSCULAR; INTRAVENOUS at 03:08

## 2020-08-19 NOTE — H&P
Radiology History & Physical      SUBJECTIVE:     Chief Complaint: cerebral aneurysm    History of Present Illness:  Margarita Chávez is a 84 y.o. female who presents for diagnostic cerebral angiogram in patient with cerebral aneurysm.   Past Medical History:   Diagnosis Date    Aneurysm of middle cerebral artery     Hypertension     TIA (transient ischemic attack)      Past Surgical History:   Procedure Laterality Date    BRAIN SURGERY      HYSTERECTOMY         Home Meds:   Prior to Admission medications    Medication Sig Start Date End Date Taking? Authorizing Provider   amLODIPine (NORVASC) 10 MG tablet Take 1 tablet (10 mg total) by mouth once daily. 6/20/20 6/20/21 Yes Kala Ortiz MD   aspirin 81 MG Chew Chew and swallow 1 tablet (81 mg total) by mouth once daily. 6/20/20 8/19/20 Yes Kala Ortiz MD   atorvastatin (LIPITOR) 80 MG tablet Take 1 tablet (80 mg total) by mouth once daily. 6/20/20 6/20/21 Yes Kaal Ortiz MD   clopidogreL (PLAVIX) 75 mg tablet Take 1 tablet (75 mg total) by mouth once daily. 6/22/20 8/19/20 Yes Susie Archuleta MD   docusate sodium (COLACE) 100 MG capsule Take 100 mg by mouth 2 (two) times daily.   Yes Historical Provider, MD   hydroCHLOROthiazide (HYDRODIURIL) 12.5 MG Tab Take 2 tablets (25 mg total) by mouth once daily. 6/19/20 8/19/20 Yes Kala Ortiz MD   lisinopriL (PRINIVIL,ZESTRIL) 20 MG tablet Take 1 tablet (20 mg total) by mouth once daily. 6/23/20 6/23/21 Yes Susie Archuleta MD   traMADoL (ULTRAM) 50 mg tablet Take 1 tablet (50 mg total) by mouth every 8 (eight) hours as needed for Pain. 7/24/20  Yes Saadia Blari MD   polyethylene glycol (GLYCOLAX) 17 gram/dose powder Take 17 g by mouth once daily.    Historical Provider, MD     Anticoagulants/Antiplatelets: no anticoagulation    Allergies:   Review of patient's allergies indicates:   Allergen Reactions    Penicillins Nausea And Vomiting     Sedation History:  no  adverse reactions    Review of Systems:   Hematological: no known coagulopathies  Respiratory: no shortness of breath  Cardiovascular: no chest pain  Gastrointestinal: no abdominal pain  Genito-Urinary: no dysuria  Musculoskeletal: negative  Neurological: no TIA or stroke symptoms         OBJECTIVE:     Vital Signs (Most Recent)  Temp: 98.3 °F (36.8 °C) (08/19/20 1401)  Pulse: 83 (08/19/20 1401)  Resp: 20 (08/19/20 1401)  BP: (!) 112/57 (08/19/20 1401)  SpO2: 100 % (08/19/20 1401)    Physical Exam:  ASA: 2  Mallampati: 2    General: no acute distress  Mental Status: alert and oriented to person, place and time  HEENT: normocephalic, atraumatic  Chest: unlabored breathing  Heart: regular heart rate  Abdomen: nondistended  Extremity: moves all extremities    Laboratory  Lab Results   Component Value Date    INR 1.0 08/19/2020       Lab Results   Component Value Date    WBC 4.97 08/19/2020    HGB 11.3 (L) 08/19/2020    HCT 36.2 (L) 08/19/2020    MCV 96 08/19/2020     08/19/2020      Lab Results   Component Value Date    GLU 96 08/19/2020     08/19/2020    K 3.4 (L) 08/19/2020     08/19/2020    CO2 30 (H) 08/19/2020    BUN 19 08/19/2020    CREATININE 1.3 08/19/2020    CALCIUM 10.3 08/19/2020    MG 1.8 07/17/2020    ALT 8 (L) 08/19/2020    AST 13 08/19/2020    ALBUMIN 3.8 08/19/2020    BILITOT 1.1 (H) 08/19/2020       ASSESSMENT/PLAN:     Sedation Plan: Moderate sedation  Patient will undergo : diagnostic cerebral angiogram.    Rudolph Groves MD  NeuroIR fellow.

## 2020-08-19 NOTE — PROGRESS NOTES
Recovery complete. Discharge instructions and handouts provided. Pt verbalized understanding. Pt discharged in wheelchair with family

## 2020-08-19 NOTE — DISCHARGE INSTRUCTIONS
For scheduling: Call Sade at [phone]    For questions or concerns call: BHAVIN MON-FRI 8 AM- 5PM [phone]. Radiology resident on call [phone].    For immediate concerns that are not emergent, you may call our radiology clinic at: [phone]

## 2020-08-19 NOTE — PROGRESS NOTES
Pt arrived to ROCU bed 3 for post procedure recovery.  Pt oriented to unit and staff.  Stretcher locked and placed in lowest position. Calming environment promoted. Comfort measures provided. Pt resting comfortably. Dressing CDI. VSS. No acute events. See flow sheets for post procedure monitoring. Pt denies pain/discomfort.  Will continue to monitor.

## 2020-08-19 NOTE — PROCEDURES
Neurointerventional Radiology Post-Procedure Note    Pre Op Diagnosis: right MCA aneurysm    Post Op Diagnosis: Right MCA aneurysm    Procedure: Cerebral angiogram    Procedure performed by: Deshaun ARROYO, Yunier Groves MD.    Written Informed Consent Obtained: Yes    Specimen Removed: NO    Estimated Blood Loss: less than 50     Procedure report:     A 6F sheath was placed into the right femoral artery and a 5F Sanjay catheter was advanced into the aortic arch.  The right vertebral and right ICA were subselected and angiography of the brain was performed after injection into each of these vessels.    Preliminary interpretation: Right MCA saccular aneurysm seen again on the angiogram. Please see Imaging report for full details.    A right femoral artery angiogram was performed, the sheath removed and hemostasis achieved using Angioseal.  No hematoma was present at the time of hemostasis.    The patient tolerated the procedure well.     Rudolph Groves MD  NeuroInterventional Radiology Fellow

## 2020-08-19 NOTE — PROGRESS NOTES
Procedure complete, pt tolerated well. Site clean, dry, intact, no bleeding, no hematoma. Pt to recover in ROCU before discharge in care of RN. Report to be given to ROCU RN at bedside. Hemostasis at 1603, flat until 1803

## 2020-08-24 ENCOUNTER — CLINICAL SUPPORT (OUTPATIENT)
Dept: NEUROSURGERY | Facility: CLINIC | Age: 85
End: 2020-08-24
Payer: MEDICARE

## 2020-08-24 DIAGNOSIS — I67.1 CEREBRAL ANEURYSM, NONRUPTURED: ICD-10-CM

## 2020-08-24 PROCEDURE — 99215 PR OFFICE/OUTPT VISIT, EST, LEVL V, 40-54 MIN: ICD-10-PCS | Mod: 95,,, | Performed by: PSYCHIATRY & NEUROLOGY

## 2020-08-24 PROCEDURE — 99215 OFFICE O/P EST HI 40 MIN: CPT | Mod: 95,,, | Performed by: PSYCHIATRY & NEUROLOGY

## 2020-08-24 NOTE — PROGRESS NOTES
[facility]  Neurosurgery Department  Neurointerventional  Virtual Visit - Clinic Note      Reason for Consult (Chief Complaint):  Follow-up after angiogram    SUBJECTIVE:     History of Present Illness:  Patient is a 84 y.o. female who presents to clinic today as a follow-up due to a recurrence aneurysm after previous treatment in the right middle cerebral artery which was found on CT angiogram.  Patient had a diagnostic angiogram last Wednesday which confirmed the large aneurysm (> than 1 cm) and is here for discussion of any possible treatment options.    Interval History:  Since the procedure the patient has been doing well.  She denies any pain or discomfort in the groin area.  She denies any symptoms of possible aneurysm rupture.    Past Medical History:   Diagnosis Date    Aneurysm of middle cerebral artery     Hypertension     TIA (transient ischemic attack)      Past Surgical History:   Procedure Laterality Date    BRAIN SURGERY      CEREBRAL ANGIOGRAM N/A 8/19/2020    Procedure: ANGIOGRAM-CEREBRAL;  Surgeon: Mando Diagnostic Provider;  Location: [facility] OR [address] [place] [address];  Service: Radiology;  Laterality: N/A;   / ANDRÉS    HYSTERECTOMY       Family History   Problem Relation Age of Onset    Cancer Paternal Aunt      Social History     Tobacco Use    Smoking status: Never Smoker   Substance Use Topics    Alcohol use: Never     Frequency: Never    Drug use: Never     Review of patient's allergies indicates:   Allergen Reactions    Penicillins Nausea And Vomiting       Medications:   I have reviewed the current medication administration record.  For a complete list of medications please see the medication list.    Review of Systems:  Constitutional: no fever, no chills, no fatigue, Positive for decreased appetite  Eyes: no eyesight worsening, no double vision, no eye pain  ENT/Mouth: no nasal congestion or nose bleeds, no sore throat or hoarseness.  Cardiovascular: no chest  pain w/exertion, no palpitations, no leg pain while walking, no irregular heartbeat  Respiratory: no cough or shortness of breath, no coughing up blood  Gastrointestinal: no nausea or vomiting, no abdominal pain or change in bowel habits, no black/bloody stools  Genitourinary: no frequent voiding or blood in urine  Musculoskeletal: positive for aching muscles/joints   Skin/Breast: negative for rash or skin lesions  Hematologic: no easy bruising  Neurological: no headaches, dizziness, or confusion  Sleep: negative for snoring or breath holding  Psychiatric: no auditory or visual hallucinations, no anxiety, no depression/worrying alot  Endocrine: no heat or cold intolerance, no excessive thirst    OBJECTIVE:     Vital Signs (Most Recent):  LMP  (LMP Unknown)     Physical Exam:  General: well developed, well nourished  Head/Neck: atraumatic  Eyes: clear sclera  Respiratory: normal respiratory effort  Skin: clear skin, no visible bruising or rash    Neurological Exam:   LOC: alert and follows requests  Language: No aphasia  Speech: No dysarthria  Orientation: Person, Place, Time  Memory: Recent memory intact, Remote memory intact, Age correct, Month correct  Visual Fields (CN II): Full  EOM (CN III, IV, VI): Full/intact  Oculocephalics: normal  Pupils (CN III, IV, VI): PERRL  Facial Sensation (CN V): Symmetric, Corneal reflex intact  Facial Movement (CN VII): symmetric facial expression  Hearing (CN VIII): intact bilaterally  Gag Reflex (CN IX, X): normal/symmetric and not done  Shoulder/Neck (CN XI): Shoulder Shrug: Normal/Symetric  Tongue (CN XII): to midline  Reflexes: not examined  Motor*: No drift or obvious weakness noted on exam  Cerebellar*: Normal limb, Normal stance  Sensation: intact to light touch    Laboratory:  BMP:   Lab Results   Component Value Date     08/19/2020    K 3.4 (L) 08/19/2020     08/19/2020    CO2 30 (H) 08/19/2020    BUN 19 08/19/2020    CREATININE 1.3 08/19/2020    CALCIUM 10.3  08/19/2020     CBC:   Lab Results   Component Value Date    WBC 4.97 08/19/2020    RBC 3.77 (L) 08/19/2020    HGB 11.3 (L) 08/19/2020    HCT 36.2 (L) 08/19/2020    HCT 42 07/01/2020     08/19/2020    MCV 96 08/19/2020    MCH 30.0 08/19/2020    MCHC 31.2 (L) 08/19/2020     Lipid Panel:   Lab Results   Component Value Date    CHOL 241 (H) 06/17/2020    LDLCALC 152.8 06/17/2020    HDL 59 06/17/2020    TRIG 146 06/17/2020     Coagulation:   Lab Results   Component Value Date    INR 1.0 08/19/2020    APTT 28.4 07/01/2020     Hgb A1C:   Lab Results   Component Value Date    HGBA1C 5.8 (H) 06/17/2020     TSH:   Lab Results   Component Value Date    TSH 0.341 (L) 07/01/2020       Diagnostic Results:  Brain Imaging:   Cerebral angiogram done August 19, 2020.  Fusiform from dilatation of the right internal carotid artery at the level of the cavernous sinus.  Large left middle cerebral artery bifurcation aneurysm with stenotic middle cerebral artery inflow.    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    Recommendations:  Due to the high complexity of possible treatments for the right middle cerebral artery aneurysm which include stent assisted or flow diversion an assisted coil embolization, I recommend observation and treatment of the aneurysm only case of rupture.      Follow up w CTA in 6 months    Yunier Meade MD  Vascular and Interventional Neurology Staff  Director of [facility]  Departments of Neurology, Radiology & Neurosurgery  [facility]  [phone]

## 2020-09-29 DIAGNOSIS — I69.359 HEMIPLEGIA OF DOMINANT SIDE FOLLOWING CEREBROVASCULAR ACCIDENT: Primary | ICD-10-CM

## 2020-10-06 ENCOUNTER — CLINICAL SUPPORT (OUTPATIENT)
Dept: REHABILITATION | Facility: HOSPITAL | Age: 85
End: 2020-10-06
Payer: MEDICARE

## 2020-10-06 DIAGNOSIS — R26.9 ABNORMALITY OF GAIT AND MOBILITY: ICD-10-CM

## 2020-10-06 DIAGNOSIS — R53.1 RIGHT SIDED WEAKNESS: ICD-10-CM

## 2020-10-06 PROCEDURE — 97162 PT EVAL MOD COMPLEX 30 MIN: CPT | Mod: PN

## 2020-10-06 NOTE — PLAN OF CARE
[facility]  Physical Therapy Neurological Rehabilitation Initial Evaluation    Name: Margarita Chávez  Clinic Number: [id number]    Therapy Diagnosis:   Encounter Diagnoses   Name Primary?   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     Medical History:   Past Medical History:   Diagnosis Date    Aneurysm of middle cerebral artery     Hypertension     TIA (transient ischemic attack)      Surgical History:   Margarita Chávez  has a past surgical history that includes Brain surgery; Hysterectomy; and Cerebral angiogram (N/A, 8/19/2020).    Medications:   Margarita has a current medication list which includes the following prescription(s): amlodipine, aspirin, atorvastatin, clopidogrel, docusate sodium, hydrochlorothiazide, lisinopril, polyethylene glycol, and tramadol.    Allergies:   Review of patient's allergies indicates:   Allergen Reactions    Penicillins Nausea And Vomiting      Imaging, CT scan films:   Impression:  No acute intracranial abnormality.  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     Prior Therapy: IP rehab f/b home health PT and OT  Social History:  lives with their son  Falls: possibly 3    DME: Manual wheelchair, Walker and Tub bench    Home Environment: [facility]  Exercise Routine / History: none  "  Family Present at time of Eval: son drove her to therapy   Occupation: homemaker  Prior Level of Function: indep to STEAFN  Current Level of Function: modA with most mobility     Pain:  Current 1/10, worst /10, best 6/10   Location: headache and R LE pain     Patient's goals: "I want to walk"    Objective     - Command followin% of simple commands   - Speech: no deficits     Mental status: alert, oriented to person, place, and time, normal mood, behavior, speech, dress, motor activity, and thought processes  Behavior:  calm and cooperative  Attention Span and Concentration:  Slow     Dominant hand:  right     Posture Alignment :slouched posture    Tone: 1+ Slight increases in muscle tone, manifested by a catch, followed by minimal resistance throughout the remainder (less than half) of the ROM.  Limbs/muscles affected: R UE and LE    Visual/Auditory: denies changes     Coordination:   - fine motor: mild impairment R  - UE coordination:   mild impairment R  - LE coordination:  Moderate impairment R    ROM:   UPPER EXTREMITY--AROM/PROM  (R) UE: impaired for overhead reaching  (L) UE: impaired PROM and hand to mouth due to shoulder pain and old injury         RANGE OF MOTION--LOWER EXTREMITIES  (R) LE WFL but neuro weakness evident    (L) LE: WNL    Lower Extremity Strength   RLE LLE   Hip Flexion: 4-/5 4/5   Hip Extension:  3-/5 3/5   Hip Abduction: 4/5 3/5   Knee Extension: 3-/5 4/5   Knee Flexion: 2/5 4/5   Ankle Dorsiflexion: 4/5 4/5   Ankle Plantarflexion: 4-/5 4/5        Evaluation   30 second Chair Rise  (adults > 61 y/o) 1 completed with arms and modA      Evaluation   Timed Up and Go Unable to attempt today     TINETTI BALANCE ASSESSMENT TOOL  BALANCE SECTION  1.Sitting Balance:   Steady; safe = 1  2.Rises from chair :  Unable without help = 0  3.Attempts to arise :  Unable without help = 0  4.Immediate standing Balance (first 5 seconds) : Unsteady (swaggers, moves feet, trunk sway) = 0  5.Standing " balance: Unsteady = 0  6.Nudged : Begins to fall = 0  7.Eyes closed: Unsteady = 0  8.Turning 360 degrees:  Discontinuous steps = 0 and Unsteady (grabs, staggers) = 0  9.Sitting Down : Uses arms or not a smooth motion = 1  Balance Score:  2/16    GAIT SECTION  10.Initiation of Gait (Immediately after told to go.): Any hesitancy or multiple attempts to start = 0  11.Step length and height :  Step through R=1  12.Foot Clearance :  R foot clears floor=1   13.Step symmetry: Right & Left step length not equal (estimate) = 0  14.Step continuity : Stooping or discontinuity between steps = 0  15.Path: Marked deviation = 0  16.Trunk : Marked sway or uses walking aid = 0  17.Walking Time: Heels apart = 0    Gait Score: 2/12  Balance score:2/16  Total Score=Balance + Gait Score: 4/28    Risk Indicators:    Tinetti Tool Score   Risk of Falls   ?18    High     Gait Assessment:   - AD used: RW   - Assistance: modA  - Distance: ~25 max  - Stairs: not tested unsafe    Gait Analysis:  Deviations noted: R sided weakness, decreased SLS time B LE, shuffling and sliding  Impairments contributing to deviations:  Fear, decreased confidence, general weakness    Balance Assessment:    Sitting balance (static,dynamic):WFL  Standing balance (static, dynamic):see Tinetti scale    Functional Mobility (Bed mobility, transfers)  Mod-Kris with all     CMS Impairment/Limitation/Restriction for FOTO LE CVA Survey    Therapist reviewed FOTO scores for Margarita Chávez on 10/6/2020.   FOTO documents entered into [employer] - see Media section.    Limitation Score: 66%       TREATMENT   Therapeutic exercise to be initiated at follow-up visit:  - sit to stand  - pre gait  - supine TE     Patient Education Provided    Education provided:   - need for OT  - benefit from PT    Assessment   Margarita is a 85 y.o. female referred to outpatient Physical Therapy with a medical diagnosis of CVA. Patient presents with R sided weakness, gait impairment, impaired mobility  and transfers.      Patient prognosis is Good.   Patient will benefit from skilled outpatient Physical Therapy to address the deficits stated above and in the chart below, provide patient/family education, and to maximize patient's level of independence.     Plan of care discussed with patient: Yes  Patient's spiritual, cultural and educational needs considered and patient is agreeable to the plan of care and goals as stated below:     Anticipated Barriers for therapy: age, L UE injury    Medical Necessity is demonstrated by the following  History  Co-morbidities and personal factors that may impact the plan of care Co-morbidities:   Aneurysm of middle cerebral artery   Hypertension   TIA (transient ischemic attack)     Personal Factors:   age  Fall risk   moderate   Examination  Body Structures and Functions, activity limitations and participation restrictions that may impact the plan of care Body Regions:   lower extremities  trunk    Body Systems:    gross symmetry  ROM  strength  gross coordinated movement  balance  gait  transfers  transitions  motor control  motor learning    Participation Restrictions:   Driving, walking without assist, transfers, ADLs    Activity limitations:   Learning and applying knowledge  solving problems    General Tasks and Commands  undertaking multiple tasks    Communication  communicating with/receiving spoken language  communicating with/receiving non-verbal language    Mobility  lifting and carrying objects  fine hand use (grasping/picking up)  walking  driving (bike, car, motorcycle)    Self care  washing oneself (bathing, drying, washing hands)  caring for body parts (brushing teeth, shaving, grooming)  toileting  dressing  looking after one's health    Domestic Life  shopping  cooking  doing house work (cleaning house, washing dishes, laundry)  assisting others    Interactions/Relationships  family relationships    Life Areas  employment    Community and Social Life  community  life  recreation and leisure         moderate   Clinical Presentation evolving clinical presentation with changing clinical characteristics moderate   Decision Making/ Complexity Score: moderate     Short Term Goals (4 Weeks):   1.  Independent with initial HEP.  2.  Pt will perform nu-step at level 4 x 8 minutes without rests breaks going at least 60 step/min or greater.  3.  Pt will score greater than or equal to 10/28 on the Tinetti test/assessment B use of AD demonstrating overall improved functional mobility and balance and reduce fall risk.  Long Term Goals (4 Weeks):   3. Pt will be able to perform TUG in 30 secs with use of AD placingdemonstrating overall improved functional mobility.   4. Pt will performed sit to stand x 5 B UE support in 20 seconds without LOB backward or posterior LE hooking for functional and safe transfers.   5.  Pt will score greater than or equal to 16/28 on the Tinetti test/assessment B use of AD demonstrating overall improved functional mobility and balance and reduce fall risk.   8. Pt will walk < than or equal to 75 ft on the 2 minute walk test indoor with AD with 0 LOB and minimal gait deviations for improved safety in home ambulation and safety.   12. Pt will have MMT score of 4-/5 in all major ms groups in R LE.    Plan   Plan of care Certification: 10/6/2020 to 12/4/2020.    Outpatient Physical Therapy 2 times weekly for 12 weeks to include the following interventions: Gait Training, Manual Therapy, Moist Heat/ Ice, Neuromuscular Re-ed, Patient Education, Self Care, Therapeutic Activites and Therapeutic Exercise.     Zulma Rodas, PT

## 2020-10-09 PROBLEM — R26.9 ABNORMALITY OF GAIT AND MOBILITY: Status: ACTIVE | Noted: 2020-10-09

## 2020-10-09 PROBLEM — R53.1 RIGHT SIDED WEAKNESS: Status: ACTIVE | Noted: 2020-10-09

## 2020-10-14 ENCOUNTER — TELEPHONE (OUTPATIENT)
Dept: REHABILITATION | Facility: HOSPITAL | Age: 85
End: 2020-10-14

## 2020-10-14 NOTE — TELEPHONE ENCOUNTER
Left message at second listed contact number (first contact number's voicemail box was full) regarding missed PT appointment today without contact. Given next appointment time/day and asked to call to confirm attendance.    Pat Meraz, PT, DPT

## 2020-10-16 ENCOUNTER — CLINICAL SUPPORT (OUTPATIENT)
Dept: REHABILITATION | Facility: HOSPITAL | Age: 85
End: 2020-10-16
Payer: MEDICARE

## 2020-10-16 DIAGNOSIS — R26.9 ABNORMALITY OF GAIT AND MOBILITY: ICD-10-CM

## 2020-10-16 DIAGNOSIS — R53.1 RIGHT SIDED WEAKNESS: ICD-10-CM

## 2020-10-16 PROCEDURE — 97110 THERAPEUTIC EXERCISES: CPT | Mod: PN

## 2020-10-16 PROCEDURE — 97116 GAIT TRAINING THERAPY: CPT | Mod: PN

## 2020-10-16 PROCEDURE — 97112 NEUROMUSCULAR REEDUCATION: CPT | Mod: PN

## 2020-10-16 NOTE — PROGRESS NOTES
"  Physical Therapy Treatment Note     Name: Margarita Chávez  Clinic Number: [id number]    Therapy Diagnosis:   Encounter Diagnoses   Name Primary?   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 "I don't walk."  She will be compliant with home exercise program.  Response to previous treatment: Last session was initial evaluation  Functional change: None noted    Pain: 5/10  Location: right knee      Objective     Margarita participated in neuromuscular re-education activities to improve: Coordination and Posture for 15 minutes. The following activities were included:  -- Sit to stands from wheelchair in // bars x 12 throughout course of session; verbal/tactile cues for forward weight-shift and eccentric control to sit  -- Standing tolerance x 5 bouts x 1-2 minutes each with visual feedback (mirror)/tactile and verbal cues for upright stance    Margarita participated in gait training to improve functional mobility and safety for 20  minutes, including:  -- Pre-gait weight-shifting in // bars with verbal/tactile cues x 3 bouts x 1-2 minutes  -- Walking in // bars 3 lengths x 10 feet with wheelchair follow for safety; verbal/tactile cues for weight-shifting and limb advancement    Home Exercises Provided and Patient Education Provided     Education provided:   - Purpose of pre-gait and gait training in parallel " bars    Written Home Exercises Provided: Provide within next several visits  Exercises were reviewed and Margarita was able to demonstrate them prior to the end of the session.  Margarita demonstrated good  understanding of the education provided.     Assessment     Margarita tolerated first treatment session well with no loss of balance nor adverse response. Poor left foot clearance noted; however, left step length and overall gait speed improved from onset to completion of gait training within session. Maximal verbal and tactile cues required for weight-shifting to RLE; pt appears to be limited in RLE weight-bearing both as secondary effect of CVA but also R knee pain. She would benefit from continued PT services to progress toward goals.    Margarita is progressing well towards her goals.   Pt prognosis is Good.     Pt will continue to benefit from skilled outpatient physical therapy to address the deficits listed in the problem list box on initial evaluation, provide pt/family education and to maximize pt's level of independence in the home and community environment.     Pt's spiritual, cultural and educational needs considered and pt agreeable to plan of care and goals.     Anticipated barriers to physical therapy: age, L UE injury    Goals:     Short Term Goals (4 Weeks):   1.  Independent with initial HEP. (progressing, not met)  2.  Pt will perform nu-step at level 4 x 8 minutes without rests breaks going at least 60 step/min or greater. (progressing, not met)  3.  Pt will score greater than or equal to 10/28 on the Tinetti test/assessment B use of AD demonstrating overall improved functional mobility and balance and reduce fall risk. (progressing, not met)    Long Term Goals (4 Weeks):   3. Pt will be able to perform TUG in 30 secs with use of AD placingdemonstrating overall improved functional mobility. (progressing, not met)  4. Pt will performed sit to stand x 5 B UE support in 20 seconds without LOB backward or  posterior LE hooking for functional and safe transfers. (progressing, not met)  5.  Pt will score greater than or equal to 16/28 on the Tinetti test/assessment B use of AD demonstrating overall improved functional mobility and balance and reduce fall risk. (progressing, not met)   8. Pt will walk < than or equal to 75 ft on the 2 minute walk test indoor with AD with 0 LOB and minimal gait deviations for improved safety in home ambulation and safety.   12. Pt will have MMT score of 4-/5 in all major ms groups in R LE. (progressing, not met)    Plan     Continue to progress as per plan of care; follow up with response to today's session    ALLISON BAXTER, PT, DPT

## 2020-10-22 ENCOUNTER — DOCUMENTATION ONLY (OUTPATIENT)
Dept: REHABILITATION | Facility: HOSPITAL | Age: 85
End: 2020-10-22

## 2020-10-22 ENCOUNTER — TELEPHONE (OUTPATIENT)
Dept: REHABILITATION | Facility: HOSPITAL | Age: 85
End: 2020-10-22

## 2020-10-26 ENCOUNTER — CLINICAL SUPPORT (OUTPATIENT)
Dept: REHABILITATION | Facility: HOSPITAL | Age: 85
End: 2020-10-26
Payer: MEDICARE

## 2020-10-26 DIAGNOSIS — R26.9 ABNORMALITY OF GAIT AND MOBILITY: ICD-10-CM

## 2020-10-26 DIAGNOSIS — R53.1 RIGHT SIDED WEAKNESS: ICD-10-CM

## 2020-10-26 PROCEDURE — 97112 NEUROMUSCULAR REEDUCATION: CPT | Mod: PN

## 2020-10-26 NOTE — PROGRESS NOTES
"  Physical Therapy Treatment Note     Name: Margarita Chávez  Clinic Number: [id number]    Therapy Diagnosis:   Encounter Diagnoses   Name Primary?    Right sided weakness     Abnormality of gait and mobility      Physician: Syeda Grace MD    Visit Date: 10/26/2020    Physician Orders: PT Eval and Treat   Medical Diagnosis: I69.359 (ICD-10-CM) - Hemiplegia of dominant side following cerebrovascular accident  Evaluation Date: 10/6/2020  Authorization Period Expiration: 11/25/2020  Plan of Care Certification Period: 10/6/2020 11/27/2020  Visit # / Visits authorized: 3/ 12    Time In: 9:15 AM  Time Out: 10.00 AM  Total Billable Time: 45 minutes ( 3 NMR)    Precautions: Standard and Fall    Subjective     Pt reports: my son works that is why I have missed appts.  "I still cant walk"  She will be compliant with home exercise program.  Response to previous treatment: Last session was initial evaluation  Functional change: transferring with supervision     Pain: 5/10  Location: right knee      Objective     Margarita participated in neuromuscular re-education activities to improve: Coordination and Posture for 45 minutes. The following activities were included:  - Neuro re-ed and endurance training with B UE/LE extremities for reciprocal motion of all limbs on sci-fit x 8 min at level 1 at > or equal to 50 spm w/ 1 rest.   - Powder board:   R hip flexion/extension x 25     R knee flexion/extension  X 25  - Pt performed sit to stand transfers without AD x 5 with cues to increase R LE wt bearing f/b B wt shifts x R f/b pre-gait training with R stance and L  swing with faciltiation from PT to promote increased use of L LE in standing.     -- Standing tolerance x 3 bouts x 1-2 minutes eachverbal cues for upright stance  -- stand pivot training  From mat to w/c with RW and cues    Home Exercises Provided and Patient Education Provided     Education provided:   - Purpose of pre-gait and gait training in parallel " bars    Written Home Exercises Provided: Provide within next several visits  Exercises were reviewed and Margarita was able to demonstrate them prior to the end of the session.  Margarita demonstrated good  understanding of the education provided.     Assessment     Margarita has severe R knee pain and lack of knee extension. Her R knee pain limits tolerance to R stance.  She responded well to stepper. SHe is now able to transfer with AD with supervision and verbal cues.     Margarita is progressing well towards her goals.   Pt prognosis is Good.     Pt will continue to benefit from skilled outpatient physical therapy to address the deficits listed in the problem list box on initial evaluation, provide pt/family education and to maximize pt's level of independence in the home and community environment.     Pt's spiritual, cultural and educational needs considered and pt agreeable to plan of care and goals.     Anticipated barriers to physical therapy: age, L UE injury    Goals:     Short Term Goals (4 Weeks):   1.  Independent with initial HEP. (progressing, not met)  2.  Pt will perform nu-step at level 4 x 8 minutes without rests breaks going at least 60 step/min or greater. (progressing, not met)  3.  Pt will score greater than or equal to 10/28 on the Tinetti test/assessment B use of AD demonstrating overall improved functional mobility and balance and reduce fall risk. (progressing, not met)    Long Term Goals (4 Weeks):   1. Pt will be able to perform TUG in 30 secs with use of AD placingdemonstrating overall improved functional mobility. (progressing, not met)  2. Pt will performed sit to stand x 5 B UE support in 20 seconds without LOB backward or posterior LE hooking for functional and safe transfers. (progressing, not met)  3.  Pt will score greater than or equal to 16/28 on the Tinetti test/assessment B use of AD demonstrating overall improved functional mobility and balance and reduce fall risk. (progressing, not  met)   4. Pt will walk < than or equal to 75 ft on the 2 minute walk test indoor with AD with 0 LOB and minimal gait deviations for improved safety in home ambulation and safety.   5. Pt will have MMT score of 4-/5 in all major ms groups in R LE. (progressing, not met)    Plan     Progress standing and pregait, as well as supine and seated TE.    Zulma Rodas, PT, DPT

## 2020-11-04 ENCOUNTER — CLINICAL SUPPORT (OUTPATIENT)
Dept: REHABILITATION | Facility: HOSPITAL | Age: 85
End: 2020-11-04
Payer: MEDICARE

## 2020-11-04 DIAGNOSIS — R53.1 RIGHT SIDED WEAKNESS: ICD-10-CM

## 2020-11-04 DIAGNOSIS — R26.9 ABNORMALITY OF GAIT AND MOBILITY: ICD-10-CM

## 2020-11-04 PROCEDURE — 97110 THERAPEUTIC EXERCISES: CPT | Mod: PN

## 2020-11-04 PROCEDURE — 97112 NEUROMUSCULAR REEDUCATION: CPT | Mod: PN

## 2020-11-06 ENCOUNTER — TELEPHONE (OUTPATIENT)
Dept: REHABILITATION | Facility: HOSPITAL | Age: 85
End: 2020-11-06

## 2020-11-06 NOTE — TELEPHONE ENCOUNTER
Spoke with pt's son regarding missed appointment today. Son asked to please call in the future if pt cannot attend appointments. He expresses understanding and confirms appointment on Monday.    Pat Meraz, PT, DPT

## 2020-11-06 NOTE — PROGRESS NOTES
Physical Therapy Treatment Note     Name: Margarita Chávez  Clinic Number: [id number]    Therapy Diagnosis:   Encounter Diagnoses   Name Primary?    Right sided weakness     Abnormality of gait and mobility      Physician: Syeda Grace MD    Visit Date: 11/4/2020    Physician Orders: PT Eval and Treat   Medical Diagnosis: I69.359 (ICD-10-CM) - Hemiplegia of dominant side following cerebrovascular accident  Evaluation Date: 10/6/2020  Authorization Period Expiration: 11/25/2020  Plan of Care Certification Period: 10/6/2020 11/27/2020  Visit # / Visits authorized: 4/ 12    Time In: 9:15 AM  Time Out: 10.00 AM  Total Billable Time: 45 minutes ( 2 NMR, 1 TE)    Precautions: Standard and Fall    Subjective     Pt reports: My knee and shoulder hurt so bad.  She  Will begin walking at least 2x's daily with son with RW.  Response to previous treatment:increase knee and shoulder pain  Functional change: Stand pivot transer with RW supervision    Pain: 5/10  Location: right knee and B shoulder     Objective     Margarita participated in neuromuscular re-education activities to improve: Coordination and Posture for 45 minutes. The following activities were included:  - Pt performed sit to stand transfers without AD x 5 with cues to increase R LE wt bearing f/b B wt shifts x R f/b pre-gait training with R stance and L  swing with faciltiation from PT to promote increased use of L LE in standing.   - gait training   30 feet with RW with Kris with w/c to follow ~ 4 minutes to complete    -- stand pivot training  X 2  From mat to w/c with RW and cues    Margarita received therapeutic exercises to develop strength, endurance, ROM, flexibility, posture and core stabilization for 8 minutes including:    - SAQ   2 x 10 B  - supine bridges 3 x 5 B    Home Exercises Provided and Patient Education Provided     Education provided:   - Purpose of pre-gait and gait training in parallel bars    Written Home Exercises Provided: Provide  within next several visits  Exercises were reviewed and Margarita was able to demonstrate them prior to the end of the session.  Margarita demonstrated good  understanding of the education provided.     Assessment     Margarita was able to ambulate ~30 ft with RW and Min very slowly today. Her stand pivot transfers with RW are improving to supervision.  He has severe R knee pain and lack of knee extension. Her R knee pain limits tolerance to R stance.      Margarita is progressing well towards her goals.   Pt prognosis is Good.     Pt will continue to benefit from skilled outpatient physical therapy to address the deficits listed in the problem list box on initial evaluation, provide pt/family education and to maximize pt's level of independence in the home and community environment.     Pt's spiritual, cultural and educational needs considered and pt agreeable to plan of care and goals.     Anticipated barriers to physical therapy: age, L UE injury    Goals:     Short Term Goals (4 Weeks):   1.  Independent with initial HEP. (progressing, not met)  2.  Pt will perform nu-step at level 4 x 8 minutes without rests breaks going at least 60 step/min or greater. (progressing, not met)  3.  Pt will score greater than or equal to 10/28 on the Tinetti test/assessment B use of AD demonstrating overall improved functional mobility and balance and reduce fall risk. (progressing, not met)    Long Term Goals (4 Weeks):   1. Pt will be able to perform TUG in 30 secs with use of AD placingdemonstrating overall improved functional mobility. (progressing, not met)  2. Pt will performed sit to stand x 5 B UE support in 20 seconds without LOB backward or posterior LE hooking for functional and safe transfers. (progressing, not met)  3.  Pt will score greater than or equal to 16/28 on the Tinetti test/assessment B use of AD demonstrating overall improved functional mobility and balance and reduce fall risk. (progressing, not met)   4. Pt will  walk < than or equal to 75 ft on the 2 minute walk test indoor with AD with 0 LOB and minimal gait deviations for improved safety in home ambulation and safety.   5. Pt will have MMT score of 4-/5 in all major ms groups in R LE. (progressing, not met)    Plan     Progress gait distance with RW, assess knee and B shoulder pain.  Educate son about walking with pt in the home daily     Zulma Rodas, PT, DPT

## 2020-11-09 ENCOUNTER — CLINICAL SUPPORT (OUTPATIENT)
Dept: REHABILITATION | Facility: HOSPITAL | Age: 85
End: 2020-11-09
Payer: MEDICARE

## 2020-11-09 DIAGNOSIS — R26.9 ABNORMALITY OF GAIT AND MOBILITY: ICD-10-CM

## 2020-11-09 DIAGNOSIS — R53.1 RIGHT SIDED WEAKNESS: ICD-10-CM

## 2020-11-09 PROCEDURE — 97112 NEUROMUSCULAR REEDUCATION: CPT | Mod: PN

## 2020-11-09 PROCEDURE — 97110 THERAPEUTIC EXERCISES: CPT | Mod: PN

## 2020-11-09 NOTE — PROGRESS NOTES
"  Physical Therapy Treatment Note     Name: Margarita Chávez  Clinic Number: [id number]    Therapy Diagnosis:   Encounter Diagnoses   Name Primary?    Right sided weakness     Abnormality of gait and mobility      Physician: Syeda Grace MD    Visit Date: 11/9/2020    Physician Orders: PT Eval and Treat   Medical Diagnosis: I69.359 (ICD-10-CM) - Hemiplegia of dominant side following cerebrovascular accident  Evaluation Date: 10/6/2020  Authorization Period Expiration: 11/25/2020  Plan of Care Certification Period: 10/6/2020 11/27/2020  Visit # / Visits authorized: 5/ 12  FOTO: 5/5 (completed on 11/9/2020)    Time In: 9:11 AM (pt arrived 11 min late to appointment)  Time Out: 9:45 AM  Total Billable Time: 34 minutes (1 NMR, 1 TE)    Precautions: Standard and Fall    Subjective     Pt reports: R knee and L shoulder pain today (R knee > L shoulder) and mild headache. She is agreeable to PT today. She has not been walking at home with son's assistance as suggested by PT.  She will begin walking at least 2x's daily with son with RW.  Response to previous treatment: increase knee and shoulder pain  Functional change: Stand pivot transer with RW supervision    Pain: 5/10  Location: right knee and B shoulder     Objective     Margarita participated in neuromuscular re-education activities to improve: Coordination and Posture for 24 minutes. The following activities were included:  - Pt performed sit to stand transfers without AD x 2 with cues to increase R LE wt bearing f/b B wt shifts x R f/b pre-gait training with R stance and L swing with faciltiation from PT to promote increased use of L LE in standing.   - gait training 10 feet + 30 feet with RW with Kris with w/c to follow  - stand pivot training x2 From mat to w/c with RW and cues    Margarita received therapeutic exercises to develop strength, endurance, ROM, flexibility, posture and core stabilization for 10 minutes including:  - SAQ 2x60" each side  - hip " "isometric with yellow ball 5" holds x 4 min    Home Exercises Provided and Patient Education Provided     Education provided:   - Purpose of pre-gait and gait training in parallel bars    Written Home Exercises Provided: Provide within next several visits  Exercises were reviewed and Margarita was able to demonstrate them prior to the end of the session.  Margarita demonstrated good  understanding of the education provided.     Assessment     R knee pain continues to limit functional mobility. She continues to improve in independence and efficiency of transfers but during ambulation overground she still demonstrates decreased gait speed, poor AD management, forward flexed trunk, and antalgic gait pattern due to R knee pain. She would benefit from continued PT services to progress toward goal attainment.    Margarita is progressing well towards her goals.   Pt prognosis is Good.     Pt will continue to benefit from skilled outpatient physical therapy to address the deficits listed in the problem list box on initial evaluation, provide pt/family education and to maximize pt's level of independence in the home and community environment.     Pt's spiritual, cultural and educational needs considered and pt agreeable to plan of care and goals.     Anticipated barriers to physical therapy: age, L UE injury    Goals:     Short Term Goals (4 Weeks):   1.  Independent with initial HEP. (progressing, not met)  2.  Pt will perform nu-step at level 4 x 8 minutes without rests breaks going at least 60 step/min or greater. (progressing, not met)  3.  Pt will score greater than or equal to 10/28 on the Tinetti test/assessment B use of AD demonstrating overall improved functional mobility and balance and reduce fall risk. (progressing, not met)    Long Term Goals (4 Weeks):   1. Pt will be able to perform TUG in 30 secs with use of AD placingdemonstrating overall improved functional mobility. (progressing, not met)  2. Pt will performed sit " to stand x 5 B UE support in 20 seconds without LOB backward or posterior LE hooking for functional and safe transfers. (progressing, not met)  3.  Pt will score greater than or equal to 16/28 on the Tinetti test/assessment B use of AD demonstrating overall improved functional mobility and balance and reduce fall risk. (progressing, not met)   4. Pt will walk < than or equal to 75 ft on the 2 minute walk test indoor with AD with 0 LOB and minimal gait deviations for improved safety in home ambulation and safety.   5. Pt will have MMT score of 4-/5 in all major ms groups in R LE. (progressing, not met)    Plan     Progress gait distance with RW, assess knee and B shoulder pain. Educate son about walking with pt in the home daily     ALLISON BAXTER, PT, DPT

## 2020-12-07 ENCOUNTER — TELEPHONE (OUTPATIENT)
Dept: REHABILITATION | Facility: HOSPITAL | Age: 85
End: 2020-12-07

## 2020-12-15 ENCOUNTER — TELEPHONE (OUTPATIENT)
Dept: REHABILITATION | Facility: HOSPITAL | Age: 85
End: 2020-12-15

## 2020-12-15 NOTE — TELEPHONE ENCOUNTER
Continued:  Inability to attend OP therapy in the community.  Pt has missed >4 OP PT appts due to her son's inability to transport her here.      Zulma Rodas  DPT, PT, NCS

## 2020-12-28 ENCOUNTER — TELEPHONE (OUTPATIENT)
Dept: NEUROSURGERY | Facility: CLINIC | Age: 85
End: 2020-12-28

## 2020-12-28 ENCOUNTER — TELEPHONE (OUTPATIENT)
Dept: NEUROLOGY | Facility: CLINIC | Age: 85
End: 2020-12-28

## 2020-12-28 ENCOUNTER — HOSPITAL ENCOUNTER (EMERGENCY)
Facility: HOSPITAL | Age: 85
Discharge: HOME | End: 2020-12-28
Attending: EMERGENCY MEDICINE
Payer: MEDICARE

## 2020-12-28 VITALS
RESPIRATION RATE: 20 BRPM | BODY MASS INDEX: 25.79 KG/M2 | WEIGHT: 155 LBS | OXYGEN SATURATION: 95 % | TEMPERATURE: 99 F | HEART RATE: 95 BPM | SYSTOLIC BLOOD PRESSURE: 124 MMHG | DIASTOLIC BLOOD PRESSURE: 66 MMHG

## 2020-12-28 DIAGNOSIS — T78.40XA ALLERGIC REACTION, INITIAL ENCOUNTER: Primary | ICD-10-CM

## 2020-12-28 DIAGNOSIS — R00.0 TACHYCARDIA: ICD-10-CM

## 2020-12-28 LAB
ALBUMIN SERPL BCP-MCNC: 3.5 G/DL (ref 3.5–5.2)
ALP SERPL-CCNC: 67 U/L (ref 55–135)
ALT SERPL W/O P-5'-P-CCNC: 15 U/L (ref 10–44)
ANION GAP SERPL CALC-SCNC: 11 MMOL/L (ref 8–16)
AST SERPL-CCNC: 16 U/L (ref 10–40)
BASOPHILS # BLD AUTO: 0.02 K/UL (ref 0–0.2)
BASOPHILS NFR BLD: 0.1 % (ref 0–1.9)
BILIRUB SERPL-MCNC: 0.9 MG/DL (ref 0.1–1)
BUN SERPL-MCNC: 17 MG/DL (ref 8–23)
CALCIUM SERPL-MCNC: 9.7 MG/DL (ref 8.7–10.5)
CHLORIDE SERPL-SCNC: 100 MMOL/L (ref 95–110)
CO2 SERPL-SCNC: 25 MMOL/L (ref 23–29)
CREAT SERPL-MCNC: 1.2 MG/DL (ref 0.5–1.4)
CTP QC/QA: YES
DIFFERENTIAL METHOD: ABNORMAL
EOSINOPHIL # BLD AUTO: 1.2 K/UL (ref 0–0.5)
EOSINOPHIL NFR BLD: 8.6 % (ref 0–8)
ERYTHROCYTE [DISTWIDTH] IN BLOOD BY AUTOMATED COUNT: 14.2 % (ref 11.5–14.5)
EST. GFR  (AFRICAN AMERICAN): 48 ML/MIN/1.73 M^2
EST. GFR  (NON AFRICAN AMERICAN): 42 ML/MIN/1.73 M^2
GLUCOSE SERPL-MCNC: 127 MG/DL (ref 70–110)
HCT VFR BLD AUTO: 44.7 % (ref 37–48.5)
HGB BLD-MCNC: 14.4 G/DL (ref 12–16)
IMM GRANULOCYTES # BLD AUTO: 0.04 K/UL (ref 0–0.04)
IMM GRANULOCYTES NFR BLD AUTO: 0.3 % (ref 0–0.5)
LYMPHOCYTES # BLD AUTO: 1.9 K/UL (ref 1–4.8)
LYMPHOCYTES NFR BLD: 13.8 % (ref 18–48)
MCH RBC QN AUTO: 29.6 PG (ref 27–31)
MCHC RBC AUTO-ENTMCNC: 32.2 G/DL (ref 32–36)
MCV RBC AUTO: 92 FL (ref 82–98)
MONOCYTES # BLD AUTO: 0.4 K/UL (ref 0.3–1)
MONOCYTES NFR BLD: 2.7 % (ref 4–15)
NEUTROPHILS # BLD AUTO: 10 K/UL (ref 1.8–7.7)
NEUTROPHILS NFR BLD: 74.5 % (ref 38–73)
NRBC BLD-RTO: 0 /100 WBC
PLATELET # BLD AUTO: 337 K/UL (ref 150–350)
PMV BLD AUTO: 10.5 FL (ref 9.2–12.9)
POTASSIUM SERPL-SCNC: 3.9 MMOL/L (ref 3.5–5.1)
PROT SERPL-MCNC: 6.8 G/DL (ref 6–8.4)
RBC # BLD AUTO: 4.86 M/UL (ref 4–5.4)
SARS-COV-2 RDRP RESP QL NAA+PROBE: NEGATIVE
SODIUM SERPL-SCNC: 136 MMOL/L (ref 136–145)
TROPONIN I SERPL DL<=0.01 NG/ML-MCNC: <0.006 NG/ML (ref 0–0.03)
WBC # BLD AUTO: 13.37 K/UL (ref 3.9–12.7)

## 2020-12-28 PROCEDURE — 84484 ASSAY OF TROPONIN QUANT: CPT

## 2020-12-28 PROCEDURE — 80053 COMPREHEN METABOLIC PANEL: CPT

## 2020-12-28 PROCEDURE — 96375 TX/PRO/DX INJ NEW DRUG ADDON: CPT

## 2020-12-28 PROCEDURE — 63600175 PHARM REV CODE 636 W HCPCS: Performed by: EMERGENCY MEDICINE

## 2020-12-28 PROCEDURE — 93005 ELECTROCARDIOGRAM TRACING: CPT

## 2020-12-28 PROCEDURE — 85025 COMPLETE CBC W/AUTO DIFF WBC: CPT

## 2020-12-28 PROCEDURE — 96374 THER/PROPH/DIAG INJ IV PUSH: CPT

## 2020-12-28 PROCEDURE — 99285 EMERGENCY DEPT VISIT HI MDM: CPT | Mod: 25

## 2020-12-28 PROCEDURE — 93010 ELECTROCARDIOGRAM REPORT: CPT | Mod: ,,, | Performed by: INTERNAL MEDICINE

## 2020-12-28 PROCEDURE — U0002 COVID-19 LAB TEST NON-CDC: HCPCS | Performed by: EMERGENCY MEDICINE

## 2020-12-28 PROCEDURE — 25000003 PHARM REV CODE 250: Performed by: EMERGENCY MEDICINE

## 2020-12-28 PROCEDURE — 93010 EKG 12-LEAD: ICD-10-PCS | Mod: ,,, | Performed by: INTERNAL MEDICINE

## 2020-12-28 RX ORDER — METHYLPREDNISOLONE SOD SUCC 125 MG
125 VIAL (EA) INJECTION
Status: COMPLETED | OUTPATIENT
Start: 2020-12-28 | End: 2020-12-28

## 2020-12-28 RX ORDER — OXYCODONE AND ACETAMINOPHEN 5; 325 MG/1; MG/1
1 TABLET ORAL EVERY 6 HOURS PRN
Qty: 15 TABLET | Refills: 0 | Status: SHIPPED | OUTPATIENT
Start: 2020-12-28 | End: 2021-01-15 | Stop reason: SDUPTHER

## 2020-12-28 RX ORDER — FAMOTIDINE 10 MG/ML
20 INJECTION INTRAVENOUS ONCE
Status: COMPLETED | OUTPATIENT
Start: 2020-12-28 | End: 2020-12-28

## 2020-12-28 RX ADMIN — METHYLPREDNISOLONE SODIUM SUCCINATE 125 MG: 125 INJECTION, POWDER, FOR SOLUTION INTRAMUSCULAR; INTRAVENOUS at 10:12

## 2020-12-28 RX ADMIN — FAMOTIDINE 20 MG: 10 INJECTION, SOLUTION INTRAVENOUS at 10:12

## 2020-12-28 NOTE — TELEPHONE ENCOUNTER
----- Message from Ella Harvey MA sent at 12/28/2020  3:54 PM CST -----  Contact: Clarke (son) @ [phone]    ----- Message -----  From: Fadumo Carpenter  Sent: 12/28/2020  10:15 AM CST  To: [facility] Neurosurgery Clinical Support    Not sure if sending to the correct pool, so I do apologize but there wasn't a provider attached to appt. Pts son called and is needing to r/s her appt for today. He says she had an allergic reaction to her medication and they are currently at the ED w/ her. Pls call son back.

## 2020-12-28 NOTE — ED PROVIDER NOTES
Encounter Date: 12/28/2020       History     Chief Complaint   Patient presents with    Allergic Reaction     EMS called for allergic reaction, unknown etiolog, recently started taking norco and gabapention upon ems arrivial pt was altered, no resp distress, treated with 0.5 im epi, and 50 benadryl, EMS states swelling noted to bilateral arms, upon arrivial to ER pt aaox2      Patient is an 85-year-old female brought in by EMS for an allergic reaction.  Patient began this morning with a diffuse rash and pruritus.  No breathing difficulty.  She was given Benadryl and epinephrine by EMS enroute to the hospital.  No difficulty swallowing.  No definite known inciting factor, although patient just recently started gabapentin and Norco.        Review of patient's allergies indicates:   Allergen Reactions    Penicillins Nausea And Vomiting     Past Medical History:   Diagnosis Date    Aneurysm of middle cerebral artery     Hypertension     TIA (transient ischemic attack)      Past Surgical History:   Procedure Laterality Date    BRAIN SURGERY      CEREBRAL ANGIOGRAM N/A 8/19/2020    Procedure: ANGIOGRAM-CEREBRAL;  [facility];  Location: [facility] OR [address] [place] [address];  Service: Radiology;  Laterality: N/A;   / ANDRÉS    HYSTERECTOMY       Family History   Problem Relation Age of Onset    Cancer Paternal Aunt      Social History     Tobacco Use    Smoking status: Never Smoker   Substance Use Topics    Alcohol use: Never     Frequency: Never    Drug use: Never     Review of Systems   Constitutional: Negative for fever.   Respiratory: Negative for shortness of breath.    Cardiovascular: Negative for chest pain.   Skin: Positive for rash.   All other systems reviewed and are negative.      Physical Exam     Initial Vitals   BP Pulse Resp Temp SpO2   12/28/20 1025 12/28/20 1024 12/28/20 1024 12/28/20 1025 12/28/20 1025   (!) 134/90 (!) 114 (!) 29 99 °F (37.2 °C) 100 %      MAP       --                 Physical Exam    Nursing note and vitals reviewed.  Constitutional: No distress.   HENT:   Head: Normocephalic and atraumatic.   Eyes: EOM are normal.   Neck: Neck supple.   Cardiovascular:   Tachycardic.   Pulmonary/Chest: Breath sounds normal. She has no wheezes.   Abdominal: Soft. She exhibits no distension. There is no abdominal tenderness.   Neurological: She is alert.   Skin: Skin is warm and dry.   Diffuse erythema is urticaria of the trunk and extremities.  No mucous membrane involvement.         ED Course   Procedures  Labs Reviewed   CBC W/ AUTO DIFFERENTIAL - Abnormal; Notable for the following components:       Result Value    WBC 13.37 (*)     Gran # (ANC) 10.0 (*)     Eos # 1.2 (*)     Gran % 74.5 (*)     Lymph % 13.8 (*)     Mono % 2.7 (*)     Eosinophil % 8.6 (*)     All other components within normal limits   COMPREHENSIVE METABOLIC PANEL - Abnormal; Notable for the following components:    Glucose 127 (*)     eGFR if  48 (*)     eGFR if non  42 (*)     All other components within normal limits   TROPONIN I   SARS-COV-2 RDRP GENE          Imaging Results          X-Ray Chest 1 View (Final result)  Result time 12/28/20 11:12:45    Final result by Ryan Sotelo MD (12/28/20 11:12:45)                 Impression:      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  Once again, prior ultrasound demonstrated an enlarged multinodular thyroid.  No acute abnormality and no significant interval change.      Electronically signed by: Ryan Sotelo MD  Date:    12/28/2020  Time:    11:12             Narrative:    EXAMINATION:  XR CHEST 1 VIEW    CLINICAL HISTORY:  allergic reaction;    TECHNIQUE:  Single frontal view of the chest was performed.    COMPARISON:  07/01/2020, 06/17/2020 and 05/26/2014    FINDINGS:  Poor inspiration accentuates the cardiac silhouette.  The heart is judged to be normal in size and unchanged.  Tortuous thoracic aorta with  atherosclerotic calcification in the wall of the aortic arch.  Abnormal widening of the superior mediastinum is again seen with deviation of the trachea toward the right.  This appears similar to prior exams.  Once again, prior ultrasound dated 06/18/2020 demonstrated an enlarged multinodular thyroid.  Pulmonary vascularity does not appear congested.  Lungs are satisfactorily expanded and appear free of active disease.  No pleural fluid or pneumothorax.  Generalized osteopenia with degenerative changes involving the thoracic spine and both shoulders.                                 Medical Decision Making:   Initial Assessment:   85-year-old female with a generalized rash and itching.  Patient just recently started gabapentin and Norco.  Clinical Tests:   Lab Tests: Ordered and Reviewed  Radiological Study: Ordered and Reviewed  ED Management:  Patient was given epinephrine and Benadryl by EMS prior to arrival in the ED. She was given Solu-Medrol here in the emergency department as well as Pepcid.  There was a decrease in her rash.  There is no respiratory involvement.  No mucous membrane involvement.  I have advised the patient to stop gabapentin as well as Norco since these medicines were started at the same time and we are unsure as to which one she reacted to.  I will switch her to Percocet to take for pain.  I advised close follow-up with the primary physician but most importantly to return to the ED for any possible worsening.                             Clinical Impression:       ICD-10-CM ICD-9-CM   1. Allergic reaction, initial encounter  T78.40XA 995.3   2. Tachycardia  R00.0 785.0                                               Josh Roberto MD  12/28/20 [id number]

## 2020-12-28 NOTE — TELEPHONE ENCOUNTER
----- Message from Fadumo Carpenter sent at 12/28/2020 10:15 AM CST -----  Contact: Clarke (son) @ [phone]  Not sure if sending to the correct pool, so I do apologize but there wasn't a provider attached to appt. Pts son called and is needing to r/s her appt for today. He says she had an allergic reaction to her medication and they are currently at the ED w/ her. Pls call son back.

## 2021-02-25 ENCOUNTER — IMMUNIZATION (OUTPATIENT)
Dept: PHARMACY | Facility: CLINIC | Age: 86
End: 2021-02-25
Payer: MEDICARE

## 2021-02-25 DIAGNOSIS — Z23 NEED FOR VACCINATION: Primary | ICD-10-CM

## 2021-03-03 RX ORDER — HYDROCHLOROTHIAZIDE 12.5 MG/1
25 TABLET ORAL DAILY
Qty: 30 TABLET | Refills: 5 | Status: CANCELLED | OUTPATIENT
Start: 2021-02-22 | End: 2021-03-24

## 2021-03-25 ENCOUNTER — IMMUNIZATION (OUTPATIENT)
Dept: PHARMACY | Facility: CLINIC | Age: 86
End: 2021-03-25
Payer: MEDICARE

## 2021-03-25 DIAGNOSIS — Z23 NEED FOR VACCINATION: Primary | ICD-10-CM

## 2021-09-22 ENCOUNTER — HOSPITAL ENCOUNTER (EMERGENCY)
Facility: HOSPITAL | Age: 86
Discharge: HOME | End: 2021-09-22
Attending: EMERGENCY MEDICINE
Payer: MEDICARE

## 2021-09-22 VITALS
RESPIRATION RATE: 20 BRPM | DIASTOLIC BLOOD PRESSURE: 89 MMHG | BODY MASS INDEX: 24.96 KG/M2 | WEIGHT: 150 LBS | HEART RATE: 73 BPM | OXYGEN SATURATION: 98 % | TEMPERATURE: 98 F | SYSTOLIC BLOOD PRESSURE: 167 MMHG

## 2021-09-22 DIAGNOSIS — R05.9 COUGH: ICD-10-CM

## 2021-09-22 LAB
CTP QC/QA: YES
SARS-COV-2 RDRP RESP QL NAA+PROBE: NEGATIVE

## 2021-09-22 PROCEDURE — 25000003 PHARM REV CODE 250: Mod: HCWC,ER | Performed by: PHYSICIAN ASSISTANT

## 2021-09-22 PROCEDURE — 99284 EMERGENCY DEPT VISIT MOD MDM: CPT | Mod: 25,HCWC,ER

## 2021-09-22 PROCEDURE — U0002 COVID-19 LAB TEST NON-CDC: HCPCS | Mod: HCWC,ER | Performed by: PHYSICIAN ASSISTANT

## 2021-09-22 RX ORDER — DOCUSATE SODIUM 50 MG/5ML
100 LIQUID ORAL
Status: COMPLETED | OUTPATIENT
Start: 2021-09-22 | End: 2021-09-22

## 2021-09-22 RX ORDER — BENZONATATE 100 MG/1
100 CAPSULE ORAL 3 TIMES DAILY PRN
Qty: 20 CAPSULE | Refills: 0 | Status: SHIPPED | OUTPATIENT
Start: 2021-09-22 | End: 2021-09-29

## 2021-09-22 RX ORDER — FAMOTIDINE 20 MG/1
20 TABLET, FILM COATED ORAL 2 TIMES DAILY
Qty: 20 TABLET | Refills: 0 | Status: SHIPPED | OUTPATIENT
Start: 2021-09-22 | End: 2022-12-15

## 2021-09-22 RX ORDER — BENZONATATE 100 MG/1
100 CAPSULE ORAL
Status: COMPLETED | OUTPATIENT
Start: 2021-09-22 | End: 2021-09-22

## 2021-09-22 RX ORDER — CLOPIDOGREL BISULFATE 75 MG/1
75 TABLET ORAL
Status: COMPLETED | OUTPATIENT
Start: 2021-09-22 | End: 2021-09-22

## 2021-09-22 RX ORDER — AMLODIPINE BESYLATE 5 MG/1
10 TABLET ORAL
Status: COMPLETED | OUTPATIENT
Start: 2021-09-22 | End: 2021-09-22

## 2021-09-22 RX ORDER — CETIRIZINE HYDROCHLORIDE 10 MG/1
5 TABLET ORAL DAILY
Qty: 7 TABLET | Refills: 0 | Status: SHIPPED | OUTPATIENT
Start: 2021-09-22 | End: 2022-12-15

## 2021-09-22 RX ORDER — ATORVASTATIN CALCIUM 40 MG/1
80 TABLET, FILM COATED ORAL
Status: COMPLETED | OUTPATIENT
Start: 2021-09-22 | End: 2021-09-22

## 2021-09-22 RX ADMIN — CLOPIDOGREL 75 MG: 75 TABLET, FILM COATED ORAL at 05:09

## 2021-09-22 RX ADMIN — DOCUSATE SODIUM 100 MG: 50 LIQUID ORAL at 05:09

## 2021-09-22 RX ADMIN — ATORVASTATIN CALCIUM 80 MG: 40 TABLET, FILM COATED ORAL at 05:09

## 2021-09-22 RX ADMIN — AMLODIPINE BESYLATE 10 MG: 5 TABLET ORAL at 05:09

## 2021-09-22 RX ADMIN — MAGNESIUM HYDROXIDE/ALUMINUM HYDROXICE/SIMETHICONE: 120; 1200; 1200 SUSPENSION ORAL at 05:09

## 2021-09-22 RX ADMIN — BENZONATATE 100 MG: 100 CAPSULE ORAL at 05:09

## 2022-02-03 ENCOUNTER — TELEPHONE (OUTPATIENT)
Dept: OBSTETRICS AND GYNECOLOGY | Facility: CLINIC | Age: 87
End: 2022-02-03
Payer: MEDICARE

## 2022-02-03 NOTE — TELEPHONE ENCOUNTER
Pt sister called and stated that her sister need an appointment with an GYN asap. I told pt that the sooner appt we have is the 24th. Pt sister said it was fine and she would like to be put on the wait list in case anyone cancel appt.         Nancy Hdz MA

## 2022-02-03 NOTE — TELEPHONE ENCOUNTER
----- Message from Michelle Bazan sent at 2/2/2022  1:37 PM CST -----  Contact: [phone]/ pt's sister  Type:  Sooner Apoointment Request    Caller is requesting a sooner appointment.  Caller declined first available appointment listed below.  Caller will not accept being placed on the waitlist and is requesting a message be sent to doctor.  Name of Caller: pt   When is the first available appointment? 02/24  Symptoms: bladder problems   Would the patient rather a call back or a [facility]?  Call back   Best Call Back Number: [phone]  Additional Information:

## 2022-02-24 ENCOUNTER — OFFICE VISIT (OUTPATIENT)
Dept: OBSTETRICS AND GYNECOLOGY | Facility: CLINIC | Age: 87
End: 2022-02-24
Payer: MEDICARE

## 2022-02-24 VITALS
BODY MASS INDEX: 29.64 KG/M2 | WEIGHT: 178.13 LBS | SYSTOLIC BLOOD PRESSURE: 136 MMHG | DIASTOLIC BLOOD PRESSURE: 76 MMHG

## 2022-02-24 DIAGNOSIS — N81.9 FEMALE GENITAL PROLAPSE, UNSPECIFIED TYPE: Primary | ICD-10-CM

## 2022-02-24 PROCEDURE — 3075F SYST BP GE 130 - 139MM HG: CPT | Mod: HCWC,CPTII,S$GLB, | Performed by: STUDENT IN AN ORGANIZED HEALTH CARE EDUCATION/TRAINING PROGRAM

## 2022-02-24 PROCEDURE — 99203 PR OFFICE/OUTPT VISIT, NEW, LEVL III, 30-44 MIN: ICD-10-PCS | Mod: HCWC,S$GLB,, | Performed by: STUDENT IN AN ORGANIZED HEALTH CARE EDUCATION/TRAINING PROGRAM

## 2022-02-24 PROCEDURE — 1126F AMNT PAIN NOTED NONE PRSNT: CPT | Mod: HCWC,CPTII,S$GLB, | Performed by: STUDENT IN AN ORGANIZED HEALTH CARE EDUCATION/TRAINING PROGRAM

## 2022-02-24 PROCEDURE — 1101F PT FALLS ASSESS-DOCD LE1/YR: CPT | Mod: HCWC,CPTII,S$GLB, | Performed by: STUDENT IN AN ORGANIZED HEALTH CARE EDUCATION/TRAINING PROGRAM

## 2022-02-24 PROCEDURE — 99203 OFFICE O/P NEW LOW 30 MIN: CPT | Mod: HCWC,S$GLB,, | Performed by: STUDENT IN AN ORGANIZED HEALTH CARE EDUCATION/TRAINING PROGRAM

## 2022-02-24 PROCEDURE — 3078F DIAST BP <80 MM HG: CPT | Mod: HCWC,CPTII,S$GLB, | Performed by: STUDENT IN AN ORGANIZED HEALTH CARE EDUCATION/TRAINING PROGRAM

## 2022-02-24 PROCEDURE — 1126F PR PAIN SEVERITY QUANTIFIED, NO PAIN PRESENT: ICD-10-PCS | Mod: HCWC,CPTII,S$GLB, | Performed by: STUDENT IN AN ORGANIZED HEALTH CARE EDUCATION/TRAINING PROGRAM

## 2022-02-24 PROCEDURE — 3288F PR FALLS RISK ASSESSMENT DOCUMENTED: ICD-10-PCS | Mod: HCWC,CPTII,S$GLB, | Performed by: STUDENT IN AN ORGANIZED HEALTH CARE EDUCATION/TRAINING PROGRAM

## 2022-02-24 PROCEDURE — 1160F RVW MEDS BY RX/DR IN RCRD: CPT | Mod: HCWC,CPTII,S$GLB, | Performed by: STUDENT IN AN ORGANIZED HEALTH CARE EDUCATION/TRAINING PROGRAM

## 2022-02-24 PROCEDURE — 1160F PR REVIEW ALL MEDS BY PRESCRIBER/CLIN PHARMACIST DOCUMENTED: ICD-10-PCS | Mod: HCWC,CPTII,S$GLB, | Performed by: STUDENT IN AN ORGANIZED HEALTH CARE EDUCATION/TRAINING PROGRAM

## 2022-02-24 PROCEDURE — 3288F FALL RISK ASSESSMENT DOCD: CPT | Mod: HCWC,CPTII,S$GLB, | Performed by: STUDENT IN AN ORGANIZED HEALTH CARE EDUCATION/TRAINING PROGRAM

## 2022-02-24 PROCEDURE — 99999 PR PBB SHADOW E&M-EST. PATIENT-LVL III: CPT | Mod: PBBFAC,HCWC,, | Performed by: STUDENT IN AN ORGANIZED HEALTH CARE EDUCATION/TRAINING PROGRAM

## 2022-02-24 PROCEDURE — 99999 PR PBB SHADOW E&M-EST. PATIENT-LVL III: ICD-10-PCS | Mod: PBBFAC,HCWC,, | Performed by: STUDENT IN AN ORGANIZED HEALTH CARE EDUCATION/TRAINING PROGRAM

## 2022-02-24 PROCEDURE — 1159F MED LIST DOCD IN RCRD: CPT | Mod: HCWC,CPTII,S$GLB, | Performed by: STUDENT IN AN ORGANIZED HEALTH CARE EDUCATION/TRAINING PROGRAM

## 2022-02-24 PROCEDURE — 1101F PR PT FALLS ASSESS DOC 0-1 FALLS W/OUT INJ PAST YR: ICD-10-PCS | Mod: HCWC,CPTII,S$GLB, | Performed by: STUDENT IN AN ORGANIZED HEALTH CARE EDUCATION/TRAINING PROGRAM

## 2022-02-24 PROCEDURE — 3078F PR MOST RECENT DIASTOLIC BLOOD PRESSURE < 80 MM HG: ICD-10-PCS | Mod: HCWC,CPTII,S$GLB, | Performed by: STUDENT IN AN ORGANIZED HEALTH CARE EDUCATION/TRAINING PROGRAM

## 2022-02-24 PROCEDURE — 1159F PR MEDICATION LIST DOCUMENTED IN MEDICAL RECORD: ICD-10-PCS | Mod: HCWC,CPTII,S$GLB, | Performed by: STUDENT IN AN ORGANIZED HEALTH CARE EDUCATION/TRAINING PROGRAM

## 2022-02-24 PROCEDURE — 3075F PR MOST RECENT SYSTOLIC BLOOD PRESS GE 130-139MM HG: ICD-10-PCS | Mod: HCWC,CPTII,S$GLB, | Performed by: STUDENT IN AN ORGANIZED HEALTH CARE EDUCATION/TRAINING PROGRAM

## 2022-02-24 NOTE — PROGRESS NOTES
"History & Physical  Gynecology      SUBJECTIVE:     Chief Complaint: Bladder Problems       History of Present Illness:  Ms. Avila is an 86 yr old female who presents to clinic with her sister and her son to address what they describe as "her fallen bladder." Margarita has previously been followed by Dr. Rizo, her OBGYN, who has managed her prolapse in the past. According to the family, the patient was previously given a pessary for her urogenital complaints which were primarily pelvic discomfort.  It sounds as if she was managed with a pessary for roughly 5 years until she had a stroke in . Since that time, she returned to Dr. Rizo who replaced her pessary yet the device again promptly came out upon returning home. She has not used the device since that time. She complains of urinary incontinence and using adult diapers. She is unable to fully elucidate whether her symptoms are more stress or urge in nature. Of note, according to her family, she likely lost some cognitive function following the stroke. She is s/p hysterectomy but is unsure why.     Review of patient's allergies indicates:   Allergen Reactions    Penicillin     Penicillins Nausea And Vomiting       Past Medical History:   Diagnosis Date    Aneurysm of middle cerebral artery     Arthritis     Hypertension     Stroke     TIA (transient ischemic attack)      Past Surgical History:   Procedure Laterality Date    BRAIN SURGERY      CEREBRAL ANGIOGRAM N/A 2020    Procedure: ANGIOGRAM-CEREBRAL;  Surgeon: Dosc Diagnostic Provider;  Location: [facility] OR [address] [place] [address];  Service: Radiology;  Laterality: N/A;   / ANDRÉS    HYSTERECTOMY       OB History        4    Para   3    Term   3            AB   1    Living   2       SAB   1    IAB        Ectopic        Multiple        Live Births                   Family History   Problem Relation Age of Onset    Cancer Paternal Aunt      Social History     Tobacco Use    Smoking status: " Never Smoker    Smokeless tobacco: Never Used   Substance Use Topics    Alcohol use: Never    Drug use: Never       Current Outpatient Medications   Medication Sig    amLODIPine (NORVASC) 10 MG tablet Take 1 tablet (10mg total) by mouth once daily for blood pressure    atorvastatin (LIPITOR) 80 MG tablet Take 1 tablet (80 mg) by mouth once daily for cholesterol    clopidogreL (PLAVIX) 75 mg tablet Take 1 tablet (75 mg total) by mouth once a day    docusate sodium (COLACE) 100 MG capsule Take 100 mg by mouth 2 (two) times daily.    hydroCHLOROthiazide (HYDRODIURIL) 12.5 MG Tab Take 2 tablets (25 mg total) by mouth every morning.    hydroCHLOROthiazide (HYDRODIURIL) 12.5 MG Tab Take 2 tablets by mouth orally daily in the morning    hydroCHLOROthiazide (HYDRODIURIL) 12.5 MG Tab Take 2 tablets (25 mg total) by mouth daily in the morning    polyethylene glycol (GLYCOLAX) 17 gram/dose powder Take 17 g by mouth once daily.    aspirin 81 MG Chew Chew and swallow 1 tablet (81 mg total) by mouth once daily.    cetirizine (ZYRTEC) 10 MG tablet Take 0.5 tablets (5 mg total) by mouth once daily. For postnasal drip for 14 days    famotidine (PEPCID) 20 MG tablet Take 1 tablet (20 mg total) by mouth 2 (two) times daily. for 10 days    lisinopriL (PRINIVIL,ZESTRIL) 20 MG tablet Take 1 tablet (20 mg total) by mouth once daily.    oxyCODONE-acetaminophen (PERCOCET) 5-325 mg per tablet Take 1 tablet by mouth every 6 (six) hours as needed. (Patient not taking: Reported on 2/24/2022)    oxyCODONE-acetaminophen (PERCOCET) 5-325 mg per tablet Take 1 tablet by mouth every 6 (six) hours as needed. (Patient not taking: Reported on 2/24/2022)    traMADoL (ULTRAM) 50 mg tablet Take 1 tablet (50 mg total) by mouth every 8 (eight) hours as needed for Pain. (Patient not taking: Reported on 2/24/2022)     No current facility-administered medications for this visit.       Review of Systems:  Review of Systems   Constitutional:  Negative for chills, fatigue and fever.   HENT: Negative for congestion.    Eyes: Negative for visual disturbance.   Respiratory: Negative for cough and shortness of breath.    Cardiovascular: Negative for chest pain and palpitations.   Gastrointestinal: Negative for abdominal distention, abdominal pain, constipation, diarrhea, nausea and vomiting.   Genitourinary: Positive for pelvic pain. Negative for difficulty urinating, dysuria, hematuria, vaginal bleeding and vaginal discharge.        Urinary Incontinence   Skin: Negative for rash.   Neurological: Negative for dizziness, seizures, light-headedness and headaches.   Hematological: Does not bruise/bleed easily.   Psychiatric/Behavioral: Negative for dysphoric mood. The patient is not nervous/anxious.         OBJECTIVE:     Physical Exam:  Vitals:    02/24/22 1040   BP: 136/76      Physical Exam  Vitals and nursing note reviewed.   Constitutional:       General: She is not in acute distress.     Appearance: She is well-developed.      Comments: Patient in wheel chair   HENT:      Head: Normocephalic and atraumatic.   Eyes:      Pupils: Pupils are equal, round, and reactive to light.   Cardiovascular:      Rate and Rhythm: Normal rate and regular rhythm.   Pulmonary:      Effort: Pulmonary effort is normal. No respiratory distress.   Abdominal:      General: There is no distension.      Palpations: Abdomen is soft. There is no mass.      Tenderness: There is no abdominal tenderness. There is no guarding.   Genitourinary:     Comments: Patient was offered physical exam but declined.   Musculoskeletal:         General: Normal range of motion.      Cervical back: Normal range of motion and neck supple.   Skin:     General: Skin is warm and dry.   Neurological:      Mental Status: She is alert and oriented to person, place, and time.   Psychiatric:         Behavior: Behavior normal.         Thought Content: Thought content normal.         Judgment: Judgment normal.       Comments: Likely evidence of a slight cognitive delay.         ASSESSMENT/PLAN:     1. 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, unspecified type  - Long discussion with patient and her family regarding pelvic organ prolapse and urinary incontinence, including the work-up and management  - Sounds as if the patient has known prolapse of the anterior compartment per patient and family report, yet patient declined an exam today  - Both patient and family seem to favor less invasive medical management as compared to surgical management  - Discussed role of pelvic floor therapy, avoiding bladder irritants and timed voids  - Given patient's complicated medical history (stroke with deficits), I feel the patient would be better served by a urogynecologist who is better to able to offer a wide variety of both medical and surgical options  - Ambulatory referral/consult to Urogynecology; Future

## 2022-05-24 ENCOUNTER — OFFICE VISIT (OUTPATIENT)
Dept: UROGYNECOLOGY | Facility: CLINIC | Age: 87
End: 2022-05-24
Payer: MEDICARE

## 2022-05-24 VITALS
HEIGHT: 65 IN | SYSTOLIC BLOOD PRESSURE: 156 MMHG | WEIGHT: 178.56 LBS | BODY MASS INDEX: 29.75 KG/M2 | DIASTOLIC BLOOD PRESSURE: 88 MMHG

## 2022-05-24 DIAGNOSIS — N39.46 URINARY INCONTINENCE, MIXED: ICD-10-CM

## 2022-05-24 DIAGNOSIS — I67.1 ANEURYSM OF MIDDLE CEREBRAL ARTERY: ICD-10-CM

## 2022-05-24 DIAGNOSIS — N81.9 VAGINAL VAULT PROLAPSE: ICD-10-CM

## 2022-05-24 DIAGNOSIS — N81.10 BLADDER PROLAPSE, FEMALE, ACQUIRED: Primary | ICD-10-CM

## 2022-05-24 DIAGNOSIS — R53.1 RIGHT SIDED WEAKNESS: ICD-10-CM

## 2022-05-24 DIAGNOSIS — G45.9 TIA (TRANSIENT ISCHEMIC ATTACK): ICD-10-CM

## 2022-05-24 DIAGNOSIS — R33.9 INCOMPLETE BLADDER EMPTYING: ICD-10-CM

## 2022-05-24 DIAGNOSIS — I63.412 CEREBROVASCULAR ACCIDENT (CVA) DUE TO EMBOLISM OF LEFT MIDDLE CEREBRAL ARTERY: ICD-10-CM

## 2022-05-24 DIAGNOSIS — I69.359 HEMIPARESIS AFFECTING DOMINANT SIDE AS LATE EFFECT OF CEREBROVASCULAR ACCIDENT: ICD-10-CM

## 2022-05-24 DIAGNOSIS — Z12.31 ENCOUNTER FOR SCREENING MAMMOGRAM FOR BREAST CANCER: ICD-10-CM

## 2022-05-24 PROCEDURE — 3288F PR FALLS RISK ASSESSMENT DOCUMENTED: ICD-10-PCS | Mod: CPTII,S$GLB,, | Performed by: OBSTETRICS & GYNECOLOGY

## 2022-05-24 PROCEDURE — 87086 URINE CULTURE/COLONY COUNT: CPT | Performed by: OBSTETRICS & GYNECOLOGY

## 2022-05-24 PROCEDURE — 57160 PR FIT/INSERT INTRAVAG SUPPORT DEVICE: ICD-10-PCS | Mod: S$GLB,,, | Performed by: OBSTETRICS & GYNECOLOGY

## 2022-05-24 PROCEDURE — 3079F PR MOST RECENT DIASTOLIC BLOOD PRESSURE 80-89 MM HG: ICD-10-PCS | Mod: CPTII,S$GLB,, | Performed by: OBSTETRICS & GYNECOLOGY

## 2022-05-24 PROCEDURE — 99999 PR PBB SHADOW E&M-EST. PATIENT-LVL V: CPT | Mod: PBBFAC,,, | Performed by: OBSTETRICS & GYNECOLOGY

## 2022-05-24 PROCEDURE — 1101F PR PT FALLS ASSESS DOC 0-1 FALLS W/OUT INJ PAST YR: ICD-10-PCS | Mod: CPTII,S$GLB,, | Performed by: OBSTETRICS & GYNECOLOGY

## 2022-05-24 PROCEDURE — 87186 SC STD MICRODIL/AGAR DIL: CPT | Performed by: OBSTETRICS & GYNECOLOGY

## 2022-05-24 PROCEDURE — 3079F DIAST BP 80-89 MM HG: CPT | Mod: CPTII,S$GLB,, | Performed by: OBSTETRICS & GYNECOLOGY

## 2022-05-24 PROCEDURE — 1159F MED LIST DOCD IN RCRD: CPT | Mod: CPTII,S$GLB,, | Performed by: OBSTETRICS & GYNECOLOGY

## 2022-05-24 PROCEDURE — 99215 OFFICE O/P EST HI 40 MIN: CPT | Mod: 25,S$GLB,, | Performed by: OBSTETRICS & GYNECOLOGY

## 2022-05-24 PROCEDURE — 1159F PR MEDICATION LIST DOCUMENTED IN MEDICAL RECORD: ICD-10-PCS | Mod: CPTII,S$GLB,, | Performed by: OBSTETRICS & GYNECOLOGY

## 2022-05-24 PROCEDURE — 1101F PT FALLS ASSESS-DOCD LE1/YR: CPT | Mod: CPTII,S$GLB,, | Performed by: OBSTETRICS & GYNECOLOGY

## 2022-05-24 PROCEDURE — 1160F RVW MEDS BY RX/DR IN RCRD: CPT | Mod: CPTII,S$GLB,, | Performed by: OBSTETRICS & GYNECOLOGY

## 2022-05-24 PROCEDURE — 3077F SYST BP >= 140 MM HG: CPT | Mod: CPTII,S$GLB,, | Performed by: OBSTETRICS & GYNECOLOGY

## 2022-05-24 PROCEDURE — 3077F PR MOST RECENT SYSTOLIC BLOOD PRESSURE >= 140 MM HG: ICD-10-PCS | Mod: CPTII,S$GLB,, | Performed by: OBSTETRICS & GYNECOLOGY

## 2022-05-24 PROCEDURE — 1126F PR PAIN SEVERITY QUANTIFIED, NO PAIN PRESENT: ICD-10-PCS | Mod: CPTII,S$GLB,, | Performed by: OBSTETRICS & GYNECOLOGY

## 2022-05-24 PROCEDURE — 3288F FALL RISK ASSESSMENT DOCD: CPT | Mod: CPTII,S$GLB,, | Performed by: OBSTETRICS & GYNECOLOGY

## 2022-05-24 PROCEDURE — 87088 URINE BACTERIA CULTURE: CPT | Performed by: OBSTETRICS & GYNECOLOGY

## 2022-05-24 PROCEDURE — 99999 PR PBB SHADOW E&M-EST. PATIENT-LVL V: ICD-10-PCS | Mod: PBBFAC,,, | Performed by: OBSTETRICS & GYNECOLOGY

## 2022-05-24 PROCEDURE — 1160F PR REVIEW ALL MEDS BY PRESCRIBER/CLIN PHARMACIST DOCUMENTED: ICD-10-PCS | Mod: CPTII,S$GLB,, | Performed by: OBSTETRICS & GYNECOLOGY

## 2022-05-24 PROCEDURE — 57160 INSERT PESSARY/OTHER DEVICE: CPT | Mod: S$GLB,,, | Performed by: OBSTETRICS & GYNECOLOGY

## 2022-05-24 PROCEDURE — 87077 CULTURE AEROBIC IDENTIFY: CPT | Performed by: OBSTETRICS & GYNECOLOGY

## 2022-05-24 PROCEDURE — 99215 PR OFFICE/OUTPT VISIT, EST, LEVL V, 40-54 MIN: ICD-10-PCS | Mod: 25,S$GLB,, | Performed by: OBSTETRICS & GYNECOLOGY

## 2022-05-24 PROCEDURE — 1126F AMNT PAIN NOTED NONE PRSNT: CPT | Mod: CPTII,S$GLB,, | Performed by: OBSTETRICS & GYNECOLOGY

## 2022-05-24 NOTE — PROGRESS NOTES
[facility] - OBGYN [facility]  [address]  [facility] [address]    Margarita Chávez  [id number]  1935  May 24, 2022    Consulting Physician: Samy Herrera MD   GYN: MD Javier  Primary M.D.: Syeda Grace MD    Chief Complaint   Patient presents with    bladder prolapse       HPI:     1)  UI:  (+) CAM = (+) UUI  X years, worse since stroke.  (+) pull ups:1-2/day, usually severe wetness and 1/night usually severe wetness.  Only goes to [facility] to have BMs.  Doesn't really urinate on regular basis.  Urge seems decreased, sania since CVA. Not using bedside commode.  Nocturia: No.  (--) dysuria,  (--) hematuria,  (--) frequent UTIs.  (+) complete bladder emptying.    2)  POP:  Present. Has used pessary in past x several years.  Last time tried to replace, fell out.  Last use 1 year ago. below introitus. About size of golf ball.  Symptoms:(--).  Did have some BLQ pain last week.  (--) vaginal bleeding. (--) vaginal discharge. (--) sexually active.  (--) h/o dyspareunia.  (+)  Vaginal dryness.  (--) vaginal estrogen use. Has some itching intermittently.  Uses A&D PRN.     3)  BM:  (--) constipation/straining.  (--) chronic diarrhea. (--) hematochezia.  (--) fecal incontinence.  (--) fecal smearing/urgency.  (+) complete evacuation.  Feels normal urge.   --not taking colace or glycolax    Past Medical History  Past Medical History:   Diagnosis Date    Aneurysm of middle cerebral artery     Arthritis     Hypertension     Stroke     TIA (transient ischemic attack)    2020 CVA:  Residual issues with U/L extremities; has trouble walking--mostly uses wheelchair but can walk limited amount with walker/holding things; s/p PT in 2020; has not had follow up with Stroke neuro; has aneurysms   --hyperlipidemia  --seasonal allergies  --GERD    Past Surgical History  Past Surgical History:   Procedure Laterality Date    BRAIN SURGERY      CEREBRAL ANGIOGRAM N/A 8/19/2020    Procedure: ANGIOGRAM-CEREBRAL;  Surgeon: [facility]  Diagnostic Provider;  Location: [facility] OR [address] [place] [address];  Service: Radiology;  Laterality: N/A;   / ANDRÉS    HYSTERECTOMY     --aneurysm coil     Hysterectomy: Yes   Date: .  Indication: POP.    Type: vaginal  Cervix present: No  Ovaries present: Yes   Other procedures at time of hysterectomy:  none    Past Ob History     x 2.  C/s x 0.    Largest infant weight: 7#10z  unknown FAVD. unknown episiotomy.      Gynecologic History  LMP: No LMP recorded (lmp unknown). Patient is postmenopausal.   Age of menarche: 15 yo  Age of menopause: with TVH  Menstrual history: h/o irregular  Pap test: post TVH.  History of abnormal paps: No.  History of STIs:  No  Mammogram: Date of last: 2020.  Result: Normal  Colonoscopy: years ago, normal per report.   DEXA:  Date of last: .  Result:  osteopenia.  Repeat due:  needs.  Not taking vitamin D.      Family History  Family History   Problem Relation Age of Onset    Cancer Paternal Aunt       Colon CA: No  Breast CA: Yes - PA  GYN CA: No   CA: No    Social History  Social History     Tobacco Use   Smoking Status Never Smoker   Smokeless Tobacco Never Used     Social History     Substance and Sexual Activity   Alcohol Use Never   .    Social History     Substance and Sexual Activity   Drug Use Never     The patient is .  Resides in [place] [address].  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      Allergies  Review of patient's allergies indicates:   Allergen Reactions    Penicillin     Penicillins Nausea And Vomiting       Medications  Current Outpatient Medications on File Prior to Visit   Medication Sig Dispense Refill    amLODIPine (NORVASC) 10 MG tablet Take 1 tablet (10mg total) by mouth once daily for blood pressure 90 tablet 5    atorvastatin (LIPITOR) 80 MG tablet Take 1 tablet (80 mg) by mouth once daily for cholesterol 90 tablet 5    clopidogreL (PLAVIX) 75 mg tablet Take 1 tablet (75 mg total) by mouth once a day  90 tablet 5    docusate sodium (COLACE) 100 MG capsule Take 100 mg by mouth 2 (two) times daily.      hydroCHLOROthiazide (HYDRODIURIL) 12.5 MG Tab Take 2 tablets (25 mg total) by mouth every morning. 180 tablet 5    hydroCHLOROthiazide (HYDRODIURIL) 12.5 MG Tab Take 2 tablets by mouth orally daily in the morning 180 tablet 5    hydroCHLOROthiazide (HYDRODIURIL) 12.5 MG Tab Take 2 tablets (25 mg total) by mouth daily in the morning 180 tablet 5    oxyCODONE-acetaminophen (PERCOCET) 5-325 mg per tablet Take 1 tablet by mouth every 6 (six) hours as needed. 90 each 0    oxyCODONE-acetaminophen (PERCOCET) 5-325 mg per tablet Take 1 tablet by mouth every 6 (six) hours as needed. 90 tablet 0    polyethylene glycol (GLYCOLAX) 17 gram/dose powder Take 17 g by mouth once daily.      traMADoL (ULTRAM) 50 mg tablet Take 1 tablet (50 mg total) by mouth every 8 (eight) hours as needed for Pain. 90 tablet 0    aspirin 81 MG Chew Chew and swallow 1 tablet (81 mg total) by mouth once daily. 30 tablet 5    cetirizine (ZYRTEC) 10 MG tablet Take 0.5 tablets (5 mg total) by mouth once daily. For postnasal drip for 14 days 7 tablet 0    famotidine (PEPCID) 20 MG tablet Take 1 tablet (20 mg total) by mouth 2 (two) times daily. for 10 days 20 tablet 0    lisinopriL (PRINIVIL,ZESTRIL) 20 MG tablet Take 1 tablet (20 mg total) by mouth once daily. 90 tablet 3    [DISCONTINUED] gabapentin (NEURONTIN) 100 MG capsule Take 1 capsule (100 mg total) by mouth 3 (three) times daily as needed for pain 90 capsule 1    [DISCONTINUED] omeprazole (PRILOSEC) 20 MG capsule Take 1 capsule (20mg total) by mouth twice a day before meals for stomach 180 capsule 4     No current facility-administered medications on file prior to visit.       Review of Systems A 14 point ROS was reviewed with pertinent positives as noted above in the history of present illness.      Constitutional: negative  Eyes: negative  Endocrine: negative  Gastrointestinal:  "negative  Cardiovascular: negative  Respiratory: negative  Allergic/Immunologic: negative  Integumentary: negative  Psychiatric: negative  Musculoskeletal: negative   Ear/Nose/Throat: negative  Neurologic: negative  Genitourinary: SEE HPI  Hematologic/Lymphatic: negative   Breast: negative    Urogynecologic Exam  BP (!) 156/88   Ht 5' 5" (1.651 m)   Wt 81 kg (178 lb 9.2 oz)   LMP  (LMP Unknown)   BMI 29.72 kg/m²     GENERAL APPEARANCE:  The patient is well-developed, well-nourished.  Neck:  Supple with no thyromegaly, no carotid bruits.  Heart:  Regular rate and rhythm, no murmurs, rubs or gallops.  Lungs:  Clear.  No CVA tenderness.  Abdomen:  Soft, nontender, nondistended, no hepatosplenomegaly.  Incisions:  none    PELVIC:    External genitalia:  Normal Bartholins, Skenes and labia bilaterally.    Urethra:  No caruncle, diverticulum or masses.  (+) hypermobility.    Vagina:  Atrophy (+) , no bladder masses or tender, no discharge.    Cervix:  absent  Uterus: uterus absent  Adnexa: Not palpable.    POP-Q:  Aa +3; Ba +3; C +5; Ap 0; Bp 0; D -1.  Genital hiatus 7, perineal body 3, total vaginal length 8-9.    NEUROLOGIC:  Cranial nerves 2 through 12 intact.  Strength 5/5.  DTRs 2+ lower extremities.  S2 through 4 normal.  Sacral reflexes intact.    EXT: DAVIS, 2+ pulses bilaterally, no C/C/E    COUGH STRESS TEST:  negative  KEGEL: unable to perform    RECTAL:    External:  Normal, (--) hemorrhoids, (--) dovetailing.   Internal:   (--) tenderness, (--) masses, Normal resting tone, Normal active tone.    PVR: 140 mL, cloudy    The patient was fit with #6/7 UI + POP and #6/7 ring + support pessary--did not stay in place; #8 ring + support too tight; #7 open ring had POP around device.  Pessary checked with patient supporting herself on bedside commode so she can strain while standing.  She tolerated the procedure well.        Impression    1. Bladder prolapse, female, acquired    2. Vaginal vault prolapse    3. Right " sided weakness    4. Hemiparesis affecting dominant side as late effect of cerebrovascular accident    5. Aneurysm of middle cerebral artery    6. Cerebrovascular accident (CVA) due to embolism of left middle cerebral artery    7. TIA (transient ischemic attack)    8. Urinary incontinence, mixed    9. Encounter for screening mammogram for breast cancer    10. Incomplete bladder emptying        Initial Plan  The patient was counseled regarding these issues. The patient was given a summary sheet containing each of these issues with possible options for evaluation and management. When appropriate, we also reviewed computer-generated diagrams specific to their diagnoses..  All questions were addressed to the patient's satisfaction.    1)  Complete vaginal vault prolapse:  --trial of pessary: tried #6/7 UI + POP and #6/7 ring + support pessary--did not stay in place; #8 ring + support too tight; #7 open ring had POP around device   --RTC at [facility] for further pessary testing: would try dish, oval, and GH if neither works   --have her try pessary while supporting herself against bedside commode    --if can't find pessary, surgical option would be colpocleisis (closing inside of vagina--very successful/minimally invasive but penetrative intercourse no longer possible)   --if surgery: bladder testing to make sure empties with prolapse reduction and doesn't need sling for stress leakage   --if surgery: need ok from stroke MDs and plan to minimize anesthesia--no epidural due to plavix   --if surgery: preop labs/EKG    2)  Incomplete bladder emptying:  --urine C&S  --renal US  --pessary fitting--check to see if helps   --if doesn't help, may be neuro contribution from stroke  --may need further bladder testing in future    3)  Mixed urinary incontinence, urge = stress, concern for overflow:  --get bedside commode  --Empty bladder every 2-3 hours when awake, even if you don't feel an urge.  Empty well: wait a minute, lean  forward on toilet.    --Avoid dietary irritants (see sheet).  Keep diary x 3-5 days to determine your irritants.  --KEGELS: do 10 in AM and 10 in PM, holding each x 10 seconds.  When you feel urge to go, STOP, KEGEL, and when urge has passed, then go to bathroom.  Consider PT in future.    --URGE: consider medication in future.  Takes 2-4 weeks to see if will have effect.  For dry mouth: get sour, sugar free lozenge or gum.    --STRESS:  Pessary vs. Sling.     4)  H/o stroke:  --will have someone call you to help you get follow up with team again    5)  Well-woman:  Pap test: post TVH.  No further paps needed.   Mammogram: Date of last: 1/2020.  Result: Normal.  Ordered, please schedule.   Colonoscopy: years ago, normal per report. Talk with PCP about colon cancer screening options/need.   DEXA:  Date of last: 2014.  Result:  osteopenia.  Ordered, please schedule.      6)  RTC for further pessary fitting with NP.     Approximately >60 min were spent in consult, 75 % in discussion.   The patient's son and sister were present for entire visit.   Thank you for requesting consultation of your patient.  I look forward to participating in their care.    James Andrea  Female Pelvic Medicine and Reconstructive Surgery  [facility] [place], [place]

## 2022-05-24 NOTE — PATIENT INSTRUCTIONS
Bladder Irritants  Certain foods and drinks have been associated with worsening symptoms of urinary frequency, urgency, urge incontinence, or bladder pain. If you suffer from any of these conditions, you may wish to try eliminating one or more of these foods from your diet and see if your symptoms improve. If bladder symptoms are related to dietary factors, strict adherence to a diet thateliminates the food should bring marked relief in 10 days. Once you are feeling better, you can begin to add foods back into your diet, one at a time. If symptoms return, you will be able to identify the irritant. As you add foods back to your diet it is very important that you drink significant amounts of water.    -----------------------------------------------------------------------------------------------  List of Common Bladder Irritants*  Alcoholic beverages  Apples and apple juice  Cantaloupe  Carbonated beverages  Chili and spicy foods  Chocolate  Citrus fruit  Coffee (including decaffeinated)  Cranberries and cranberry juice  Grapes  Guava  Milk Products: milk, cheese, cottage cheese, yogurt, ice cream  Peaches  Pineapple  Plums  Strawberries  Sugar especially artificial sweeteners, saccharin, aspartame, corn sweeteners, honey, fructose, sucrose, lactose  Tea  Tomatoes and tomato juice  Vitamin B complex  Vinegar  *Most people are not sensitive to ALL of these products; your goal is to find the foods that make YOUR symptoms worse.  ---------------------------------------------------------------------------------------------------    Low-acid fruit substitutions include apricots, papaya, pears and watermelon. Coffee drinkers can drink Kava or other lowacid instant drinks. Tea drinkers can substitute non-citrus herbal and sun brewed teas. Calcium carbonate co-buffered with calcium ascorbate can be substituted for Vitamin C. Prelief is a dietary supplement that works as an acid blocker for the bladder.    Where to get more  information:        Overcoming Bladder Disorders by Ramona Ferrari and Alyx Wasserman, 1990        You Dont Have to Live with Cystitis! By Virgie Rodriguez, 1988  http://[email or web address].org/oab    ------------------------------------------  1)  Complete vaginal vault prolapse:  --trial of pessary: tried #6/7 UI + POP and #6/7 ring + support pessary--did not stay in place; #8 ring + support too tight; #7 open ring had POP around device   --RTC at [facility] for further pessary testing: would try dish, oval, and GH if neither works   --have her try pessary while supporting herself against bedside commode    --if can't find pessary, surgical option would be colpocleisis (closing inside of vagina--very successful/minimally invasive but penetrative intercourse no longer possible)   --if surgery: bladder testing to make sure empties with prolapse reduction and doesn't need sling for stress leakage   --if surgery: need ok from stroke MDs and plan to minimize anesthesia--no epidural due to plavix   --if surgery: preop labs/EKG    2)  Incomplete bladder emptying:  --urine C&S  --renal US  --pessary fitting--check to see if helps   --if doesn't help, may be neuro contribution from stroke  --may need further bladder testing in future    3)  Mixed urinary incontinence, urge = stress, concern for overflow:  --get bedside commode  --Empty bladder every 2-3 hours when awake, even if you don't feel an urge.  Empty well: wait a minute, lean forward on toilet.    --Avoid dietary irritants (see sheet).  Keep diary x 3-5 days to determine your irritants.  --KEGELS: do 10 in AM and 10 in PM, holding each x 10 seconds.  When you feel urge to go, STOP, KEGEL, and when urge has passed, then go to bathroom.  Consider PT in future.    --URGE: consider medication in future.  Takes 2-4 weeks to see if will have effect.  For dry mouth: get sour, sugar free lozenge or gum.    --STRESS:  Pessary vs. Sling.      4)  H/o stroke:  --will have someone call you to help you get follow up with team again    5)  Well-woman:  Pap test: post TVH.  No further paps needed.   Mammogram: Date of last: 1/2020.  Result: Normal.  Ordered, please schedule.   Colonoscopy: years ago, normal per report. Talk with PCP about colon cancer screening options/need.   DEXA:  Date of last: 2014.  Result:  osteopenia.  Ordered, please schedule.      6)  RTC for further pessary fitting with NP.

## 2022-05-25 ENCOUNTER — TELEPHONE (OUTPATIENT)
Dept: UROGYNECOLOGY | Facility: CLINIC | Age: 87
End: 2022-05-25
Payer: MEDICARE

## 2022-05-25 NOTE — TELEPHONE ENCOUNTER
----- Message from James Andrea MD sent at 5/24/2022  7:34 PM CDT -----  Regarding: please help patient make appt  Can we please help patient make appts for:  1) neurology stroke, NP or PA or MD ok  2) mammogram  3)  DEXA/bone density    Thanks!

## 2022-05-26 ENCOUNTER — TELEPHONE (OUTPATIENT)
Dept: NEUROLOGY | Facility: CLINIC | Age: 87
End: 2022-05-26
Payer: MEDICARE

## 2022-05-26 ENCOUNTER — TELEPHONE (OUTPATIENT)
Dept: UROGYNECOLOGY | Facility: CLINIC | Age: 87
End: 2022-05-26
Payer: MEDICARE

## 2022-05-26 ENCOUNTER — PATIENT MESSAGE (OUTPATIENT)
Dept: NEUROLOGY | Facility: CLINIC | Age: 87
End: 2022-05-26
Payer: MEDICARE

## 2022-05-26 DIAGNOSIS — Z13.820 OSTEOPOROSIS SCREENING: Primary | ICD-10-CM

## 2022-05-26 DIAGNOSIS — N95.8 OTHER SPECIFIED MENOPAUSAL AND PERIMENOPAUSAL DISORDERS: ICD-10-CM

## 2022-05-26 LAB — BACTERIA UR CULT: ABNORMAL

## 2022-05-26 NOTE — TELEPHONE ENCOUNTER
Patient is scheduled for retroperitoneal ultrasound on 6/9/22 at 12pm, Pessary fitting with Komal Love NP at 10am and MAMMO on 6/23/22 at 10:20am.    Can you please place the order for Bone Density and I will call the patient to get her scheduled.     I also called to scheduled the patient for the neurology department. I was told that the scheduled is closed for [facility] and [facility] due to high volume of patient. A message is being sent to the [facility] department to contact the patient for an appointment.    Trever FRANCO MA

## 2022-05-26 NOTE — TELEPHONE ENCOUNTER
Spoke with the son Advised him that I was in [place] . Mom is in [place] her want to follow up in [place] advised him that Victor Manuel had actually reached out to him on the portal to number given to contact [phone].

## 2022-05-26 NOTE — TELEPHONE ENCOUNTER
"----- Message from Jessica Liz sent at 5/26/2022 10:24 AM CDT -----  Regarding: Trever "[facility]" [phone]  .Type:  Sooner Appointment Request    Patient is requesting a sooner appointment.  Patient declined first available appointment listed as well as another facility and provider .  Patient will not accept being placed on the waitlist and is requesting a message be sent to doctor.    Name of Caller: Deonjal "[facility]"    When is the first available appointment? N/A    Symptoms:  Requesting to get an [place] [facility] [place]     Would the patient rather a call back or a response via [facility]?  Call back     Best Call Back Number: [phone]      "

## 2022-05-27 ENCOUNTER — TELEPHONE (OUTPATIENT)
Dept: NEUROLOGY | Facility: CLINIC | Age: 87
End: 2022-05-27
Payer: MEDICARE

## 2022-05-27 ENCOUNTER — TELEPHONE (OUTPATIENT)
Dept: UROGYNECOLOGY | Facility: CLINIC | Age: 87
End: 2022-05-27
Payer: MEDICARE

## 2022-05-27 DIAGNOSIS — N39.0 ACUTE UTI: Primary | ICD-10-CM

## 2022-05-27 RX ORDER — CEPHALEXIN 500 MG/1
500 CAPSULE ORAL 2 TIMES DAILY
Qty: 10 CAPSULE | Refills: 0 | Status: SHIPPED | OUTPATIENT
Start: 2022-05-27 | End: 2022-06-01

## 2022-05-27 RX ORDER — SULFAMETHOXAZOLE AND TRIMETHOPRIM 800; 160 MG/1; MG/1
1 TABLET ORAL 2 TIMES DAILY
Qty: 10 TABLET | Refills: 0 | Status: SHIPPED | OUTPATIENT
Start: 2022-05-27 | End: 2022-05-27

## 2022-05-27 NOTE — TELEPHONE ENCOUNTER
Spoke with sister and let her know that patient has UTI. Asked her to let patient and son know, as I can't get in touch with either. She will do so. Rx sent to her [facility] so she can /start. Will also send to [facility] in case can't get to [facility] before closing today. She voiced understanding and will let them know.     Patient CrCl >30, Cr 1.2. But Lab from 2020. No recent Renal function labs. Will send in Keflex 500 BID x 5 days.

## 2022-05-27 NOTE — TELEPHONE ENCOUNTER
Patient DEXA is scheduled on 6/16/22 at 11am at [facility] and the neurology referral has been scheduled for 6/28/22 at 10:30am with Dorita Wall NP.    Trever FRANCO MA

## 2022-05-27 NOTE — TELEPHONE ENCOUNTER
----- Message from Sultana Agosto sent at 5/25/2022  7:38 AM CDT -----  Regarding: CONSULT  Good morning!    Dr. Andrea is referring Ms. Chávez for Hemiparesis affecting dominant side as late effect of cerebrovascular accident, Aneurysm of middle cerebral artery and Cerebrovascular accident (CVA) due to embolism of left middle cerebral artery.  Patient can be seen by NP. PA or MD.  Referral is in system.  Please contact for scheduling.  Thanks!

## 2022-06-09 ENCOUNTER — TELEPHONE (OUTPATIENT)
Dept: UROGYNECOLOGY | Facility: CLINIC | Age: 87
End: 2022-06-09
Payer: MEDICARE

## 2022-06-09 ENCOUNTER — OFFICE VISIT (OUTPATIENT)
Dept: UROGYNECOLOGY | Facility: CLINIC | Age: 87
End: 2022-06-09
Payer: MEDICARE

## 2022-06-09 ENCOUNTER — HOSPITAL ENCOUNTER (OUTPATIENT)
Dept: RADIOLOGY | Facility: OTHER | Age: 87
Discharge: HOME | End: 2022-06-09
Attending: OBSTETRICS & GYNECOLOGY
Payer: MEDICARE

## 2022-06-09 VITALS
WEIGHT: 176.56 LBS | BODY MASS INDEX: 29.42 KG/M2 | DIASTOLIC BLOOD PRESSURE: 80 MMHG | SYSTOLIC BLOOD PRESSURE: 110 MMHG | HEIGHT: 65 IN

## 2022-06-09 DIAGNOSIS — R33.9 INCOMPLETE BLADDER EMPTYING: ICD-10-CM

## 2022-06-09 DIAGNOSIS — N39.46 URINARY INCONTINENCE, MIXED: ICD-10-CM

## 2022-06-09 DIAGNOSIS — N81.9 VAGINAL VAULT PROLAPSE: Primary | ICD-10-CM

## 2022-06-09 PROCEDURE — 1160F PR REVIEW ALL MEDS BY PRESCRIBER/CLIN PHARMACIST DOCUMENTED: ICD-10-PCS | Mod: CPTII,S$GLB,, | Performed by: NURSE PRACTITIONER

## 2022-06-09 PROCEDURE — 1125F AMNT PAIN NOTED PAIN PRSNT: CPT | Mod: CPTII,S$GLB,, | Performed by: NURSE PRACTITIONER

## 2022-06-09 PROCEDURE — 1160F RVW MEDS BY RX/DR IN RCRD: CPT | Mod: CPTII,S$GLB,, | Performed by: NURSE PRACTITIONER

## 2022-06-09 PROCEDURE — 57160 INSERT PESSARY/OTHER DEVICE: CPT | Mod: S$GLB,,, | Performed by: NURSE PRACTITIONER

## 2022-06-09 PROCEDURE — 3079F PR MOST RECENT DIASTOLIC BLOOD PRESSURE 80-89 MM HG: ICD-10-PCS | Mod: CPTII,S$GLB,, | Performed by: NURSE PRACTITIONER

## 2022-06-09 PROCEDURE — 3288F PR FALLS RISK ASSESSMENT DOCUMENTED: ICD-10-PCS | Mod: CPTII,S$GLB,, | Performed by: NURSE PRACTITIONER

## 2022-06-09 PROCEDURE — 1101F PR PT FALLS ASSESS DOC 0-1 FALLS W/OUT INJ PAST YR: ICD-10-PCS | Mod: CPTII,S$GLB,, | Performed by: NURSE PRACTITIONER

## 2022-06-09 PROCEDURE — 3079F DIAST BP 80-89 MM HG: CPT | Mod: CPTII,S$GLB,, | Performed by: NURSE PRACTITIONER

## 2022-06-09 PROCEDURE — 1101F PT FALLS ASSESS-DOCD LE1/YR: CPT | Mod: CPTII,S$GLB,, | Performed by: NURSE PRACTITIONER

## 2022-06-09 PROCEDURE — 76770 US RETROPERITONEAL COMPLETE: ICD-10-PCS | Mod: 26,,, | Performed by: RADIOLOGY

## 2022-06-09 PROCEDURE — 99214 OFFICE O/P EST MOD 30 MIN: CPT | Mod: 25,S$GLB,, | Performed by: NURSE PRACTITIONER

## 2022-06-09 PROCEDURE — 99999 PR PBB SHADOW E&M-EST. PATIENT-LVL IV: CPT | Mod: PBBFAC,,, | Performed by: NURSE PRACTITIONER

## 2022-06-09 PROCEDURE — 1125F PR PAIN SEVERITY QUANTIFIED, PAIN PRESENT: ICD-10-PCS | Mod: CPTII,S$GLB,, | Performed by: NURSE PRACTITIONER

## 2022-06-09 PROCEDURE — 3074F PR MOST RECENT SYSTOLIC BLOOD PRESSURE < 130 MM HG: ICD-10-PCS | Mod: CPTII,S$GLB,, | Performed by: NURSE PRACTITIONER

## 2022-06-09 PROCEDURE — 3288F FALL RISK ASSESSMENT DOCD: CPT | Mod: CPTII,S$GLB,, | Performed by: NURSE PRACTITIONER

## 2022-06-09 PROCEDURE — 1159F PR MEDICATION LIST DOCUMENTED IN MEDICAL RECORD: ICD-10-PCS | Mod: CPTII,S$GLB,, | Performed by: NURSE PRACTITIONER

## 2022-06-09 PROCEDURE — 99999 PR PBB SHADOW E&M-EST. PATIENT-LVL IV: ICD-10-PCS | Mod: PBBFAC,,, | Performed by: NURSE PRACTITIONER

## 2022-06-09 PROCEDURE — 3074F SYST BP LT 130 MM HG: CPT | Mod: CPTII,S$GLB,, | Performed by: NURSE PRACTITIONER

## 2022-06-09 PROCEDURE — 76770 US EXAM ABDO BACK WALL COMP: CPT | Mod: TC

## 2022-06-09 PROCEDURE — 57160 PR FIT/INSERT INTRAVAG SUPPORT DEVICE: ICD-10-PCS | Mod: S$GLB,,, | Performed by: NURSE PRACTITIONER

## 2022-06-09 PROCEDURE — 1159F MED LIST DOCD IN RCRD: CPT | Mod: CPTII,S$GLB,, | Performed by: NURSE PRACTITIONER

## 2022-06-09 PROCEDURE — 76770 US EXAM ABDO BACK WALL COMP: CPT | Mod: 26,,, | Performed by: RADIOLOGY

## 2022-06-09 PROCEDURE — 99214 PR OFFICE/OUTPT VISIT, EST, LEVL IV, 30-39 MIN: ICD-10-PCS | Mod: 25,S$GLB,, | Performed by: NURSE PRACTITIONER

## 2022-06-09 NOTE — PATIENT INSTRUCTIONS
Complete vaginal vault prolapse:  --trial of pessary 3 # LS GH pessary-- ([employer]ita surgical) will order from [employer] for replacement   Failed #6/7 UI + POP and #6/7 ring + support pessary--did not stay in place; #8 ring + support too tight; #7 open ring had POP around device              --have her try pessary while supporting herself against bedside commode     --if can't find pessary, surgical option would be colpocleisis (closing inside of vagina--very successful/minimally invasive but penetrative intercourse no longer possible)              --if surgery: bladder testing to make sure empties with prolapse reduction and doesn't need sling for stress leakage              --if surgery: need ok from stroke MDs and plan to minimize anesthesia--no epidural due to plavix              --if surgery: preop labs/EKG     2)  Incomplete bladder emptying:  --renal US scheduled  --pessary fitting--check to see if helps              --if doesn't help, may be neuro contribution from stroke  --may need further bladder testing in future     3)  Mixed urinary incontinence, urge = stress, concern for overflow:  --get bedside commode  --Empty bladder every 2-3 hours when awake, even if you don't feel an urge.  Empty well: wait a minute, lean forward on toilet.    --Avoid dietary irritants (see sheet).  Keep diary x 3-5 days to determine your irritants.  --KEGELS: do 10 in AM and 10 in PM, holding each x 10 seconds.  When you feel urge to go, STOP, KEGEL, and when urge has passed, then go to bathroom.  Consider PT in future.    --URGE: consider medication in future.  Takes 2-4 weeks to see if will have effect.  For dry mouth: get sour, sugar free lozenge or gum.    --STRESS:  Pessary vs. Sling.      4)  H/o stroke:  --will have someone call you to help you get follow up with team again     5)  Well-woman:  Pap test: post TVH.  No further paps needed.   Mammogram: Date of last: 1/2020.  Result: Normal.  Ordered, please schedule.    Colonoscopy: years ago, normal per report. Talk with PCP about colon cancer screening options/need.   DEXA:  Date of last: 2014.  Result:  osteopenia.  Ordered, please schedule.       6)  --Nocturia (nighttime urination): stop fluids 2 hours before bed.  If have leg swelling:  Elevate feet above chest x 1 hour before bed to get excess fluid off.  Can also use support hose (knee highs).      7)RTC 4 weeks for PC

## 2022-06-09 NOTE — TELEPHONE ENCOUNTER
I spoke with patient's son to inform the patient the patient per MD AVELINO Sheehan Staff  Cc: Trever Sargent MA  Please let patient know that her renal US looks ok. She has a few renal cysts, which is ok. THanks!   Patient's son verbalized understanding and will reach out to the office if the patient has any issues.    Trever FRANCO MA

## 2022-06-09 NOTE — TELEPHONE ENCOUNTER
----- Message from James Andrea MD sent at 6/9/2022  2:22 PM CDT -----  Please let patient know that her renal US looks ok. She has a few renal cysts, which is ok. THanks!

## 2022-06-09 NOTE — PROGRESS NOTES
Urogyn follow up  06/09/2022  .  [facility]  [address] [address]    Margarita Chávez  [id number]  1935      Margarita Chávez is a 86 y.o.  here for a urogyn follow up for pessary fitting.    Last HPI from 05/24/2022     1)  UI:  (+) CAM = (+) UUI  X years, worse since stroke.  (+) pull ups:1-2/day, usually severe wetness and 1/night usually severe wetness.  Only goes to [facility] to have BMs.  Doesn't really urinate on regular basis.  Urge seems decreased, sania since CVA. Not using bedside commode.  Nocturia: No.  (--) dysuria,  (--) hematuria,  (--) frequent UTIs.  (+) complete bladder emptying.     2)  POP:  Present. Has used pessary in past x several years.  Last time tried to replace, fell out.  Last use 1 year ago. below introitus. About size of golf ball.  Symptoms:(--).  Did have some BLQ pain last week.  (--) vaginal bleeding. (--) vaginal discharge. (--) sexually active.  (--) h/o dyspareunia.  (+)  Vaginal dryness.  (--) vaginal estrogen use. Has some itching intermittently.  Uses A&D PRN.      3)  BM:  (--) constipation/straining.  (--) chronic diarrhea. (--) hematochezia.  (--) fecal incontinence.  (--) fecal smearing/urgency.  (+) complete evacuation.  Feels normal urge.   --not taking colace or glycolax    Past Medical History:   Diagnosis Date    Aneurysm of middle cerebral artery     Arthritis     Hypertension     Stroke     TIA (transient ischemic attack)        Past Surgical History:   Procedure Laterality Date    BRAIN SURGERY      CEREBRAL ANGIOGRAM N/A 8/19/2020    Procedure: ANGIOGRAM-CEREBRAL;  Surgeon: Dosc Diagnostic Provider;  Location: [facility] OR [address] [place] [address];  Service: Radiology;  Laterality: N/A;   / ANDRÉS    HYSTERECTOMY         Family History   Problem Relation Age of Onset    Cancer Paternal Aunt        Social History     Socioeconomic History    Marital status: Other   Tobacco Use    Smoking status: Never Smoker    Smokeless  tobacco: Never Used   Substance and Sexual Activity    Alcohol use: Never    Drug use: Never    Sexual activity: Not Currently       Current Outpatient Medications   Medication Sig Dispense Refill    amLODIPine (NORVASC) 10 MG tablet Take 1 tablet (10mg total) by mouth once daily for blood pressure 90 tablet 5    atorvastatin (LIPITOR) 80 MG tablet Take 1 tablet (80 mg) by mouth once daily for cholesterol 90 tablet 5    clopidogreL (PLAVIX) 75 mg tablet Take 1 tablet (75 mg total) by mouth once a day 90 tablet 5    docusate sodium (COLACE) 100 MG capsule Take 100 mg by mouth 2 (two) times daily.      hydroCHLOROthiazide (HYDRODIURIL) 12.5 MG Tab Take 2 tablets (25 mg total) by mouth every morning. 180 tablet 5    hydroCHLOROthiazide (HYDRODIURIL) 12.5 MG Tab Take 2 tablets by mouth orally daily in the morning 180 tablet 5    hydroCHLOROthiazide (HYDRODIURIL) 12.5 MG Tab Take 2 tablets (25 mg total) by mouth daily in the morning 180 tablet 5    oxyCODONE-acetaminophen (PERCOCET) 5-325 mg per tablet Take 1 tablet by mouth every 6 (six) hours as needed. 90 each 0    oxyCODONE-acetaminophen (PERCOCET) 5-325 mg per tablet Take 1 tablet by mouth every 6 (six) hours as needed. 90 tablet 0    polyethylene glycol (GLYCOLAX) 17 gram/dose powder Take 17 g by mouth once daily.      traMADoL (ULTRAM) 50 mg tablet Take 1 tablet (50 mg total) by mouth every 8 (eight) hours as needed for Pain. 90 tablet 0    aspirin 81 MG Chew Chew and swallow 1 tablet (81 mg total) by mouth once daily. 30 tablet 5    cetirizine (ZYRTEC) 10 MG tablet Take 0.5 tablets (5 mg total) by mouth once daily. For postnasal drip for 14 days 7 tablet 0    famotidine (PEPCID) 20 MG tablet Take 1 tablet (20 mg total) by mouth 2 (two) times daily. for 10 days 20 tablet 0    lisinopriL (PRINIVIL,ZESTRIL) 20 MG tablet Take 1 tablet (20 mg total) by mouth once daily. 90 tablet 3     No current facility-administered medications for this visit.  "      Review of patient's allergies indicates:   Allergen Reactions    Penicillin     Penicillins Nausea And Vomiting       Well woman:  Pap test: post TVH.  History of abnormal paps: No.  History of STIs:  No  Mammogram: Date of last: 1/2020.  Result: Normal  Colonoscopy: years ago, normal per report.   DEXA:  Date of last: 2014.  Result:  osteopenia.  Repeat due:  needs.  Not taking vitamin D.    ROS:  As per HPI.      Exam  /80 (BP Location: Right arm, Patient Position: Sitting, BP Method: Large (Manual))   Ht 5' 5" (1.651 m)   Wt 80.1 kg (176 lb 9.4 oz)   LMP  (LMP Unknown)   BMI 29.39 kg/m²   General: alert and oriented, no acute distress  Respiratory: normal respiratory effort  Abd: soft, non-tender, non-distended    Pelvic  Ext. Genitalia: normal external genitalia. Normal bartholin's and skeens glands  Vagina: + atrophy. Normal vaginal mucosa without lesions. No discharge noted.   Non-tender bladder base without palpable mass.  Cervix: absent  Uterus:  absent   Urethra: no masses or tenderness  Urethral meatus: no lesions, caruncle or prolapse.  The patient was fit with #3 " LS GH pessary.  She was able to tolerate the device comfortabley with bending, squatting, valsalva.  She was not able to demonstrate independent removal and placement.  She tolerated the procedure well.      2 3/4 " LS GH pessary came out with valsalva    Impression  1. Vaginal vault prolapse     2. Urinary incontinence, mixed     3. Incomplete bladder emptying       We reviewed the above issues and discussed options for short-term versus long-term management of her problems.   Plan:     Complete vaginal vault prolapse:  --trial of pessary 3 # LS GH pessary-- (rosita surgical) will order from [employer] for replacement   Failed #6/7 UI + POP and #6/7 ring + support pessary--did not stay in place; #8 ring + support too tight; #7 open ring had POP around device              --have her try pessary while supporting herself against " bedside commode     --if can't find pessary, surgical option would be colpocleisis (closing inside of vagina--very successful/minimally invasive but penetrative intercourse no longer possible)              --if surgery: bladder testing to make sure empties with prolapse reduction and doesn't need sling for stress leakage              --if surgery: need ok from stroke MDs and plan to minimize anesthesia--no epidural due to plavix              --if surgery: preop labs/EKG     2)  Incomplete bladder emptying:  --renal US scheduled  --pessary fitting--check to see if helps              --if doesn't help, may be neuro contribution from stroke  --may need further bladder testing in future     3)  Mixed urinary incontinence, urge = stress, concern for overflow:  --get bedside commode  --Empty bladder every 2-3 hours when awake, even if you don't feel an urge.  Empty well: wait a minute, lean forward on toilet.    --Avoid dietary irritants (see sheet).  Keep diary x 3-5 days to determine your irritants.  --KEGELS: do 10 in AM and 10 in PM, holding each x 10 seconds.  When you feel urge to go, STOP, KEGEL, and when urge has passed, then go to bathroom.  Consider PT in future.    --URGE: consider medication in future.  Takes 2-4 weeks to see if will have effect.  For dry mouth: get sour, sugar free lozenge or gum.    --STRESS:  Pessary vs. Sling.      4)  H/o stroke:  --will have someone call you to help you get follow up with team again     5)  Well-woman:  Pap test: post TVH.  No further paps needed.   Mammogram: Date of last: 1/2020.  Result: Normal.  Ordered, please schedule.   Colonoscopy: years ago, normal per report. Talk with PCP about colon cancer screening options/need.   DEXA:  Date of last: 2014.  Result:  osteopenia.  Ordered, please schedule.       6)  RTC 4 weeks for PC       30 minutes were spent in face to face time with this patient  90 % of this time was spent in counseling and/or coordination of  karrie Love, FNP-BC [facility]  Division of Female Pelvic Medicine and Reconstructive Surgery  Department of Obstetrics & Gynecology

## 2022-06-23 ENCOUNTER — HOSPITAL ENCOUNTER (OUTPATIENT)
Dept: RADIOLOGY | Facility: HOSPITAL | Age: 87
Discharge: HOME | End: 2022-06-23
Attending: OBSTETRICS & GYNECOLOGY
Payer: MEDICARE

## 2022-06-23 DIAGNOSIS — Z12.31 ENCOUNTER FOR SCREENING MAMMOGRAM FOR BREAST CANCER: ICD-10-CM

## 2022-06-23 PROCEDURE — 77063 MAMMO DIGITAL SCREENING BILAT WITH TOMO: ICD-10-PCS | Mod: 26,,, | Performed by: RADIOLOGY

## 2022-06-23 PROCEDURE — 77063 BREAST TOMOSYNTHESIS BI: CPT | Mod: TC,PO

## 2022-06-23 PROCEDURE — 77063 BREAST TOMOSYNTHESIS BI: CPT | Mod: 26,,, | Performed by: RADIOLOGY

## 2022-06-23 PROCEDURE — 77067 MAMMO DIGITAL SCREENING BILAT WITH TOMO: ICD-10-PCS | Mod: 26,,, | Performed by: RADIOLOGY

## 2022-06-23 PROCEDURE — 77067 SCR MAMMO BI INCL CAD: CPT | Mod: 26,,, | Performed by: RADIOLOGY

## 2022-06-23 PROCEDURE — 77067 SCR MAMMO BI INCL CAD: CPT | Mod: TC,PO

## 2022-06-27 ENCOUNTER — TELEPHONE (OUTPATIENT)
Dept: NEUROLOGY | Facility: CLINIC | Age: 87
End: 2022-06-27

## 2022-06-27 ENCOUNTER — TELEPHONE (OUTPATIENT)
Dept: UROGYNECOLOGY | Facility: CLINIC | Age: 87
End: 2022-06-27
Payer: MEDICARE

## 2022-06-27 NOTE — TELEPHONE ENCOUNTER
----- Message from James Andrea MD sent at 6/27/2022  4:37 PM CDT -----  Please let patient know mammogram was normal. Thanks!

## 2022-06-27 NOTE — TELEPHONE ENCOUNTER
Spoke w/ pt's son regarding the normal test results. Pt's son voiced understanding, called ended.

## 2022-06-27 NOTE — TELEPHONE ENCOUNTER
----- Message from Mindi Barragan sent at 6/27/2022  3:05 PM CDT -----  Regarding: Appt R/s  Pts son called to r/s pts 6/28 to next available. Pts call back: [phone] Adam

## 2022-07-07 ENCOUNTER — HOSPITAL ENCOUNTER (OUTPATIENT)
Dept: RADIOLOGY | Facility: CLINIC | Age: 87
Discharge: HOME | End: 2022-07-07
Attending: OBSTETRICS & GYNECOLOGY
Payer: MEDICARE

## 2022-07-07 DIAGNOSIS — Z13.820 OSTEOPOROSIS SCREENING: ICD-10-CM

## 2022-07-07 DIAGNOSIS — N95.8 OTHER SPECIFIED MENOPAUSAL AND PERIMENOPAUSAL DISORDERS: ICD-10-CM

## 2022-07-07 PROCEDURE — 77080 DXA BONE DENSITY AXIAL: CPT | Mod: 26,,, | Performed by: INTERNAL MEDICINE

## 2022-07-07 PROCEDURE — 77080 DEXA BONE DENSITY SPINE HIP: ICD-10-PCS | Mod: 26,,, | Performed by: INTERNAL MEDICINE

## 2022-07-07 PROCEDURE — 77080 DXA BONE DENSITY AXIAL: CPT | Mod: TC

## 2022-07-13 ENCOUNTER — OFFICE VISIT (OUTPATIENT)
Dept: UROGYNECOLOGY | Facility: CLINIC | Age: 87
End: 2022-07-13
Payer: MEDICARE

## 2022-07-13 VITALS
BODY MASS INDEX: 29.61 KG/M2 | HEIGHT: 65 IN | SYSTOLIC BLOOD PRESSURE: 142 MMHG | DIASTOLIC BLOOD PRESSURE: 80 MMHG | WEIGHT: 177.69 LBS

## 2022-07-13 DIAGNOSIS — N95.2 VAGINAL ATROPHY: ICD-10-CM

## 2022-07-13 DIAGNOSIS — N39.0 FREQUENT UTI: ICD-10-CM

## 2022-07-13 DIAGNOSIS — R53.1 RIGHT SIDED WEAKNESS: ICD-10-CM

## 2022-07-13 DIAGNOSIS — N81.9 VAGINAL VAULT PROLAPSE: Primary | ICD-10-CM

## 2022-07-13 DIAGNOSIS — N39.46 URINARY INCONTINENCE, MIXED: ICD-10-CM

## 2022-07-13 DIAGNOSIS — R33.9 INCOMPLETE BLADDER EMPTYING: ICD-10-CM

## 2022-07-13 PROCEDURE — 1160F RVW MEDS BY RX/DR IN RCRD: CPT | Mod: CPTII,S$GLB,, | Performed by: NURSE PRACTITIONER

## 2022-07-13 PROCEDURE — 3288F PR FALLS RISK ASSESSMENT DOCUMENTED: ICD-10-PCS | Mod: CPTII,S$GLB,, | Performed by: NURSE PRACTITIONER

## 2022-07-13 PROCEDURE — 99214 PR OFFICE/OUTPT VISIT, EST, LEVL IV, 30-39 MIN: ICD-10-PCS | Mod: 25,S$GLB,, | Performed by: NURSE PRACTITIONER

## 2022-07-13 PROCEDURE — 99999 PR PBB SHADOW E&M-EST. PATIENT-LVL IV: ICD-10-PCS | Mod: PBBFAC,,, | Performed by: NURSE PRACTITIONER

## 2022-07-13 PROCEDURE — 1101F PR PT FALLS ASSESS DOC 0-1 FALLS W/OUT INJ PAST YR: ICD-10-PCS | Mod: CPTII,S$GLB,, | Performed by: NURSE PRACTITIONER

## 2022-07-13 PROCEDURE — 1160F PR REVIEW ALL MEDS BY PRESCRIBER/CLIN PHARMACIST DOCUMENTED: ICD-10-PCS | Mod: CPTII,S$GLB,, | Performed by: NURSE PRACTITIONER

## 2022-07-13 PROCEDURE — 1126F PR PAIN SEVERITY QUANTIFIED, NO PAIN PRESENT: ICD-10-PCS | Mod: CPTII,S$GLB,, | Performed by: NURSE PRACTITIONER

## 2022-07-13 PROCEDURE — 99214 OFFICE O/P EST MOD 30 MIN: CPT | Mod: 25,S$GLB,, | Performed by: NURSE PRACTITIONER

## 2022-07-13 PROCEDURE — 1159F PR MEDICATION LIST DOCUMENTED IN MEDICAL RECORD: ICD-10-PCS | Mod: CPTII,S$GLB,, | Performed by: NURSE PRACTITIONER

## 2022-07-13 PROCEDURE — 1126F AMNT PAIN NOTED NONE PRSNT: CPT | Mod: CPTII,S$GLB,, | Performed by: NURSE PRACTITIONER

## 2022-07-13 PROCEDURE — 3079F PR MOST RECENT DIASTOLIC BLOOD PRESSURE 80-89 MM HG: ICD-10-PCS | Mod: CPTII,S$GLB,, | Performed by: NURSE PRACTITIONER

## 2022-07-13 PROCEDURE — 3077F PR MOST RECENT SYSTOLIC BLOOD PRESSURE >= 140 MM HG: ICD-10-PCS | Mod: CPTII,S$GLB,, | Performed by: NURSE PRACTITIONER

## 2022-07-13 PROCEDURE — 51701 PR INSERTION OF NON-INDWELLING BLADDER CATHETERIZATION FOR RESIDUAL UR: ICD-10-PCS | Mod: S$GLB,,, | Performed by: NURSE PRACTITIONER

## 2022-07-13 PROCEDURE — 3077F SYST BP >= 140 MM HG: CPT | Mod: CPTII,S$GLB,, | Performed by: NURSE PRACTITIONER

## 2022-07-13 PROCEDURE — 1159F MED LIST DOCD IN RCRD: CPT | Mod: CPTII,S$GLB,, | Performed by: NURSE PRACTITIONER

## 2022-07-13 PROCEDURE — 3079F DIAST BP 80-89 MM HG: CPT | Mod: CPTII,S$GLB,, | Performed by: NURSE PRACTITIONER

## 2022-07-13 PROCEDURE — 3288F FALL RISK ASSESSMENT DOCD: CPT | Mod: CPTII,S$GLB,, | Performed by: NURSE PRACTITIONER

## 2022-07-13 PROCEDURE — 51701 INSERT BLADDER CATHETER: CPT | Mod: S$GLB,,, | Performed by: NURSE PRACTITIONER

## 2022-07-13 PROCEDURE — 1101F PT FALLS ASSESS-DOCD LE1/YR: CPT | Mod: CPTII,S$GLB,, | Performed by: NURSE PRACTITIONER

## 2022-07-13 PROCEDURE — 99999 PR PBB SHADOW E&M-EST. PATIENT-LVL IV: CPT | Mod: PBBFAC,,, | Performed by: NURSE PRACTITIONER

## 2022-07-13 NOTE — PATIENT INSTRUCTIONS
Complete vaginal vault prolapse:  -failed 3 # LS GH pessary  Failed #6/7 UI + POP and #6/7 ring + support pessary--did not stay in place; #8 ring + support too tight; #7 open ring had POP around device              --have her try pessary while supporting herself against bedside commode     --if can't find pessary, surgical option would be colpocleisis (closing inside of vagina--very successful/minimally invasive but penetrative intercourse no longer possible)              --if surgery: bladder testing to make sure empties with prolapse reduction and doesn't need sling for stress leakage              --if surgery: need ok from stroke MDs and plan to minimize anesthesia--no epidural due to plavix              --if surgery: preop labs/EKG     2)  Incomplete bladder emptying:  --renal US normal  --failed pessary  --may need further bladder testing in future     3)  Mixed urinary incontinence, urge = stress, concern for overflow:  --get bedside commode  --Empty bladder every 2-3 hours when awake, even if you don't feel an urge.  Empty well: wait a minute, lean forward on toilet.    --Avoid dietary irritants (see sheet).  Keep diary x 3-5 days to determine your irritants.  --KEGELS: do 10 in AM and 10 in PM, holding each x 10 seconds.  When you feel urge to go, STOP, KEGEL, and when urge has passed, then go to bathroom.  Consider PT in future.    --URGE: consider medication in future.  Takes 2-4 weeks to see if will have effect.  For dry mouth: get sour, sugar free lozenge or gum.    --STRESS:  Pessary vs. Sling.      4)  H/o stroke:  --will have someone call you to help you get follow up with team again     5)  Well-woman:  Pap test: post TVH.  No further paps needed.   Mammogram: Date of last: 1/2020.  Result: Normal.  Ordered, please schedule.   Colonoscopy: years ago, normal per report. Talk with PCP about colon cancer screening options/need.   DEXA:  Date of last: 2014.  Result:  osteopenia.  Ordered, please  schedule.       6) will discuss vaginal estrogen with neurology    7)Constipation:  --Start daily fiber.  Take 1 tsp of fiber powder (psyllium or other sugar-free powder).  Mix in 8 oz of water.  Take x 3-5 days.  Then, increase fiber by 1 tsp every 3-5 days until stool is easy to pass.  Stop and continue at that dose.   Do not exceed 6 tsps/day.  May also use over the counter stool softener 1-2 x/day.  AVOID laxatives.    8) Frequent UTIs (bladder infections):  --urine C&S sent today  --If you feel like you have a UTI, please call our office so that we can place an order for you to drop off a urine specimen at the closest [facility] lab (we will arrange).     --We will call in antibiotics for you to start right after you drop off specimen.     --In this way, we can determine:     1)  Do you have a UTI?      2) If you have a UTI, is it sensitive to the antibiotics we prescribed?   --change liner pad every time you go to the bathroom  --control bowel movements/fecal cross-contamination  --will check with neurology regarding vaginal estrogen cream  --start taking 1 cranberry tablet daily  --start taking 1 probiotic pill (any with lactobacillus and/or acidophilus) daily  --consider need for further evaluation (pelvic imaging and cystoscopy) if issue persists            9)RTC 2-3 months for follow up

## 2022-07-13 NOTE — PROGRESS NOTES
Urogyn follow up  07/13/2022  .  [facility] - UROGYNECOLOGY  [address] [address]    Margarita Chávez  [id number]  1935      Margarita Chávez is a 86 y.o.  here for a urogyn follow up for pessary check.    Last HPI from 05/24/2022     1)  UI:  (+) CAM = (+) UUI  X years, worse since stroke.  (+) pull ups:1-2/day, usually severe wetness and 1/night usually severe wetness.  Only goes to [facility] to have BMs.  Doesn't really urinate on regular basis.  Urge seems decreased, sania since CVA. Not using bedside commode.  Nocturia: No.  (--) dysuria,  (--) hematuria,  (--) frequent UTIs.  (+) complete bladder emptying.     2)  POP:  Present. Has used pessary in past x several years.  Last time tried to replace, fell out.  Last use 1 year ago. below introitus. About size of golf ball.  Symptoms:(--).  Did have some BLQ pain last week.  (--) vaginal bleeding. (--) vaginal discharge. (--) sexually active.  (--) h/o dyspareunia.  (+)  Vaginal dryness.  (--) vaginal estrogen use. Has some itching intermittently.  Uses A&D PRN.      3)  BM:  (--) constipation/straining.  (--) chronic diarrhea. (--) hematochezia.  (--) fecal incontinence.  (--) fecal smearing/urgency.  (+) complete evacuation.  Feels normal urge.   --not taking colace or glycolax    Changes since last visit:    Pessary came out same day as placement when patient went home.    Was treated for uti after last visit.    05/24/2022  Retroperitoneal ultrasound  Right kidney: The right kidney measures 9.2 cm. No cortical thinning. No loss of corticomedullary distinction. Resistive index measures 0.64.  No mass. No renal stone. No hydronephrosis.     Left kidney: The left kidney measures 9.8 cm. No cortical thinning. No loss of corticomedullary distinction. Resistive index measures 0.72.  Renal cysts measure up to 4.5 cm.  No renal stone. No hydronephrosis.     The bladder is partially distended at the time of scanning and has an unremarkable  appearance.     Impression   No acute process seen.  Left renal cysts.       Past Medical History:   Diagnosis Date    Aneurysm of middle cerebral artery     Arthritis     Hypertension     Stroke     TIA (transient ischemic attack)      2020 CVA:  Residual issues with U/L extremities; has trouble walking--mostly uses wheelchair but can walk limited amount with walker/holding things; s/p PT in 2020; has not had follow up with Stroke neuro; has aneurysms   --hyperlipidemia  --seasonal allergies  --GERD    Past Surgical History:   Procedure Laterality Date    BRAIN SURGERY      CEREBRAL ANGIOGRAM N/A 8/19/2020    Procedure: ANGIOGRAM-CEREBRAL;  [facility];  Location: [facility] OR [address] [place] [address];  Service: Radiology;  Laterality: N/A;   / ANDRÉS    HYSTERECTOMY     --aneurysm coil 2010    Family History   Problem Relation Age of Onset    Cancer Paternal Aunt        Social History     Socioeconomic History    Marital status: Other   Tobacco Use    Smoking status: Never Smoker    Smokeless tobacco: Never Used   Substance and Sexual Activity    Alcohol use: Never    Drug use: Never    Sexual activity: Not Currently       Current Outpatient Medications   Medication Sig Dispense Refill    amLODIPine (NORVASC) 10 MG tablet Take 1 tablet (10mg total) by mouth once daily for blood pressure 90 tablet 5    atorvastatin (LIPITOR) 80 MG tablet Take 1 tablet (80 mg) by mouth once daily for cholesterol 90 tablet 5    clopidogreL (PLAVIX) 75 mg tablet Take 1 tablet (75 mg total) by mouth once a day 90 tablet 5    docusate sodium (COLACE) 100 MG capsule Take 100 mg by mouth 2 (two) times daily.      hydroCHLOROthiazide (HYDRODIURIL) 12.5 MG Tab Take 2 tablets (25 mg total) by mouth every morning. 180 tablet 5    hydroCHLOROthiazide (HYDRODIURIL) 12.5 MG Tab Take 2 tablets by mouth orally daily in the morning 180 tablet 5    hydroCHLOROthiazide (HYDRODIURIL) 12.5 MG Tab Take 2 tablets (25 mg total)  "by mouth daily in the morning 180 tablet 5    oxyCODONE-acetaminophen (PERCOCET) 5-325 mg per tablet Take 1 tablet by mouth every 6 (six) hours as needed. 90 each 0    oxyCODONE-acetaminophen (PERCOCET) 5-325 mg per tablet Take 1 tablet by mouth every 6 (six) hours as needed. 90 tablet 0    polyethylene glycol (GLYCOLAX) 17 gram/dose powder Take 17 g by mouth once daily.      traMADoL (ULTRAM) 50 mg tablet Take 1 tablet (50 mg total) by mouth every 8 (eight) hours as needed for Pain. 90 tablet 0    aspirin 81 MG Chew Chew and swallow 1 tablet (81 mg total) by mouth once daily. 30 tablet 5    cetirizine (ZYRTEC) 10 MG tablet Take 0.5 tablets (5 mg total) by mouth once daily. For postnasal drip for 14 days 7 tablet 0    famotidine (PEPCID) 20 MG tablet Take 1 tablet (20 mg total) by mouth 2 (two) times daily. for 10 days 20 tablet 0    lisinopriL (PRINIVIL,ZESTRIL) 20 MG tablet Take 1 tablet (20 mg total) by mouth once daily. 90 tablet 3     No current facility-administered medications for this visit.       Review of patient's allergies indicates:   Allergen Reactions    Penicillin     Penicillins Nausea And Vomiting       Well woman:  Pap test: post TVH.  History of abnormal paps: No.  History of STIs:  No  Mammogram: Date of last: 1/2020.  Result: Normal  Colonoscopy: years ago, normal per report.   DEXA:  Date of last: 2014.  Result:  osteopenia.  Repeat due:  needs.  Not taking vitamin D.    ROS:  As per HPI.      Exam  BP (!) 142/80 (BP Location: Right arm, Patient Position: Sitting, BP Method: Large (Manual))   Ht 5' 5" (1.651 m)   Wt 80.6 kg (177 lb 11.1 oz)   LMP  (LMP Unknown)   BMI 29.57 kg/m²   General: alert and oriented, no acute distress  Respiratory: normal respiratory effort  Abd: soft, non-tender, non-distended    Pelvic  Ext. Genitalia: normal external genitalia. Normal bartholin's and skeens glands  Vagina: + atrophy. Normal vaginal mucosa without lesions. No discharge noted.   " "Non-tender bladder base without palpable mass.  Cervix: absent  Uterus:  absent   Urethra: no masses or tenderness  Urethral meatus: no lesions, caruncle or prolapse.       2 3/4 " LS GH pessary came out with valsalva    3" LS GH came out with voiding at home     concentrated urine    Impression  1. Vaginal vault prolapse     2. Urinary incontinence, mixed     3. Incomplete bladder emptying     4. Right sided weakness     5. Vaginal atrophy     6. Frequent UTI       We reviewed the above issues and discussed options for short-term versus long-term management of her problems.   Plan:     Complete vaginal vault prolapse:  -failed 3 # LS GH pessary  Failed #6/7 UI + POP and #6/7 ring + support pessary--did not stay in place; #8 ring + support too tight; #7 open ring had POP around device              --have her try pessary while supporting herself against bedside commode     --if can't find pessary, surgical option would be colpocleisis (closing inside of vagina--very successful/minimally invasive but penetrative intercourse no longer possible)              --if surgery: bladder testing to make sure empties with prolapse reduction and doesn't need sling for stress leakage              --if surgery: need ok from stroke MDs and plan to minimize anesthesia--no epidural due to plavix              --if surgery: preop labs/EKG     2)  Incomplete bladder emptying:  --renal US normal  --failed pessary  --may need further bladder testing in future     3)  Mixed urinary incontinence, urge = stress, concern for overflow:  --get bedside commode  --Empty bladder every 2-3 hours when awake, even if you don't feel an urge.  Empty well: wait a minute, lean forward on toilet.    --Avoid dietary irritants (see sheet).  Keep diary x 3-5 days to determine your irritants.  --KEGELS: do 10 in AM and 10 in PM, holding each x 10 seconds.  When you feel urge to go, STOP, KEGEL, and when urge has passed, then go to bathroom.  Consider PT " in future.    --URGE: consider medication in future.  Takes 2-4 weeks to see if will have effect.  For dry mouth: get sour, sugar free lozenge or gum.    --STRESS:  Pessary vs. Sling.      4)  H/o stroke:  --will have someone call you to help you get follow up with team again     5)  Well-woman:  Pap test: post TVH.  No further paps needed.   Mammogram: Date of last: 1/2020.  Result: Normal.  Ordered, please schedule.   Colonoscopy: years ago, normal per report. Talk with PCP about colon cancer screening options/need.   DEXA:  Date of last: 2014.  Result:  osteopenia.  Ordered, please schedule.       6) will discuss vaginal estrogen with neurology    7)Constipation:  --Start daily fiber.  Take 1 tsp of fiber powder (psyllium or other sugar-free powder).  Mix in 8 oz of water.  Take x 3-5 days.  Then, increase fiber by 1 tsp every 3-5 days until stool is easy to pass.  Stop and continue at that dose.   Do not exceed 6 tsps/day.  May also use over the counter stool softener 1-2 x/day.  AVOID laxatives.    8) Frequent UTIs (bladder infections):  --urine C&S sent today  --If you feel like you have a UTI, please call our office so that we can place an order for you to drop off a urine specimen at the closest [facility] lab (we will arrange).     --We will call in antibiotics for you to start right after you drop off specimen.     --In this way, we can determine:     1)  Do you have a UTI?      2) If you have a UTI, is it sensitive to the antibiotics we prescribed?   --follow UTI prevention tips (see attached)  --control bowel movements/fecal cross-contamination  --will check with neurology regarding vaginal estrogen cream  --start taking 1 cranberry tablet daily  --start taking 1 probiotic pill (any with lactobacillus and/or acidophilus) daily  --consider need for further evaluation (pelvic imaging and cystoscopy) if issue persists            9)RTC 2-3 months for follow up       I spent a total of 30 minutes on the day of  the visit.  This includes face to face time and non-face to face time preparing to see the patient (eg, review of tests), obtaining and/or reviewing separately obtained history, documenting clinical information in the electronic or other health record, independently interpreting results and communicating results to the patient/family/caregiver, or care coordinator.    LUCINA Thao-BC  [facility]  Division of Female Pelvic Medicine and Reconstructive Surgery  Department of Obstetrics & Gynecology

## 2022-07-19 ENCOUNTER — PATIENT MESSAGE (OUTPATIENT)
Dept: RESEARCH | Facility: CLINIC | Age: 87
End: 2022-07-19
Payer: MEDICARE

## 2022-07-19 NOTE — PROGRESS NOTES
Vascular Neurology  Clinic Note    ___________________  ASSESSMENT & PLAN    Problem List Items Addressed This Visit        1 - High    History of stroke    Current Assessment & Plan     Continue current DAPT therapy w/o change.  Ok for topical estrogen cream              Unprioritized    Stroke    Aneurysm of middle cerebral artery    Hemiparesis affecting dominant side as late effect of cerebrovascular accident          Reason For Visit (Chief Complaint): clearance for vaginal estrogen cream    HPI: 86 y.o. right handed female with prior stroke and residual mild R hemiparesis in addition to known large R MCA aneurysm is sent from gyn re: use of topical estrogen cream.  Patient has had no new events since her stroke. She has no new neurological complaints today - does c/o occasionally having left shoulder pain with radiation to neck and mild headache which she treats with tylenol.    Brain Imaging:  None  Vessel Imaging:  DSA 8/2020     The angiogram demonstrated a large right MCA bifurcation saccular aneurysm measuring 11.3 mm X 10.7 mm X 12.6 mm without any excrescences.     Also the angiogram demonstrated dilated right ICA proximal cavernous segment fusiform aneurysm measuring 7.2 mm wide x 7.6 mm long    Cardiac Evaluation:    Other:     Relevant Labwork:  Recent Labs   Lab 06/17/20  1120 06/17/20  1341   Hemoglobin A1C  --  5.8 H   LDL Cholesterol 152.8  --    HDL 59  --    Triglycerides 146  --    Cholesterol 241 H  --        I independently viewed the above imaging studies in addition to reviewing the report.  I reviewed the above labwork.    Review of Systems  Msk: negative for muscle pain  Skin: negative for pruritis  Neuro: negative for headache  All others negative    Past Medical History  Past Medical History:   Diagnosis Date    Aneurysm of middle cerebral artery     Arthritis     Hypertension     Stroke     TIA (transient ischemic attack)      Family History  No relevant history   Social  History  Social History     Socioeconomic History    Marital status: Other   Tobacco Use    Smoking status: Never Smoker    Smokeless tobacco: Never Used   Substance and Sexual Activity    Alcohol use: Never    Drug use: Never    Sexual activity: Not Currently        Medication List with Changes/Refills   Current Medications    AMLODIPINE (NORVASC) 10 MG TABLET    Take 1 tablet (10mg total) by mouth once daily for blood pressure    ASPIRIN 81 MG CHEW    Chew and swallow 1 tablet (81 mg total) by mouth once daily.    ATORVASTATIN (LIPITOR) 80 MG TABLET    Take 1 tablet (80 mg) by mouth once daily for cholesterol    CETIRIZINE (ZYRTEC) 10 MG TABLET    Take 0.5 tablets (5 mg total) by mouth once daily. For postnasal drip for 14 days    CLOPIDOGREL (PLAVIX) 75 MG TABLET    Take 1 tablet (75 mg total) by mouth once a day    DOCUSATE SODIUM (COLACE) 100 MG CAPSULE    Take 100 mg by mouth 2 (two) times daily.    FAMOTIDINE (PEPCID) 20 MG TABLET    Take 1 tablet (20 mg total) by mouth 2 (two) times daily. for 10 days    HYDROCHLOROTHIAZIDE (HYDRODIURIL) 12.5 MG TAB    Take 2 tablets (25 mg total) by mouth every morning.    HYDROCHLOROTHIAZIDE (HYDRODIURIL) 12.5 MG TAB    Take 2 tablets by mouth orally daily in the morning    HYDROCHLOROTHIAZIDE (HYDRODIURIL) 12.5 MG TAB    Take 2 tablets (25 mg total) by mouth daily in the morning    LISINOPRIL (PRINIVIL,ZESTRIL) 20 MG TABLET    Take 1 tablet (20 mg total) by mouth once daily.    OXYCODONE-ACETAMINOPHEN (PERCOCET) 5-325 MG PER TABLET    Take 1 tablet by mouth every 6 (six) hours as needed.    OXYCODONE-ACETAMINOPHEN (PERCOCET) 5-325 MG PER TABLET    Take 1 tablet by mouth every 6 (six) hours as needed.    POLYETHYLENE GLYCOL (GLYCOLAX) 17 GRAM/DOSE POWDER    Take 17 g by mouth once daily.    TRAMADOL (ULTRAM) 50 MG TABLET    Take 1 tablet (50 mg total) by mouth every 8 (eight) hours as needed for Pain.       EXAM  Vital Signs:  Vitals - 1 value per visit 5/24/2022  6/9/2022 6/9/2022 7/13/2022 7/13/2022 7/20/2022 7/20/2022   SYSTOLIC 156 - 110 - 142 - 135   DIASTOLIC 88 - 80 - 80 - 75   Pulse - - - - - - 84   Temp - - - - - - -   Resp - - - - - - -   SPO2 - - - - - - -   Weight (lb) 178.57 - 176.59 - 177.69 - -   Weight (kg) 81 - 80.1 - 80.6 - -   Height 65 - 65 - 65 - 65   BMI (Calculated) 29.7 - 29.4 - 29.6 - -   VISIT REPORT - - - - - - -   Pain Score  - 2 - 0 - 0 -       General: well appearing without discomfort   Mental Status:alert, oriented to person - place - age - month   Language: able to name, repeat, comprehend commands   Cranial Nerves: EOMI, no facial asymmetry  Motor: 4+/5 power in R U/LE  Sensory: intact light touch bilaterally  Coordination: no ataxia on finger-to-nose   Gait & Stance: normal    Stroke Scales      MD Geetha  Vascular Neurology  Office [phone]  Fax [phone]

## 2022-07-20 ENCOUNTER — OFFICE VISIT (OUTPATIENT)
Dept: NEUROLOGY | Facility: CLINIC | Age: 87
End: 2022-07-20
Payer: MEDICARE

## 2022-07-20 VITALS
HEART RATE: 84 BPM | HEIGHT: 65 IN | DIASTOLIC BLOOD PRESSURE: 75 MMHG | BODY MASS INDEX: 29.57 KG/M2 | SYSTOLIC BLOOD PRESSURE: 135 MMHG

## 2022-07-20 DIAGNOSIS — Z86.73 HISTORY OF STROKE: ICD-10-CM

## 2022-07-20 DIAGNOSIS — I63.412 CEREBROVASCULAR ACCIDENT (CVA) DUE TO EMBOLISM OF LEFT MIDDLE CEREBRAL ARTERY: ICD-10-CM

## 2022-07-20 DIAGNOSIS — I69.359 HEMIPARESIS AFFECTING DOMINANT SIDE AS LATE EFFECT OF CEREBROVASCULAR ACCIDENT: ICD-10-CM

## 2022-07-20 DIAGNOSIS — I67.1 ANEURYSM OF MIDDLE CEREBRAL ARTERY: ICD-10-CM

## 2022-07-20 PROCEDURE — 1159F MED LIST DOCD IN RCRD: CPT | Mod: CPTII,S$GLB,, | Performed by: PSYCHIATRY & NEUROLOGY

## 2022-07-20 PROCEDURE — 3078F DIAST BP <80 MM HG: CPT | Mod: CPTII,S$GLB,, | Performed by: PSYCHIATRY & NEUROLOGY

## 2022-07-20 PROCEDURE — 1100F PR PT FALLS ASSESS DOC 2+ FALLS/FALL W/INJURY/YR: ICD-10-PCS | Mod: CPTII,S$GLB,, | Performed by: PSYCHIATRY & NEUROLOGY

## 2022-07-20 PROCEDURE — 1126F PR PAIN SEVERITY QUANTIFIED, NO PAIN PRESENT: ICD-10-PCS | Mod: CPTII,S$GLB,, | Performed by: PSYCHIATRY & NEUROLOGY

## 2022-07-20 PROCEDURE — 99499 RISK ADDL DX/OHS AUDIT: ICD-10-PCS | Mod: HCWC,S$GLB,, | Performed by: PSYCHIATRY & NEUROLOGY

## 2022-07-20 PROCEDURE — 3288F FALL RISK ASSESSMENT DOCD: CPT | Mod: CPTII,S$GLB,, | Performed by: PSYCHIATRY & NEUROLOGY

## 2022-07-20 PROCEDURE — 1100F PTFALLS ASSESS-DOCD GE2>/YR: CPT | Mod: CPTII,S$GLB,, | Performed by: PSYCHIATRY & NEUROLOGY

## 2022-07-20 PROCEDURE — 99999 PR PBB SHADOW E&M-EST. PATIENT-LVL III: ICD-10-PCS | Mod: PBBFAC,,, | Performed by: PSYCHIATRY & NEUROLOGY

## 2022-07-20 PROCEDURE — 3075F SYST BP GE 130 - 139MM HG: CPT | Mod: CPTII,S$GLB,, | Performed by: PSYCHIATRY & NEUROLOGY

## 2022-07-20 PROCEDURE — 99999 PR PBB SHADOW E&M-EST. PATIENT-LVL III: CPT | Mod: PBBFAC,,, | Performed by: PSYCHIATRY & NEUROLOGY

## 2022-07-20 PROCEDURE — 1159F PR MEDICATION LIST DOCUMENTED IN MEDICAL RECORD: ICD-10-PCS | Mod: CPTII,S$GLB,, | Performed by: PSYCHIATRY & NEUROLOGY

## 2022-07-20 PROCEDURE — 3078F PR MOST RECENT DIASTOLIC BLOOD PRESSURE < 80 MM HG: ICD-10-PCS | Mod: CPTII,S$GLB,, | Performed by: PSYCHIATRY & NEUROLOGY

## 2022-07-20 PROCEDURE — 99213 OFFICE O/P EST LOW 20 MIN: CPT | Mod: S$GLB,,, | Performed by: PSYCHIATRY & NEUROLOGY

## 2022-07-20 PROCEDURE — 1126F AMNT PAIN NOTED NONE PRSNT: CPT | Mod: CPTII,S$GLB,, | Performed by: PSYCHIATRY & NEUROLOGY

## 2022-07-20 PROCEDURE — 3288F PR FALLS RISK ASSESSMENT DOCUMENTED: ICD-10-PCS | Mod: CPTII,S$GLB,, | Performed by: PSYCHIATRY & NEUROLOGY

## 2022-07-20 PROCEDURE — 99213 PR OFFICE/OUTPT VISIT, EST, LEVL III, 20-29 MIN: ICD-10-PCS | Mod: S$GLB,,, | Performed by: PSYCHIATRY & NEUROLOGY

## 2022-07-20 PROCEDURE — 99499 UNLISTED E&M SERVICE: CPT | Mod: HCWC,S$GLB,, | Performed by: PSYCHIATRY & NEUROLOGY

## 2022-07-20 PROCEDURE — 3075F PR MOST RECENT SYSTOLIC BLOOD PRESS GE 130-139MM HG: ICD-10-PCS | Mod: CPTII,S$GLB,, | Performed by: PSYCHIATRY & NEUROLOGY

## 2022-10-19 ENCOUNTER — OFFICE VISIT (OUTPATIENT)
Dept: UROGYNECOLOGY | Facility: CLINIC | Age: 87
End: 2022-10-19
Payer: MEDICARE

## 2022-10-19 VITALS — HEIGHT: 65 IN | BODY MASS INDEX: 29.57 KG/M2 | DIASTOLIC BLOOD PRESSURE: 80 MMHG | SYSTOLIC BLOOD PRESSURE: 124 MMHG

## 2022-10-19 DIAGNOSIS — R33.9 INCOMPLETE BLADDER EMPTYING: ICD-10-CM

## 2022-10-19 DIAGNOSIS — N81.9 VAGINAL VAULT PROLAPSE: Primary | ICD-10-CM

## 2022-10-19 DIAGNOSIS — N95.2 VAGINAL ATROPHY: ICD-10-CM

## 2022-10-19 DIAGNOSIS — K59.00 CONSTIPATION, UNSPECIFIED CONSTIPATION TYPE: ICD-10-CM

## 2022-10-19 DIAGNOSIS — N39.46 URINARY INCONTINENCE, MIXED: ICD-10-CM

## 2022-10-19 DIAGNOSIS — N39.0 FREQUENT UTI: ICD-10-CM

## 2022-10-19 PROCEDURE — 1125F AMNT PAIN NOTED PAIN PRSNT: CPT | Mod: CPTII,S$GLB,, | Performed by: NURSE PRACTITIONER

## 2022-10-19 PROCEDURE — 99213 OFFICE O/P EST LOW 20 MIN: CPT | Mod: S$GLB,,, | Performed by: NURSE PRACTITIONER

## 2022-10-19 PROCEDURE — 1159F MED LIST DOCD IN RCRD: CPT | Mod: CPTII,S$GLB,, | Performed by: NURSE PRACTITIONER

## 2022-10-19 PROCEDURE — 1101F PT FALLS ASSESS-DOCD LE1/YR: CPT | Mod: CPTII,S$GLB,, | Performed by: NURSE PRACTITIONER

## 2022-10-19 PROCEDURE — 99999 PR PBB SHADOW E&M-EST. PATIENT-LVL IV: ICD-10-PCS | Mod: PBBFAC,,, | Performed by: NURSE PRACTITIONER

## 2022-10-19 PROCEDURE — 1159F PR MEDICATION LIST DOCUMENTED IN MEDICAL RECORD: ICD-10-PCS | Mod: CPTII,S$GLB,, | Performed by: NURSE PRACTITIONER

## 2022-10-19 PROCEDURE — 99999 PR PBB SHADOW E&M-EST. PATIENT-LVL IV: CPT | Mod: PBBFAC,,, | Performed by: NURSE PRACTITIONER

## 2022-10-19 PROCEDURE — 3288F FALL RISK ASSESSMENT DOCD: CPT | Mod: CPTII,S$GLB,, | Performed by: NURSE PRACTITIONER

## 2022-10-19 PROCEDURE — 1125F PR PAIN SEVERITY QUANTIFIED, PAIN PRESENT: ICD-10-PCS | Mod: CPTII,S$GLB,, | Performed by: NURSE PRACTITIONER

## 2022-10-19 PROCEDURE — 99213 PR OFFICE/OUTPT VISIT, EST, LEVL III, 20-29 MIN: ICD-10-PCS | Mod: S$GLB,,, | Performed by: NURSE PRACTITIONER

## 2022-10-19 PROCEDURE — 1101F PR PT FALLS ASSESS DOC 0-1 FALLS W/OUT INJ PAST YR: ICD-10-PCS | Mod: CPTII,S$GLB,, | Performed by: NURSE PRACTITIONER

## 2022-10-19 PROCEDURE — 1160F PR REVIEW ALL MEDS BY PRESCRIBER/CLIN PHARMACIST DOCUMENTED: ICD-10-PCS | Mod: CPTII,S$GLB,, | Performed by: NURSE PRACTITIONER

## 2022-10-19 PROCEDURE — 3288F PR FALLS RISK ASSESSMENT DOCUMENTED: ICD-10-PCS | Mod: CPTII,S$GLB,, | Performed by: NURSE PRACTITIONER

## 2022-10-19 PROCEDURE — 1160F RVW MEDS BY RX/DR IN RCRD: CPT | Mod: CPTII,S$GLB,, | Performed by: NURSE PRACTITIONER

## 2022-10-19 RX ORDER — ESTRADIOL 0.1 MG/G
1 CREAM VAGINAL
Qty: 42.5 G | Refills: 3 | Status: SHIPPED | OUTPATIENT
Start: 2022-10-20 | End: 2023-10-23 | Stop reason: SDUPTHER

## 2022-10-19 NOTE — PROGRESS NOTES
Urogyn follow up  10/19/2022  .  [facility] ([facility] - UROGYNECOLOGY  [address] [address]    Margarita Chávez  [id number]  1935      Margarita Chávez is a 87 y.o.  here for a urogyn follow up for mixed urinary incontinence and uterovaginal prolapse    Last HPI from 05/24/2022     1)  UI:  (+) CAM = (+) UUI  X years, worse since stroke.  (+) pull ups:1-2/day, usually severe wetness and 1/night usually severe wetness.  Only goes to [facility] to have BMs.  Doesn't really urinate on regular basis.  Urge seems decreased, sania since CVA. Not using bedside commode.  Nocturia: No.  (--) dysuria,  (--) hematuria,  (--) frequent UTIs.  (+) complete bladder emptying.     2)  POP:  Present. Has used pessary in past x several years.  Last time tried to replace, fell out.  Last use 1 year ago. below introitus. About size of golf ball.  Symptoms:(--).  Did have some BLQ pain last week.  (--) vaginal bleeding. (--) vaginal discharge. (--) sexually active.  (--) h/o dyspareunia.  (+)  Vaginal dryness.  (--) vaginal estrogen use. Has some itching intermittently.  Uses A&D PRN.      3)  BM:  (--) constipation/straining.  (--) chronic diarrhea. (--) hematochezia.  (--) fecal incontinence.  (--) fecal smearing/urgency.  (+) complete evacuation.  Feels normal urge.   --not taking colace or glycolax    07/23/2022    Pessary came out same day as placement when patient went home.    Was treated for uti after last visit.    Changes since last visit  Using pullups 2-3 during the day  Changes liner pad on pullups  Taking cranberry + probiotic  Denies UTI symptoms  One pullup over night very wet  Not bothered by prolaspe  Has not started vaginal estrogen cream  Using fiber supplement    05/24/2022  Retroperitoneal ultrasound  Right kidney: The right kidney measures 9.2 cm. No cortical thinning. No loss of corticomedullary distinction. Resistive index measures 0.64.  No mass. No renal stone. No hydronephrosis.     Left kidney:  The left kidney measures 9.8 cm. No cortical thinning. No loss of corticomedullary distinction. Resistive index measures 0.72.  Renal cysts measure up to 4.5 cm.  No renal stone. No hydronephrosis.     The bladder is partially distended at the time of scanning and has an unremarkable appearance.     Impression   No acute process seen.  Left renal cysts.       Past Medical History:   Diagnosis Date    Aneurysm of middle cerebral artery     Arthritis     Hypertension     Stroke     TIA (transient ischemic attack)      2020 CVA:  Residual issues with U/L extremities; has trouble walking--mostly uses wheelchair but can walk limited amount with walker/holding things; s/p PT in 2020; has not had follow up with Stroke neuro; has aneurysms   --hyperlipidemia  --seasonal allergies  --GERD    Past Surgical History:   Procedure Laterality Date    BRAIN SURGERY      CEREBRAL ANGIOGRAM N/A 8/19/2020    Procedure: ANGIOGRAM-CEREBRAL;  Surgeon: [facility] Diagnostic Provider;  Location: [facility] OR [address] [place] [address];  Service: Radiology;  Laterality: N/A;   / ANDRÉS    HYSTERECTOMY     --aneurysm coil 2010    Family History   Problem Relation Age of Onset    Cancer Paternal Aunt        Social History     Socioeconomic History    Marital status: Other   Tobacco Use    Smoking status: Never    Smokeless tobacco: Never   Substance and Sexual Activity    Alcohol use: Never    Drug use: Never    Sexual activity: Not Currently       Current Outpatient Medications   Medication Sig Dispense Refill    amLODIPine (NORVASC) 10 MG tablet Take 1 tablet (10mg total) by mouth once daily for blood pressure 90 tablet 5    atorvastatin (LIPITOR) 80 MG tablet Take 1 tablet (80 mg) by mouth once daily for cholesterol 90 tablet 5    clopidogreL (PLAVIX) 75 mg tablet Take 1 tablet (75 mg total) by mouth once a day 90 tablet 5    docusate sodium (COLACE) 100 MG capsule Take 100 mg by mouth 2 (two) times daily.      hydroCHLOROthiazide (HYDRODIURIL) 12.5 MG Tab Take 2  tablets (25 mg total) by mouth every morning. 180 tablet 5    hydroCHLOROthiazide (HYDRODIURIL) 12.5 MG Tab Take 2 tablets by mouth orally daily in the morning 180 tablet 5    hydroCHLOROthiazide (HYDRODIURIL) 12.5 MG Tab Take 2 tablets (25 mg total) by mouth daily in the morning 180 tablet 5    oxyCODONE-acetaminophen (PERCOCET) 5-325 mg per tablet Take 1 tablet by mouth every 6 (six) hours as needed. 90 each 0    oxyCODONE-acetaminophen (PERCOCET) 5-325 mg per tablet Take 1 tablet by mouth every 6 (six) hours as needed. 90 tablet 0    polyethylene glycol (GLYCOLAX) 17 gram/dose powder Take 17 g by mouth once daily.      traMADoL (ULTRAM) 50 mg tablet Take 1 tablet (50 mg total) by mouth every 8 (eight) hours as needed for Pain. 90 tablet 0    aspirin 81 MG Chew Chew and swallow 1 tablet (81 mg total) by mouth once daily. 30 tablet 5    cetirizine (ZYRTEC) 10 MG tablet Take 0.5 tablets (5 mg total) by mouth once daily. For postnasal drip for 14 days 7 tablet 0    [START ON 10/20/2022] estradioL (ESTRACE) 0.01 % (0.1 mg/gram) vaginal cream Place 1 g vaginally twice a week. 42.5 g 3    famotidine (PEPCID) 20 MG tablet Take 1 tablet (20 mg total) by mouth 2 (two) times daily. for 10 days 20 tablet 0    lisinopriL (PRINIVIL,ZESTRIL) 20 MG tablet Take 1 tablet (20 mg total) by mouth once daily. 90 tablet 3     No current facility-administered medications for this visit.       Review of patient's allergies indicates:   Allergen Reactions    Penicillin     Penicillins Nausea And Vomiting       Well woman:  Pap test: post TVH.  History of abnormal paps: No.  History of STIs:  No  Mammogram: Date of last: 6/2022  Result: Normal  Colonoscopy: years ago, normal per report.   DEXA:  Date of last: 07/2022    7.1% risk of a major osteoporotic fracture in the next 10 years.  2.1% risk of hip fracture in the next 10 years.  Impression:  *Low bone mass (Osteopenia); FRAX calculations do not support treatment as  "osteoporosis    Exam  /80 (BP Location: Right arm, Patient Position: Sitting, BP Method: Large (Manual))   Ht 5' 5" (1.651 m)   LMP  (LMP Unknown)   BMI 29.57 kg/m²   General: alert and oriented, no acute distress  Respiratory: normal respiratory effort  Abd: soft, non-tender, non-distended    Pelvic  Ext. Genitalia: normal external genitalia. Normal bartholin's and skeens glands  Vagina: + atrophy. Normal vaginal mucosa without lesions. No discharge noted.   Non-tender bladder base without palpable mass.  Cervix: absent  Uterus:  absent   Urethra: no masses or tenderness  Urethral meatus: no lesions, caruncle or prolapse.       2 3/4 " LS GH pessary came out with valsalva    3" LS GH came out with voiding at home        Impression  1. Vaginal vault prolapse        2. Incomplete bladder emptying        3. Urinary incontinence, mixed        4. Vaginal atrophy        5. Frequent UTI        6. Constipation, unspecified constipation type            We reviewed the above issues and discussed options for short-term versus long-term management of her problems.   Plan:     Complete vaginal vault prolapse:  -failed 3 # LS GH pessary  Failed #6/7 UI + POP and #6/7 ring + support pessary--did not stay in place; #8 ring + support too tight; #7 open ring had POP around device              --have her try pessary while supporting herself against bedside commode     --if can't find pessary, surgical option would be colpocleisis (closing inside of vagina--very successful/minimally invasive but penetrative intercourse no longer possible)              --if surgery: bladder testing to make sure empties with prolapse reduction and doesn't need sling for stress leakage              --if surgery: need ok from stroke MDs and plan to minimize anesthesia--no epidural due to plavix              --if surgery: preop labs/EKG     2)  Incomplete bladder emptying:  --renal US normal  --failed pessary  --may need further bladder testing in " future     3)  Mixed urinary incontinence, urge = stress, concern for overflow:  --get bedside commode  --Empty bladder every 2-3 hours when awake, even if you don't feel an urge.  Empty well: wait a minute, lean forward on toilet.    --Avoid dietary irritants (see sheet).  Keep diary x 3-5 days to determine your irritants.  --KEGELS: do 10 in AM and 10 in PM, holding each x 10 seconds.  When you feel urge to go, STOP, KEGEL, and when urge has passed, then go to bathroom.  Consider PT in future.    --URGE: consider medication in future.  Takes 2-4 weeks to see if will have effect.  For dry mouth: get sour, sugar free lozenge or gum.    --STRESS:  Pessary vs. Sling.      4)  H/o stroke:  --will have someone call you to help you get follow up with team again     5)  Well-woman:  Pap test: post TVH.  No further paps needed.   Mammogram: Date of last: 1/2020.  Result: Normal.  Ordered, please schedule.   Colonoscopy: years ago, normal per report. Talk with PCP about colon cancer screening options/need.   DEXA:  Date of last: 2014.  Result:  osteopenia.  Ordered, please schedule.       6) use vaginal estrogen cream twice weekly  --ok per neurology  --use dime size around bulge and opening of vaginal twice weekly    7)Constipation:  --take fiber daily    8) Frequent UTIs (bladder infections):  --If you feel like you have a UTI, please call our office so that we can place an order for you to drop off a urine specimen at the closest [facility] lab (we will arrange).     --We will call in antibiotics for you to start right after you drop off specimen.     --In this way, we can determine:     1)  Do you have a UTI?      2) If you have a UTI, is it sensitive to the antibiotics we prescribed?   --follow UTI prevention tips (see attached)  --control bowel movements/fecal cross-contamination  --will check with neurology regarding vaginal estrogen cream  --continue cranberry tablet daily  --conotinue 1 probiotic pill (any with  lactobacillus and/or acidophilus) daily  --consider need for further evaluation (pelvic imaging and cystoscopy) if issue persists            9)RTC 6 months for follow up       I spent a total of 20 minutes on the day of the visit.  This includes face to face time and non-face to face time preparing to see the patient (eg, review of tests), obtaining and/or reviewing separately obtained history, documenting clinical information in the electronic or other health record, independently interpreting results and communicating results to the patient/family/caregiver, or care coordinator.    LUCINA Thao-BC [facility]  Division of Female Pelvic Medicine and Reconstructive Surgery  Department of Obstetrics & Gynecology

## 2022-10-19 NOTE — PATIENT INSTRUCTIONS
Complete vaginal vault prolapse:  -failed 3 # LS GH pessary  Failed #6/7 UI + POP and #6/7 ring + support pessary--did not stay in place; #8 ring + support too tight; #7 open ring had POP around device              --have her try pessary while supporting herself against bedside commode     --if can't find pessary, surgical option would be colpocleisis (closing inside of vagina--very successful/minimally invasive but penetrative intercourse no longer possible)              --if surgery: bladder testing to make sure empties with prolapse reduction and doesn't need sling for stress leakage              --if surgery: need ok from stroke MDs and plan to minimize anesthesia--no epidural due to plavix              --if surgery: preop labs/EKG     2)  Incomplete bladder emptying:  --renal US normal  --failed pessary  --may need further bladder testing in future     3)  Mixed urinary incontinence, urge = stress, concern for overflow:  --get bedside commode  --Empty bladder every 2-3 hours when awake, even if you don't feel an urge.  Empty well: wait a minute, lean forward on toilet.    --Avoid dietary irritants (see sheet).  Keep diary x 3-5 days to determine your irritants.  --KEGELS: do 10 in AM and 10 in PM, holding each x 10 seconds.  When you feel urge to go, STOP, KEGEL, and when urge has passed, then go to bathroom.  Consider PT in future.    --URGE: consider medication in future.  Takes 2-4 weeks to see if will have effect.  For dry mouth: get sour, sugar free lozenge or gum.    --STRESS:  Pessary vs. Sling.      4)  H/o stroke:  --will have someone call you to help you get follow up with team again     5)  Well-woman:  Pap test: post TVH.  No further paps needed.   Mammogram: Date of last: 1/2020.  Result: Normal.  Ordered, please schedule.   Colonoscopy: years ago, normal per report. Talk with PCP about colon cancer screening options/need.   DEXA:  Date of last: 2014.  Result:  osteopenia.  Ordered, please  schedule.       6) use vaginal estrogen cream twice weekly  --ok per neurology  --use dime size around bulge and opening of vaginal twice weekly    7)Constipation:  --take fiber daily    8) Frequent UTIs (bladder infections):  --If you feel like you have a UTI, please call our office so that we can place an order for you to drop off a urine specimen at the closest [facility] lab (we will arrange).     --We will call in antibiotics for you to start right after you drop off specimen.     --In this way, we can determine:     1)  Do you have a UTI?      2) If you have a UTI, is it sensitive to the antibiotics we prescribed?   --follow UTI prevention tips (see attached)  --control bowel movements/fecal cross-contamination  --will check with neurology regarding vaginal estrogen cream  --continue cranberry tablet daily  --conotinue 1 probiotic pill (any with lactobacillus and/or acidophilus) daily  --consider need for further evaluation (pelvic imaging and cystoscopy) if issue persists              9)RTC 6 months for follow up

## 2022-12-15 ENCOUNTER — HOSPITAL ENCOUNTER (OUTPATIENT)
Facility: HOSPITAL | Age: 87
Discharge: HOME | End: 2022-12-16
Attending: EMERGENCY MEDICINE | Admitting: EMERGENCY MEDICINE
Payer: MEDICARE

## 2022-12-15 DIAGNOSIS — R13.10 DYSPHAGIA, UNSPECIFIED TYPE: ICD-10-CM

## 2022-12-15 DIAGNOSIS — R13.10 DYSPHAGIA: Primary | ICD-10-CM

## 2022-12-15 LAB
ALBUMIN SERPL BCP-MCNC: 4.5 G/DL (ref 3.5–5.2)
ALP SERPL-CCNC: 74 U/L (ref 55–135)
ALT SERPL W/O P-5'-P-CCNC: 15 U/L (ref 10–44)
ANION GAP SERPL CALC-SCNC: 13 MMOL/L (ref 8–16)
AST SERPL-CCNC: 19 U/L (ref 10–40)
BASOPHILS # BLD AUTO: 0.04 K/UL (ref 0–0.2)
BASOPHILS NFR BLD: 0.5 % (ref 0–1.9)
BILIRUB SERPL-MCNC: 0.7 MG/DL (ref 0.1–1)
BUN SERPL-MCNC: 17 MG/DL (ref 8–23)
CALCIUM SERPL-MCNC: 11 MG/DL (ref 8.7–10.5)
CHLORIDE SERPL-SCNC: 101 MMOL/L (ref 95–110)
CHOLEST SERPL-MCNC: 161 MG/DL (ref 120–199)
CHOLEST/HDLC SERPL: 2.6 {RATIO} (ref 2–5)
CO2 SERPL-SCNC: 28 MMOL/L (ref 23–29)
CREAT SERPL-MCNC: 1.1 MG/DL (ref 0.5–1.4)
CTP QC/QA: YES
CTP QC/QA: YES
DIFFERENTIAL METHOD: ABNORMAL
EOSINOPHIL # BLD AUTO: 0 K/UL (ref 0–0.5)
EOSINOPHIL NFR BLD: 0.3 % (ref 0–8)
ERYTHROCYTE [DISTWIDTH] IN BLOOD BY AUTOMATED COUNT: 13.4 % (ref 11.5–14.5)
EST. GFR  (NO RACE VARIABLE): 49 ML/MIN/1.73 M^2
GLUCOSE SERPL-MCNC: 150 MG/DL (ref 70–110)
HCT VFR BLD AUTO: 43 % (ref 37–48.5)
HDLC SERPL-MCNC: 63 MG/DL (ref 40–75)
HDLC SERPL: 39.1 % (ref 20–50)
HGB BLD-MCNC: 14 G/DL (ref 12–16)
IMM GRANULOCYTES # BLD AUTO: 0.04 K/UL (ref 0–0.04)
IMM GRANULOCYTES NFR BLD AUTO: 0.5 % (ref 0–0.5)
INR PPP: 1.1 (ref 0.8–1.2)
LDLC SERPL CALC-MCNC: 83 MG/DL (ref 63–159)
LYMPHOCYTES # BLD AUTO: 1.4 K/UL (ref 1–4.8)
LYMPHOCYTES NFR BLD: 18.3 % (ref 18–48)
MAGNESIUM SERPL-MCNC: 2.4 MG/DL (ref 1.6–2.6)
MCH RBC QN AUTO: 29.9 PG (ref 27–31)
MCHC RBC AUTO-ENTMCNC: 32.6 G/DL (ref 32–36)
MCV RBC AUTO: 92 FL (ref 82–98)
MONOCYTES # BLD AUTO: 0.5 K/UL (ref 0.3–1)
MONOCYTES NFR BLD: 6.1 % (ref 4–15)
NEUTROPHILS # BLD AUTO: 5.7 K/UL (ref 1.8–7.7)
NEUTROPHILS NFR BLD: 74.3 % (ref 38–73)
NONHDLC SERPL-MCNC: 98 MG/DL
NRBC BLD-RTO: 0 /100 WBC
PLATELET # BLD AUTO: 357 K/UL (ref 150–450)
PMV BLD AUTO: 9.6 FL (ref 9.2–12.9)
POC MOLECULAR INFLUENZA A AGN: NEGATIVE
POC MOLECULAR INFLUENZA B AGN: NEGATIVE
POTASSIUM SERPL-SCNC: 3.7 MMOL/L (ref 3.5–5.1)
PROT SERPL-MCNC: 8.1 G/DL (ref 6–8.4)
PROTHROMBIN TIME: 11.2 SEC (ref 9–12.5)
RBC # BLD AUTO: 4.68 M/UL (ref 4–5.4)
SARS-COV-2 RDRP RESP QL NAA+PROBE: NEGATIVE
SODIUM SERPL-SCNC: 142 MMOL/L (ref 136–145)
T4 FREE SERPL-MCNC: 1.15 NG/DL (ref 0.71–1.51)
TRIGL SERPL-MCNC: 75 MG/DL (ref 30–150)
TROPONIN I SERPL DL<=0.01 NG/ML-MCNC: 0.01 NG/ML (ref 0–0.03)
TSH SERPL DL<=0.005 MIU/L-ACNC: 0.27 UIU/ML (ref 0.4–4)
WBC # BLD AUTO: 7.72 K/UL (ref 3.9–12.7)

## 2022-12-15 PROCEDURE — 80053 COMPREHEN METABOLIC PANEL: CPT | Mod: HCWC | Performed by: EMERGENCY MEDICINE

## 2022-12-15 PROCEDURE — 84443 ASSAY THYROID STIM HORMONE: CPT | Mod: HCWC | Performed by: EMERGENCY MEDICINE

## 2022-12-15 PROCEDURE — 96372 THER/PROPH/DIAG INJ SC/IM: CPT | Performed by: STUDENT IN AN ORGANIZED HEALTH CARE EDUCATION/TRAINING PROGRAM

## 2022-12-15 PROCEDURE — 85025 COMPLETE CBC W/AUTO DIFF WBC: CPT | Mod: HCWC | Performed by: EMERGENCY MEDICINE

## 2022-12-15 PROCEDURE — 85610 PROTHROMBIN TIME: CPT | Mod: HCWC | Performed by: EMERGENCY MEDICINE

## 2022-12-15 PROCEDURE — 93005 ELECTROCARDIOGRAM TRACING: CPT

## 2022-12-15 PROCEDURE — 84484 ASSAY OF TROPONIN QUANT: CPT | Mod: HCWC | Performed by: EMERGENCY MEDICINE

## 2022-12-15 PROCEDURE — G0378 HOSPITAL OBSERVATION PER HR: HCPCS

## 2022-12-15 PROCEDURE — 87635 SARS-COV-2 COVID-19 AMP PRB: CPT | Performed by: EMERGENCY MEDICINE

## 2022-12-15 PROCEDURE — 93010 EKG 12-LEAD: ICD-10-PCS | Mod: HCWC,,, | Performed by: INTERNAL MEDICINE

## 2022-12-15 PROCEDURE — 84439 ASSAY OF FREE THYROXINE: CPT | Mod: HCWC | Performed by: EMERGENCY MEDICINE

## 2022-12-15 PROCEDURE — 83735 ASSAY OF MAGNESIUM: CPT | Mod: HCWC | Performed by: EMERGENCY MEDICINE

## 2022-12-15 PROCEDURE — 80061 LIPID PANEL: CPT | Mod: HCWC | Performed by: EMERGENCY MEDICINE

## 2022-12-15 PROCEDURE — 87502 INFLUENZA DNA AMP PROBE: CPT | Mod: HCWC

## 2022-12-15 PROCEDURE — 93010 ELECTROCARDIOGRAM REPORT: CPT | Mod: HCWC,,, | Performed by: INTERNAL MEDICINE

## 2022-12-15 PROCEDURE — 63600175 PHARM REV CODE 636 W HCPCS: Performed by: STUDENT IN AN ORGANIZED HEALTH CARE EDUCATION/TRAINING PROGRAM

## 2022-12-15 PROCEDURE — 99285 EMERGENCY DEPT VISIT HI MDM: CPT | Mod: 25,HCWC,CS

## 2022-12-15 RX ORDER — ENOXAPARIN SODIUM 100 MG/ML
40 INJECTION SUBCUTANEOUS EVERY 24 HOURS
Status: DISCONTINUED | OUTPATIENT
Start: 2022-12-15 | End: 2022-12-16 | Stop reason: HOSPADM

## 2022-12-15 RX ORDER — ATORVASTATIN CALCIUM 40 MG/1
80 TABLET, FILM COATED ORAL DAILY
Status: DISCONTINUED | OUTPATIENT
Start: 2022-12-16 | End: 2022-12-16 | Stop reason: HOSPADM

## 2022-12-15 RX ORDER — SODIUM CHLORIDE 0.9 % (FLUSH) 0.9 %
10 SYRINGE (ML) INJECTION
Status: DISCONTINUED | OUTPATIENT
Start: 2022-12-15 | End: 2022-12-16 | Stop reason: HOSPADM

## 2022-12-15 RX ORDER — NAPROXEN SODIUM 220 MG/1
81 TABLET, FILM COATED ORAL DAILY
COMMUNITY

## 2022-12-15 RX ORDER — CLOPIDOGREL BISULFATE 75 MG/1
75 TABLET ORAL DAILY
Status: DISCONTINUED | OUTPATIENT
Start: 2022-12-16 | End: 2022-12-16 | Stop reason: HOSPADM

## 2022-12-15 RX ADMIN — ENOXAPARIN SODIUM 40 MG: 40 INJECTION SUBCUTANEOUS at 09:12

## 2022-12-15 NOTE — ED PROVIDER NOTES
Encounter Date: 12/15/2022    SCRIBE #1 NOTE: I, Carole Canela, am scribing for, and in the presence of,  Jyoti Veras MD.     History     Chief Complaint   Patient presents with    Dysphagia     Pt reports difficulty swallowing for 3 days. Pt states she thought something was stuck in her throat but no longer feels it in there. Pt spitting clear liquid into an emesis bag. Pt able to speak in complete sentences without difficulty.      Margarita Chávez is a 87 year old female with known allergies to Penicillin who has a past medical history of Aneurysm of middle cerebral artery, Arthritis, Hypertension, Stroke, and TIA (transient ischemic attack).    The patient presents to the ED due to Dysphagia.     Patient reports difficulty swallowing for the past day or two. Patient states she believes something gets stuck in her throat but then symptoms resolve. She states she ate oatmeal this morning and had difficulty swallowing it but was able to swallow it. Patient reports she has had a history of stroke and aneurysm and has limited mobility; notes that her stroke in 2020 presented with right lower extremity weakness that has now improved but ever since she has been nonambulatory. Patient's sister reports six weeks ago she had an acute episode of blurry vision that lasted a few hours. She denies any recent falls or injuries. She also reports her sister has a prolapsed bladder. She denies any fever, abdominal pain, and shortness of breath.  Patient denies any current new extremity weakness, numbness or tingling, visual disturbance.        The history is provided by the patient and a relative. No  was used.   Review of patient's allergies indicates:   Allergen Reactions    Penicillin     Penicillins Nausea And Vomiting     Past Medical History:   Diagnosis Date    Aneurysm of middle cerebral artery     Arthritis     Hypertension     Stroke     TIA (transient ischemic attack)      Past  Surgical History:   Procedure Laterality Date    BRAIN SURGERY      CEREBRAL ANGIOGRAM N/A 8/19/2020    Procedure: ANGIOGRAM-CEREBRAL;  Surgeon: [facility];  Location: [facility] OR [address] [place] [address];  Service: Radiology;  Laterality: N/A;   / ANDRÉS    HYSTERECTOMY       Family History   Problem Relation Age of Onset    Cancer Paternal Aunt      Social History     Tobacco Use    Smoking status: Never    Smokeless tobacco: Never   Substance Use Topics    Alcohol use: Never    Drug use: Never     Review of Systems   Constitutional:  Negative for fever.   HENT:  Positive for trouble swallowing. Negative for sore throat.         Dysphagia   Eyes:  Negative for visual disturbance.   Respiratory:  Negative for shortness of breath.    Cardiovascular:  Negative for chest pain.   Gastrointestinal:  Negative for abdominal pain, diarrhea, nausea and vomiting.   Genitourinary:  Negative for difficulty urinating and dysuria.   Musculoskeletal:  Negative for back pain.   Skin:  Negative for rash.   Neurological:  Negative for weakness, numbness and headaches.   Hematological:  Does not bruise/bleed easily.   Psychiatric/Behavioral:  Negative for confusion.      Physical Exam     Initial Vitals [12/15/22 1516]   BP Pulse Resp Temp SpO2   128/76 101 18 98.2 °F (36.8 °C) 99 %      MAP       --         Physical Exam    Nursing note and vitals reviewed.  Constitutional: She appears well-developed. She is not diaphoretic. No distress.   HENT:   Head: Normocephalic and atraumatic.   Mouth/Throat: Oropharynx is clear and moist. No oropharyngeal exudate.   Eyes: Conjunctivae and EOM are normal. Pupils are equal, round, and reactive to light.   Neck: Neck supple.   Normal range of motion.  Cardiovascular:  Normal rate.           Pulmonary/Chest: No respiratory distress.   Abdominal: Abdomen is soft. She exhibits no distension. There is no abdominal tenderness.   Musculoskeletal:         General: Normal range of motion.      Cervical  back: Normal range of motion and neck supple.     Neurological: She is alert and oriented to person, place, and time. She has normal strength. No cranial nerve deficit.   Skin: Skin is warm and dry.   Psychiatric: She has a normal mood and affect.       ED Course   Procedures  Labs Reviewed   CBC W/ AUTO DIFFERENTIAL - Abnormal; Notable for the following components:       Result Value    Gran % 74.3 (*)     All other components within normal limits   COMPREHENSIVE METABOLIC PANEL - Abnormal; Notable for the following components:    Glucose 150 (*)     Calcium 11.0 (*)     eGFR 49 (*)     All other components within normal limits   TSH - Abnormal; Notable for the following components:    TSH 0.272 (*)     All other components within normal limits   PROTIME-INR   LIPID PANEL   MAGNESIUM   T4, FREE   TROPONIN I   URINALYSIS, REFLEX TO URINE CULTURE   SARS-COV-2 RDRP GENE   POCT INFLUENZA A/B MOLECULAR     EKG Readings: (Independently Interpreted)   Initial Reading: No STEMI. Previous EKG: Compared with most recent EKG Rhythm: Sinus Tachycardia. Heart Rate: 105. Ectopy: No Ectopy. Conduction: Normal. Axis: Left Axis Deviation. Clinical Impression: Sinus Tachycardia     Imaging Results              X-Ray Chest AP Portable (Final result)  Result time 12/15/22 16:57:29      Final result by Vic Diaz MD (12/15/22 16:57:29)                   Impression:      1. Chronic appearing interstitial findings, no large focal consolidation.      Electronically signed by: Vic Diaz MD  Date:    12/15/2022  Time:    16:57               Narrative:    EXAMINATION:  XR CHEST AP PORTABLE    CLINICAL HISTORY:  Other fatigue    TECHNIQUE:  Single frontal view of the chest was performed.    COMPARISON:  09/22/2021    FINDINGS:  The cardiomediastinal silhouette is not enlarged noting calcification of the aorta.  There is elevation of the left hemidiaphragm..  There is no pleural effusion.  The trachea is midline.  The lungs are  symmetrically expanded bilaterally with mildly coarse interstitial attenuation.  There is left basilar subsegmental atelectasis or scarring..  No large focal consolidation seen.  There is no pneumothorax.  The osseous structures are remarkable for degenerative changes and osteopenia..                                       CT Head Without Contrast (Final result)  Result time 12/15/22 17:15:15      Final result by Santy Babcock MD (12/15/22 17:15:15)                   Impression:      Stable chronic changes in the brain with small-vessel disease and remote cortical and lacunar infarcts.    No CT evidence of acute hemorrhage, mass or infarction.    Large calcified MCA aneurysm unchanged.      Electronically signed by: Santy Babcock  Date:    12/15/2022  Time:    17:15               Narrative:    EXAMINATION:  CT HEAD WITHOUT CONTRAST    CLINICAL HISTORY:  Neuro deficit, acute, stroke suspected;    TECHNIQUE:  Low dose axial images were obtained through the head.  Coronal and sagittal reformations were also performed. Contrast was not administered.    COMPARISON:  CT brain, 07/01/2020    FINDINGS:  Calcified MCA aneurysm in the sylvian fissure on the right appear stable.  Involutional and chronic small vessel changes throughout the brain with low density throughout the deep and subcortical white matter as well as wedge-shaped infarct in the posterior left cerebellar hemisphere and low densities in the internal capsule anteriorly on the right and basal ganglia on the left appear stable.    There is no new loss of gray-white matter junction differentiation, mass or mass effect.  Craniotomy changes with hardware in the right temporal frontal region appear stable.  The calvarium is otherwise intact.  Orbits and orbital contents appear unremarkable.  Is left sphenoid sinus opacity at the sphenoid ethmoid ostia is present.  Mastoids are clear.  Orbits are unremarkable.                                       Medications  - No data to display  Medical Decision Making:   Clinical Tests:   Lab Tests: Ordered and Reviewed  Radiological Study: Ordered and Reviewed  Medical Tests: Ordered and Reviewed  ED Management:  87-year-old female with past medical history including stroke and middle cerebral artery aneurysm presents with complaint of dysphagia, ongoing for the past few days (> 24 hrs PTA).  No other associated neurological deficits.  However has report of TIA symptoms within the past few weeks for which she has not had workup.  No acute neurological deficits on exam at this time.  CT head without acute pathology.  No acute lab abnormalities.  Will admit to r/o posterior circulation stroke with presenting symptom of dysphagia given pt report that it occurred acutely and not gradually and has remained constant since.  Otherwise will require swallow studies with GI.   Other:   I have discussed this case with another health care provider.        Scribe Attestation:   Scribe #1: I performed the above scribed service and the documentation accurately describes the services I performed. I attest to the accuracy of the note.          I, Jyoti Veras, personally performed the services described in this documentation. All medical record entries made by the scribe were at my direction and in my presence.  I have reviewed the chart and agree that the record reflects my personal performance and is accurate and complete. Jyoti Veras M.D. 4:25 PM12/15/2022           Clinical Impression:   Final diagnoses:  [R13.10] Dysphagia        ED Disposition Condition    Observation Stable                Jyoti Veras MD  12/15/22 1800

## 2022-12-15 NOTE — Clinical Note
Diagnosis: Dysphagia [136856]   Future Attending Provider: KUNAL DENIS [[id number]]   Is the patient being sent to ED Observation?: No   Admitting Provider:: CARLEE TERRELL [[id number]]

## 2022-12-16 VITALS
HEIGHT: 65 IN | SYSTOLIC BLOOD PRESSURE: 167 MMHG | RESPIRATION RATE: 18 BRPM | TEMPERATURE: 98 F | WEIGHT: 181.44 LBS | OXYGEN SATURATION: 97 % | HEART RATE: 78 BPM | BODY MASS INDEX: 30.23 KG/M2 | DIASTOLIC BLOOD PRESSURE: 72 MMHG

## 2022-12-16 LAB
ALBUMIN SERPL BCP-MCNC: 3.9 G/DL (ref 3.5–5.2)
ALP SERPL-CCNC: 67 U/L (ref 55–135)
ALT SERPL W/O P-5'-P-CCNC: 19 U/L (ref 10–44)
ANION GAP SERPL CALC-SCNC: 12 MMOL/L (ref 8–16)
AST SERPL-CCNC: 21 U/L (ref 10–40)
BILIRUB SERPL-MCNC: 0.9 MG/DL (ref 0.1–1)
BUN SERPL-MCNC: 13 MG/DL (ref 8–23)
CALCIUM SERPL-MCNC: 10.3 MG/DL (ref 8.7–10.5)
CHLORIDE SERPL-SCNC: 101 MMOL/L (ref 95–110)
CO2 SERPL-SCNC: 29 MMOL/L (ref 23–29)
CREAT SERPL-MCNC: 0.9 MG/DL (ref 0.5–1.4)
ERYTHROCYTE [DISTWIDTH] IN BLOOD BY AUTOMATED COUNT: 13.6 % (ref 11.5–14.5)
EST. GFR  (NO RACE VARIABLE): >60 ML/MIN/1.73 M^2
ESTIMATED AVG GLUCOSE: 123 MG/DL (ref 68–131)
GLUCOSE SERPL-MCNC: 92 MG/DL (ref 70–110)
HBA1C MFR BLD: 5.9 % (ref 4–5.6)
HCT VFR BLD AUTO: 41.6 % (ref 37–48.5)
HGB BLD-MCNC: 13.5 G/DL (ref 12–16)
MCH RBC QN AUTO: 29.9 PG (ref 27–31)
MCHC RBC AUTO-ENTMCNC: 32.5 G/DL (ref 32–36)
MCV RBC AUTO: 92 FL (ref 82–98)
PLATELET # BLD AUTO: 328 K/UL (ref 150–450)
PMV BLD AUTO: 9.6 FL (ref 9.2–12.9)
POCT GLUCOSE: 89 MG/DL (ref 70–110)
POTASSIUM SERPL-SCNC: 3.1 MMOL/L (ref 3.5–5.1)
PROT SERPL-MCNC: 7.8 G/DL (ref 6–8.4)
RBC # BLD AUTO: 4.51 M/UL (ref 4–5.4)
SODIUM SERPL-SCNC: 142 MMOL/L (ref 136–145)
WBC # BLD AUTO: 6.94 K/UL (ref 3.9–12.7)

## 2022-12-16 PROCEDURE — 99204 PR OFFICE/OUTPT VISIT, NEW, LEVL IV, 45-59 MIN: ICD-10-PCS | Mod: HCWC,,, | Performed by: INTERNAL MEDICINE

## 2022-12-16 PROCEDURE — 80053 COMPREHEN METABOLIC PANEL: CPT | Mod: HCWC | Performed by: STUDENT IN AN ORGANIZED HEALTH CARE EDUCATION/TRAINING PROGRAM

## 2022-12-16 PROCEDURE — 83036 HEMOGLOBIN GLYCOSYLATED A1C: CPT | Mod: HCWC | Performed by: STUDENT IN AN ORGANIZED HEALTH CARE EDUCATION/TRAINING PROGRAM

## 2022-12-16 PROCEDURE — 25000003 PHARM REV CODE 250: Performed by: STUDENT IN AN ORGANIZED HEALTH CARE EDUCATION/TRAINING PROGRAM

## 2022-12-16 PROCEDURE — 92610 EVALUATE SWALLOWING FUNCTION: CPT

## 2022-12-16 PROCEDURE — 97535 SELF CARE MNGMENT TRAINING: CPT

## 2022-12-16 PROCEDURE — 96374 THER/PROPH/DIAG INJ IV PUSH: CPT

## 2022-12-16 PROCEDURE — 85027 COMPLETE CBC AUTOMATED: CPT | Mod: HCWC | Performed by: STUDENT IN AN ORGANIZED HEALTH CARE EDUCATION/TRAINING PROGRAM

## 2022-12-16 PROCEDURE — 99204 OFFICE O/P NEW MOD 45 MIN: CPT | Mod: HCWC,,, | Performed by: INTERNAL MEDICINE

## 2022-12-16 PROCEDURE — G0378 HOSPITAL OBSERVATION PER HR: HCPCS | Mod: HCWC

## 2022-12-16 PROCEDURE — 63600175 PHARM REV CODE 636 W HCPCS: Mod: HCWC | Performed by: STUDENT IN AN ORGANIZED HEALTH CARE EDUCATION/TRAINING PROGRAM

## 2022-12-16 PROCEDURE — 96376 TX/PRO/DX INJ SAME DRUG ADON: CPT

## 2022-12-16 PROCEDURE — 96372 THER/PROPH/DIAG INJ SC/IM: CPT | Performed by: STUDENT IN AN ORGANIZED HEALTH CARE EDUCATION/TRAINING PROGRAM

## 2022-12-16 PROCEDURE — 63600175 PHARM REV CODE 636 W HCPCS

## 2022-12-16 PROCEDURE — 36415 COLL VENOUS BLD VENIPUNCTURE: CPT | Mod: HCWC | Performed by: STUDENT IN AN ORGANIZED HEALTH CARE EDUCATION/TRAINING PROGRAM

## 2022-12-16 PROCEDURE — 99900035 HC TECH TIME PER 15 MIN (STAT): Mod: HCWC

## 2022-12-16 PROCEDURE — 94761 N-INVAS EAR/PLS OXIMETRY MLT: CPT

## 2022-12-16 RX ORDER — POTASSIUM CHLORIDE 7.45 MG/ML
10 INJECTION INTRAVENOUS
Status: COMPLETED | OUTPATIENT
Start: 2022-12-16 | End: 2022-12-16

## 2022-12-16 RX ORDER — AMLODIPINE BESYLATE 5 MG/1
10 TABLET ORAL DAILY
Status: DISCONTINUED | OUTPATIENT
Start: 2022-12-16 | End: 2022-12-16 | Stop reason: HOSPADM

## 2022-12-16 RX ADMIN — AMLODIPINE BESYLATE 10 MG: 5 TABLET ORAL at 04:12

## 2022-12-16 RX ADMIN — ATORVASTATIN CALCIUM 80 MG: 40 TABLET, FILM COATED ORAL at 09:12

## 2022-12-16 RX ADMIN — CLOPIDOGREL BISULFATE 75 MG: 75 TABLET ORAL at 09:12

## 2022-12-16 RX ADMIN — ENOXAPARIN SODIUM 40 MG: 40 INJECTION SUBCUTANEOUS at 04:12

## 2022-12-16 RX ADMIN — POTASSIUM CHLORIDE 10 MEQ: 7.46 INJECTION, SOLUTION INTRAVENOUS at 10:12

## 2022-12-16 RX ADMIN — POTASSIUM CHLORIDE 10 MEQ: 7.46 INJECTION, SOLUTION INTRAVENOUS at 09:12

## 2022-12-16 NOTE — H&P
[facility] Medicine H&P Note     Admitting Team: [facility] Hospitalist Team B  Attending Physician: Benjie Watt   Resident: Alejandro  Intern: Mak     Date of Admit: 12/15/2022    Chief Complaint     Difficulty swallowing for 2 days     Subjective:      History of Present Illness:  Margarita Chávez is an 87 year old female with PMH of MCA aneurysm, CVA in 2020, HTN, and arthritis who presents to [facility] [place] on 12/15/22 for a primary complaint of dysphagia for 2 days.     Patient was in her usual state of health until yesterday afternoon when she noticed she couldn't swallow while eating lunch. She had no pain on swallowing but felt as though both solids and liquids were getting stuck and she had to spit them back out. She has not had any associated nausea, vomiting, or abdominal pain. She notes one other time this happened about a month ago that resolved on its own. She has no other symptoms, denies dizziness, changes in vision, or new onset headaches.       Past Medical History:  Past Medical History:   Diagnosis Date    Aneurysm of middle cerebral artery     Arthritis     Hypertension     Stroke     TIA (transient ischemic attack)        Past Surgical History:  Past Surgical History:   Procedure Laterality Date    BRAIN SURGERY      CEREBRAL ANGIOGRAM N/A 8/19/2020    Procedure: ANGIOGRAM-CEREBRAL;  Surgeon: [facility] Diagnostic Provider;  Location: [facility] OR [address] [place] [address];  Service: Radiology;  Laterality: N/A;   / ANDRÉS    HYSTERECTOMY         Allergies:  Review of patient's allergies indicates:   Allergen Reactions    Penicillin     Penicillins Nausea And Vomiting       Home Medications:  Current Outpatient Medications   Medication Instructions    amLODIPine (NORVASC) 10 MG tablet Take 1 tablet (10mg total) by mouth once daily for blood pressure    aspirin 81 mg, Oral, Daily    atorvastatin (LIPITOR) 80 MG tablet Take 1 tablet (80 mg) by mouth once daily for cholesterol    clopidogreL (PLAVIX) 75 mg tablet Take 1 tablet  (75 mg total) by mouth once a day    docusate sodium (COLACE) 100 mg, Oral, 2 times daily    estradioL (ESTRACE) 1 g, Vaginal, Twice weekly    flu vac  [id number],PF, (FLUAD QUAD -,65Y UP,,PF,) 60 mcg (15 mcg x 4)/0.5 mL Syrg Intramuscular    hydroCHLOROthiazide (HYDRODIURIL) 12.5 MG Tab Take 2 tablets (25 mg total) by mouth daily in the morning    lisinopriL (PRINIVIL,ZESTRIL) 20 mg, Oral, Daily    polyethylene glycol (GLYCOLAX) 17 g, Oral, Daily         Social History:  Social History     Tobacco Use    Smoking status: Never    Smokeless tobacco: Never   Substance Use Topics    Alcohol use: Never    Drug use: Never       Review of Systems:  Pertinent items are noted in HPI. All other systems are reviewed and are negative.     Objective:   Last 24 Hour Vital Signs:  BP  Min: 128/76  Max: 128/76  Temp  Av.2 °F (36.8 °C)  Min: 98.2 °F (36.8 °C)  Max: 98.2 °F (36.8 °C)  Pulse  Av  Min: 101  Max: 101  Resp  Av  Min: 18  Max: 18  SpO2  Av %  Min: 99 %  Max: 99 %  There is no height or weight on file to calculate BMI.  No intake/output data recorded.    Physical Examination:  Examination  General: Patient sitting comfortably in NAD  Head: normocephalic, atraumatic  Eyes: PERRL, EOMI, no conjunctival injections or icterus  Mouth: MMM, posterior oropharynx without erythema  Neck: No thyromegaly or masses   Cardiac: tachycardic, regular rhythm, no murmurs appreciated, no extra heart sounds  Pulmonary/Chest: CTAB, symmetric chest rise, no wheezing or crackles  GI: Soft, non tender, non distended, normoactive bowel sounds  Extremities: no edema, clubbing, or cyanosis  Skin: dry, warm, intact. No bruising or rashes.  Neuro: Alert and oriented, cranial nerves intact, 5/5 strength in all extremities, normal finger to nose       Laboratory:  Most Recent Data:  CBC:   Lab Results   Component Value Date    WBC 7.72 12/15/2022    HGB 14.0 12/15/2022    HCT 43.0 12/15/2022     12/15/2022     MCV 92 12/15/2022    RDW 13.4 12/15/2022     BMP:   Lab Results   Component Value Date     12/15/2022    K 3.7 12/15/2022     12/15/2022    CO2 28 12/15/2022    BUN 17 12/15/2022    CREATININE 1.1 12/15/2022     (H) 12/15/2022    CALCIUM 11.0 (H) 12/15/2022    MG 2.4 12/15/2022    PHOS 3.6 07/17/2020     LFTs:   Lab Results   Component Value Date    PROT 8.1 12/15/2022    ALBUMIN 4.5 12/15/2022    BILITOT 0.7 12/15/2022    AST 19 12/15/2022    ALKPHOS 74 12/15/2022    ALT 15 12/15/2022     Coags:   Lab Results   Component Value Date    INR 1.1 12/15/2022     FLP:   Lab Results   Component Value Date    CHOL 161 12/15/2022    HDL 63 12/15/2022    LDLCALC 83.0 12/15/2022    TRIG 75 12/15/2022    CHOLHDL 39.1 12/15/2022     DM:   Lab Results   Component Value Date    HGBA1C 5.8 (H) 06/17/2020    LDLCALC 83.0 12/15/2022    CREATININE 1.1 12/15/2022     Thyroid:   Lab Results   Component Value Date    TSH 0.272 (L) 12/15/2022    FREET4 1.15 12/15/2022     Anemia:   Lab Results   Component Value Date    [id number] 436 06/17/2020    FOLATE 15.1 06/17/2020     Cardiac:   Lab Results   Component Value Date    TROPONINI 0.013 12/15/2022     Urinalysis:   Lab Results   Component Value Date    LABURIN ESCHERICHIA COLI  >100,000 cfu/ml   (A) 05/24/2022    COLORU Straw 07/01/2020    SPECGRAV 1.010 07/01/2020    NITRITE Negative 07/01/2020    KETONESU Negative 07/01/2020    UROBILINOGEN Negative 06/18/2020    WBCUA 3 07/01/2020       Radiology:  CT Head 12/15  Calcified MCA aneurysm in the sylvian fissure on the right appear stable.  Involutional and chronic small vessel changes throughout the brain with low density throughout the deep and subcortical white matter as well as wedge-shaped infarct in the posterior left cerebellar hemisphere and low densities in the internal capsule anteriorly on the right and basal ganglia on the left appear stable.     There is no new loss of gray-white matter junction  differentiation, mass or mass effect.  Craniotomy changes with hardware in the right temporal frontal region appear stable.  The calvarium is otherwise intact.  Orbits and orbital contents appear unremarkable.  Is left sphenoid sinus opacity at the sphenoid ethmoid ostia is present.  Mastoids are clear.  Orbits are unremarkable.     Assessment:     Margarita Chávez is an 87 year old female with PMH of R MCA aneurysm, prior CVA with residual R sided hemiparesis, and HTN who presented to [facility] on 12/15/22 for a primary complaint of dysphagia.     Plan:     Dysphagia   - patient reports 2 days of difficulty swallowing  - denies pain, nausea, vomiting, hx of GERD  - multinodular goiter noted in chart, not felt on exam   - TSH 0.272, subclinical   - has had one other instance similar to this about 1 month ago that resolved on its own   - suspect etiology is esophageal spasms   - will get speech to evaluate in the morning   - pending speech eval, may consider barium swallow study     HTN  - normotensive on presentation  - will hold antihypertensives for now     Hx MCA aneurysm   - stable on head CT     Prior CVA (2020)  - continue home plavix when she can tolerate PO       Code Status: Full   DVT Prophylaxis: lovenox  Diet: NPO  Disposition: pending speech eval, possible GI consult     Nel Corado MD   [facility] Neurology Resident, -I    [facility] Medicine Hospitalist Pager numbers:   U Hospitalist Medicine Team A (Fuad/Kathe): [phone]  U Hospitalist Medicine Team B (Nayana/Alysa):  [phone]

## 2022-12-16 NOTE — NURSING
Nursing bedside swallow eval complete. No s/s of choking on thin liquids puree or solid. Pt family to bedside.

## 2022-12-16 NOTE — PLAN OF CARE
12/15/22 2102   Admission   Initial VN Admission Questions Complete   Communication Issues? None   Shift   Virtual Nurse - Rounding Complete   Pain Management Interventions pain management plan reviewed with patient/caregiver   Virtual Nurse - Patient Verbalized Approval Of Camera Use;VN Rounding   Type of Frequent Check   Type Patient Rounds   Safety/Activity   Patient Rounds bed in low position;call light in patient/parent reach;placement of personal items at bedside;clutter free environment maintained;visualized patient   Safety Promotion/Fall Prevention medications reviewed;Fall Risk reviewed with patient/family   Positioning   Body Position sitting up in bed     Pt arrived to unit. Introduced self as VN for this shift. Admission questions completed by VN. Educated pt and family on VTE risk, safety precautions, and VN's role in pt care. Opportunity given for questions. All questions answered.

## 2022-12-16 NOTE — CONSULTS
[facility] - Telemetry  Gastroenterology  Consult Note    Patient Name: Margarita Chávez  MRN: [id number]  Admission Date: 12/15/2022  Hospital Length of Stay: 0 days  Code Status: Full Code    Attending Provider: Benjie Watt MD   Consulting Provider: Santos Hassan MD  Primary Care Physician: Syeda Grace MD  Principal Problem:Dysphagia    Inpatient consult to Gastroenterology-[facility]  Consult performed by: Santos Hassan MD  Consult ordered by: Pauline Allen MD        Subjective:     HPI:  89-year-old female past medical history significant for MCA aneurysm, CVA, hypertension, and arthritis who presents the emergency department on 12/15 complaints of dysphagia for 2 days.  Symptoms seem to be self-limiting and she was cleared by speech, however currently this is a recurring issue.  Patient denies weight loss.  Denies odynophagia.  Denies prior endoscopy.  Son at bedside      Past Medical History:   Diagnosis Date    Aneurysm of middle cerebral artery     Arthritis     Hypertension     Stroke     TIA (transient ischemic attack)        Past Surgical History:   Procedure Laterality Date    BRAIN SURGERY      CEREBRAL ANGIOGRAM N/A 8/19/2020    Procedure: ANGIOGRAM-CEREBRAL;  Surgeon: [facility] Diagnostic Provider;  Location: [facility] OR [address] [place] [address];  Service: Radiology;  Laterality: N/A;   / ANDRÉS    HYSTERECTOMY         Review of patient's allergies indicates:   Allergen Reactions    Penicillin     Penicillins Nausea And Vomiting     Family History       Problem Relation (Age of Onset)    Cancer Paternal Aunt          Tobacco Use    Smoking status: Never    Smokeless tobacco: Never   Substance and Sexual Activity    Alcohol use: Never    Drug use: Never    Sexual activity: Not Currently     Review of Systems   Constitutional:  Negative for chills, fever and unexpected weight change.   HENT:  Positive for trouble swallowing. Negative for congestion.    Eyes:  Negative for photophobia and  visual disturbance.   Respiratory:  Negative for cough and shortness of breath.    Cardiovascular:  Negative for chest pain and leg swelling.   Gastrointestinal:  Negative for abdominal distention, abdominal pain, blood in stool, constipation, diarrhea, nausea and vomiting.   Genitourinary:  Negative for dysuria and hematuria.   Musculoskeletal:  Negative for arthralgias and myalgias.   Skin:  Negative for color change and rash.   Neurological:  Negative for dizziness, light-headedness and numbness.   Psychiatric/Behavioral:  Negative for agitation and confusion.    Objective:     Vital Signs (Most Recent):  Temp: 98.5 °F (36.9 °C) (12/16/22 1101)  Pulse: 86 (12/16/22 1145)  Resp: 18 (12/16/22 1101)  BP: 134/62 (12/16/22 1101)  SpO2: 95 % (12/16/22 1249) Vital Signs (24h Range):  Temp:  [97.7 °F (36.5 °C)-98.6 °F (37 °C)] 98.5 °F (36.9 °C)  Pulse:  [] 86  Resp:  [16-18] 18  SpO2:  [94 %-98 %] 95 %  BP: (134-148)/(62-81) 134/62     Weight: 82.3 kg (181 lb 7 oz) (12/15/22 2042)  Body mass index is 30.19 kg/m².      Intake/Output Summary (Last 24 hours) at 12/16/2022 1539  Last data filed at 12/16/2022 0938  Gross per 24 hour   Intake 50 ml   Output --   Net 50 ml       Lines/Drains/Airways       Peripheral Intravenous Line  Duration                  Peripheral IV - Single Lumen 12/15/22 1854 20 G Right Antecubital <1 day                    Physical Exam  Vitals and nursing note reviewed.   Constitutional:       Appearance: She is well-developed.   HENT:      Head: Normocephalic and atraumatic.   Eyes:      General: No scleral icterus.     Pupils: Pupils are equal, round, and reactive to light.   Cardiovascular:      Rate and Rhythm: Normal rate and regular rhythm.      Heart sounds: Normal heart sounds.   Pulmonary:      Effort: Pulmonary effort is normal. No respiratory distress.      Breath sounds: Normal breath sounds.   Abdominal:      General: Bowel sounds are normal. There is no distension.       Palpations: Abdomen is soft.      Tenderness: There is no abdominal tenderness.   Musculoskeletal:         General: Normal range of motion.      Cervical back: Normal range of motion and neck supple.   Skin:     General: Skin is warm and dry.      Findings: No erythema or rash.   Neurological:      Mental Status: She is alert and oriented to person, place, and time.      Comments: No asterixis   Psychiatric:         Behavior: Behavior normal.       Significant Labs:  Recent Lab Results  (Last 5 results in the past 24 hours)        12/16/22  0436   12/16/22  0435   12/15/22  1637   12/15/22  1636   12/15/22  1615        POC Molecular Influenza A Ag       Negative         POC Molecular Influenza B Ag       Negative         Albumin 3.9         4.5       Alkaline Phosphatase 67         74       ALT 19         15       ANION GAP 12         13       AST 21         19       Baso #         0.04       Basophil %         0.5       BILIRUBIN TOTAL 0.9  Comment: For infants and newborns, interpretation of results should be based  on gestational age, weight and in agreement with clinical  observations.    Premature Infant recommended reference ranges:  Up to 24 hours.............<8.0 mg/dL  Up to 48 hours............<12.0 mg/dL  3-5 days..................<15.0 mg/dL  6-29 days.................<15.0 mg/dL           0.7  Comment: For infants and newborns, interpretation of results should be based  on gestational age, weight and in agreement with clinical  observations.    Premature Infant recommended reference ranges:  Up to 24 hours.............<8.0 mg/dL  Up to 48 hours............<12.0 mg/dL  3-5 days..................<15.0 mg/dL  6-29 days.................<15.0 mg/dL         BUN 13         17       Calcium 10.3         11.0       Chloride 101         101       Cholesterol         161  Comment: The National Cholesterol Education Program (NCEP) has set the  following guidelines (reference ranges) for  Cholesterol:  Optimal.....................<200 mg/dL  Borderline High.............200-239 mg/dL  High........................> or = 240 mg/dL         CO2 29         28       Creatinine 0.9         1.1       Differential Method         Automated       eGFR >60         49       Eos #         0.0       Eosinophil %         0.3       Estimated Avg Glucose 123               Free T4         1.15       Glucose 92         150       Gran # (ANC)         5.7       Gran %         74.3       HDL         63  Comment: The National Cholesterol Education Program (NCEP) has set the  following guidelines (reference values) for HDL Cholesterol:  Low...............<40 mg/dL  Optimal...........>60 mg/dL         HDL/Cholesterol Ratio         39.1       HEMATOCRIT 41.6         43.0       HEMOGLOBIN 13.5         14.0       Hemoglobin A1C External 5.9  Comment: ADA Screening Guidelines:  5.7-6.4%  Consistent with prediabetes  >or=6.5%  Consistent with diabetes    High levels of fetal hemoglobin interfere with the HbA1C  assay. Heterozygous hemoglobin variants (HbS, HgC, etc)do  not significantly interfere with this assay.   However, presence of multiple variants may affect accuracy.                 Immature Grans (Abs)         0.04  Comment: Mild elevation in immature granulocytes is non specific and   can be seen in a variety of conditions including stress response,   acute inflammation, trauma and pregnancy. Correlation with other   laboratory and clinical findings is essential.         Immature Granulocytes         0.5       INR         1.1  Comment: Coumadin Therapy:  2.0 - 3.0 for INR for all indicators except mechanical heart valves  and antiphospholipid syndromes which should use 2.5 - 3.5.  LOT^040^PT Inn^[id number]         LDL Cholesterol External         83.0  Comment: The National Cholesterol Education Program (NCEP) has set the  following guidelines (reference values) for LDL Cholesterol:  Optimal.......................<130  mg/dL  Borderline High...............130-159 mg/dL  High..........................160-189 mg/dL  Very High.....................>190 mg/dL         Lymph #         1.4       Lymph %         18.3       Magnesium         2.4       MCH 29.9         29.9       MCHC 32.5         32.6       MCV 92         92       Mono #         0.5       Mono %         6.1       MPV 9.6         9.6       Non-HDL Cholesterol         98  Comment: Risk category and Non-HDL cholesterol goals:  Coronary heart disease (CHD)or equivalent (10-year risk of CHD >20%):  Non-HDL cholesterol goal     <130 mg/dL  Two or more CHD risk factors and 10-year risk of CHD <= 20%:  Non-HDL cholesterol goal     <160 mg/dL  0 to 1 CHD risk factor:  Non-HDL cholesterol goal     <190 mg/dL         nRBC         0       Platelets 328         357       POCT Glucose   89             Potassium 3.1         3.7       PROTEIN TOTAL 7.8         8.1       Protime         11.2        Acceptable     Yes   Yes         RBC 4.51         4.68       RDW 13.6         13.4       SARS-CoV-2 RNA, Amplification, Qual     Negative           Sodium 142         142       Total Cholesterol/HDL Ratio         2.6       Triglycerides         75  Comment: The National Cholesterol Education Program (NCEP) has set the  following guidelines (reference values) for triglycerides:  Normal......................<150 mg/dL  Borderline High.............150-199 mg/dL  High........................200-499 mg/dL         Troponin I         0.013  Comment: The reference interval for Troponin I represents the 99th percentile   cutoff   for our facility and is consistent with 3rd generation assay   performance.         TSH         0.272       WBC 6.94         7.72                              Significant Imaging:  Imaging results within the past 24 hours have been reviewed.    Assessment/Plan:     * Dysphagia  Intermittent, now improved  Plan for outpatient workup/EGD  Okay to discharge from GI  perspective   Advance diet        Thank you for your consult. I will follow-up with patient. Please contact us if you have any additional questions.    Santos Hassan MD  Gastroenterology  [facility]

## 2022-12-16 NOTE — PLAN OF CARE
SW met with pt via [facility] to complete assessment. Pt is currently sleeping in bed with adult son at bedside. Pt adult son woke pt up to listen to assessment. Pt is reported to live at home alone with private paid caregivers. Pt son reports to have a caregiver in the morning to bathe pt and assist in getting her out of bed then another caregiver sits with pt from 11-6pm daily. Pt is reported to ambulate with assistance of walker or power chair. Pt does not drive. Pt has support of family. At time of discharge pt to be transported home by adult son at bedside. SW updated whiteboard with [facility] name and contact information. SW confirmed pt understanding of Observation unit and expected discharge plan. SW will continue to follow pt throughout care and assist with any discharge needs.         12/16/22 8956   Discharge Planning   Assessment Type Discharge Planning Brief Assessment   Resource/Environmental Concerns none   Support Systems Children;Family members   Equipment Currently Used at Home power chair;walker, rolling   Current Living Arrangements home   Patient/Family Anticipates Transition to home with help/services;home   Patient/Family Anticipated Services at Transition none   DME Needed Upon Discharge  none   Discharge Plan A Home       Future Appointments   Date Time Provider Department Center   12/20/2022 11:00 AM [facility] ODC XR-B LIMIT 500 LBS [facility] XRAY OP [facility]   12/28/2022  1:30 PM Mary Kenney NP [facility] GASTRO [facility]     TRISHA Palacio Case Management  [phone]

## 2022-12-16 NOTE — ASSESSMENT & PLAN NOTE
Intermittent, now improved  Plan for outpatient workup/EGD  Okay to discharge from GI perspective   Advance diet

## 2022-12-16 NOTE — PT/OT/SLP EVAL
Speech Language Pathology Evaluation  Bedside Swallow    Patient Name:  Margarita Chávez   MRN:  [id number]  Admitting Diagnosis: Dysphagia    Recommendations:                 General Recommendations:  GI evaluation and Modified barium swallow study  Diet recommendations:  Regular Diet - IDDSI Level 7, Thin liquids - IDDSI Level 0   Aspiration Precautions: 1 bite/sip at a time, Alternating bites/sips, Frequent oral care, HOB to 90 degrees, Meds whole 1 at a time, Monitor for s/s of aspiration, Remain upright 30 minutes post meal, Small bites/sips, and Standard aspiration precautions   General Precautions: Standard, aspiration  Communication strategies:  none    History per MD:   Margarita Chávez is an 87 year old female with PMH of MCA aneurysm, CVA in 2020, HTN, and arthritis who presents to [facility] on 12/15/22 for a primary complaint of dysphagia for 2 days.      Patient was in her usual state of health until yesterday afternoon when she noticed she couldn't swallow while eating lunch. She had no pain on swallowing but felt as though both solids and liquids were getting stuck and she had to spit them back out. She has not had any associated nausea, vomiting, or abdominal pain. She notes one other time this happened about a month ago that resolved on its own. She has no other symptoms, denies dizziness, changes in vision, or new onset headaches.     Past Medical History:   Diagnosis Date    Aneurysm of middle cerebral artery     Arthritis     Hypertension     Stroke     TIA (transient ischemic attack)        Past Surgical History:   Procedure Laterality Date    BRAIN SURGERY      CEREBRAL ANGIOGRAM N/A 8/19/2020    Procedure: ANGIOGRAM-CEREBRAL;  [facility];  Location: [facility] OR [address] [place] [address];  Service: Radiology;  Laterality: N/A;   / ANDRÉS    HYSTERECTOMY         Prior Intubation HX:  n/a    Modified Barium Swallow: n/a    Chest X-Rays: Chronic appearing interstitial findings, no large  focal consolidation.     CT HEAD WITHOUT CONTRAST: Stable chronic changes in the brain with small-vessel disease and remote cortical and lacunar infarcts.     No CT evidence of acute hemorrhage, mass or infarction.     Large calcified MCA aneurysm unchanged.     Prior diet: The International Dysphagia Diet Standardisation Initiative 0/ The International Dysphagia Diet Standardisation Initiative 7 .    Occupation/hobbies/homemaking: Patient is retired and functioning at the level of modified independence. She has strong family support.     Subjective   Patient awake and alert and oriented x4 with her son, Clarke, at bedside.   Patient goals: Home      Pain/Comfort:  Pain Rating 1: 0/10    Respiratory Status: Room air    Objective:     Oral Musculature Evaluation  Oral Musculature: WFL  Secretion Management: adequate  Mucosal Quality: good  Mandibular Strength and Mobility: WFL  Oral Labial Strength and Mobility: impaired retraction  Lingual Strength and Mobility: WFL  Velar Elevation: WFL  Buccal Strength and Mobility: decreased tone  Volitional Cough: present  Volitional Swallow: able to palpate at bedside  Voice Prior to PO Intake: clear and strong    Bedside Swallow Eval:   Consistencies Assessed:  Thin liquids x5  Puree x3  Solids x3      Oral Phase:   Oral residue: Mild oral residue with solids. Not cleared with thin liquid wash.     Pharyngeal Phase:   no overt clinical signs/symptoms of aspiration  no overt clinical signs/symptoms of pharyngeal dysphagia    Compensatory Strategies  None    Treatment: n/a    Assessment:     Margarita Chávez is a 87 y.o. female with an admitting diagnosis of Dysphagia. Patient presents with no overt signs and symptoms of aspiration or penetration at bedside. Patient denies having swallowing difficulties following her TIA, stroke, and aneurysm of the middle cerebral artery. Patient's sister reported to MD (per MD note) that the patient experienced an acute episode of blurry vision  "that lasted a few hours about six weeks ago. Patient reports that she ate a carrot and drank some [employer] at home and "they wouldn't go down." Patient reports that the solid and liquid "got stuck" at the laryngeal level and because they would not travel further down her esophagus, she regurgitated them to clear. Patient reports she has not had an objective swallow test. Patient denies any pulmonary diagnoses, cancer, or surgical history that would impact the swallow (ACDF, Nissen). Patient reports that she normally has a bowel movement at least once a day, but at the same time that she experienced her swallowing difficulties, she had missed a day or two and had not had a bowel movement. Patient denies any gastroenterology diagnoses. Suspect possible esophageal component impacting her swallow function. Educated patient and her son, Clarke, on aspiration precautions, overt signs and symptoms of penetration and aspiration to monitor patient for, the pillars of pneumonia, modified barium swallow study, and the plan to refer to gastroenterology outpatient services per patient's care team. Patient does not required any further inpatient speech therapy services at this time, but would benefit from outpatient modified barium swallow study to rule out silent penetration and silent aspiration.      Goals:   Multidisciplinary Problems       SLP Goals       Not on file                    Plan:     Patient to be seen:      Plan of Care expires:     Plan of Care reviewed with:  patient, son, other (see comments) (Clarke)   SLP Follow-Up:  No       Discharge recommendations:  home   Barriers to Discharge:  None    Time Tracking:     SLP Treatment Date:   12/16/22  Speech Start Time:  0850  Speech Stop Time:  0922     Speech Total Time (min):  32 min    Billable Minutes: Eval Swallow and Oral Function 8 and Self Care/Home Management Training 14    12/16/2022         "

## 2022-12-16 NOTE — PHARMACY MED REC
"    [facility]           Pharmacy  Admission Medication History     The home medication history was taken by Queta Chris.      Medication history obtained from Medications listed below were obtained from: Patient/family    Based on information gathered for medication list, you may go to "Admission" then "Reconcile Home Medications" tabs to review and/or act upon those items.     The home medication list has been updated by the Pharmacy department.   Please read ALL comments highlighted in yellow.   Please address this information as you see fit.    Feel free to contact us if you have any questions or require assistance.    The medications listed below were removed from the home medication list.  Please reorder if appropriate:    Patient reports NOT TAKING the following medication(s):  Zyrtec 10mg  Pepcid 20mg  Percocet 5-325mg  Ultram 50mg    No current facility-administered medications on file prior to encounter.     Current Outpatient Medications on File Prior to Encounter   Medication Sig Dispense Refill    amLODIPine (NORVASC) 10 MG tablet Take 1 tablet (10mg total) by mouth once daily for blood pressure 90 tablet 5    aspirin 81 MG Chew Take 81 mg by mouth once daily.      atorvastatin (LIPITOR) 80 MG tablet Take 1 tablet (80 mg) by mouth once daily for cholesterol 90 tablet 5    clopidogreL (PLAVIX) 75 mg tablet Take 1 tablet (75 mg total) by mouth once a day 90 tablet 5    docusate sodium (COLACE) 100 MG capsule Take 100 mg by mouth 2 (two) times daily.      estradioL (ESTRACE) 0.01 % (0.1 mg/gram) vaginal cream Place 1 g vaginally twice a week. 42.5 g 3    polyethylene glycol (GLYCOLAX) 17 gram/dose powder Take 17 g by mouth once daily.      flu vac 2022 [id number],PF, (FLUAD QUAD 2022-23,65Y UP,,PF,) 60 mcg (15 mcg x 4)/0.5 mL Syrg Inject into the muscle. 0.5 mL 0    hydroCHLOROthiazide (HYDRODIURIL) 12.5 MG Tab Take 2 tablets (25 mg total) by mouth daily in the morning (Patient " not taking: Reported on 12/15/2022) 180 tablet 5    lisinopriL (PRINIVIL,ZESTRIL) 20 MG tablet Take 1 tablet (20 mg total) by mouth once daily. (Patient not taking: Reported on 12/15/2022) 90 tablet 3       Please address this information as you see fit.  Feel free to contact us if you have any questions or require assistance.    Queta Chris  [phone]              .

## 2022-12-16 NOTE — PLAN OF CARE
D/C recs noted. Pt to have GI follow up. Pharmacist will go over home medications and reasons for medications. VN and bedside nurse to reiterate final discharge instructions.       At time of discharge pt will be transported home by son at bedside.    DME at discharge: none  Home Health: none- pt has family and paid caregiver support.    Pt has follow up appointments added to AVS.         12/16/22 1619   Final Note   Assessment Type Final Discharge Note   Anticipated Discharge Disposition Home   What phone number can be called within the next 1-3 days to see how you are doing after discharge? [phone]   Hospital Resources/Appts/Education Provided Appointments scheduled and added to AVS   Post-Acute Status   Discharge Delays None known at this time       Future Appointments   Date Time Provider Department Center   12/20/2022 11:00 AM [facility] XR-B LIMIT 500 LBS [facility] XRAY OP [facility]   12/28/2022  1:30 PM Mary Kenney, ANGELICA [facility] GASTRO [facility]     TRISHA Palacio Case Management  [phone]

## 2022-12-16 NOTE — HPI
89-year-old female past medical history significant for MCA aneurysm, CVA, hypertension, and arthritis who presents the emergency department on 12/15 complaints of dysphagia for 2 days.  Symptoms seem to be self-limiting and she was cleared by speech, however currently this is a recurring issue.  Patient denies weight loss.  Denies odynophagia.  Denies prior endoscopy.  Son at bedside

## 2022-12-16 NOTE — SUBJECTIVE & OBJECTIVE
Past Medical History:   Diagnosis Date    Aneurysm of middle cerebral artery     Arthritis     Hypertension     Stroke     TIA (transient ischemic attack)        Past Surgical History:   Procedure Laterality Date    BRAIN SURGERY      CEREBRAL ANGIOGRAM N/A 8/19/2020    Procedure: ANGIOGRAM-CEREBRAL;  Surgeon: [facility] Diagnostic Provider;  Location: [facility] OR [address] [place] [address];  Service: Radiology;  Laterality: N/A;   / ANDRÉS    HYSTERECTOMY         Review of patient's allergies indicates:   Allergen Reactions    Penicillin     Penicillins Nausea And Vomiting     Family History       Problem Relation (Age of Onset)    Cancer Paternal Aunt          Tobacco Use    Smoking status: Never    Smokeless tobacco: Never   Substance and Sexual Activity    Alcohol use: Never    Drug use: Never    Sexual activity: Not Currently     Review of Systems   Constitutional:  Negative for chills, fever and unexpected weight change.   HENT:  Positive for trouble swallowing. Negative for congestion.    Eyes:  Negative for photophobia and visual disturbance.   Respiratory:  Negative for cough and shortness of breath.    Cardiovascular:  Negative for chest pain and leg swelling.   Gastrointestinal:  Negative for abdominal distention, abdominal pain, blood in stool, constipation, diarrhea, nausea and vomiting.   Genitourinary:  Negative for dysuria and hematuria.   Musculoskeletal:  Negative for arthralgias and myalgias.   Skin:  Negative for color change and rash.   Neurological:  Negative for dizziness, light-headedness and numbness.   Psychiatric/Behavioral:  Negative for agitation and confusion.    Objective:     Vital Signs (Most Recent):  Temp: 98.5 °F (36.9 °C) (12/16/22 1101)  Pulse: 86 (12/16/22 1145)  Resp: 18 (12/16/22 1101)  BP: 134/62 (12/16/22 1101)  SpO2: 95 % (12/16/22 1249) Vital Signs (24h Range):  Temp:  [97.7 °F (36.5 °C)-98.6 °F (37 °C)] 98.5 °F (36.9 °C)  Pulse:  [] 86  Resp:  [16-18] 18  SpO2:  [94 %-98 %] 95 %  BP:  (134-148)/(62-81) 134/62     Weight: 82.3 kg (181 lb 7 oz) (12/15/22 2042)  Body mass index is 30.19 kg/m².      Intake/Output Summary (Last 24 hours) at 12/16/2022 1539  Last data filed at 12/16/2022 0938  Gross per 24 hour   Intake 50 ml   Output --   Net 50 ml       Lines/Drains/Airways       Peripheral Intravenous Line  Duration                  Peripheral IV - Single Lumen 12/15/22 1854 20 G Right Antecubital <1 day                    Physical Exam  Vitals and nursing note reviewed.   Constitutional:       Appearance: She is well-developed.   HENT:      Head: Normocephalic and atraumatic.   Eyes:      General: No scleral icterus.     Pupils: Pupils are equal, round, and reactive to light.   Cardiovascular:      Rate and Rhythm: Normal rate and regular rhythm.      Heart sounds: Normal heart sounds.   Pulmonary:      Effort: Pulmonary effort is normal. No respiratory distress.      Breath sounds: Normal breath sounds.   Abdominal:      General: Bowel sounds are normal. There is no distension.      Palpations: Abdomen is soft.      Tenderness: There is no abdominal tenderness.   Musculoskeletal:         General: Normal range of motion.      Cervical back: Normal range of motion and neck supple.   Skin:     General: Skin is warm and dry.      Findings: No erythema or rash.   Neurological:      Mental Status: She is alert and oriented to person, place, and time.      Comments: No asterixis   Psychiatric:         Behavior: Behavior normal.       Significant Labs:  Recent Lab Results  (Last 5 results in the past 24 hours)        12/16/22  0436   12/16/22  0435   12/15/22  1637   12/15/22  1636   12/15/22  1615        POC Molecular Influenza A Ag       Negative         POC Molecular Influenza B Ag       Negative         Albumin 3.9         4.5       Alkaline Phosphatase 67         74       ALT 19         15       ANION GAP 12         13       AST 21         19       Baso #         0.04       Basophil %         0.5        BILIRUBIN TOTAL 0.9  Comment: For infants and newborns, interpretation of results should be based  on gestational age, weight and in agreement with clinical  observations.    Premature Infant recommended reference ranges:  Up to 24 hours.............<8.0 mg/dL  Up to 48 hours............<12.0 mg/dL  3-5 days..................<15.0 mg/dL  6-29 days.................<15.0 mg/dL           0.7  Comment: For infants and newborns, interpretation of results should be based  on gestational age, weight and in agreement with clinical  observations.    Premature Infant recommended reference ranges:  Up to 24 hours.............<8.0 mg/dL  Up to 48 hours............<12.0 mg/dL  3-5 days..................<15.0 mg/dL  6-29 days.................<15.0 mg/dL         BUN 13         17       Calcium 10.3         11.0       Chloride 101         101       Cholesterol         161  Comment: The National Cholesterol Education Program (NCEP) has set the  following guidelines (reference ranges) for Cholesterol:  Optimal.....................<200 mg/dL  Borderline High.............200-239 mg/dL  High........................> or = 240 mg/dL         CO2 29         28       Creatinine 0.9         1.1       Differential Method         Automated       eGFR >60         49       Eos #         0.0       Eosinophil %         0.3       Estimated Avg Glucose 123               Free T4         1.15       Glucose 92         150       Gran # (ANC)         5.7       Gran %         74.3       HDL         63  Comment: The National Cholesterol Education Program (NCEP) has set the  following guidelines (reference values) for HDL Cholesterol:  Low...............<40 mg/dL  Optimal...........>60 mg/dL         HDL/Cholesterol Ratio         39.1       HEMATOCRIT 41.6         43.0       HEMOGLOBIN 13.5         14.0       Hemoglobin A1C External 5.9  Comment: ADA Screening Guidelines:  5.7-6.4%  Consistent with prediabetes  >or=6.5%  Consistent with diabetes    High  levels of fetal hemoglobin interfere with the HbA1C  assay. Heterozygous hemoglobin variants (HbS, HgC, etc)do  not significantly interfere with this assay.   However, presence of multiple variants may affect accuracy.                 Immature Grans (Abs)         0.04  Comment: Mild elevation in immature granulocytes is non specific and   can be seen in a variety of conditions including stress response,   acute inflammation, trauma and pregnancy. Correlation with other   laboratory and clinical findings is essential.         Immature Granulocytes         0.5       INR         1.1  Comment: Coumadin Therapy:  2.0 - 3.0 for INR for all indicators except mechanical heart valves  and antiphospholipid syndromes which should use 2.5 - 3.5.  LOT^040^PT Inn^818020         LDL Cholesterol External         83.0  Comment: The National Cholesterol Education Program (NCEP) has set the  following guidelines (reference values) for LDL Cholesterol:  Optimal.......................<130 mg/dL  Borderline High...............130-159 mg/dL  High..........................160-189 mg/dL  Very High.....................>190 mg/dL         Lymph #         1.4       Lymph %         18.3       Magnesium         2.4       MCH 29.9         29.9       MCHC 32.5         32.6       MCV 92         92       Mono #         0.5       Mono %         6.1       MPV 9.6         9.6       Non-HDL Cholesterol         98  Comment: Risk category and Non-HDL cholesterol goals:  Coronary heart disease (CHD)or equivalent (10-year risk of CHD >20%):  Non-HDL cholesterol goal     <130 mg/dL  Two or more CHD risk factors and 10-year risk of CHD <= 20%:  Non-HDL cholesterol goal     <160 mg/dL  0 to 1 CHD risk factor:  Non-HDL cholesterol goal     <190 mg/dL         nRBC         0       Platelets 328         357       POCT Glucose   89             Potassium 3.1         3.7       PROTEIN TOTAL 7.8         8.1       Protime         11.2        Acceptable      Yes   Yes         RBC 4.51         4.68       RDW 13.6         13.4       SARS-CoV-2 RNA, Amplification, Qual     Negative           Sodium 142         142       Total Cholesterol/HDL Ratio         2.6       Triglycerides         75  Comment: The National Cholesterol Education Program (NCEP) has set the  following guidelines (reference values) for triglycerides:  Normal......................<150 mg/dL  Borderline High.............150-199 mg/dL  High........................200-499 mg/dL         Troponin I         0.013  Comment: The reference interval for Troponin I represents the 99th percentile   cutoff   for our facility and is consistent with 3rd generation assay   performance.         TSH         0.272       WBC 6.94         7.72                              Significant Imaging:  Imaging results within the past 24 hours have been reviewed.

## 2022-12-16 NOTE — NURSING
1720-- IV and telemetry monitor removed. AVS reviewed with pt and her son; all questions answered and understanding of instructions verbalized. Pt dressed and awaiting wheelchair transport.

## 2022-12-16 NOTE — PROGRESS NOTES
"[facility] Medicine Progress Note    Primary Team: \[facility]\"" Hospitalist Team B  Attending Physician: Benjie Watt MD  Resident: Alejandro  Intern: Mak    Subjective:      No acute events overnight. Patient feeling well this morning, was able to tolerate food with minimal regurgitation. Currently denying any difficulty swallowing.      Objective:     Last 24 Hour Vital Signs:  BP  Min: 128/76  Max: 148/69  Temp  Av.1 °F (36.7 °C)  Min: 97.7 °F (36.5 °C)  Max: 98.6 °F (37 °C)  Pulse  Av  Min: 68  Max: 101  Resp  Av.5  Min: 16  Max: 18  SpO2  Av.9 %  Min: 94 %  Max: 99 %  Height  Av' 5" (165.1 cm)  Min: 5' 5" (165.1 cm)  Max: 5' 5" (165.1 cm)  Weight  Av.1 kg (183 lb 3.5 oz)  Min: 82.3 kg (181 lb 7 oz)  Max: 83.9 kg (185 lb)  No intake/output data recorded.    Physical Examination:  Examination  General: Patient sitting comfortably in NAD  Head: normocephalic, atraumatic  Eyes: PERRL, EOMI, no conjunctival injections or icterus  Mouth: MMM, posterior oropharynx without erythema  Neck: No thyromegaly or masses   Cardiac: regular rate and regular rhythm, no murmurs appreciated, no extra heart sounds  Pulmonary/Chest: CTAB, symmetric chest rise, no wheezing or crackles  GI: Soft, non tender, non distended  Extremities: no edema, clubbing, or cyanosis  Skin: dry, warm, intact. No bruising or rashes.  Neuro: Alert and oriented, cranial nerves intact, 5/5 strength in all extremities, normal finger to nose     Laboratory:  Laboratory Data   Recent Labs   Lab 12/15/22  1615 22  0436   WBC 7.72 6.94   HGB 14.0 13.5   HCT 43.0 41.6    328   MCV 92 92    142   K 3.7 3.1*    101   CO2 28 29   BUN 17 13   CREATININE 1.1 0.9   * 92   CALCIUM 11.0* 10.3   PROT 8.1 7.8   ALBUMIN 4.5 3.9   MG 2.4  --    AST 19 21   ALT 15 19   ALKPHOS 74 67   TROPONINI 0.013  --      Invalid input(s): POCTGLU  Recent Labs   Lab 22  0436   HGBA1C 5.9*       Radiology Data  CT Head 12/15 "   Calcified MCA aneurysm in the sylvian fissure on the right appear stable.  Involutional and chronic small vessel changes throughout the brain with low density throughout the deep and subcortical white matter as well as wedge-shaped infarct in the posterior left cerebellar hemisphere and low densities in the internal capsule anteriorly on the right and basal ganglia on the left appear stable.     There is no new loss of gray-white matter junction differentiation, mass or mass effect.  Craniotomy changes with hardware in the right temporal frontal region appear stable.  The calvarium is otherwise intact.  Orbits and orbital contents appear unremarkable.  Is left sphenoid sinus opacity at the sphenoid ethmoid ostia is present.  Mastoids are clear.  Orbits are unremarkable.      Current Medications:     Infusions:       Scheduled:   atorvastatin  80 mg Oral Daily    clopidogreL  75 mg Oral Daily    enoxaparin  40 mg Subcutaneous Daily        Assessment:     Margarita Chávez is an 87 year old female with PMH of R MCA aneurysm, prior CVA with residual R sided hemiparesis, and HTN who presented to [facility] on 12/15/22 for a primary complaint of dysphagia.     Plan:     Dysphagia   - patient reports 2 days of difficulty swallowing  - denies pain, nausea, vomiting, hx of GERD  - multinodular goiter noted in chart, not felt on exam   - TSH 0.272, subclinical   - has had one other instance similar to this about 1 month ago that resolved on its own   - suspect etiology is esophageal spasms   - speech consulted, patient tolerating regular diet with mild regurgitation   - GI consulted, will see her inpatient      HTN  - normotensive on presentation  - will hold antihypertensives for now      Hx MCA aneurysm   - stable on head CT      Prior CVA (2020)  - continue home plavix         Code Status: Full   DVT Prophylaxis: lovenox  Diet: Regular   Disposition: pending GI recs       Nel Corado MD   [employer] Neurology Resident,  [facility]    [facility] Medicine Hospitalist Pager numbers:   [facility] Hospitalist Medicine Team A (Fuad/Kathe): [phone]  [facility] Hospitalist Medicine Team B (Nayana/Alysa):  [phone]

## 2022-12-16 NOTE — PLAN OF CARE
VN note: VN completed AVS and attachments and notified bedside nurseLu. Will cont to be available and intervene prn.

## 2022-12-16 NOTE — PLAN OF CARE
Clinical bedside swallow eval completed, rec: OP GI assessment to r/o esophageal component post swallow. Pt to be DC'd this date.

## 2022-12-16 NOTE — ED NOTES
Attempted to call [facility] again and the call was answered and transferred to her ext, but not answered.

## 2023-01-07 NOTE — DISCHARGE SUMMARY
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 Patient was in her usual state of health until sudden onset inability to swallow while eating lunch on day of admit. She had no pain on swallowing but felt as though both solids and liquids were getting stuck and she had to spit them back out. She had not had any associated nausea, vomiting, or abdominal pain. She noted one prior episode a month ago that resolved on its own. She had no other symptoms, denies dizziness, changes in vision, or new onset headaches. CTH obtained in ED without acute pathology. No further episodes occurs while inpatient. Low suspicion for new CVA. Clinical presentation concerning for esophageal spasm. Speech therapy consulted, suspected possible esophageal component. Was cleared and tolerated diet. GI eval and barium swallow was recommended. GI was consulted as well inpatient. As patient's symptoms resolved and tolerating diet, further work up will be done outpatient.    For the full HPI please refer to the History & Physical from this admission.    Hospital Course By Problem with Pertinent Findings     Dysphagia   - patient reports 2 days of difficulty swallowing  - denies pain, nausea, vomiting, hx of GERD  - multinodular goiter noted in chart, not felt on exam   - TSH 0.272,  subclinical   - has had one other instance similar to this about 1 month ago that resolved on its own   - suspect etiology is esophageal spasms   - speech consulted, patient tolerating regular diet with mild regurgitation   - GI consulted as well and will continue work up outpatient with EGD     HTN  - normotensive on presentation  - continue home amlodipine  - HCTZ and lisinopril listed in chart but patient is not on these medications     Hx MCA aneurysm   - stable on head CT      Prior CVA (2020)  - continue home plavix     Discharge Medications        Medication List        CONTINUE taking these medications      amLODIPine 10 MG tablet  Commonly known as: NORVASC  Take 1 tablet (10mg total) by mouth once daily for blood pressure     aspirin 81 MG Chew     atorvastatin 80 MG tablet  Commonly known as: LIPITOR  Take 1 tablet (80 mg) by mouth once daily for cholesterol     clopidogreL 75 mg tablet  Commonly known as: PLAVIX  Take 1 tablet (75 mg total) by mouth once a day     docusate sodium 100 MG capsule  Commonly known as: COLACE     estradioL 0.01 % (0.1 mg/gram) vaginal cream  Commonly known as: ESTRACE  Place 1 g vaginally twice a week.     FLUAD QUAD 2022-23(65Y UP)(PF) 60 mcg (15 mcg x 4)/0.5 mL Syrg  Generic drug: flu vac 2022 65up-gneAW50B(PF)  Inject into the muscle.     polyethylene glycol 17 gram/dose powder  Commonly known as: GLYCOLAX            STOP taking these medications      hydroCHLOROthiazide 12.5 MG Tab  Commonly known as: HYDRODIURIL     lisinopriL 20 MG tablet  Commonly known as: PRINIVIL,ZESTRIL               Discharge Information:   Diet:  Evaluated by speech inpatient, cleared for regular diet with thin liquids    Physical Activity:  As tolerated             Instructions:  1. Take all medications as prescribed  2. Keep all follow-up appointments  3. Return to the hospital or call your primary care physicians if any worsening symptoms such as fever, chest pain, shortness of breath, return  of symptoms, or any other concerns.    Follow-Up Appointments:  Gastroenterology     Pauline Allen MD  [facility], -

## 2023-01-12 NOTE — PLAN OF CARE
Problem: SLP Goal  Goal: SLP Goal  Description: Speech Language Pathology Goals  Goals expected to be met by 7/9:  1. Patient will tolerate a mechanical soft diet and thin liquids with no overt signs of airway compromise.   2. Patient will complete mild to moderate level problem solving tasks with 70% acc given mod A.   3. Patient will provide 6-10 items within concrete categories indep.   4. Patient will attend to structured therapy tasks for 3 minutes without redirect cues.   5. Patient will participate in ongoing assessment of R,W,and VS.     Outcome: Ongoing, Progressing     Goals remain appropriate  Emily P. Abadie M.S., CCC-SLP  Speech Language Pathologist  [phone]  07/13/2020     A-T Advancement Flap Text: The defect edges were debeveled with a #15 scalpel blade.  Given the location of the defect, shape of the defect and the proximity to free margins an A-T advancement flap was deemed most appropriate.  Using a sterile surgical marker, an appropriate advancement flap was drawn incorporating the defect and placing the expected incisions within the relaxed skin tension lines where possible.    The area thus outlined was incised deep to adipose tissue with a #15 scalpel blade.  The skin margins were undermined to an appropriate distance in all directions utilizing iris scissors.

## 2023-01-26 ENCOUNTER — OFFICE VISIT (OUTPATIENT)
Dept: GASTROENTEROLOGY | Facility: CLINIC | Age: 88
End: 2023-01-26
Payer: MEDICARE

## 2023-01-26 VITALS — HEIGHT: 65 IN | BODY MASS INDEX: 27.99 KG/M2 | WEIGHT: 168 LBS

## 2023-01-26 DIAGNOSIS — Z09 HOSPITAL DISCHARGE FOLLOW-UP: ICD-10-CM

## 2023-01-26 DIAGNOSIS — R13.10 DYSPHAGIA, UNSPECIFIED TYPE: Primary | ICD-10-CM

## 2023-01-26 PROCEDURE — 3288F FALL RISK ASSESSMENT DOCD: CPT | Mod: HCWC,CPTII,S$GLB, | Performed by: NURSE PRACTITIONER

## 2023-01-26 PROCEDURE — 1101F PR PT FALLS ASSESS DOC 0-1 FALLS W/OUT INJ PAST YR: ICD-10-PCS | Mod: HCWC,CPTII,S$GLB, | Performed by: NURSE PRACTITIONER

## 2023-01-26 PROCEDURE — 99214 OFFICE O/P EST MOD 30 MIN: CPT | Mod: HCWC,S$GLB,, | Performed by: NURSE PRACTITIONER

## 2023-01-26 PROCEDURE — 99214 PR OFFICE/OUTPT VISIT, EST, LEVL IV, 30-39 MIN: ICD-10-PCS | Mod: HCWC,S$GLB,, | Performed by: NURSE PRACTITIONER

## 2023-01-26 PROCEDURE — 1159F PR MEDICATION LIST DOCUMENTED IN MEDICAL RECORD: ICD-10-PCS | Mod: HCWC,CPTII,S$GLB, | Performed by: NURSE PRACTITIONER

## 2023-01-26 PROCEDURE — 1126F AMNT PAIN NOTED NONE PRSNT: CPT | Mod: HCWC,CPTII,S$GLB, | Performed by: NURSE PRACTITIONER

## 2023-01-26 PROCEDURE — 3288F PR FALLS RISK ASSESSMENT DOCUMENTED: ICD-10-PCS | Mod: HCWC,CPTII,S$GLB, | Performed by: NURSE PRACTITIONER

## 2023-01-26 PROCEDURE — 1101F PT FALLS ASSESS-DOCD LE1/YR: CPT | Mod: HCWC,CPTII,S$GLB, | Performed by: NURSE PRACTITIONER

## 2023-01-26 PROCEDURE — 99999 PR PBB SHADOW E&M-EST. PATIENT-LVL IV: ICD-10-PCS | Mod: PBBFAC,HCWC,, | Performed by: NURSE PRACTITIONER

## 2023-01-26 PROCEDURE — 1159F MED LIST DOCD IN RCRD: CPT | Mod: HCWC,CPTII,S$GLB, | Performed by: NURSE PRACTITIONER

## 2023-01-26 PROCEDURE — 99999 PR PBB SHADOW E&M-EST. PATIENT-LVL IV: CPT | Mod: PBBFAC,HCWC,, | Performed by: NURSE PRACTITIONER

## 2023-01-26 PROCEDURE — 1126F PR PAIN SEVERITY QUANTIFIED, NO PAIN PRESENT: ICD-10-PCS | Mod: HCWC,CPTII,S$GLB, | Performed by: NURSE PRACTITIONER

## 2023-01-26 NOTE — PROGRESS NOTES
GASTROENTEROLOGY CLINIC NOTE    Chief Complaint: The primary encounter diagnosis was Dysphagia, unspecified type. A diagnosis of Hospital discharge follow-up was also pertinent to this visit.  Referring provider/PCP: Syeda Grace MD    Margarita Chávez is a 87 y.o. female who is a new patient to me with a PMH that's significant for stroke (2020), TIA, CKD Stage 3, and goiter and is accompanied by her son in person. Her other son is present via telephone. She is here today to establish care for dysphagia and hospital follow up.      Per Hospital Note:   Margarita Chávez is an 87 year old female with PMH of R MCA aneurysm, prior CVA, and HTN who presented to [facility] on 12/15/22 for a primary complaint of dysphagia. Patient was in her usual state of health until sudden onset inability to swallow while eating lunch on day of admit. She had no pain on swallowing but felt as though both solids and liquids were getting stuck and she had to spit them back out. She had not had any associated nausea, vomiting, or abdominal pain. She noted one prior episode a month ago that resolved on its own. She had no other symptoms, denies dizziness, changes in vision, or new onset headaches. CTH obtained in ED without acute pathology. No further episodes occurs while inpatient. Low suspicion for new CVA. Clinical presentation concerning for esophageal spasm. Speech therapy consulted, suspected possible esophageal component. Was cleared and tolerated diet. GI eval and barium swallow was recommended. GI was consulted as well inpatient. As patient's symptoms resolved and tolerating diet, further work up will be done outpatient    States while eating boiled carrot, felt it get stuck with swallowing. Points to neck as location of where she felt food sticking.  Tried drinking [employer] and was unable to swallow liquid.  She therefore vomited food in order to clear it.  Had 1-2 episodes of vomiting following.  Son reports similar  occurrence about two years ago following stroke.  Has not experienced any further episodes since hospital stay.  No nasal regurgitation, odynophagia, pyrosis, reflux, water brash, chest pain, abdominal pain, or melena.  She is not currently taking PPI.  No changes in bowel movements; occurring daily.     Bedside Swallow Eval:   Consistencies Assessed:  Thin liquids x5  Puree x3  Solids x3       Oral Phase:   Oral residue: Mild oral residue with solids. Not cleared with thin liquid wash.      Pharyngeal Phase:   no overt clinical signs/symptoms of aspiration  no overt clinical signs/symptoms of pharyngeal dysphagia     Compensatory Strategies  None      NSAIDs: No  Anticoagulation or Antiplatelet: Plavix    History of H.pylori:  H.pylori Treatment:   Prior Upper Endoscopy: no  Prior Colonoscopy: unknown  Family h/o Colon Cancer: No  Family h/o Crohn's Disease or Ulcerative Colitis: No  Family h/o Celiac Sprue: No  Abdominal Surgeries: hysterectomy    Review of Systems   Constitutional:  Negative for weight loss.   HENT:  Negative for sore throat.    Eyes:  Negative for blurred vision.   Respiratory:  Negative for cough.    Cardiovascular:  Negative for chest pain.   Gastrointestinal:  Negative for abdominal pain, blood in stool, constipation, diarrhea, heartburn, melena, nausea and vomiting.   Genitourinary:  Negative for dysuria.   Musculoskeletal:  Negative for myalgias.   Skin:  Negative for rash.   Neurological:  Negative for headaches.   Endo/Heme/Allergies:  Negative for environmental allergies.   Psychiatric/Behavioral:  Negative for suicidal ideas. The patient is not nervous/anxious.      Past Medical History: has a past medical history of Aneurysm of middle cerebral artery, Arthritis, Hypertension, Stroke, and TIA (transient ischemic attack).    Past Surgical History: has a past surgical history that includes Brain surgery; Hysterectomy; and Cerebral angiogram (N/A, 8/19/2020).    Family History:family  "history includes Cancer in her paternal aunt.    Allergies:   Review of patient's allergies indicates:   Allergen Reactions    Penicillin     Penicillins Nausea And Vomiting       Social History: reports that she has never smoked. She has never used smokeless tobacco. She reports that she does not drink alcohol and does not use drugs.    Home medications:   Current Outpatient Medications on File Prior to Visit   Medication Sig Dispense Refill    amLODIPine (NORVASC) 10 MG tablet Take 1 tablet (10mg total) by mouth once daily for blood pressure 90 tablet 5    aspirin 81 MG Chew Take 81 mg by mouth once daily.      atorvastatin (LIPITOR) 80 MG tablet Take 1 tablet (80 mg) by mouth once daily for cholesterol 90 tablet 5    clopidogreL (PLAVIX) 75 mg tablet Take 1 tablet (75 mg total) by mouth once a day 90 tablet 5    docusate sodium (COLACE) 100 MG capsule Take 100 mg by mouth 2 (two) times daily.      estradioL (ESTRACE) 0.01 % (0.1 mg/gram) vaginal cream Place 1 g vaginally twice a week. 42.5 g 3    flu vac 2022 65up-wdeBL92K,PF, (FLUAD QUAD 2022-23,65Y UP,,PF,) 60 mcg (15 mcg x 4)/0.5 mL Syrg Inject into the muscle. 0.5 mL 0    polyethylene glycol (GLYCOLAX) 17 gram/dose powder Take 17 g by mouth once daily.      [DISCONTINUED] gabapentin (NEURONTIN) 100 MG capsule Take 1 capsule (100 mg total) by mouth 3 (three) times daily as needed for pain 90 capsule 1    [DISCONTINUED] omeprazole (PRILOSEC) 20 MG capsule Take 1 capsule (20mg total) by mouth twice a day before meals for stomach 180 capsule 4     No current facility-administered medications on file prior to visit.       Vital signs:  Ht 5' 5" (1.651 m)   Wt 76.2 kg (167 lb 15.9 oz)   LMP  (LMP Unknown)   BMI 27.96 kg/m²     Physical Exam  Vitals reviewed.   Constitutional:       General: She is not in acute distress.     Appearance: Normal appearance. She is not ill-appearing.   HENT:      Head: Normocephalic.   Cardiovascular:      Rate and Rhythm: Normal " rate and regular rhythm.      Heart sounds: Normal heart sounds. No murmur heard.  Pulmonary:      Effort: Pulmonary effort is normal. No respiratory distress.      Breath sounds: Normal breath sounds.   Chest:      Chest wall: No tenderness.   Abdominal:      General: Bowel sounds are normal. There is no distension.      Palpations: Abdomen is soft.      Tenderness: There is no abdominal tenderness. Negative signs include Callejas's sign.      Hernia: No hernia is present.   Skin:     General: Skin is warm.   Neurological:      Mental Status: She is alert and oriented to person, place, and time.   Psychiatric:         Mood and Affect: Mood normal.         Behavior: Behavior normal.       Routine labs:  Lab Results   Component Value Date    WBC 6.94 12/16/2022    HGB 13.5 12/16/2022    HCT 41.6 12/16/2022    MCV 92 12/16/2022     12/16/2022     Lab Results   Component Value Date    INR 1.1 12/15/2022     No results found for: IRON, FERRITIN, TIBC, FESATURATED  Lab Results   Component Value Date     12/16/2022    K 3.1 (L) 12/16/2022     12/16/2022    CO2 29 12/16/2022    BUN 13 12/16/2022    CREATININE 0.9 12/16/2022     Lab Results   Component Value Date    ALBUMIN 3.9 12/16/2022    ALT 19 12/16/2022    AST 21 12/16/2022    ALKPHOS 67 12/16/2022    BILITOT 0.9 12/16/2022     No results found for: GLUCOSE  Lab Results   Component Value Date    TSH 0.272 (L) 12/15/2022     Lab Results   Component Value Date    CALCIUM 10.3 12/16/2022    PHOS 3.6 07/17/2020       Imaging:      I have reviewed prior labs, imaging, and notes.      Assessment:  1. Dysphagia, unspecified type    2. Hospital discharge follow-up        Plan:  Orders Placed This Encounter    FL Esophagram With Barium Tablet     Esophagram with Barium Tablet    Had a thorough discussion with patient and both sons regarding EGD vs Esophagram and risks vs benefits of both.  Discussed both options in detail.  All were informed if esophagram does  show narrowing of the esophagus, EGD will likely be recommended. Also informed esophagram may not show cause of dysphagia.  Family and patient would like to do esophagram with tablet first as it is less invasive.     If patient needs EGD in future, will need clearance to hold Plavix.       Plan of care discussed with patient who is in agreement and verbalized understanding.     I have explained the planned procedures to the patient.The risks, benefits and alternatives of the procedure were also explained in detail. Patient verbalized understanding, all questions were answered. The patient agrees to proceed as planned    Follow Up: As Needed          Mary Dixon, LUCHO,FNP-BC  [facility]    (Portions of this note were dictated using voice recognition software and may contain dictation related errors in spelling/grammar/syntax not found on text review)

## 2023-02-02 ENCOUNTER — HOSPITAL ENCOUNTER (OUTPATIENT)
Dept: RADIOLOGY | Facility: HOSPITAL | Age: 88
Discharge: HOME | End: 2023-02-02
Attending: NURSE PRACTITIONER
Payer: MEDICARE

## 2023-02-02 DIAGNOSIS — R13.10 DYSPHAGIA, UNSPECIFIED TYPE: ICD-10-CM

## 2023-02-02 PROCEDURE — 74220 X-RAY XM ESOPHAGUS 1CNTRST: CPT | Mod: 26,HCWC,, | Performed by: RADIOLOGY

## 2023-02-02 PROCEDURE — 74220 X-RAY XM ESOPHAGUS 1CNTRST: CPT | Mod: TC,HCWC

## 2023-02-02 PROCEDURE — A9698 NON-RAD CONTRAST MATERIALNOC: HCPCS | Mod: HCWC | Performed by: NURSE PRACTITIONER

## 2023-02-02 PROCEDURE — 25500020 PHARM REV CODE 255: Mod: HCWC | Performed by: NURSE PRACTITIONER

## 2023-02-02 PROCEDURE — 74220 FL ESOPHAGRAM WITH BARIUM TABLET: ICD-10-PCS | Mod: 26,HCWC,, | Performed by: RADIOLOGY

## 2023-02-02 RX ADMIN — BARIUM SULFATE 100 ML: 980 POWDER, FOR SUSPENSION ORAL at 10:02

## 2023-02-02 RX ADMIN — BARIUM SULFATE 100 ML: 0.6 SUSPENSION ORAL at 11:02

## 2023-02-07 DIAGNOSIS — Z00.00 ENCOUNTER FOR MEDICARE ANNUAL WELLNESS EXAM: ICD-10-CM

## 2023-02-09 DIAGNOSIS — Z00.00 ENCOUNTER FOR MEDICARE ANNUAL WELLNESS EXAM: ICD-10-CM

## 2023-02-27 ENCOUNTER — TELEPHONE (OUTPATIENT)
Dept: GASTROENTEROLOGY | Facility: CLINIC | Age: 88
End: 2023-02-27
Payer: MEDICARE

## 2023-02-27 NOTE — TELEPHONE ENCOUNTER
----- Message from Mary Kenney NP sent at 2/26/2023  3:28 PM CST -----  Regarding: esophagram results  Please let patient know I reviewed the esophagram results and discussed with Dr. Hassan. There is some dysmotility with the esophagus. 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 This could be causing the problem swallowing she was having. I do recommend an upper endoscopy to further evaluate. If she would like to schedule please send me a message to enter the order. Thanks.

## 2023-02-27 NOTE — TELEPHONE ENCOUNTER
Spoke to pt son Mr. Medina. Informed that Mary DOMINGUEZ reviewed the esophagram results and discussed with Dr. Hassan. There is some dysmotility with the esophagus. 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 This could be causing the problem swallowing she was having. Mary DOMINGUEZ do recommend an upper endoscopy to further evaluate. Informed to let us know if they would want to do egd. Pt son requested to send this message on portal so he can  read and discuss it with his mother and he said he will send us message back if they would like to do the egd or not. Verbalized understanding

## 2023-03-10 ENCOUNTER — OFFICE VISIT (OUTPATIENT)
Dept: URGENT CARE | Facility: CLINIC | Age: 88
End: 2023-03-10
Payer: MEDICARE

## 2023-03-10 VITALS
HEIGHT: 65 IN | DIASTOLIC BLOOD PRESSURE: 75 MMHG | TEMPERATURE: 98 F | RESPIRATION RATE: 16 BRPM | OXYGEN SATURATION: 95 % | BODY MASS INDEX: 27.96 KG/M2 | SYSTOLIC BLOOD PRESSURE: 141 MMHG | HEART RATE: 67 BPM

## 2023-03-10 DIAGNOSIS — R52 PAIN: Primary | ICD-10-CM

## 2023-03-10 PROCEDURE — 99213 PR OFFICE/OUTPT VISIT, EST, LEVL III, 20-29 MIN: ICD-10-PCS | Mod: S$GLB,,, | Performed by: INTERNAL MEDICINE

## 2023-03-10 PROCEDURE — 99213 OFFICE O/P EST LOW 20 MIN: CPT | Mod: S$GLB,,, | Performed by: INTERNAL MEDICINE

## 2023-03-10 NOTE — PROGRESS NOTES
"Subjective:       Patient ID: Margarita Chávez is a 87 y.o. female.    Vitals:  height is 5' 5" (1.651 m). Her temperature is 97.7 °F (36.5 °C). Her blood pressure is 141/75 (abnormal) and her pulse is 67. Her respiration is 16 and oxygen saturation is 95%.     Chief Complaint: Hip Pain    87 year old patient presents left lowr quadrant pain. Patient stated symptoms started last week. Patient stated pain is  coming and going. Denies fever, constipation, diarrhea, dysuria, hematuria, radiation of pain to pelvis. Pain rated 4-5/10. No current pain, unclear what exacerbates pain. She has a history of CVA and is wheel chair bound. No pain with transfers, she denies any pain with weight bearing. No falls or trauma. No pain with defecation.     Hip Pain   The incident occurred 3 to 5 days ago. There was no injury mechanism. The pain is present in the left hip. The quality of the pain is described as aching. The pain is at a severity of 6/10. The pain has been Fluctuating since onset. Pertinent negatives include no numbness or tingling. She has tried acetaminophen for the symptoms.     Skin:  Negative for erythema.   Neurological:  Negative for numbness.     Objective:      Physical Exam   Constitutional: She is oriented to person, place, and time. She appears well-developed.  Non-toxic appearance. She does not appear ill. No distress.   HENT:   Head: Normocephalic and atraumatic.   Ears:   Right Ear: External ear normal.   Left Ear: External ear normal.   Nose: Nose normal.   Mouth/Throat: Oropharynx is clear and moist. Mucous membranes are moist. No oropharyngeal exudate. Oropharynx is clear.   Eyes: Conjunctivae and EOM are normal. Pupils are equal, round, and reactive to light. Right eye exhibits no discharge. Left eye exhibits no discharge. No scleral icterus.   Neck: Neck supple.   Cardiovascular: Normal rate, regular rhythm and normal heart sounds.   No murmur heard.Exam reveals no gallop and no friction rub. "   Pulmonary/Chest: Effort normal. No respiratory distress. She has no wheezes. She has no rales.   Abdominal: Bowel sounds are normal. She exhibits no distension. Soft. There is no abdominal tenderness. There is no rebound, no guarding, no left CVA tenderness and no right CVA tenderness.   Musculoskeletal:      Right hip: She exhibits normal range of motion, normal strength, no tenderness, no bony tenderness, no crepitus, no deformity and no laceration.      Left hip: Normal. She exhibits normal range of motion, normal strength, no tenderness, no bony tenderness, no crepitus, no deformity and no laceration.      Right lower leg: No edema.      Left lower leg: No edema.   Lymphadenopathy:     She has no cervical adenopathy.   Neurological: She is alert and oriented to person, place, and time.   Skin: Skin is warm, dry, not diaphoretic and no rash. Capillary refill takes less than 2 seconds. No erythema   Psychiatric: Her behavior is normal.   Nursing note and vitals reviewed.      Assessment:       1. Pain          Plan:         Pain    Unclear etiology of pain. No Gi symptoms to suggest diverticulitis, no fever, no dysuria. Pain is not suggestive of renal colic. She has no pain with weight bearing, no trauma to suggest bony etiology. She has full ROM of hip and no pain or tenderness of the hip joint. Abdominal exam is benign. Reassured. Heating pads/tylenol. Discussed possible gas pain or slight abdominal muscle strain.

## 2023-03-10 NOTE — PATIENT INSTRUCTIONS
Use tylenol, heating pads to help with pain.     Follow up with PCP in 1-2 weeks.     Go to ER for severe worsening of pain.

## 2023-06-07 NOTE — PROGRESS NOTES
[facility]  Physical Medicine & Rehab  Progress Note    Patient Name: Margarita Chávez  MRN: [id number]  Admission Date: 7/1/2020  Length of Stay: 6 days  Attending Physician: Robert Sharma MD    Subjective:     Principal Problem:Aneurysm of middle cerebral artery    Hospital Course:   7/2/2020:  Evaluated by SLP.  Found to have dysphagia and cognitive-linguistic impairment.  SLP recommendation: mechanical soft diet and thin liquids.  7/3/2020:  Evaluated by PT and OT.  Bed mobility maxA.  Sit to stand modA.  Transfers maxA.  UBD maxA and LBD totalA.  7/6/2020:  Participated with OT.  Bed mobility min-maxA.  EOB SBA-CGA.  Sit to stand totalA (Kris x 2).  Ambulated 1 side step MaxA & RW. UBD modA and LBD modA.  Toileting maxA.     Interval History 7/7/2020:  Patient is seen for follow-up PM&R evaluation and recommendations: Participating w/ therapy.    HPI, Past Medical, Family, and Social History remains the same as documented in the initial encounter.    Scheduled Medications:    aspirin  81 mg Oral Daily    atorvastatin  80 mg Oral QHS    clopidogreL  75 mg Oral Daily    heparin (porcine)  5,000 Units Subcutaneous Q8H       Diagnostic Results: Labs: Reviewed    PRN Medications: acetaminophen, melatonin, ondansetron, ondansetron, sodium chloride 0.9%    Review of Systems   Constitutional: Positive for activity change.   Respiratory: Negative for cough and shortness of breath.    Cardiovascular: Negative for chest pain and palpitations.   Musculoskeletal: Positive for gait problem.   Neurological: Positive for weakness.   Psychiatric/Behavioral: Positive for confusion.     Objective:     Vital Signs (Most Recent):  Temp: 98 °F (36.7 °C) (07/07/20 0525)  Pulse: 65 (07/07/20 0727)  Resp: 16 (07/07/20 0525)  BP: (!) 154/67 (07/07/20 0525)  SpO2: 98 % (07/07/20 0525)    Vital Signs (24h Range):  Temp:  [98 °F (36.7 °C)-99.6 °F (37.6 °C)] 98 °F (36.7 °C)  Pulse:  [60-92] 65  Resp:  [14-18]  Procedure: colon dx screen/iron deficiency anemia  Anes: MAC (chronic pain)  Prep: nulytely   Diabetic: insulin  Blood Thinners: no  Wt loss meds: no  ED meds: no  Iron supp: no    EGD 6/6/23 with Dr. Shaver  Colon last 4/26/21 with Dr. Shaver, repeat 1 year      Chart reviewed via Epic   16  SpO2:  [93 %-98 %] 98 %  BP: (133-154)/(60-70) 154/67     Physical Exam  Vitals signs reviewed.   Constitutional:       General: She is not in acute distress.     Appearance: She is well-developed.   HENT:      Head: Normocephalic and atraumatic.      Right Ear: External ear normal.      Left Ear: External ear normal.      Nose: Nose normal.   Eyes:      General:         Right eye: No discharge.         Left eye: No discharge.   Neck:      Musculoskeletal: Normal range of motion.   Cardiovascular:      Rate and Rhythm: Normal rate.   Pulmonary:      Effort: Pulmonary effort is normal. No respiratory distress.   Abdominal:      General: There is no distension.      Palpations: Abdomen is soft.      Tenderness: There is no abdominal tenderness.   Musculoskeletal: Normal range of motion.         General: Tenderness present.   Skin:     General: Skin is warm and dry.      Findings: No rash.   Neurological:      Mental Status: She is alert and oriented to person, place, and time.      Motor: Weakness present.   Psychiatric:         Behavior: Behavior normal.         Thought Content: Thought content normal.         Cognition and Memory: Cognition is impaired.     Assessment/Plan:      * Aneurysm of middle cerebral artery  -known R MCA aneurysm s/p clipping with multifocal intracranial arterial stenosis  -vascular neurology consulted--> patient returned to baseline, imaging negative for new event   -continue medical management     Impaired mobility and ADLs  -IRF prior to current admission   See hospital course for functional, cognitive/speech/language, and nutrition/swallow status.    Recommendations  -  Encourage mobility, OOB in chair, and early ambulation as appropriate   -  PT, OT, SLP evaluate and treat  -  Monitor mood and sleep disturbances and establish consistent sleep-wake cycle  -  Monitor for bowel and bladder dysfunction  -  Monitor for shoulder pain/subluxation and spasticity  -  DVT prophylaxis if  appropriate  -  Monitor for and prevent skin breakdown and pressure ulcers  · Early mobility, repositioning/weight shifting every 20-30 minutes when sitting, turn patient every 2 hours, proper mattress/overlay and chair cushioning, pressure relief/heel protector boots    ST elevation on ECG  -on arrival ST elevations on EKG with normal troponin  -repeat EKG with resolution of ST abnormalities  -Cardiology consulted and presentation likely demand ischemia due to neurologic issue that led to temporary EKG changes  -continue medical management    PAC recommendation: Inpatient Rehab      Hanna Ivy NP  Department of Physical Medicine & Rehab   [facility]

## 2023-10-23 RX ORDER — ESTRADIOL 0.1 MG/G
1 CREAM VAGINAL
Qty: 42.5 G | Refills: 0 | Status: SHIPPED | OUTPATIENT
Start: 2023-10-23 | End: 2024-10-21

## 2023-12-21 NOTE — PLAN OF CARE
07/17/20 1555   Post-Acute Status   Post-Acute Authorization Placement   Post-Acute Placement Status Set-up Complete     PHN auth #[id number] for SNF.  Transportation scheduled via stretcher/wheelchair van for 4:30pm to transfer to [facility].  BK Sotelo to call report to Anabel at [phone], [place].  BK Sotelo was notified of the above information.  SW notified pt's son Adam of transfer and BK Sotelo to notify Adam when pt physically leaves.    Paulette Souza, NOA  [facility]  [id number]         Assessment:    Right distal radius fracture s/p reduction  DOI:  12/18/2023      Plan:    Non-operative management  NWYOLA GUY in splint  Finger motion encouraged  OTC pain medications as needed  Follow-up 7-10 days for repeat evaluation and new right wrist x-rays         Subjective:     HPI    Patient ID:  Joslyn Cantu is a ambidextrous 72 y.o. female who tripped and fell a few days ago sustained a right distal radius fracture.  She was seen in the ED in [place] and reduced to anatomic alignment by our orthopedic residency team.  She felt the splint was too tight and had it replaced yesterday, and now feels better.  No numbness and tingling into the fingers.  He resides in a group home at [facility].  At baseline she has an comprehensible speech at baseline.    The following portions of the patient's history were reviewed and updated as appropriate: allergies, current medications, past family history, past medical history, past social history, past surgical history, and problem list.    Review of Systems     Objective:    Imaging:  Right wrist x-rays 12/18/2023  VIEWS:  XR FOREARM 2 VW RIGHT  Images: 2     FINDINGS:     Displaced distal radius metaphyseal fracture with posterior displacement of the major fracture fragment.     The proximal forearm appears intact.     Degenerative changes first CMC joint.     No lytic or blastic osseous lesion.     Soft tissue swelling noted about the wrist.     IMPRESSION:     Dorsally displaced distal radius fracture.    Physical Exam     General appearance:  NAD   Cardiac:  Regular rate  Lungs:  Unlabored breathing  Abdomen:  Non-distended    Orthopedic Examination:  Right wrist   Well-fitted sugar-tong splint remained in place during the visit.  Good finger range of motion with sensation intact to light touch  The exposed digits are warm and well-perfused

## 2024-02-27 NOTE — PROGRESS NOTES
[facility]   Re-evaluate Visit  DOS 8/5/2020     PRESENTING HISTORY     Chief Complaint/Reason for Admission:  Evaluate for debility, CVA and chronic diseases    PCP: Syeda Grace MD   Date of Admission:  7/1/2020     Date of Discharge: 7/20/2020      History of Present Illness:  Ms. Margarita Chávez is a 84 y.o. female with recent CVA (R sided residual weakness) was transferred to [facility] from Rehab for onset of confusion, slurred speech, and facial droop. Imaging was negative for CVA.  EKG showed ST elevation in lateral limb leads which resolved w neg troponins likely demand ischemia. Confusion, slurred speech, and facial droop resolved overnight.  Prior to recent CVA the patient was living independently.   Patient back to baseline - with residual subtle RUE drift. Negative concerns for any LVO. Less concerns for acute stroke etiology. However, intracranial aneurysm needs IR evaluation in near future. Need F/u set-up on op basis and optimal medical management for initiation of anticoagulation from cardiac, ACS standpoint.     Resident transferred to [facility] for PT/OT.   ____________________________________________________    Today:  The resident is currently sitting up in the wheelchair with no acute distress. AAOx3. Answering questions appropriately. No complaints. Reports bowel movements regular with no constipation.   Progressing well with therapy. Resident is improving with transfers and walks 10 feet with minimal assist.  Weakness rt side (upper >> lower) s/p CVA. No slurred speech.       Review of Systems  General ROS: negative for chills or chils  Psychological ROS: negative for hallucination, depression or suicidal ideation  Ophthalmic ROS: negative for blurry vision, photophobia or eye pain  ENT ROS: negative for epistaxis, sore throat or rhinorrhea  Respiratory ROS: no cough, shortness of breath, or wheezing  Cardiovascular ROS: no chest pain or dyspnea on  Tylenol arthritis (acetaminophen arthritis) 1-2 tablets in the morning  If pain in afternoon can take 1-2 arthritis pills again  Bedtime take tramadol    Increase iron to 2 tablets daily-take along with orange juice or vitamin d    Start omeprazole 20 mg daily in morning   exertion  Gastrointestinal ROS: no abdominal pain; no constipation  Genito-Urinary ROS: no dysuria, trouble voiding, or hematuria  Musculoskeletal ROS: negative for gait disturbance or muscular weakness  Neurological ROS: no syncope or seizures; no ataxia  Dermatological ROS: negative for pruritis, rash and jaundice        PAST HISTORY:     Past Medical History:   Diagnosis Date    Aneurysm of middle cerebral artery     Hypertension     TIA (transient ischemic attack)        Past Surgical History:   Procedure Laterality Date    BRAIN SURGERY      HYSTERECTOMY         Family History   Problem Relation Age of Onset    Cancer Paternal Aunt          MEDICATIONS & ALLERGIES:     Current Outpatient Medications on File Prior to Visit   Medication Sig Dispense Refill    amLODIPine (NORVASC) 10 MG tablet Take 1 tablet (10 mg total) by mouth once daily. 30 tablet 11    aspirin 81 MG Chew Chew and swallow 1 tablet (81 mg total) by mouth once daily. 30 tablet 5    atorvastatin (LIPITOR) 80 MG tablet Take 1 tablet (80 mg total) by mouth once daily. 90 tablet 5    clopidogreL (PLAVIX) 75 mg tablet Take 1 tablet (75 mg total) by mouth once daily. 30 tablet 0    docusate sodium (COLACE) 100 MG capsule Take 100 mg by mouth 2 (two) times daily.      hydroCHLOROthiazide (HYDRODIURIL) 12.5 MG Tab Take 2 tablets (25 mg total) by mouth once daily. 30 tablet 5    lisinopriL (PRINIVIL,ZESTRIL) 20 MG tablet Take 1 tablet (20 mg total) by mouth once daily. 90 tablet 3    polyethylene glycol (GLYCOLAX) 17 gram/dose powder Take 17 g by mouth once daily.      traMADoL (ULTRAM) 50 mg tablet Take 1 tablet (50 mg total) by mouth every 8 (eight) hours as needed for Pain. 90 tablet 0     No current facility-administered medications on file prior to visit.         Review of patient's allergies indicates:   Allergen Reactions    Penicillins Nausea And Vomiting       OBJECTIVE:     There were no vitals filed for this visit.  Wt Readings  from Last 1 Encounters:   07/09/20 0335 76.2 kg (167 lb 15.9 oz)   07/03/20 0800 75.7 kg (166 lb 15.6 oz)   07/01/20 1455 75.8 kg (167 lb)     There is no height or weight on file to calculate BMI.        Physical Exam:  General appearance: alert, cooperative, no distress  Constitutional:Oriented to person, place, and time  + appears well-developed and well-nourished.   HEENT: Normocephalic, atraumatic, neck symmetrical, no nasal discharge   Eyes: conjunctivae/corneas clear, PERRL  Lungs: clear to auscultation bilaterally  Heart: regular rate and rhythm without rub  Abdomen: soft, non-tender; bowel sounds normoactive;  Extremities: extremities symmetric; no clubbing, cyanosis, or edema  Integument: Skin color, texture, turgor normal; no rashes; hair distrubution normal  Neurologic: Alert and oriented X 4,Weakness rt side (upper >> lower).   Psychiatric: no pressured speech; normal affect; no evidence of impaired cognition       ASSESSMENT & PLAN:     CVA  TIA  Debility  - Rt sided weakness RUE   - Continue ASA, plavix, statin  - continue PT/OT SNF  - appt with neurosurgery on 8/10    Constipation  - 7/29 start on Miralax 17  Po daily and Colace 100 mg BID  8/5 improved. Reports no constipation      DJD rt knee  NSAID's contra indicated due to risk of CNS bleed  XR rt knee > no fx or dislocation  Tramadol 500 mg q 8 ordered      NSTEMI   Resolved        Hypertension   - on amlodipine 10 mg daily, lisinopril 20 mg daily and HCTZ 25 mg daily    Instructions for the patient:      Scheduled Follow-up :  Future Appointments   Date Time Provider Department Center   8/10/2020 10:00 [facility] Dez [facility]       Post Visit Medication List:     Medication List          Accurate as of August 5, 2020 11:59 PM. If you have any questions, ask your nurse or doctor.            CONTINUE taking these medications    amLODIPine 10 MG tablet  Commonly known as: NORVASC  Take 1 tablet (10 mg total) by mouth once  daily.     atorvastatin 80 MG tablet  Commonly known as: LIPITOR  Take 1 tablet (80 mg total) by mouth once daily.     docusate sodium 100 MG capsule  Commonly known as: COLACE     lisinopriL 20 MG tablet  Commonly known as: PRINIVIL,ZESTRIL  Take 1 tablet (20 mg total) by mouth once daily.     polyethylene glycol 17 gram/dose powder  Commonly known as: GLYCOLAX     traMADoL 50 mg tablet  Commonly known as: ULTRAM  Take 1 tablet (50 mg total) by mouth every 8 (eight) hours as needed for Pain.              Signing Physician:  Maggie Madden NP     Due to unresolved technical issue with EMR, Medication list on this note summary may not be accurate.

## 2024-02-29 ENCOUNTER — LAB VISIT (OUTPATIENT)
Dept: LAB | Facility: HOSPITAL | Age: 89
End: 2024-02-29
Attending: STUDENT IN AN ORGANIZED HEALTH CARE EDUCATION/TRAINING PROGRAM
Payer: MEDICARE

## 2024-02-29 ENCOUNTER — OFFICE VISIT (OUTPATIENT)
Dept: PODIATRY | Facility: CLINIC | Age: 89
End: 2024-02-29
Payer: MEDICARE

## 2024-02-29 VITALS
BODY MASS INDEX: 27.99 KG/M2 | TEMPERATURE: 98 F | SYSTOLIC BLOOD PRESSURE: 130 MMHG | WEIGHT: 168 LBS | DIASTOLIC BLOOD PRESSURE: 80 MMHG | HEART RATE: 80 BPM | HEIGHT: 65 IN

## 2024-02-29 DIAGNOSIS — G60.9 IDIOPATHIC PERIPHERAL NEUROPATHY: ICD-10-CM

## 2024-02-29 DIAGNOSIS — R73.03 PREDIABETES: ICD-10-CM

## 2024-02-29 DIAGNOSIS — B35.1 TINEA UNGUIUM: ICD-10-CM

## 2024-02-29 DIAGNOSIS — I73.9 PERIPHERAL ARTERIAL DISEASE: Primary | ICD-10-CM

## 2024-02-29 LAB
ESTIMATED AVG GLUCOSE: 120 MG/DL (ref 68–131)
HBA1C MFR BLD: 5.8 % (ref 4–5.6)

## 2024-02-29 PROCEDURE — 1101F PT FALLS ASSESS-DOCD LE1/YR: CPT | Mod: HCWC,CPTII,S$GLB, | Performed by: STUDENT IN AN ORGANIZED HEALTH CARE EDUCATION/TRAINING PROGRAM

## 2024-02-29 PROCEDURE — 1125F AMNT PAIN NOTED PAIN PRSNT: CPT | Mod: HCWC,CPTII,S$GLB, | Performed by: STUDENT IN AN ORGANIZED HEALTH CARE EDUCATION/TRAINING PROGRAM

## 2024-02-29 PROCEDURE — 99204 OFFICE O/P NEW MOD 45 MIN: CPT | Mod: 25,HCWC,S$GLB, | Performed by: STUDENT IN AN ORGANIZED HEALTH CARE EDUCATION/TRAINING PROGRAM

## 2024-02-29 PROCEDURE — 3288F FALL RISK ASSESSMENT DOCD: CPT | Mod: HCWC,CPTII,S$GLB, | Performed by: STUDENT IN AN ORGANIZED HEALTH CARE EDUCATION/TRAINING PROGRAM

## 2024-02-29 PROCEDURE — 36415 COLL VENOUS BLD VENIPUNCTURE: CPT | Mod: HCWC | Performed by: STUDENT IN AN ORGANIZED HEALTH CARE EDUCATION/TRAINING PROGRAM

## 2024-02-29 PROCEDURE — 99999 PR PBB SHADOW E&M-EST. PATIENT-LVL III: CPT | Mod: PBBFAC,HCWC,, | Performed by: STUDENT IN AN ORGANIZED HEALTH CARE EDUCATION/TRAINING PROGRAM

## 2024-02-29 PROCEDURE — 11721 DEBRIDE NAIL 6 OR MORE: CPT | Mod: Q8,HCWC,S$GLB, | Performed by: STUDENT IN AN ORGANIZED HEALTH CARE EDUCATION/TRAINING PROGRAM

## 2024-02-29 PROCEDURE — 1159F MED LIST DOCD IN RCRD: CPT | Mod: HCWC,CPTII,S$GLB, | Performed by: STUDENT IN AN ORGANIZED HEALTH CARE EDUCATION/TRAINING PROGRAM

## 2024-02-29 PROCEDURE — 83036 HEMOGLOBIN GLYCOSYLATED A1C: CPT | Mod: HCWC | Performed by: STUDENT IN AN ORGANIZED HEALTH CARE EDUCATION/TRAINING PROGRAM

## 2024-02-29 NOTE — PROGRESS NOTES
Subjective:     Patient    Margarita Chávez is a 88 y.o. female.    Problem    New to [facility]. Presents for thick discolored toenails of both feet, worst at 1st toes. Also with pain in both feet at night which can keep her up or wake her up, but this is occasional. Reports being prediabetic but has not had blood work in a couple of years. Denies numbness, tingling, burning.      History    History obtained from patient and review of medical records.     Past Medical History:   Diagnosis Date    Aneurysm of middle cerebral artery     Arthritis     Hypertension     Stroke     TIA (transient ischemic attack)        Past Surgical History:   Procedure Laterality Date    BRAIN SURGERY      CEREBRAL ANGIOGRAM N/A 8/19/2020    Procedure: ANGIOGRAM-CEREBRAL;  Surgeon: Mando Diagnostic Provider;  Location: [facility] OR [address] [place] [address];  Service: Radiology;  Laterality: N/A;   / ANDRÉS    HYSTERECTOMY          Objective:     Vitals  Wt Readings from Last 1 Encounters:   02/29/24 76.2 kg (167 lb 15.9 oz)     Temp Readings from Last 1 Encounters:   02/29/24 98.1 °F (36.7 °C) (Oral)     BP Readings from Last 1 Encounters:   02/29/24 130/80     Pulse Readings from Last 1 Encounters:   02/29/24 80       Dermatological Exam    Skin:  Pedal hair growth diminished and Pedal skin thin and shiny on right  Pedal hair growth diminished and Pedal skin thin and shiny on left    Nails:  10 nail(s) elongated, 10 nail(s) thickened, and 10 nail(s) discolored    Vascular Exam    Arteries:  Posterior tibial artery palpable on right  Dorsalis pedis artery nonpalpable on right  Posterior tibial artery palpable on left  Dorsalis pedis artery nonpalpable on left    Veins:  Superficial veins unremarkable on right  Superficial veins unremarkable on left    Swelling:  None on right  None on left    Neurological Exam    [place] touch test:  6/6 sites sensed, light touch intact     Musculoskeletal Exam    Footwear:  Casual on right  Casual on  left    Gait Exam:   Ambulatory Status: Ambulatory  Gait: Normal  Assistive Devices: None    Foot Progression Angle:  Normal on right  Normal on left    Right Lower Extremity Additional Findings:  Right foot and ankle function, strength, and range of motion unremarkable except as noted above.     Left Lower Extremity Additional Findings:  Left foot and ankle function, strength, and range of motion unremarkable except as noted above.    Imaging and Other Tests    Imaging:  Independently reviewed and interpreted imaging, findings are as follows: N/A     Assessment:     Encounter Diagnoses   Name Primary?    Peripheral arterial disease Yes    Idiopathic peripheral neuropathy     Prediabetes     Tinea unguium         Plan:     I counseled the patient on her conditions, their implications and medical management.    Peripheral arterial disease: chronic stable  -Ordered arterial US.    Tinea unguium: chronic stable   -Discussed treatment options including (1) monitoring, (2) debridement, (3) topical antifungals, (4) oral antifungals. Discussed potential risks, benefits, alternatives to each. Patient opted for debridement only.  -Nails debrided x10, thickness and length reduced using sterile nail nippers, see procedure note.      Neuropathy, prediabetes: chronic stable  -Ordered updated A1c.   -Discussed nerve pain medications, patient declined at this time.       Return to clinic in 2-3 months for routine foot care, call sooner PRN.

## 2024-02-29 NOTE — PROCEDURES
"Routine Foot Care    Date/Time: 2/29/2024 2:30 PM    Performed by: Pola Salinas DPM  Authorized by: Pola Salinas DPM    Time out: Immediately prior to procedure a "time out" was called to verify the correct patient, procedure, equipment, support staff and site/side marked as required.    Consent Done?:  Yes (Verbal)  Hyperkeratotic Skin Lesions?: No      Nail Care Type:  Debride  Location(s): All  (Left 1st Toe, Left 3rd Toe, Left 2nd Toe, Left 4th Toe, Left 5th Toe, Right 1st Toe, Right 2nd Toe, Right 3rd Toe, Right 4th Toe and Right 5th Toe)  Patient tolerance:  Patient tolerated the procedure well with no immediate complications    "

## 2024-03-21 ENCOUNTER — HOSPITAL ENCOUNTER (OUTPATIENT)
Dept: RADIOLOGY | Facility: HOSPITAL | Age: 89
Discharge: HOME | End: 2024-03-21
Attending: STUDENT IN AN ORGANIZED HEALTH CARE EDUCATION/TRAINING PROGRAM
Payer: MEDICARE

## 2024-03-21 ENCOUNTER — TELEPHONE (OUTPATIENT)
Dept: PODIATRY | Facility: CLINIC | Age: 89
End: 2024-03-21
Payer: MEDICARE

## 2024-03-21 ENCOUNTER — PATIENT MESSAGE (OUTPATIENT)
Dept: PODIATRY | Facility: CLINIC | Age: 89
End: 2024-03-21
Payer: MEDICARE

## 2024-03-21 DIAGNOSIS — I73.9 PERIPHERAL ARTERIAL DISEASE: ICD-10-CM

## 2024-03-21 DIAGNOSIS — I73.9 PERIPHERAL ARTERIAL DISEASE: Primary | ICD-10-CM

## 2024-03-21 PROCEDURE — 93925 LOWER EXTREMITY STUDY: CPT | Mod: TC,HCWC

## 2024-03-21 PROCEDURE — 93925 LOWER EXTREMITY STUDY: CPT | Mod: 26,HCWC,, | Performed by: STUDENT IN AN ORGANIZED HEALTH CARE EDUCATION/TRAINING PROGRAM

## 2024-03-21 NOTE — TELEPHONE ENCOUNTER
Per Dr. Salinas, spoke with Adam Chávez and explained US vascular results and scheduled appointment for patient to see vascular cardiology for further evaluation and treatment.  Appointment has been scheduled and give appt. Time and date to patients carolyn Medina verbalized comprehension.

## 2024-03-21 NOTE — TELEPHONE ENCOUNTER
Reviewed arterial US, significant PAD but no urgent concerns but may impact healing potential. Placing referral to cardiology to establish care for PAD.     Pola Salinas DPM  Podiatric Medicine & Surgery  [facility]

## 2024-04-04 ENCOUNTER — OFFICE VISIT (OUTPATIENT)
Dept: CARDIOLOGY | Facility: CLINIC | Age: 89
End: 2024-04-04
Payer: MEDICARE

## 2024-04-04 VITALS
SYSTOLIC BLOOD PRESSURE: 136 MMHG | HEART RATE: 101 BPM | DIASTOLIC BLOOD PRESSURE: 80 MMHG | BODY MASS INDEX: 30.34 KG/M2 | HEIGHT: 65 IN | WEIGHT: 182.13 LBS

## 2024-04-04 DIAGNOSIS — I73.9 PERIPHERAL ARTERIAL DISEASE: ICD-10-CM

## 2024-04-04 DIAGNOSIS — E66.01 MORBID (SEVERE) OBESITY DUE TO EXCESS CALORIES: ICD-10-CM

## 2024-04-04 PROCEDURE — 99999 PR PBB SHADOW E&M-EST. PATIENT-LVL III: CPT | Mod: PBBFAC,HCWC,, | Performed by: INTERNAL MEDICINE

## 2024-04-04 PROCEDURE — 1126F AMNT PAIN NOTED NONE PRSNT: CPT | Mod: HCWC,CPTII,S$GLB, | Performed by: INTERNAL MEDICINE

## 2024-04-04 PROCEDURE — 3288F FALL RISK ASSESSMENT DOCD: CPT | Mod: HCWC,CPTII,S$GLB, | Performed by: INTERNAL MEDICINE

## 2024-04-04 PROCEDURE — 99204 OFFICE O/P NEW MOD 45 MIN: CPT | Mod: 25,HCWC,S$GLB, | Performed by: INTERNAL MEDICINE

## 2024-04-04 PROCEDURE — 1160F RVW MEDS BY RX/DR IN RCRD: CPT | Mod: HCWC,CPTII,S$GLB, | Performed by: INTERNAL MEDICINE

## 2024-04-04 PROCEDURE — 1159F MED LIST DOCD IN RCRD: CPT | Mod: HCWC,CPTII,S$GLB, | Performed by: INTERNAL MEDICINE

## 2024-04-04 PROCEDURE — 93000 ELECTROCARDIOGRAM COMPLETE: CPT | Mod: HCWC,S$GLB,, | Performed by: INTERNAL MEDICINE

## 2024-04-04 PROCEDURE — 1101F PT FALLS ASSESS-DOCD LE1/YR: CPT | Mod: HCWC,CPTII,S$GLB, | Performed by: INTERNAL MEDICINE

## 2024-04-04 NOTE — PROGRESS NOTES
HISTORY:    88-year-old female with a history of hypertension, hyperlipidemia, PAD, CVA '20, right MCA aneurysm presenting for initial evaluation by me.      Patient referred by podiatry for evaluation of PAD on screening us.    No CP, SOB, le claudication.No h/o le wounds.     Activity levels mild and limited by balance issues and general weakness. Uses a walker to ambulate.     Tolerates aspirin 81 x 1, clopidogrel 75 x 1, amlodipine 10 x 1, atorvastatin 80 x 1.    PHYSICAL EXAM:    Vitals:    04/04/24 1510   BP: 136/80   Pulse: 101       NAD, A+Ox3.  No jvd, no bruit.  RRR nml s1,s2. No murmurs.  CTA B no wheezes or crackles.  No edema.    LABS/STUDIES (imaging reviewed during clinic visit):    2022 CBC and CMP unremarkable with the exception of a low potassium.  /HDL 63/LDL 83.  A1c 5.8.    ECG December 2022 sinus rhythm with no Q-waves or ST changes.  Poor R-wave progression.    TTE 2020 normal LV size and function with EF 65%.  CVP 3.    Arterial duplex right lower extremity no ORLY.  Infrapopliteal disease noted.    ASSESSMENT & PLAN:    1. Peripheral arterial disease    2. Morbid (severe) obesity due to excess calories        Orders Placed This Encounter    IN OFFICE EKG 12-LEAD (to Muse)        Asymptomatic infrapop ds. On aggressive RF mdofication given h/o CVA. Cont aspirin 81 x 1, clopidogrel 75 x 1, amlodipine 10 x 1, atorvastatin 80 x 1. Bps controlled. Cholesterol profile acceptable.      Lelo Ramos MD

## 2024-04-05 LAB
OHS QRS DURATION: 94 MS
OHS QTC CALCULATION: 421 MS

## 2025-02-17 ENCOUNTER — HOSPITAL ENCOUNTER (OUTPATIENT)
Dept: RADIOLOGY | Facility: HOSPITAL | Age: 89
Discharge: HOME | End: 2025-02-17
Attending: FAMILY MEDICINE
Payer: MEDICARE

## 2025-02-17 DIAGNOSIS — I69.359 HEMIPARESIS AFFECTING DOMINANT SIDE AS LATE EFFECT OF CEREBROVASCULAR ACCIDENT: ICD-10-CM

## 2025-02-17 DIAGNOSIS — I67.1 ANEURYSM OF MIDDLE CEREBRAL ARTERY: ICD-10-CM

## 2025-02-17 DIAGNOSIS — G44.039 EPISODIC PAROXYSMAL HEMICRANIA, NOT INTRACTABLE: ICD-10-CM

## 2025-02-17 PROCEDURE — 70450 CT HEAD/BRAIN W/O DYE: CPT | Mod: TC,HCWC
